# Patient Record
Sex: FEMALE | Race: BLACK OR AFRICAN AMERICAN | Employment: FULL TIME | URBAN - METROPOLITAN AREA
[De-identification: names, ages, dates, MRNs, and addresses within clinical notes are randomized per-mention and may not be internally consistent; named-entity substitution may affect disease eponyms.]

---

## 2017-02-02 ENCOUNTER — OFFICE VISIT (OUTPATIENT)
Dept: GYNECOLOGY | Age: 37
End: 2017-02-02

## 2017-02-02 VITALS
HEART RATE: 73 BPM | SYSTOLIC BLOOD PRESSURE: 115 MMHG | BODY MASS INDEX: 42.34 KG/M2 | HEIGHT: 64 IN | TEMPERATURE: 98.6 F | WEIGHT: 248 LBS | RESPIRATION RATE: 17 BRPM | DIASTOLIC BLOOD PRESSURE: 68 MMHG

## 2017-02-02 DIAGNOSIS — N89.8 VAGINAL IRRITATION: ICD-10-CM

## 2017-02-02 DIAGNOSIS — R87.811 VAGINAL HIGH RISK HPV DNA TEST POSITIVE: Primary | ICD-10-CM

## 2017-02-02 PROCEDURE — 99213 OFFICE O/P EST LOW 20 MIN: CPT | Performed by: OBSTETRICS & GYNECOLOGY

## 2017-02-02 RX ORDER — CLOBETASOL PROPIONATE 0.5 MG/G
OINTMENT TOPICAL NIGHTLY
Qty: 60 G | Refills: 2 | Status: SHIPPED | OUTPATIENT
Start: 2017-02-02 | End: 2017-12-12 | Stop reason: SDUPTHER

## 2017-02-03 ENCOUNTER — TELEPHONE (OUTPATIENT)
Dept: BARIATRICS/WEIGHT MGMT | Age: 37
End: 2017-02-03

## 2017-02-07 LAB
HPV COMMENT: ABNORMAL
HPV TYPE 16: NOT DETECTED
HPV TYPE 18: NOT DETECTED
HPVOH (OTHER TYPES): DETECTED

## 2017-05-16 ENCOUNTER — OFFICE VISIT (OUTPATIENT)
Dept: ORTHOPEDIC SURGERY | Age: 37
End: 2017-05-16

## 2017-05-16 VITALS — HEIGHT: 63 IN | BODY MASS INDEX: 49.61 KG/M2 | HEART RATE: 74 BPM | WEIGHT: 279.98 LBS | RESPIRATION RATE: 17 BRPM

## 2017-05-16 DIAGNOSIS — M25.512 ACUTE PAIN OF LEFT SHOULDER: Primary | ICD-10-CM

## 2017-05-16 PROCEDURE — 99203 OFFICE O/P NEW LOW 30 MIN: CPT | Performed by: ORTHOPAEDIC SURGERY

## 2017-05-26 ENCOUNTER — OFFICE VISIT (OUTPATIENT)
Dept: INTERNAL MEDICINE CLINIC | Age: 37
End: 2017-05-26

## 2017-05-26 VITALS
BODY MASS INDEX: 53.18 KG/M2 | WEIGHT: 289 LBS | OXYGEN SATURATION: 97 % | HEIGHT: 62 IN | DIASTOLIC BLOOD PRESSURE: 79 MMHG | SYSTOLIC BLOOD PRESSURE: 137 MMHG | HEART RATE: 97 BPM

## 2017-05-26 DIAGNOSIS — I10 ESSENTIAL HYPERTENSION: Primary | Chronic | ICD-10-CM

## 2017-05-26 DIAGNOSIS — I50.32 CHRONIC DIASTOLIC CHF (CONGESTIVE HEART FAILURE) (HCC): ICD-10-CM

## 2017-05-26 DIAGNOSIS — S43.402A SPRAIN OF LEFT SHOULDER, UNSPECIFIED SHOULDER SPRAIN TYPE, INITIAL ENCOUNTER: ICD-10-CM

## 2017-05-26 DIAGNOSIS — K21.9 GASTROESOPHAGEAL REFLUX DISEASE, ESOPHAGITIS PRESENCE NOT SPECIFIED: ICD-10-CM

## 2017-05-26 PROCEDURE — 99214 OFFICE O/P EST MOD 30 MIN: CPT | Performed by: INTERNAL MEDICINE

## 2017-05-26 RX ORDER — CHOLECALCIFEROL (VITAMIN D3) 125 MCG
500 CAPSULE ORAL DAILY
Qty: 30 TABLET | Refills: 3 | Status: SHIPPED | OUTPATIENT
Start: 2017-05-26 | End: 2018-09-14

## 2017-05-26 RX ORDER — CALCIUM CARBONATE 200(500)MG
2 TABLET,CHEWABLE ORAL 2 TIMES DAILY WITH MEALS
Qty: 360 TABLET | Refills: 1 | Status: SHIPPED | OUTPATIENT
Start: 2017-05-26 | End: 2018-06-06 | Stop reason: SDUPTHER

## 2017-05-26 RX ORDER — AMLODIPINE BESYLATE 10 MG/1
TABLET ORAL
Qty: 90 TABLET | Refills: 1 | Status: SHIPPED | OUTPATIENT
Start: 2017-05-26 | End: 2018-08-01 | Stop reason: SDUPTHER

## 2017-05-26 RX ORDER — DULOXETIN HYDROCHLORIDE 60 MG/1
CAPSULE, DELAYED RELEASE ORAL
Qty: 30 CAPSULE | Refills: 3 | Status: SHIPPED | OUTPATIENT
Start: 2017-05-26 | End: 2017-12-18 | Stop reason: SDUPTHER

## 2017-05-26 RX ORDER — ACETAMINOPHEN AND CODEINE PHOSPHATE 300; 30 MG/1; MG/1
TABLET ORAL
Qty: 30 TABLET | Refills: 0 | Status: SHIPPED | OUTPATIENT
Start: 2017-05-26 | End: 2017-12-12

## 2017-05-26 RX ORDER — METFORMIN HYDROCHLORIDE 500 MG/1
1000 TABLET, EXTENDED RELEASE ORAL 2 TIMES DAILY WITH MEALS
Qty: 120 TABLET | Refills: 3 | Status: SHIPPED | OUTPATIENT
Start: 2017-05-26 | End: 2018-09-21 | Stop reason: SDUPTHER

## 2017-05-26 RX ORDER — BUMETANIDE 1 MG/1
1 TABLET ORAL 2 TIMES DAILY
Qty: 180 TABLET | Refills: 1 | Status: SHIPPED | OUTPATIENT
Start: 2017-05-26 | End: 2017-12-12 | Stop reason: SDUPTHER

## 2017-05-26 RX ORDER — SPIRONOLACTONE 100 MG/1
100 TABLET, FILM COATED ORAL DAILY
Qty: 90 TABLET | Refills: 1 | Status: SHIPPED | OUTPATIENT
Start: 2017-05-26 | End: 2017-11-27 | Stop reason: SDUPTHER

## 2017-05-26 RX ORDER — FLUTICASONE PROPIONATE 50 MCG
2 SPRAY, SUSPENSION (ML) NASAL DAILY
Qty: 1 BOTTLE | Refills: 5 | Status: SHIPPED | OUTPATIENT
Start: 2017-05-26 | End: 2018-07-31 | Stop reason: SDUPTHER

## 2017-05-26 RX ORDER — CALCIUM CARBONATE 300MG(750)
1 TABLET,CHEWABLE ORAL DAILY
Qty: 90 TABLET | Refills: 3 | Status: SHIPPED | OUTPATIENT
Start: 2017-05-26 | End: 2018-08-01 | Stop reason: SDUPTHER

## 2017-05-26 RX ORDER — LANSOPRAZOLE 30 MG/1
CAPSULE, DELAYED RELEASE ORAL
Qty: 90 CAPSULE | Refills: 1 | Status: SHIPPED | OUTPATIENT
Start: 2017-05-26 | End: 2018-06-06 | Stop reason: SDUPTHER

## 2017-05-26 RX ORDER — AMITRIPTYLINE HYDROCHLORIDE 25 MG/1
25 TABLET, FILM COATED ORAL NIGHTLY
Qty: 90 TABLET | Refills: 1 | Status: SHIPPED | OUTPATIENT
Start: 2017-05-26 | End: 2018-07-31

## 2017-05-26 ASSESSMENT — ENCOUNTER SYMPTOMS
ROS SKIN COMMENTS: + HIRSUTISM
APNEA: 1
WHEEZING: 0
COUGH: 0
GASTROINTESTINAL NEGATIVE: 1
SHORTNESS OF BREATH: 1

## 2017-05-30 ENCOUNTER — OFFICE VISIT (OUTPATIENT)
Dept: ORTHOPEDIC SURGERY | Age: 37
End: 2017-05-30

## 2017-05-30 VITALS
RESPIRATION RATE: 16 BRPM | HEIGHT: 62 IN | BODY MASS INDEX: 53.18 KG/M2 | SYSTOLIC BLOOD PRESSURE: 136 MMHG | WEIGHT: 289 LBS | DIASTOLIC BLOOD PRESSURE: 88 MMHG

## 2017-05-30 DIAGNOSIS — S40.022A CONTUSION SHOULDER/ARM, LEFT, INITIAL ENCOUNTER: Primary | ICD-10-CM

## 2017-05-30 DIAGNOSIS — S40.012A CONTUSION SHOULDER/ARM, LEFT, INITIAL ENCOUNTER: Primary | ICD-10-CM

## 2017-05-30 DIAGNOSIS — E66.01 MORBID OBESITY WITH BMI OF 50.0-59.9, ADULT (HCC): Chronic | ICD-10-CM

## 2017-05-30 PROCEDURE — 99213 OFFICE O/P EST LOW 20 MIN: CPT | Performed by: ORTHOPAEDIC SURGERY

## 2017-06-06 ENCOUNTER — TELEPHONE (OUTPATIENT)
Dept: SLEEP MEDICINE | Age: 37
End: 2017-06-06

## 2017-11-27 ENCOUNTER — OFFICE VISIT (OUTPATIENT)
Dept: ORTHOPEDIC SURGERY | Age: 37
End: 2017-11-27

## 2017-11-27 VITALS
RESPIRATION RATE: 18 BRPM | SYSTOLIC BLOOD PRESSURE: 135 MMHG | WEIGHT: 292 LBS | HEIGHT: 63 IN | HEART RATE: 68 BPM | DIASTOLIC BLOOD PRESSURE: 81 MMHG | BODY MASS INDEX: 51.74 KG/M2

## 2017-11-27 DIAGNOSIS — M75.82 TENDINITIS OF LEFT ROTATOR CUFF: Primary | ICD-10-CM

## 2017-11-27 PROCEDURE — G8417 CALC BMI ABV UP PARAM F/U: HCPCS | Performed by: ORTHOPAEDIC SURGERY

## 2017-11-27 PROCEDURE — 99213 OFFICE O/P EST LOW 20 MIN: CPT | Performed by: ORTHOPAEDIC SURGERY

## 2017-11-27 PROCEDURE — 1036F TOBACCO NON-USER: CPT | Performed by: ORTHOPAEDIC SURGERY

## 2017-11-27 PROCEDURE — G8484 FLU IMMUNIZE NO ADMIN: HCPCS | Performed by: ORTHOPAEDIC SURGERY

## 2017-11-27 PROCEDURE — G8427 DOCREV CUR MEDS BY ELIG CLIN: HCPCS | Performed by: ORTHOPAEDIC SURGERY

## 2017-11-27 RX ORDER — SPIRONOLACTONE 100 MG/1
100 TABLET, FILM COATED ORAL DAILY
Qty: 90 TABLET | Refills: 1 | Status: SHIPPED | OUTPATIENT
Start: 2017-11-27 | End: 2018-06-06 | Stop reason: SDUPTHER

## 2017-11-27 NOTE — PROGRESS NOTES
CHIEF COMPLAINT: Left shoulder pain    DATE OF INJURY: 5/16/17    History:    Zena Parmar is a 42 y.o. morbidly obese right handed Black female here for left shoulder follow up. Initial history:  [facility] for evaluation and treatment of Left shoulder pain. This is evaluated as a personal injury. The pain is described as aching and throbbing. The pain began 11 hours ago. There was an injury. She states that she accidentally rolled out of bed while asleep and landed directly on the left shoulder. She was seen in the ER this morning She was diagnosed with a shoulder sprain and recommended orthopaedic follow up. She thinks they mis-diagnosed her in the ER and wanted follow up today. Pain is rated as a 10/10 . Pain is located global.  The symptoms are worse with all activities. Symptoms improve with nothing. Limited activities include all activities. No stiffness, no weakness reported. The patient has not had PT. The patient has not had an injection. The patient has not tried NSAIDs. 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 Interval History:  She was last seen about 6 months ago. She did not follow up. She was hoping the shoulder would get better on it's own. Rates pain 2-8/10. Pain still located latera. Worse with reaching. Physical Examination:      Vital signs:  /81   Pulse 68   Resp 18   Ht 5' 2.99\" (1.6 m)   Wt 292 lb (132.5 kg)   LMP 05/26/2014 (Exact Date)   BMI 51.74 kg/m²     General:   alert, appears stated age, cooperative and no distress   Left Shoulder   Active ROM:   forward flexion 135, external rotation 80, internal rotation L4. Right shoulder: forward flexion 180, external rotation 80, internal rotation L4.       Passive ROM:  forward flexion 180   Joint Tenderness:   lateral acroimal   Neer:   positive   Ele:   mildly positive   Strength:   5/5 Supraspinatus, External rotation, Internal rotation    Right shoulder: 5/5 Supraspinatus, External rotation, Internal rotation    Drop-arm test:   negative   Belly-press test:   negative   Bear-hug test:   negative   Speed's test:   negative   Bicipital groove tenderness:  positive   Duffy's test:   not tested   Cross-body adduction test:   negative   AC joint tenderness:   positive     There are no skin lesions, cellulitis, or extreme edema in the upper extremities. Sensation is grossly intact to light touch bilaterally upper extremity. The patient has warm and well-perfused Bilateral upper extremities with brisk capillary refill. Assessment:      Left shoulder rotator cuff tendonitis      Plan:      Ice / heat prn. NSAIDs prn. PT referral.    Offered injection. She deferred. Follow up in 2 months. Call if no improvement with PT after 4-6 weeks and I will order MRI.

## 2017-12-12 ENCOUNTER — OFFICE VISIT (OUTPATIENT)
Dept: CARDIOLOGY CLINIC | Age: 37
End: 2017-12-12

## 2017-12-12 VITALS
DIASTOLIC BLOOD PRESSURE: 74 MMHG | OXYGEN SATURATION: 97 % | HEART RATE: 75 BPM | SYSTOLIC BLOOD PRESSURE: 118 MMHG | BODY MASS INDEX: 50.54 KG/M2 | HEIGHT: 63 IN | WEIGHT: 285.25 LBS

## 2017-12-12 DIAGNOSIS — R06.02 SHORTNESS OF BREATH: ICD-10-CM

## 2017-12-12 DIAGNOSIS — R07.9 CHEST PAIN IN ADULT: Primary | ICD-10-CM

## 2017-12-12 PROCEDURE — G8417 CALC BMI ABV UP PARAM F/U: HCPCS | Performed by: INTERNAL MEDICINE

## 2017-12-12 PROCEDURE — 1036F TOBACCO NON-USER: CPT | Performed by: INTERNAL MEDICINE

## 2017-12-12 PROCEDURE — G8427 DOCREV CUR MEDS BY ELIG CLIN: HCPCS | Performed by: INTERNAL MEDICINE

## 2017-12-12 PROCEDURE — 93000 ELECTROCARDIOGRAM COMPLETE: CPT | Performed by: INTERNAL MEDICINE

## 2017-12-12 PROCEDURE — G8484 FLU IMMUNIZE NO ADMIN: HCPCS | Performed by: INTERNAL MEDICINE

## 2017-12-12 PROCEDURE — 99215 OFFICE O/P EST HI 40 MIN: CPT | Performed by: INTERNAL MEDICINE

## 2017-12-12 NOTE — PATIENT INSTRUCTIONS
adult-strength or 2 to 4 low-dose aspirin. Wait for an ambulance. Do not try to drive yourself. ?Call your doctor today if:  ? · You have any trouble breathing. ? · Your chest pain gets worse. ? · You are dizzy or lightheaded, or you feel like you may faint. ? · You are not getting better as expected. ? · You are having new or different chest pain. Where can you learn more? Go to https://Sefas Innovation.Advaxis. org and sign in to your SiphonLabs account. Enter A120 in the LogicSource box to learn more about \"Chest Pain: Care Instructions. \"     If you do not have an account, please click on the \"Sign Up Now\" link. Current as of: March 20, 2017  Content Version: 11.4  © 6400-9986 YogiPlay. Care instructions adapted under license by [facility] ([facility]). If you have questions about a medical condition or this instruction, always ask your healthcare professional. [place] [address] any warranty or liability for your use of this information. Patient Education        Angina: Care Instructions  Your Care Instructions    You have a problem called angina. Angina happens when there is not enough blood flow to your heart muscle. Angina is a sign of coronary artery disease (CAD). CAD occurs when blood vessels that supply the heart become narrowed. Having CAD increases your risk of a heart attack. Chest pain or pressure is the most common symptom of angina. But some people have other symptoms, like:  · Pain, pressure, or a strange feeling in the back, neck, jaw, or upper belly, or in one or both shoulders or arms. · Shortness of breath. · Nausea or vomiting. · Lightheadedness or sudden weakness. · Fast or irregular heartbeat. Women are somewhat more likely than men to have angina symptoms like shortness of breath, nausea, and back or jaw pain. Angina can be dangerous. That's why it is important to pay attention to your symptoms.  Know what is typical for you, learn call if:  ? · You passed out (lost consciousness). ? · You have symptoms of a heart attack. These may include:  ¨ Chest pain or pressure, or a strange feeling in the chest.  ¨ Sweating. ¨ Shortness of breath. ¨ Nausea or vomiting. ¨ Pain, pressure, or a strange feeling in the back, neck, jaw, or upper belly or in one or both shoulders or arms. ¨ Lightheadedness or sudden weakness. ¨ A fast or irregular heartbeat. After you call 911, the  may tell you to chew 1 adult-strength or 2 to 4 low-dose aspirin. Wait for an ambulance. Do not try to drive yourself. ? · You have angina symptoms that do not go away with rest or are not getting better within 5 minutes after you take a dose of nitroglycerin. ?Call your doctor now or seek immediate medical care if:  ? · You are having angina symptoms more often than usual, or they are different or worse than usual.   ? · You feel dizzy or lightheaded, or you feel like you may faint. ? Watch closely for changes in your health, and be sure to contact your doctor if you have any problems. Where can you learn more? Go to https://Amplience.Top100.cn. org and sign in to your Unutility Electric account. Enter H129 in the Stitcher box to learn more about \"Angina: Care Instructions. \"     If you do not have an account, please click on the \"Sign Up Now\" link. Current as of: September 21, 2016  Content Version: 11.4  © 4550-2409 Healthwise, Populr. Care instructions adapted under license by [facility] ([facility]). If you have questions about a medical condition or this instruction, always ask your healthcare professional. [place] [address] any warranty or liability for your use of this information.

## 2017-12-12 NOTE — PROGRESS NOTES
(129.4 kg)   LMP 05/26/2014 (Exact Date)   SpO2 97%   BMI 50.53 kg/m²    HEENT:  Face: Atraumatic, Conjunctiva: Pink; non icteric,  Mucous Memb:  Moist, No thyromegaly or Lymphadenopathy  Respiratory:  Resp Assessment: normal, Resp Auscultation: clear  Cardiovascular: Auscultation: nl S1 & S2, Palpation:  Nl PMI; No heaves or thrills, JVP:  normal  Abdomen: Soft, non-tender, Normal bowel sounds,  No organomegaly  Extremities: No Cyanosis or Clubbing  Neurological: Oriented to time, place, and person, Non-anxious  Psychiatric: Normal mood and affect  Skin: Warm and dry,  No rash seen     Outpatient Prescriptions Marked as Taking for the 12/12/17 encounter (Office Visit) with Mira Jose MD   Medication Sig Dispense Refill    spironolactone (ALDACTONE) 100 MG tablet Take 1 tablet by mouth daily 90 tablet 1    DULoxetine (CYMBALTA) 60 MG extended release capsule TAKE 1 CAPSULE BY MOUTH EVERY DAY 30 capsule 3    lansoprazole (PREVACID) 30 MG delayed release capsule TAKE ONE CAPSULE BY MOUTH DAILY 90 capsule 1    Magnesium 400 MG TABS Take 1 tablet by mouth daily 90 tablet 3    amLODIPine (NORVASC) 10 MG tablet TAKE 1 TABLET BY MOUTH EVERY DAY 90 tablet 1    amitriptyline (ELAVIL) 25 MG tablet Take 1 tablet by mouth nightly 90 tablet 1    Psyllium (METAMUCIL MULTIHEALTH FIBER) 63 % POWD Take 7.5 g by mouth 2 times daily Dissolve in a large glass of liquid 1 Bottle 3    metFORMIN (GLUCOPHAGE-XR) 500 MG extended release tablet Take 2 tablets by mouth 2 times daily (with meals) 120 tablet 3    Cholecalciferol 2000 UNITS TABS Take 1 tablet by mouth daily Take 1 tablet by mouth daily.  90 tablet 1    calcium carbonate (ANTACID) 500 MG chewable tablet Take 2 tablets by mouth 2 times daily (with meals) 360 tablet 1    vitamin B-12 (CYANOCOBALAMIN) 500 MCG tablet Take 1 tablet by mouth daily 30 tablet 3    fluticasone (FLONASE) 50 MCG/ACT nasal spray 2 sprays by Nasal route daily 1 Bottle 5    loratadine (CLARITIN) 10 MG tablet Take 1 tablet by mouth daily 90 tablet 1    bumetanide (BUMEX) 1 MG tablet Take 1 tablet by mouth daily 90 tablet 3    nystatin (MYCOSTATIN) 018050 UNIT/GM powder Apply 2 times daily. 1 Bottle 2    clobetasol (TEMOVATE) 0.05 % ointment Apply topically nightly 1 Tube 3    Lactobacillus (ACIDOPHILUS PROBIOTIC) TABS Take 1 tablet by mouth 2 times daily 60 tablet 5    EPIPEN 2-CLARY 0.3 MG/0.3ML SOAJ injection   2       Labs:   Lab Results   Component Value Date    HDL 39 2016    LDLCALC 177 2016    TRIG 129 2016     EK17: reviewed    ECHO:2016  Technically limited study due to body habitus.     Normal left ventricle size, wall thickness and systolic function with an EF   of 55%.     Reversal of E/A inflow velocities across the mitral valve suggesting   impaired left ventricular relaxation.     Trivial tricuspid regurgitation with RVSP estimated at 19 mmHg. Stress Nuclear: 12 ([facility])  FINDINGS:  Resting ECG showed sinus rhythm.  The patient underwent   Lexiscan infusion  under standard protocol.  There were no ST segment changes diagnostic of   ischemia.  There  were no significant atrioventricular arrhythmias identified.  Heart rate   during the  Lexiscan infusion was 71 beats per minute.  Blood pressure was 182/92. IMAGING FINDINGS:  Stress Images:   Attenuation corrected images demonstrate a perfusion defect along the anterior wall and apex.  This is not present on the uncorrected images however. Rest images:   Normal perfusion with homogeneous uptake throughout. LVEF:  63% .       (Normal is 50% or greater). LV wall motion:   Normal  LVEDV:   152 mL        (Normal is up to 100ml for women and 150ml for men). Transient dilatation ratio:  1.6.         (Normal is up to 1.3 for women and 1.2 for men).     Corornary angiogram  & Intervention: 2012 ([facility]gr[facility])  CORONARY ANGIOGRAPHY FINDINGS    DOMINANCE:  Right dominant        LEFT MAIN:

## 2017-12-18 RX ORDER — DULOXETIN HYDROCHLORIDE 60 MG/1
CAPSULE, DELAYED RELEASE ORAL
Qty: 30 CAPSULE | Refills: 0 | Status: SHIPPED | OUTPATIENT
Start: 2017-12-18 | End: 2018-03-01 | Stop reason: SDUPTHER

## 2018-01-09 DIAGNOSIS — N89.8 VAGINAL IRRITATION: ICD-10-CM

## 2018-01-09 RX ORDER — CLOBETASOL PROPIONATE 0.5 MG/G
OINTMENT TOPICAL
Qty: 60 G | Refills: 0 | Status: SHIPPED | OUTPATIENT
Start: 2018-01-09 | End: 2018-06-06 | Stop reason: SDUPTHER

## 2018-01-16 ENCOUNTER — TELEPHONE (OUTPATIENT)
Dept: GYNECOLOGY | Age: 38
End: 2018-01-16

## 2018-01-17 ENCOUNTER — OFFICE VISIT (OUTPATIENT)
Dept: ORTHOPEDIC SURGERY | Age: 38
End: 2018-01-17

## 2018-01-17 VITALS — BODY MASS INDEX: 50.5 KG/M2 | HEIGHT: 63 IN | WEIGHT: 285 LBS

## 2018-01-17 DIAGNOSIS — M79.671 RIGHT FOOT PAIN: Primary | ICD-10-CM

## 2018-01-17 PROCEDURE — 1036F TOBACCO NON-USER: CPT | Performed by: ORTHOPAEDIC SURGERY

## 2018-01-17 PROCEDURE — 99214 OFFICE O/P EST MOD 30 MIN: CPT | Performed by: ORTHOPAEDIC SURGERY

## 2018-01-17 PROCEDURE — G8484 FLU IMMUNIZE NO ADMIN: HCPCS | Performed by: ORTHOPAEDIC SURGERY

## 2018-01-17 PROCEDURE — G8427 DOCREV CUR MEDS BY ELIG CLIN: HCPCS | Performed by: ORTHOPAEDIC SURGERY

## 2018-01-17 PROCEDURE — G8417 CALC BMI ABV UP PARAM F/U: HCPCS | Performed by: ORTHOPAEDIC SURGERY

## 2018-01-17 PROCEDURE — L3040 FT ARCH SUPRT PREMOLD LONGIT: HCPCS | Performed by: ORTHOPAEDIC SURGERY

## 2018-01-17 RX ORDER — PREDNISONE 10 MG/1
10 TABLET ORAL DAILY
Qty: 30 TABLET | Refills: 0 | Status: SHIPPED | OUTPATIENT
Start: 2018-01-17 | End: 2018-06-06 | Stop reason: ALTCHOICE

## 2018-01-17 NOTE — PATIENT INSTRUCTIONS
knees straight, slowly lift your heels above the step so that you are standing on your toes. Then slowly lower your heels below the step and toward the floor. 3. Return to the starting position, with your feet even with the step. 4. Repeat 8 to 12 times. Follow-up care is a key part of your treatment and safety. Be sure to make and go to all appointments, and call your doctor if you are having problems. It's also a good idea to know your test results and keep a list of the medicines you take. Where can you learn more? Go to https://AntVoice.Moodlerooms. org and sign in to your Peopleclick Authoria account. Enter H119 in the Yodio box to learn more about \"Arch Pain: Exercises. \"     If you do not have an account, please click on the \"Sign Up Now\" link. Current as of: March 21, 2017  Content Version: 11.5  © 2043-3533 Healthwise, Incorporated. Care instructions adapted under license by [facility] ([facility]). If you have questions about a medical condition or this instruction, always ask your healthcare professional. [place] [address] any warranty or liability for your use of this information.

## 2018-01-17 NOTE — PROGRESS NOTES
Chief Complaint    No chief complaint on file. History of Present Illness:  Indiana Cao is a 42 y.o. female. She is here for evaluation of her right foot. She's had lateral border right foot pain that is been going on now for roughly 6 weeks. She had no injury to her foot which onset of the pain. She does work in a [employer] and has to stand throughout the entire day and thinks that this is what onset of her pain. It is painful when she is standing on it is also painful at rest.  She denies radicular pain. Denies numbness or tingling. Medical History:  Patient's medications, allergies, past medical, surgical, social and family histories were reviewed and updated as appropriate. Review of Systems:  Pertinent items are noted in HPI  Review of systems reviewed from Patient History Form dated on 1/17/18 and available in the patient's chart under the Media tab. Vital Signs:  Ht 5' 3\" (1.6 m)   Wt 285 lb (129.3 kg)   LMP 05/26/2014 (Exact Date)   BMI 50.49 kg/m²     General Exam:   Constitutional: Patient is adequately groomed with no evidence of malnutrition  DTRs: Deep tendon reflexes are intact  Mental Status: The patient is oriented to time, place and person. The patient's mood and affect are appropriate. Foot Examination:    Inspection:  She is an obese female with a BMI 50. No swelling or erythema noted about the right foot. She does have an overall flat foot alignment    Palpation:  There is tenderness to palpation over the plantar aspect of the 5th metatarsal.  She does have callus in this area on the plantar aspect of the foot. There is no break in the skin    Range of Motion:  Dorsiflexion of her right ankle is to neutral    Strength:  She does have good strength with resisted plantarflexion and dorsiflexion    Special Tests:  Negative Felix test.  Negative Homans sign    Skin: There are no rashes, ulcerations or lesions.     Gait: She is walking with an antalgic gait    Reflex 2+ and symmetric    Additional Comments:       Additional Examinations:         Left Lower Extremity: Examination of the left lower extremity does not show any tenderness, deformity or injury. Range of motion is unremarkable. There is no gross instability. There are no rashes, ulcerations or lesions. Strength and tone are normal.    Radiology:     X-rays from the emergency department:  FINDINGS:   There is no evidence of acute fracture.  There is normal alignment of the   tarsometatarsal joints.  No acute joint abnormality.  No focal osseous   lesion.  There is moderate dorsal soft tissue swelling. Assessment :  Right foot pain with overloading of the lateral border of the foot    Impression:  Encounter Diagnosis   Name Primary?  Right foot pain Yes       Office Procedures:  No orders of the defined types were placed in this encounter. Treatment Plan: We are going to get her into a pair power step orthotics today. I'm going to place her onto prednisone. Also did give her some stretching exercises to help with her ankle flexibility. I will see her back in 4-6 weeks to make sure she is getting improvement.

## 2018-01-24 ENCOUNTER — HOSPITAL ENCOUNTER (OUTPATIENT)
Dept: PHYSICAL THERAPY | Age: 38
Discharge: HOME | End: 2018-01-31
Admitting: ORTHOPAEDIC SURGERY

## 2018-01-24 ASSESSMENT — PAIN SCALES - GENERAL: PAINLEVEL_OUTOF10: 4

## 2018-01-24 ASSESSMENT — PAIN DESCRIPTION - LOCATION: LOCATION: SHOULDER

## 2018-01-24 ASSESSMENT — PAIN DESCRIPTION - FREQUENCY: FREQUENCY: CONTINUOUS

## 2018-01-24 ASSESSMENT — PAIN DESCRIPTION - ORIENTATION: ORIENTATION: LEFT

## 2018-01-24 ASSESSMENT — PAIN DESCRIPTION - PAIN TYPE: TYPE: CHRONIC PAIN

## 2018-01-24 ASSESSMENT — PAIN DESCRIPTION - DESCRIPTORS: DESCRIPTORS: BURNING;CONSTANT

## 2018-01-24 NOTE — PROGRESS NOTES
Treatment Recommendations: Strengthening, ROM, Functional Mobility Training, Neuromuscular Re-education, Manual Therapy - Soft Tissue Mobilization, Home Exercise Program, Manual Therapy - Joint Manipulation    G-Code  PT G-Codes  Functional Assessment Tool Used: quick dash  Score: 23  Functional Limitation: Carrying, moving and handling objects  Carrying, Moving and Handling Objects Current Status (): At least 20 percent but less than 40 percent impaired, limited or restricted  Carrying, Moving and Handling Objects Goal Status (): 0 percent impaired, limited or restricted    OutComes Score  QuickDASH Total Score: 23       QuickDASH Disability/Symptom Score : 27.27 %      Goals  Long term goals  Time Frame for Long term goals : 6 weeks  Long term goal 1: Pt will have full ROM in L shoulder for return to PLOF  Long term goal 2: Pt will have full strength for ADL's   Long term goal 3: Pt will report that she is able to sleep her normal amount without waking wlith pain in her shoulder.    Patient Goals   Patient goals : full movement and no pain       Therapy Time   Individual Concurrent Group Co-treatment   Time In 1140         Time Out 1221         Minutes 41         Timed Code Treatment Minutes: 32 Minutes       Cherylene Cam, PT

## 2018-02-01 ENCOUNTER — HOSPITAL ENCOUNTER (OUTPATIENT)
Dept: OTHER | Age: 38
Discharge: HOME | End: 2018-02-28
Attending: ORTHOPAEDIC SURGERY | Admitting: ORTHOPAEDIC SURGERY

## 2018-02-04 RX ORDER — LORATADINE 10 MG/1
10 TABLET ORAL DAILY
Qty: 90 TABLET | Refills: 0 | Status: SHIPPED | OUTPATIENT
Start: 2018-02-04 | End: 2018-07-31 | Stop reason: SDUPTHER

## 2018-02-05 ENCOUNTER — HOSPITAL ENCOUNTER (OUTPATIENT)
Dept: PHYSICAL THERAPY | Age: 38
Discharge: HOME | End: 2018-02-06
Admitting: ORTHOPAEDIC SURGERY

## 2018-02-05 NOTE — FLOWSHEET NOTE
Physical Therapy Daily Treatment Note  Date:  2018    Patient Name:  Lissette Wilson    :  1980  MRN: [id number]  Restrictions/Precautions:  LATEX ALLERGY  Medical/Treatment Diagnosis Information:   · Diagnosis: tendonitis L RC  · Treatment Diagnosis: decreased ROM and strength in L shoulder    Tracking Information:  Physician Information Referring Practitioner: Dr Tj Justice of Care Sent Date: 18 Signed Received:    Visit Count / Total Visits      Insurance Approved Visits  /  Approved Dates:     Insurance Information PT Insurance Information: [facility]    Progress Note/G-codes   []  Yes  [x]  No Next Due:      Pain level: 2/10     Subjective: Pain was worse after last visit, had pain into left neck and down arm more. Return to MD   Objective:   Observation:   Test measurements:    None this date    Exercises:  Exercise/Equipment Resistance/Repetitions Other comments   UBE 2'/2' frd/back SCI fit   pullies  L flex 15x     /FM   Mid row: 1 x 15  20#  High row 1 x 15  20#  LPD 1 x 15   20#     ER L: 1x10 5#           Increase wt next visit   Wall/standing Wall:   LT wall slide 10x with intermittent TCs/VCs to recruit LT    Standing: L AAROM flex with LT recuirtment: 5x 2/  Pt improved recruitment of LT    : reviewed cues needed   Mat  Serratus punches: 10x  scap circles: 10x ea cw/ccw     S/l: L ER 15x S/l L shoulder AB 10x with t   Some burning sensation in L axilla.                                                      Other Therapeutic Activities:   review HEP with min cues- issued orange and green LATEX FREE tband    Home Exercise Program:   Provided h/o for HEP wall slides,  SL ER and ABD, supine scap punches and circles     : scap squeeze x 10, sh flex/abd with bar x 10, LPD/ High/mid row x 10 with green band pt issued green band    Manual Treatments:      Modalities: ,  ice bag to go    Timed Code Treatment Minutes:   30    Total Treatment Minutes: 30    Treatment/Activity Tolerance:  [x] Patient tolerated treatment well [] Patient limited by fatigue  [] Patient limited by pain  [] Patient limited by other medical complications  [] Other:     Prognosis: [x] Good [] Fair  [] Poor    Patient Requires Follow-up: [x] Yes  [] No    Plan:   [x] Continue per plan of care [] Alter current plan (see comments)  [] Plan of care initiated [] Hold pending MD visit [] Discharge  Plan for Next Session:  Strengthening, stretching, endurance training    Electronically signed by:  JUNE MarchT

## 2018-03-01 ENCOUNTER — HOSPITAL ENCOUNTER (OUTPATIENT)
Dept: OTHER | Age: 38
Discharge: HOME | End: 2018-03-31
Attending: ORTHOPAEDIC SURGERY | Admitting: ORTHOPAEDIC SURGERY

## 2018-03-01 RX ORDER — DULOXETIN HYDROCHLORIDE 60 MG/1
CAPSULE, DELAYED RELEASE ORAL
Qty: 30 CAPSULE | Refills: 3 | Status: SHIPPED | OUTPATIENT
Start: 2018-03-01 | End: 2018-08-03 | Stop reason: SDUPTHER

## 2018-03-12 ENCOUNTER — OFFICE VISIT (OUTPATIENT)
Dept: ORTHOPEDIC SURGERY | Age: 38
End: 2018-03-12

## 2018-03-12 VITALS
WEIGHT: 285.06 LBS | BODY MASS INDEX: 50.51 KG/M2 | DIASTOLIC BLOOD PRESSURE: 78 MMHG | HEART RATE: 79 BPM | RESPIRATION RATE: 17 BRPM | SYSTOLIC BLOOD PRESSURE: 121 MMHG | HEIGHT: 63 IN

## 2018-03-12 DIAGNOSIS — E66.01 MORBID OBESITY WITH BMI OF 50.0-59.9, ADULT (HCC): Chronic | ICD-10-CM

## 2018-03-12 DIAGNOSIS — M75.82 TENDINITIS OF LEFT ROTATOR CUFF: Primary | ICD-10-CM

## 2018-03-12 PROCEDURE — G8417 CALC BMI ABV UP PARAM F/U: HCPCS | Performed by: ORTHOPAEDIC SURGERY

## 2018-03-12 PROCEDURE — G8484 FLU IMMUNIZE NO ADMIN: HCPCS | Performed by: ORTHOPAEDIC SURGERY

## 2018-03-12 PROCEDURE — G8427 DOCREV CUR MEDS BY ELIG CLIN: HCPCS | Performed by: ORTHOPAEDIC SURGERY

## 2018-03-12 PROCEDURE — 99213 OFFICE O/P EST LOW 20 MIN: CPT | Performed by: ORTHOPAEDIC SURGERY

## 2018-03-12 PROCEDURE — 1036F TOBACCO NON-USER: CPT | Performed by: ORTHOPAEDIC SURGERY

## 2018-04-09 RX ORDER — LANSOPRAZOLE 30 MG/1
CAPSULE, DELAYED RELEASE ORAL
Qty: 90 CAPSULE | Refills: 0 | OUTPATIENT
Start: 2018-04-09

## 2018-06-06 ENCOUNTER — OFFICE VISIT (OUTPATIENT)
Dept: INTERNAL MEDICINE CLINIC | Age: 38
End: 2018-06-06

## 2018-06-06 VITALS
RESPIRATION RATE: 20 BRPM | DIASTOLIC BLOOD PRESSURE: 78 MMHG | SYSTOLIC BLOOD PRESSURE: 136 MMHG | BODY MASS INDEX: 51.91 KG/M2 | HEIGHT: 63 IN | HEART RATE: 76 BPM | WEIGHT: 293 LBS

## 2018-06-06 DIAGNOSIS — E66.01 MORBID OBESITY WITH BMI OF 50.0-59.9, ADULT (HCC): Chronic | ICD-10-CM

## 2018-06-06 DIAGNOSIS — R73.03 PRE-DIABETES: ICD-10-CM

## 2018-06-06 DIAGNOSIS — G56.03 BILATERAL CARPAL TUNNEL SYNDROME: ICD-10-CM

## 2018-06-06 DIAGNOSIS — I10 ESSENTIAL HYPERTENSION: Primary | Chronic | ICD-10-CM

## 2018-06-06 DIAGNOSIS — I50.32 CHRONIC DIASTOLIC CHF (CONGESTIVE HEART FAILURE) (HCC): ICD-10-CM

## 2018-06-06 DIAGNOSIS — M76.61 ACHILLES TENDINITIS OF RIGHT LOWER EXTREMITY: Primary | ICD-10-CM

## 2018-06-06 DIAGNOSIS — Z72.89 OTHER PROBLEMS RELATED TO LIFESTYLE: ICD-10-CM

## 2018-06-06 PROCEDURE — G8417 CALC BMI ABV UP PARAM F/U: HCPCS | Performed by: INTERNAL MEDICINE

## 2018-06-06 PROCEDURE — 90732 PPSV23 VACC 2 YRS+ SUBQ/IM: CPT | Performed by: INTERNAL MEDICINE

## 2018-06-06 PROCEDURE — G8427 DOCREV CUR MEDS BY ELIG CLIN: HCPCS | Performed by: INTERNAL MEDICINE

## 2018-06-06 PROCEDURE — 90471 IMMUNIZATION ADMIN: CPT | Performed by: INTERNAL MEDICINE

## 2018-06-06 PROCEDURE — 1036F TOBACCO NON-USER: CPT | Performed by: INTERNAL MEDICINE

## 2018-06-06 PROCEDURE — 99214 OFFICE O/P EST MOD 30 MIN: CPT | Performed by: INTERNAL MEDICINE

## 2018-06-06 RX ORDER — BUMETANIDE 1 MG/1
TABLET ORAL
Qty: 180 TABLET | Refills: 3 | Status: SHIPPED | OUTPATIENT
Start: 2018-06-06 | End: 2020-01-31

## 2018-06-06 RX ORDER — POLYETHYLENE GLYCOL 3350 17 G/17G
17 POWDER ORAL DAILY
Qty: 1 BOTTLE | Refills: 5 | Status: SHIPPED | OUTPATIENT
Start: 2018-06-06 | End: 2020-01-31 | Stop reason: SDUPTHER

## 2018-06-06 RX ORDER — SPIRONOLACTONE 100 MG/1
TABLET, FILM COATED ORAL
Qty: 270 TABLET | Refills: 3 | Status: SHIPPED | OUTPATIENT
Start: 2018-06-06 | End: 2018-10-15 | Stop reason: SDUPTHER

## 2018-06-06 RX ORDER — CALCIUM CARBONATE 200(500)MG
1 TABLET,CHEWABLE ORAL
Qty: 200 TABLET | Refills: 5 | Status: SHIPPED | OUTPATIENT
Start: 2018-06-06 | End: 2020-01-31

## 2018-06-06 RX ORDER — LANSOPRAZOLE 30 MG/1
CAPSULE, DELAYED RELEASE ORAL
Qty: 90 CAPSULE | Refills: 1 | Status: SHIPPED | OUTPATIENT
Start: 2018-06-06 | End: 2018-09-21 | Stop reason: SDUPTHER

## 2018-06-06 ASSESSMENT — PATIENT HEALTH QUESTIONNAIRE - PHQ9
1. LITTLE INTEREST OR PLEASURE IN DOING THINGS: 0
SUM OF ALL RESPONSES TO PHQ9 QUESTIONS 1 & 2: 0
2. FEELING DOWN, DEPRESSED OR HOPELESS: 0
SUM OF ALL RESPONSES TO PHQ QUESTIONS 1-9: 0

## 2018-06-07 ASSESSMENT — ENCOUNTER SYMPTOMS
SHORTNESS OF BREATH: 0
DIARRHEA: 0
VOMITING: 0
COUGH: 0
APNEA: 1
CONSTIPATION: 1
WHEEZING: 0
ROS SKIN COMMENTS: + HIRSUTISM

## 2018-06-11 ENCOUNTER — OFFICE VISIT (OUTPATIENT)
Dept: GYNECOLOGY | Age: 38
End: 2018-06-11

## 2018-06-11 ENCOUNTER — TELEPHONE (OUTPATIENT)
Dept: BARIATRICS/WEIGHT MGMT | Age: 38
End: 2018-06-11

## 2018-06-11 VITALS
HEART RATE: 80 BPM | SYSTOLIC BLOOD PRESSURE: 122 MMHG | DIASTOLIC BLOOD PRESSURE: 73 MMHG | WEIGHT: 293 LBS | BODY MASS INDEX: 51.9 KG/M2

## 2018-06-11 DIAGNOSIS — Z12.4 SCREENING FOR CERVICAL CANCER: ICD-10-CM

## 2018-06-11 DIAGNOSIS — Z01.419 WOMEN'S ANNUAL ROUTINE GYNECOLOGICAL EXAMINATION: Primary | ICD-10-CM

## 2018-06-11 PROCEDURE — 99395 PREV VISIT EST AGE 18-39: CPT | Performed by: NURSE PRACTITIONER

## 2018-06-14 LAB
HPV COMMENT: NORMAL
HPV TYPE 16: NOT DETECTED
HPV TYPE 18: NOT DETECTED
HPVOH (OTHER TYPES): NOT DETECTED

## 2018-07-31 ENCOUNTER — OFFICE VISIT (OUTPATIENT)
Dept: INTERNAL MEDICINE CLINIC | Age: 38
End: 2018-07-31

## 2018-07-31 VITALS — OXYGEN SATURATION: 97 % | HEART RATE: 85 BPM | SYSTOLIC BLOOD PRESSURE: 128 MMHG | DIASTOLIC BLOOD PRESSURE: 82 MMHG

## 2018-07-31 DIAGNOSIS — R40.0 DAYTIME SOMNOLENCE: ICD-10-CM

## 2018-07-31 DIAGNOSIS — J30.1 CHRONIC SEASONAL ALLERGIC RHINITIS DUE TO POLLEN: ICD-10-CM

## 2018-07-31 DIAGNOSIS — R51.9 SINUS HEADACHE: Primary | ICD-10-CM

## 2018-07-31 DIAGNOSIS — G47.33 OSA (OBSTRUCTIVE SLEEP APNEA): ICD-10-CM

## 2018-07-31 PROCEDURE — 1036F TOBACCO NON-USER: CPT | Performed by: INTERNAL MEDICINE

## 2018-07-31 PROCEDURE — 99213 OFFICE O/P EST LOW 20 MIN: CPT | Performed by: INTERNAL MEDICINE

## 2018-07-31 PROCEDURE — G8417 CALC BMI ABV UP PARAM F/U: HCPCS | Performed by: INTERNAL MEDICINE

## 2018-07-31 PROCEDURE — G8427 DOCREV CUR MEDS BY ELIG CLIN: HCPCS | Performed by: INTERNAL MEDICINE

## 2018-07-31 RX ORDER — ACETAMINOPHEN, ASPIRIN AND CAFFEINE 250; 250; 65 MG/1; MG/1; MG/1
1 TABLET, FILM COATED ORAL 2 TIMES DAILY PRN
Qty: 40 TABLET | Refills: 0 | Status: SHIPPED | OUTPATIENT
Start: 2018-07-31 | End: 2018-09-21 | Stop reason: SDUPTHER

## 2018-07-31 RX ORDER — LORATADINE 10 MG/1
10 TABLET ORAL 2 TIMES DAILY PRN
Qty: 90 TABLET | Refills: 0 | Status: SHIPPED | OUTPATIENT
Start: 2018-07-31 | End: 2018-09-14 | Stop reason: ALTCHOICE

## 2018-07-31 RX ORDER — PREDNISONE 20 MG/1
20 TABLET ORAL DAILY
Qty: 5 TABLET | Refills: 0 | Status: SHIPPED | OUTPATIENT
Start: 2018-07-31 | End: 2018-08-05

## 2018-07-31 RX ORDER — FLUTICASONE PROPIONATE 50 MCG
2 SPRAY, SUSPENSION (ML) NASAL DAILY
Qty: 1 BOTTLE | Refills: 5 | Status: SHIPPED | OUTPATIENT
Start: 2018-07-31 | End: 2020-01-31

## 2018-07-31 ASSESSMENT — PATIENT HEALTH QUESTIONNAIRE - PHQ9
SUM OF ALL RESPONSES TO PHQ9 QUESTIONS 1 & 2: 0
SUM OF ALL RESPONSES TO PHQ QUESTIONS 1-9: 0
2. FEELING DOWN, DEPRESSED OR HOPELESS: 0
1. LITTLE INTEREST OR PLEASURE IN DOING THINGS: 0

## 2018-07-31 NOTE — PROGRESS NOTES
Progress Note:    Mehrdad Genao    7/31/2018    Chief Complaint   Patient presents with    Headache     Temple headaches, pain behind ears    Nausea    Fatigue     Mr(s)Sylvie Genao has had 60 days of nausea, headache and fatigue  Progression: same, not resolving, undulating severity  Quality: feeling soreness of neck, also headache is \"pounding\"  Radiation: points to right ear and left ear also, traces downward to side of necks,  Severity: moderate, it's limiting some days  Timing: gradual onset since she   Moderation: took ibuprofen, tylenol, advil, no relief. She does have fibromyalgia. Sleeping is ok, denies fogginess feeling. PHQ-2 was zero. She is still on magnesium but she stopped her allergy medicine because it was not filled. She just feels tired all the time, waking headaches, not sure if she is snoring. Daytime fatigue, has a hx of SHYLA but not using her machine. /82   Pulse 85   LMP 05/26/2014 (Exact Date)   SpO2 97%   There is no height or weight on file to calculate BMI. Gen: Patient appears well groomed, well nourished appearing  HEAD: Atraumatic, normocephalic,   Eyes: PERRLA, EOMI   Neck: supple, no thyroid nodule appreciated, no JVD  Chest: Clear to auscultation THERESA, unlabored breathing, normal expansion  Heart: Regular rate, regular rhythm, no murmur, no rub  Abdomen: Non-tender, non-distended, bowel sounds present x3  Extremities: no edema, distal pulses intact  Patient was alert and oriented to person, place and time  Cranial compression seems to make the headache worse  Neck circumference ~19 inches  Mallampati Class III/IV    Kerry was seen today for headache, nausea and fatigue.     Diagnoses and all orders for this visit:    Sinus headache  Treat allergic rhinitis first, resume claritin and also demonstrated use of flonase, refilled flonase, refer for pulmonology eval for SHYLA again, prn excedrin migraine    Chronic seasonal allergic rhinitis due to pollen  As above    SHYLA (obstructive sleep apnea)  -     Quirino Hill MD    Daytime somnolence  -     Coreen Barger MD    Other orders  -     loratadine (CLARITIN) 10 MG tablet; Take 1 tablet by mouth 2 times daily as needed (allergies)  -     predniSONE (DELTASONE) 20 MG tablet; Take 1 tablet by mouth daily for 5 days  -     fluticasone (FLONASE) 50 MCG/ACT nasal spray; 2 sprays by Nasal route daily  -     aspirin-acetaminophen-caffeine (EXCEDRIN MIGRAINE) 250-250-65 MG per tablet; Take 1 tablet by mouth 2 times daily as needed for Headaches    Current Outpatient Prescriptions on File Prior to Visit   Medication Sig Dispense Refill    bumetanide (BUMEX) 1 MG tablet Take ONE plus HALF tablet TWICE daily 180 tablet 3    spironolactone (ALDACTONE) 100 MG tablet Take ONE plus HALF tablet daily 270 tablet 3    polyethylene glycol (MIRALAX) POWD powder Take 17 g by mouth daily 1 Bottle 5    lansoprazole (PREVACID) 30 MG delayed release capsule TAKE ONE CAPSULE BY MOUTH DAILY 90 capsule 1    DULoxetine (CYMBALTA) 60 MG extended release capsule TAKE 1 CAPSULE BY MOUTH EVERY DAY 30 capsule 3    amLODIPine (NORVASC) 10 MG tablet TAKE 1 TABLET BY MOUTH EVERY DAY 90 tablet 1    metFORMIN (GLUCOPHAGE-XR) 500 MG extended release tablet Take 2 tablets by mouth 2 times daily (with meals) 120 tablet 3    Cholecalciferol 2000 UNITS TABS Take 1 tablet by mouth daily Take 1 tablet by mouth daily.  90 tablet 1    vitamin B-12 (CYANOCOBALAMIN) 500 MCG tablet Take 1 tablet by mouth daily 30 tablet 3    EPIPEN 2-CLARY 0.3 MG/0.3ML SOAJ injection   2    calcium carbonate (ANTACID) 500 MG chewable tablet Take 1 tablet by mouth Daily with supper 200 tablet 5    Magnesium 400 MG TABS Take 1 tablet by mouth daily 90 tablet 3    Psyllium (METAMUCIL MULTIHEALTH FIBER) 63 % POWD Take 7.5 g by mouth 2 times daily Dissolve in a large glass of liquid 1 Bottle 3     No current facility-administered medications on file prior to visit.         Past Medical History:   Diagnosis Date    Acid reflux     Allergic rhinitis     Anemia     Back pain     Depression     Dermatomyositis ([address] 2008    Eczema     Fibromyalgia     GERD (gastroesophageal reflux disease)     Headache(784.0)     History of blood transfusion 2009    also two in 2014    HTN (hypertension)     Hx of blood clots     Irritable bowel syndrome     Morbid obesity with BMI of 50.0-59.9, adult (HCC)     Osteoarthritis     PCOS (polycystic ovarian syndrome)     Prediabetes     Pulmonary embolism (HCC)     Vaginal high risk HPV DNA test positive 05/2016       Past Surgical History:   Procedure Laterality Date    EYE SURGERY      crossed eyes    HYSTERECTOMY  2014    full-ovaries gone    MUSCLE BIOPSY      left thigh    OVARIAN CYST REMOVAL  1998       Social History   Substance Use Topics    Smoking status: Never Smoker    Smokeless tobacco: Never Used    Alcohol use 0.0 oz/week      Comment: occasionally       Family History   Problem Relation Age of Onset    High Blood Pressure Mother     Other Father         gout    Cancer Father         prostate    Breast Cancer Maternal Aunt 47    Asthma Maternal Cousin     Rheum Arthritis Neg Hx     Osteoarthritis Neg Hx     Diabetes Neg Hx     Heart Failure Neg Hx     High Cholesterol Neg Hx     Hypertension Neg Hx     Migraines Neg Hx     Ovarian Cancer Neg Hx     Rashes/Skin Problems Neg Hx     Seizures Neg Hx     Stroke Neg Hx     Thyroid Disease Neg Hx

## 2018-07-31 NOTE — PATIENT INSTRUCTIONS
body.  ¨ Swelling of the throat, mouth, lips, or tongue. ¨ Trouble breathing. ¨ Passing out (losing consciousness). Or you may feel very lightheaded or suddenly feel weak, confused, or restless.     · You have been given an epinephrine shot, even if you feel better.    Call your doctor now or seek immediate medical care if:    · You have symptoms of an allergic reaction, such as:  ¨ A rash or hives (raised, red areas on the skin). ¨ Itching. ¨ Swelling. ¨ Belly pain, nausea, or vomiting.    Watch closely for changes in your health, and be sure to contact your doctor if:    · You do not get better as expected. Where can you learn more? Go to [email or web address]. org and sign in to your [employer] account. Enter S774 in the [employer] box to learn more about \"Allergies: Care Instructions. \"     If you do not have an account, please click on the \"Sign Up Now\" link. Current as of: October 6, 2017  Content Version: 11.6  © 2189-7537 [employer]. Care instructions adapted under license by [facility] ([facility]). If you have questions about a medical condition or this instruction, always ask your healthcare professional. [place] [address] any warranty or liability for your use of this information. Patient Education        Learning About CPAP for Sleep Apnea  What is CPAP? CPAP is a small machine that you use at home every night while you sleep. It increases air pressure in your throat to keep your airway open. When you have sleep apnea, this can help you sleep better so you feel much better. CPAP stands for \"continuous positive airway pressure. \"  The CPAP machine will have one of the following:  · A mask that covers your nose and mouth  · Prongs that fit into your nose  · A mask that covers your nose only, the most common type. This type is called NCPAP. The N stands for \"nasal.\"  Why is it done? CPAP is usually the best treatment for obstructive sleep apnea.

## 2018-08-02 ENCOUNTER — TELEPHONE (OUTPATIENT)
Dept: INTERNAL MEDICINE CLINIC | Age: 38
End: 2018-08-02

## 2018-08-02 RX ORDER — AMLODIPINE BESYLATE 10 MG/1
TABLET ORAL
Qty: 90 TABLET | Refills: 0 | Status: SHIPPED | OUTPATIENT
Start: 2018-08-02 | End: 2018-09-21 | Stop reason: SDUPTHER

## 2018-08-02 RX ORDER — CHOLECALCIFEROL (VITAMIN D3) 125 MCG
CAPSULE ORAL
Qty: 90 TABLET | Refills: 0 | Status: SHIPPED | OUTPATIENT
Start: 2018-08-02 | End: 2018-09-21 | Stop reason: SDUPTHER

## 2018-08-02 RX ORDER — CYANOCOBALAMIN (VITAMIN B-12) 500 MCG
500 TABLET ORAL DAILY
Qty: 30 TABLET | Refills: 0 | Status: SHIPPED | OUTPATIENT
Start: 2018-08-02 | End: 2018-09-21 | Stop reason: SDUPTHER

## 2018-08-03 RX ORDER — DULOXETIN HYDROCHLORIDE 60 MG/1
CAPSULE, DELAYED RELEASE ORAL
Qty: 30 CAPSULE | Refills: 1 | Status: SHIPPED | OUTPATIENT
Start: 2018-08-03 | End: 2018-09-21 | Stop reason: SDUPTHER

## 2018-08-03 RX ORDER — LEVOCETIRIZINE DIHYDROCHLORIDE 5 MG/1
TABLET, FILM COATED ORAL
Qty: 30 TABLET | Refills: 1 | Status: SHIPPED | OUTPATIENT
Start: 2018-08-03 | End: 2018-09-21 | Stop reason: SDUPTHER

## 2018-09-14 ENCOUNTER — OFFICE VISIT (OUTPATIENT)
Dept: INTERNAL MEDICINE CLINIC | Age: 38
End: 2018-09-14

## 2018-09-14 VITALS
WEIGHT: 293 LBS | SYSTOLIC BLOOD PRESSURE: 136 MMHG | BODY MASS INDEX: 51.91 KG/M2 | DIASTOLIC BLOOD PRESSURE: 80 MMHG | TEMPERATURE: 98.4 F | HEART RATE: 76 BPM | HEIGHT: 63 IN

## 2018-09-14 DIAGNOSIS — G47.33 OBSTRUCTIVE SLEEP APNEA SYNDROME: Chronic | ICD-10-CM

## 2018-09-14 DIAGNOSIS — E78.5 DYSLIPIDEMIA (HIGH LDL; LOW HDL): ICD-10-CM

## 2018-09-14 DIAGNOSIS — Z12.39 SCREENING FOR BREAST CANCER: ICD-10-CM

## 2018-09-14 DIAGNOSIS — I10 ESSENTIAL HYPERTENSION: Chronic | ICD-10-CM

## 2018-09-14 DIAGNOSIS — R79.89 ABNORMAL TSH: ICD-10-CM

## 2018-09-14 DIAGNOSIS — E66.01 MORBID OBESITY WITH BMI OF 50.0-59.9, ADULT (HCC): Primary | Chronic | ICD-10-CM

## 2018-09-14 DIAGNOSIS — E04.9 ENLARGED THYROID GLAND: ICD-10-CM

## 2018-09-14 DIAGNOSIS — R73.03 PREDIABETES: ICD-10-CM

## 2018-09-14 DIAGNOSIS — E28.2 PCOS (POLYCYSTIC OVARIAN SYNDROME): Chronic | ICD-10-CM

## 2018-09-14 PROCEDURE — 90686 IIV4 VACC NO PRSV 0.5 ML IM: CPT | Performed by: INTERNAL MEDICINE

## 2018-09-14 PROCEDURE — 90471 IMMUNIZATION ADMIN: CPT | Performed by: INTERNAL MEDICINE

## 2018-09-14 PROCEDURE — 1036F TOBACCO NON-USER: CPT | Performed by: INTERNAL MEDICINE

## 2018-09-14 PROCEDURE — 99214 OFFICE O/P EST MOD 30 MIN: CPT | Performed by: INTERNAL MEDICINE

## 2018-09-14 PROCEDURE — G8417 CALC BMI ABV UP PARAM F/U: HCPCS | Performed by: INTERNAL MEDICINE

## 2018-09-14 PROCEDURE — G8428 CUR MEDS NOT DOCUMENT: HCPCS | Performed by: INTERNAL MEDICINE

## 2018-09-14 NOTE — PROGRESS NOTES
Vaccine Information Sheet, \"Influenza - Inactivated\"  given to Wagner Carlisle, or parent/legal guardian of  Wagner Carlisle and verbalized understanding. Patient responses:    Have you ever had a reaction to a flu vaccine? No  Are you able to eat eggs without adverse effects? Yes  Do you have any current illness? No  Have you ever had [place] Syndrome? No    Flu vaccine given per order. Please see immunization tab.
Yes Pippa Ponce MD   EPIPEN 2-CLARY 0.3 MG/0.3ML SOAJ injection  2/28/15  Yes Historical Provider, MD       Past Medical History:   Diagnosis Date    Acid reflux     Allergic rhinitis     Anemia     Back pain     Depression     Dermatomyositis ([address] 2008    Eczema     Fibromyalgia     GERD (gastroesophageal reflux disease)     Headache(784.0)     History of blood transfusion 2009    also two in 2014    HTN (hypertension)     Hx of blood clots     Irritable bowel syndrome     Morbid obesity with BMI of 50.0-59.9, adult (HCC)     Osteoarthritis     PCOS (polycystic ovarian syndrome)     Prediabetes     Pulmonary embolism (HCC)     Vaginal high risk HPV DNA test positive 05/2016       Past Surgical History:   Procedure Laterality Date    EYE SURGERY      crossed eyes    HYSTERECTOMY  2014    full-ovaries gone    MUSCLE BIOPSY      left thigh    OVARIAN CYST REMOVAL  1998       Social History   Substance Use Topics    Smoking status: Never Smoker    Smokeless tobacco: Never Used    Alcohol use 0.0 oz/week      Comment: occasionally       Family History   Problem Relation Age of Onset    High Blood Pressure Mother     Other Father         gout    Cancer Father         prostate    Breast Cancer Maternal Aunt 47    Asthma Maternal Cousin     Rheum Arthritis Neg Hx     Osteoarthritis Neg Hx     Diabetes Neg Hx     Heart Failure Neg Hx     High Cholesterol Neg Hx     Hypertension Neg Hx     Migraines Neg Hx     Ovarian Cancer Neg Hx     Rashes/Skin Problems Neg Hx     Seizures Neg Hx     Stroke Neg Hx     Thyroid Disease Neg Hx

## 2018-09-18 DIAGNOSIS — R73.03 PREDIABETES: ICD-10-CM

## 2018-09-18 DIAGNOSIS — E78.5 DYSLIPIDEMIA (HIGH LDL; LOW HDL): ICD-10-CM

## 2018-09-18 DIAGNOSIS — R79.89 ABNORMAL TSH: ICD-10-CM

## 2018-09-18 DIAGNOSIS — E04.9 ENLARGED THYROID GLAND: ICD-10-CM

## 2018-09-18 DIAGNOSIS — I10 ESSENTIAL HYPERTENSION: Chronic | ICD-10-CM

## 2018-09-18 LAB
A/G RATIO: 1.3 (ref 1.1–2.2)
ALBUMIN SERPL-MCNC: 4.5 G/DL (ref 3.4–5)
ALP BLD-CCNC: 88 U/L (ref 40–129)
ALT SERPL-CCNC: 13 U/L (ref 10–40)
ANION GAP SERPL CALCULATED.3IONS-SCNC: 12 MMOL/L (ref 3–16)
AST SERPL-CCNC: 15 U/L (ref 15–37)
BILIRUB SERPL-MCNC: 0.3 MG/DL (ref 0–1)
BUN BLDV-MCNC: 9 MG/DL (ref 7–20)
CALCIUM SERPL-MCNC: 9.5 MG/DL (ref 8.3–10.6)
CHLORIDE BLD-SCNC: 103 MMOL/L (ref 99–110)
CHOLESTEROL, FASTING: 285 MG/DL (ref 0–199)
CO2: 25 MMOL/L (ref 21–32)
CREAT SERPL-MCNC: <0.5 MG/DL (ref 0.6–1.1)
GFR AFRICAN AMERICAN: >60
GFR NON-AFRICAN AMERICAN: >60
GLOBULIN: 3.4 G/DL
GLUCOSE BLD-MCNC: 93 MG/DL (ref 70–99)
HDLC SERPL-MCNC: 44 MG/DL (ref 40–60)
LDL CHOLESTEROL CALCULATED: 215 MG/DL
POTASSIUM SERPL-SCNC: 4.8 MMOL/L (ref 3.5–5.1)
SODIUM BLD-SCNC: 140 MMOL/L (ref 136–145)
T4 TOTAL: 7.3 UG/DL (ref 4.5–10.9)
THYROID PEROXIDASE (TPO) ABS: 12 IU/ML
TOTAL PROTEIN: 7.9 G/DL (ref 6.4–8.2)
TRIGLYCERIDE, FASTING: 130 MG/DL (ref 0–150)
TSH REFLEX: 0.59 UIU/ML (ref 0.27–4.2)
VLDLC SERPL CALC-MCNC: 26 MG/DL

## 2018-09-21 ENCOUNTER — TELEPHONE (OUTPATIENT)
Dept: INTERNAL MEDICINE CLINIC | Age: 38
End: 2018-09-21

## 2018-09-21 ENCOUNTER — PROCEDURE VISIT (OUTPATIENT)
Dept: NEUROLOGY | Age: 38
End: 2018-09-21

## 2018-09-21 DIAGNOSIS — R20.0 BILATERAL ARM NUMBNESS AND TINGLING WHILE SLEEPING: Primary | ICD-10-CM

## 2018-09-21 DIAGNOSIS — R20.2 BILATERAL ARM NUMBNESS AND TINGLING WHILE SLEEPING: Primary | ICD-10-CM

## 2018-09-21 DIAGNOSIS — G56.01 RIGHT CARPAL TUNNEL SYNDROME: ICD-10-CM

## 2018-09-21 PROCEDURE — 95886 MUSC TEST DONE W/N TEST COMP: CPT | Performed by: PSYCHIATRY & NEUROLOGY

## 2018-09-21 PROCEDURE — 95911 NRV CNDJ TEST 9-10 STUDIES: CPT | Performed by: PSYCHIATRY & NEUROLOGY

## 2018-09-21 RX ORDER — LANSOPRAZOLE 30 MG/1
30 CAPSULE, DELAYED RELEASE ORAL PRN
Qty: 30 CAPSULE | Refills: 1 | Status: SHIPPED | OUTPATIENT
Start: 2018-09-21 | End: 2018-11-22 | Stop reason: SDUPTHER

## 2018-09-21 RX ORDER — METFORMIN HYDROCHLORIDE 500 MG/1
1000 TABLET, EXTENDED RELEASE ORAL 2 TIMES DAILY WITH MEALS
Qty: 120 TABLET | Refills: 5 | Status: SHIPPED | OUTPATIENT
Start: 2018-09-21 | End: 2020-01-31

## 2018-09-21 RX ORDER — AMLODIPINE BESYLATE 10 MG/1
TABLET ORAL
Qty: 30 TABLET | Refills: 5 | Status: SHIPPED | OUTPATIENT
Start: 2018-09-21 | End: 2020-01-31 | Stop reason: SDUPTHER

## 2018-09-21 RX ORDER — CYANOCOBALAMIN (VITAMIN B-12) 500 MCG
500 TABLET ORAL DAILY
Qty: 30 TABLET | Refills: 5 | Status: SHIPPED | OUTPATIENT
Start: 2018-09-21 | End: 2020-02-28

## 2018-09-21 RX ORDER — DULOXETIN HYDROCHLORIDE 60 MG/1
CAPSULE, DELAYED RELEASE ORAL
Qty: 30 CAPSULE | Refills: 5 | Status: SHIPPED | OUTPATIENT
Start: 2018-09-21 | End: 2020-01-31 | Stop reason: SDUPTHER

## 2018-09-21 RX ORDER — CHOLECALCIFEROL (VITAMIN D3) 125 MCG
CAPSULE ORAL
Qty: 30 TABLET | Refills: 5 | Status: SHIPPED | OUTPATIENT
Start: 2018-09-21 | End: 2020-01-31

## 2018-09-21 RX ORDER — ACETAMINOPHEN, ASPIRIN AND CAFFEINE 250; 250; 65 MG/1; MG/1; MG/1
1 TABLET, FILM COATED ORAL PRN
Qty: 30 TABLET | Refills: 0 | Status: SHIPPED | OUTPATIENT
Start: 2018-09-21 | End: 2018-10-15 | Stop reason: SDUPTHER

## 2018-09-21 RX ORDER — LEVOCETIRIZINE DIHYDROCHLORIDE 5 MG/1
TABLET, FILM COATED ORAL
Qty: 30 TABLET | Refills: 5 | Status: SHIPPED | OUTPATIENT
Start: 2018-09-21 | End: 2020-02-28 | Stop reason: SDUPTHER

## 2018-09-21 NOTE — TELEPHONE ENCOUNTER
Patient states that @ her appointment a few days ago, she wasn't sure which medications she needed refilled. She is calling for the following refills:    1.  levocetirizine (XYZAL) 5 MG tablet #30 w/ refills  2.  metFORMIN (GLUCOPHAGE-XR) 500 MG extended release tablet #120  3.  aspirin-acetaminophen-caffeine (EXCEDRIN MIGRAINE) 250-250-65 MG per tablet #40  4. MAGNESIUM-OXIDE 400 (241.3 Mg) MG TABS tablet #30  5. Cyanocobalamin (B-12) 500 MCG TABS #30  6. amLODIPine (NORVASC) 10 MG tablet #30  7.  lansoprazole (PREVACID) 30 MG delayed release capsule #30  8. DULoxetine (CYMBALTA) 60 MG extended release capsule #30  9. Cholecalciferol (VITAMIN D3) 2000 units TABS #30    [facility], [address] [phone] [facility] [phone]  Patient made aware to allow 24 to 48 business hours for prescription refills. Patient can be reached @ phone # provided should there be any questions.

## 2018-09-24 ENCOUNTER — HOSPITAL ENCOUNTER (OUTPATIENT)
Dept: ULTRASOUND IMAGING | Age: 38
Discharge: HOME | End: 2018-09-24
Payer: COMMERCIAL

## 2018-09-24 DIAGNOSIS — E04.9 ENLARGED THYROID GLAND: ICD-10-CM

## 2018-09-24 PROCEDURE — 76536 US EXAM OF HEAD AND NECK: CPT

## 2018-09-27 ENCOUNTER — TELEPHONE (OUTPATIENT)
Dept: INTERNAL MEDICINE CLINIC | Age: 38
End: 2018-09-27

## 2018-10-10 ENCOUNTER — TELEPHONE (OUTPATIENT)
Dept: INTERNAL MEDICINE CLINIC | Age: 38
End: 2018-10-10

## 2018-10-10 DIAGNOSIS — G56.01 CARPAL TUNNEL SYNDROME OF RIGHT WRIST: Primary | ICD-10-CM

## 2018-10-11 ENCOUNTER — TELEPHONE (OUTPATIENT)
Dept: INTERNAL MEDICINE CLINIC | Age: 38
End: 2018-10-11

## 2018-10-15 RX ORDER — SPIRONOLACTONE 100 MG/1
TABLET, FILM COATED ORAL
Qty: 270 TABLET | Refills: 1 | Status: SHIPPED | OUTPATIENT
Start: 2018-10-15 | End: 2020-02-28

## 2018-10-15 RX ORDER — DIAPER,BRIEF,ADULT, DISPOSABLE
EACH MISCELLANEOUS
Qty: 30 TABLET | Refills: 0 | Status: SHIPPED | OUTPATIENT
Start: 2018-10-15 | End: 2018-10-19 | Stop reason: SDUPTHER

## 2018-10-19 RX ORDER — DIAPER,BRIEF,ADULT, DISPOSABLE
EACH MISCELLANEOUS
Qty: 30 TABLET | Refills: 0 | Status: SHIPPED | OUTPATIENT
Start: 2018-10-19 | End: 2020-01-31

## 2018-11-19 RX ORDER — LORATADINE 10 MG/1
10 TABLET ORAL 2 TIMES DAILY PRN
Qty: 90 TABLET | Refills: 0 | OUTPATIENT
Start: 2018-11-19

## 2018-11-27 RX ORDER — LANSOPRAZOLE 30 MG/1
CAPSULE, DELAYED RELEASE ORAL
Qty: 30 CAPSULE | Refills: 0 | Status: SHIPPED | OUTPATIENT
Start: 2018-11-27 | End: 2020-01-31 | Stop reason: SDUPTHER

## 2018-12-10 ENCOUNTER — TELEPHONE (OUTPATIENT)
Dept: INTERNAL MEDICINE CLINIC | Age: 38
End: 2018-12-10

## 2019-02-04 ENCOUNTER — TELEPHONE (OUTPATIENT)
Dept: INTERNAL MEDICINE CLINIC | Age: 39
End: 2019-02-04

## 2019-02-05 ENCOUNTER — HOSPITAL ENCOUNTER (EMERGENCY)
Age: 39
Discharge: HOME | End: 2019-02-05
Payer: MEDICAID

## 2019-02-05 ENCOUNTER — APPOINTMENT (OUTPATIENT)
Dept: CT IMAGING | Age: 39
End: 2019-02-05
Payer: MEDICAID

## 2019-02-05 VITALS
DIASTOLIC BLOOD PRESSURE: 57 MMHG | RESPIRATION RATE: 18 BRPM | HEART RATE: 66 BPM | OXYGEN SATURATION: 95 % | TEMPERATURE: 98.2 F | SYSTOLIC BLOOD PRESSURE: 105 MMHG

## 2019-02-05 DIAGNOSIS — R10.84 GENERALIZED ABDOMINAL PAIN: Primary | ICD-10-CM

## 2019-02-05 LAB
A/G RATIO: 1 (ref 1.1–2.2)
ALBUMIN SERPL-MCNC: 4.1 G/DL (ref 3.4–5)
ALP BLD-CCNC: 94 U/L (ref 40–129)
ALT SERPL-CCNC: 24 U/L (ref 10–40)
ANION GAP SERPL CALCULATED.3IONS-SCNC: 12 MMOL/L (ref 3–16)
AST SERPL-CCNC: 21 U/L (ref 15–37)
BACTERIA WET PREP: NORMAL
BASOPHILS ABSOLUTE: 0 K/UL (ref 0–0.2)
BASOPHILS RELATIVE PERCENT: 0.4 %
BILIRUB SERPL-MCNC: <0.2 MG/DL (ref 0–1)
BILIRUBIN URINE: NEGATIVE
BLOOD, URINE: NEGATIVE
BUN BLDV-MCNC: 12 MG/DL (ref 7–20)
CALCIUM SERPL-MCNC: 9.4 MG/DL (ref 8.3–10.6)
CHLORIDE BLD-SCNC: 104 MMOL/L (ref 99–110)
CLARITY: CLEAR
CLUE CELLS: NORMAL
CO2: 25 MMOL/L (ref 21–32)
COLOR: YELLOW
CREAT SERPL-MCNC: 0.6 MG/DL (ref 0.6–1.1)
EOSINOPHILS ABSOLUTE: 0.4 K/UL (ref 0–0.6)
EOSINOPHILS RELATIVE PERCENT: 4.7 %
EPITHELIAL CELLS WET PREP: NORMAL
GFR AFRICAN AMERICAN: >60
GFR NON-AFRICAN AMERICAN: >60
GLOBULIN: 4.1 G/DL
GLUCOSE BLD-MCNC: 92 MG/DL (ref 70–99)
GLUCOSE URINE: NEGATIVE MG/DL
HCG QUALITATIVE: NEGATIVE
HCT VFR BLD CALC: 38.6 % (ref 36–48)
HEMOGLOBIN: 12.6 G/DL (ref 12–16)
KETONES, URINE: NEGATIVE MG/DL
LACTIC ACID, SEPSIS: 1.2 MMOL/L (ref 0.4–1.9)
LEUKOCYTE ESTERASE, URINE: NEGATIVE
LYMPHOCYTES ABSOLUTE: 2.9 K/UL (ref 1–5.1)
LYMPHOCYTES RELATIVE PERCENT: 36.7 %
MCH RBC QN AUTO: 27.9 PG (ref 26–34)
MCHC RBC AUTO-ENTMCNC: 32.5 G/DL (ref 31–36)
MCV RBC AUTO: 85.8 FL (ref 80–100)
MICROSCOPIC EXAMINATION: NORMAL
MONOCYTES ABSOLUTE: 0.7 K/UL (ref 0–1.3)
MONOCYTES RELATIVE PERCENT: 8.6 %
NEUTROPHILS ABSOLUTE: 4 K/UL (ref 1.7–7.7)
NEUTROPHILS RELATIVE PERCENT: 49.6 %
NITRITE, URINE: NEGATIVE
PDW BLD-RTO: 14.4 % (ref 12.4–15.4)
PH UA: 7.5
PLATELET # BLD: 262 K/UL (ref 135–450)
PMV BLD AUTO: 8.6 FL (ref 5–10.5)
POTASSIUM SERPL-SCNC: 4.7 MMOL/L (ref 3.5–5.1)
PROTEIN UA: NEGATIVE MG/DL
RBC # BLD: 4.5 M/UL (ref 4–5.2)
RBC WET PREP: NORMAL
SODIUM BLD-SCNC: 141 MMOL/L (ref 136–145)
SOURCE WET PREP: NORMAL
SPECIFIC GRAVITY UA: 1.02
TOTAL PROTEIN: 8.2 G/DL (ref 6.4–8.2)
TRICHOMONAS PREP: NORMAL
URINE REFLEX TO CULTURE: NORMAL
URINE TYPE: NORMAL
UROBILINOGEN, URINE: 1 E.U./DL
WBC # BLD: 8 K/UL (ref 4–11)
WBC WET PREP: NORMAL
YEAST WET PREP: NORMAL

## 2019-02-05 PROCEDURE — 83605 ASSAY OF LACTIC ACID: CPT

## 2019-02-05 PROCEDURE — 81003 URINALYSIS AUTO W/O SCOPE: CPT

## 2019-02-05 PROCEDURE — 80053 COMPREHEN METABOLIC PANEL: CPT

## 2019-02-05 PROCEDURE — 6360000002 HC RX W HCPCS: Performed by: NURSE PRACTITIONER

## 2019-02-05 PROCEDURE — 85025 COMPLETE CBC W/AUTO DIFF WBC: CPT

## 2019-02-05 PROCEDURE — 74177 CT ABD & PELVIS W/CONTRAST: CPT

## 2019-02-05 PROCEDURE — 87491 CHLMYD TRACH DNA AMP PROBE: CPT

## 2019-02-05 PROCEDURE — 87591 N.GONORRHOEAE DNA AMP PROB: CPT

## 2019-02-05 PROCEDURE — 6360000004 HC RX CONTRAST MEDICATION: Performed by: NURSE PRACTITIONER

## 2019-02-05 PROCEDURE — 99284 EMERGENCY DEPT VISIT MOD MDM: CPT

## 2019-02-05 PROCEDURE — 96374 THER/PROPH/DIAG INJ IV PUSH: CPT

## 2019-02-05 PROCEDURE — 87210 SMEAR WET MOUNT SALINE/INK: CPT

## 2019-02-05 PROCEDURE — 96375 TX/PRO/DX INJ NEW DRUG ADDON: CPT

## 2019-02-05 PROCEDURE — 2580000003 HC RX 258: Performed by: NURSE PRACTITIONER

## 2019-02-05 PROCEDURE — 96361 HYDRATE IV INFUSION ADD-ON: CPT

## 2019-02-05 PROCEDURE — 84703 CHORIONIC GONADOTROPIN ASSAY: CPT

## 2019-02-05 RX ORDER — KETOROLAC TROMETHAMINE 30 MG/ML
30 INJECTION, SOLUTION INTRAMUSCULAR; INTRAVENOUS ONCE
Status: COMPLETED | OUTPATIENT
Start: 2019-02-05 | End: 2019-02-05

## 2019-02-05 RX ORDER — DICYCLOMINE HYDROCHLORIDE 10 MG/1
10 CAPSULE ORAL EVERY 6 HOURS PRN
Qty: 20 CAPSULE | Refills: 0 | Status: SHIPPED | OUTPATIENT
Start: 2019-02-05 | End: 2020-01-31

## 2019-02-05 RX ORDER — ONDANSETRON 2 MG/ML
4 INJECTION INTRAMUSCULAR; INTRAVENOUS EVERY 6 HOURS PRN
Status: DISCONTINUED | OUTPATIENT
Start: 2019-02-05 | End: 2019-02-05 | Stop reason: HOSPADM

## 2019-02-05 RX ORDER — 0.9 % SODIUM CHLORIDE 0.9 %
1000 INTRAVENOUS SOLUTION INTRAVENOUS ONCE
Status: COMPLETED | OUTPATIENT
Start: 2019-02-05 | End: 2019-02-05

## 2019-02-05 RX ADMIN — IOPAMIDOL 75 ML: 755 INJECTION, SOLUTION INTRAVENOUS at 16:20

## 2019-02-05 RX ADMIN — ONDANSETRON 4 MG: 2 INJECTION INTRAMUSCULAR; INTRAVENOUS at 14:47

## 2019-02-05 RX ADMIN — KETOROLAC TROMETHAMINE 30 MG: 30 INJECTION, SOLUTION INTRAMUSCULAR at 14:47

## 2019-02-05 RX ADMIN — SODIUM CHLORIDE 1000 ML: 9 INJECTION, SOLUTION INTRAVENOUS at 14:47

## 2019-02-05 ASSESSMENT — ENCOUNTER SYMPTOMS
NAUSEA: 1
DIARRHEA: 0
VOMITING: 0
SHORTNESS OF BREATH: 0
CHEST TIGHTNESS: 0
ABDOMINAL PAIN: 1

## 2019-02-05 ASSESSMENT — PAIN DESCRIPTION - PAIN TYPE: TYPE: ACUTE PAIN

## 2019-02-05 ASSESSMENT — PAIN SCALES - GENERAL
PAINLEVEL_OUTOF10: 4
PAINLEVEL_OUTOF10: 4

## 2019-02-07 ENCOUNTER — TELEPHONE (OUTPATIENT)
Dept: GYNECOLOGY | Age: 39
End: 2019-02-07

## 2019-02-07 LAB
C TRACH DNA GENITAL QL NAA+PROBE: NEGATIVE
N. GONORRHOEAE DNA: NEGATIVE

## 2019-02-11 ENCOUNTER — TELEPHONE (OUTPATIENT)
Dept: GYNECOLOGY | Age: 39
End: 2019-02-11

## 2020-01-31 ENCOUNTER — OFFICE VISIT (OUTPATIENT)
Dept: INTERNAL MEDICINE CLINIC | Age: 40
End: 2020-01-31
Payer: COMMERCIAL

## 2020-01-31 VITALS
OXYGEN SATURATION: 98 % | HEART RATE: 65 BPM | DIASTOLIC BLOOD PRESSURE: 93 MMHG | WEIGHT: 293 LBS | HEIGHT: 64 IN | SYSTOLIC BLOOD PRESSURE: 145 MMHG | BODY MASS INDEX: 50.02 KG/M2 | TEMPERATURE: 98.1 F

## 2020-01-31 PROBLEM — M75.82 TENDINITIS OF LEFT ROTATOR CUFF: Status: RESOLVED | Noted: 2018-03-12 | Resolved: 2020-01-31

## 2020-01-31 LAB — HBA1C MFR BLD: 5.8 %

## 2020-01-31 PROCEDURE — G8427 DOCREV CUR MEDS BY ELIG CLIN: HCPCS | Performed by: NURSE PRACTITIONER

## 2020-01-31 PROCEDURE — G8482 FLU IMMUNIZE ORDER/ADMIN: HCPCS | Performed by: NURSE PRACTITIONER

## 2020-01-31 PROCEDURE — 99214 OFFICE O/P EST MOD 30 MIN: CPT | Performed by: NURSE PRACTITIONER

## 2020-01-31 PROCEDURE — 1036F TOBACCO NON-USER: CPT | Performed by: NURSE PRACTITIONER

## 2020-01-31 PROCEDURE — G8417 CALC BMI ABV UP PARAM F/U: HCPCS | Performed by: NURSE PRACTITIONER

## 2020-01-31 PROCEDURE — 83036 HEMOGLOBIN GLYCOSYLATED A1C: CPT | Performed by: NURSE PRACTITIONER

## 2020-01-31 RX ORDER — CHLORTHALIDONE 25 MG/1
25 TABLET ORAL DAILY
Qty: 30 TABLET | Refills: 3 | Status: SHIPPED | OUTPATIENT
Start: 2020-01-31 | End: 2020-02-28 | Stop reason: SDUPTHER

## 2020-01-31 RX ORDER — LANSOPRAZOLE 30 MG/1
CAPSULE, DELAYED RELEASE ORAL
Qty: 30 CAPSULE | Refills: 0 | Status: SHIPPED | OUTPATIENT
Start: 2020-01-31 | End: 2020-02-26

## 2020-01-31 RX ORDER — POLYETHYLENE GLYCOL 3350 17 G/17G
17 POWDER ORAL DAILY
Qty: 1 BOTTLE | Refills: 5 | Status: SHIPPED | OUTPATIENT
Start: 2020-01-31 | End: 2020-02-28 | Stop reason: SDUPTHER

## 2020-01-31 RX ORDER — AMLODIPINE BESYLATE 10 MG/1
TABLET ORAL
Qty: 30 TABLET | Refills: 5 | Status: SHIPPED | OUTPATIENT
Start: 2020-01-31 | End: 2020-02-28 | Stop reason: SDUPTHER

## 2020-01-31 RX ORDER — DULOXETIN HYDROCHLORIDE 60 MG/1
60 CAPSULE, DELAYED RELEASE ORAL DAILY
Qty: 30 CAPSULE | Refills: 3 | Status: SHIPPED | OUTPATIENT
Start: 2020-01-31 | End: 2020-02-28 | Stop reason: SDUPTHER

## 2020-01-31 SDOH — ECONOMIC STABILITY: TRANSPORTATION INSECURITY
IN THE PAST 12 MONTHS, HAS THE LACK OF TRANSPORTATION KEPT YOU FROM MEDICAL APPOINTMENTS OR FROM GETTING MEDICATIONS?: NO

## 2020-01-31 SDOH — ECONOMIC STABILITY: INCOME INSECURITY: HOW HARD IS IT FOR YOU TO PAY FOR THE VERY BASICS LIKE FOOD, HOUSING, MEDICAL CARE, AND HEATING?: PATIENT DECLINED

## 2020-01-31 SDOH — SOCIAL STABILITY: SOCIAL INSECURITY: WITHIN THE LAST YEAR, HAVE YOU BEEN HUMILIATED OR EMOTIONALLY ABUSED IN OTHER WAYS BY YOUR PARTNER OR EX-PARTNER?: NO

## 2020-01-31 SDOH — SOCIAL STABILITY: SOCIAL INSECURITY: WITHIN THE LAST YEAR, HAVE YOU BEEN AFRAID OF YOUR PARTNER OR EX-PARTNER?: NO

## 2020-01-31 SDOH — ECONOMIC STABILITY: TRANSPORTATION INSECURITY
IN THE PAST 12 MONTHS, HAS LACK OF TRANSPORTATION KEPT YOU FROM MEETINGS, WORK, OR FROM GETTING THINGS NEEDED FOR DAILY LIVING?: NO

## 2020-01-31 SDOH — HEALTH STABILITY: MENTAL HEALTH: HOW OFTEN DO YOU HAVE A DRINK CONTAINING ALCOHOL?: MONTHLY OR LESS

## 2020-01-31 SDOH — SOCIAL STABILITY: SOCIAL INSECURITY
WITHIN THE LAST YEAR, HAVE TO BEEN RAPED OR FORCED TO HAVE ANY KIND OF SEXUAL ACTIVITY BY YOUR PARTNER OR EX-PARTNER?: NO

## 2020-01-31 SDOH — ECONOMIC STABILITY: FOOD INSECURITY: WITHIN THE PAST 12 MONTHS, YOU WORRIED THAT YOUR FOOD WOULD RUN OUT BEFORE YOU GOT MONEY TO BUY MORE.: NEVER TRUE

## 2020-01-31 SDOH — ECONOMIC STABILITY: FOOD INSECURITY: WITHIN THE PAST 12 MONTHS, THE FOOD YOU BOUGHT JUST DIDN'T LAST AND YOU DIDN'T HAVE MONEY TO GET MORE.: NEVER TRUE

## 2020-01-31 SDOH — SOCIAL STABILITY: SOCIAL INSECURITY
WITHIN THE LAST YEAR, HAVE YOU BEEN KICKED, HIT, SLAPPED, OR OTHERWISE PHYSICALLY HURT BY YOUR PARTNER OR EX-PARTNER?: NO

## 2020-01-31 ASSESSMENT — PATIENT HEALTH QUESTIONNAIRE - PHQ9
SUM OF ALL RESPONSES TO PHQ QUESTIONS 1-9: 0
1. LITTLE INTEREST OR PLEASURE IN DOING THINGS: 0
2. FEELING DOWN, DEPRESSED OR HOPELESS: 0
SUM OF ALL RESPONSES TO PHQ9 QUESTIONS 1 & 2: 0
SUM OF ALL RESPONSES TO PHQ QUESTIONS 1-9: 0

## 2020-01-31 ASSESSMENT — ENCOUNTER SYMPTOMS
DIARRHEA: 0
CHEST TIGHTNESS: 0
CONSTIPATION: 0

## 2020-01-31 NOTE — PROGRESS NOTES
[address]   [address]  [address] [facility] [place] [address]  Dept: [phone]       2020    Best Brown (:  1980) najma 37 y.o. female, here for evaluation of the following medical concerns: This is a new patient being seen today for obesity, hypertension, and prediabetes. She reports not taking medication for several months. HPI   Hypertension  She reports a history of hypertension, reports being out of medication for several months. Denies significant life style changes, requesting a refill on medication. Pulmonary Embolism  She reports a history of PE, had IVC filter placed. Reports clots developed in legs and lungs. Stated has not had IVC filter removed, would like a referral to vascular. She reports intermittent epigastric discomfort, possibly related to IVC filter. Recent CT below. ICT ABDOMEN PELVIS W CONTRAST 2020  FINDINGS:  LOWER CHEST:  Unremarkable  LIVER:  Diffuse fatty infiltration  GALLBLADDER/BILE DUCTS:  Unremarkable.  No opaque gallstones  PANCREAS:  Unremarkable.  No mass or duct dilation  SPLEEN:  Unremarkable  ADRENALS:  Unremarkable    KIDNEYS/URETERS:  Unremarkable.  No hydronephrosis, stone, or suspicious mass   GI TRACT:  Unremarkable.  No obstruction or wall thickening  VESSELS:  IVC filter again noted with extraluminal extension of the limbs, stable.  The anterior limb may project sufficiently into the posterior wall of the crossing duodenum. LYMPH NODES: Unremarkable.  No enlarged lymph nodes  ABD WALL:  Unremarkable  PELVIS:  Unremarkable  BONES:  Unremarkable  OTHER:  None     Polycystic Ovarian syndrome (PCOS)  Reports a history of PCOS, treated with Spironolactone, for facial hair growth. Reports discontinuing due to loss of eyebrows but no improvement of unwanted facial hair. A1C 5.8% today    Obesity  She states she is interested in the [employer].   General weight loss/lifestyle modification strategies discussed (elicit support from others; identify saboteurs; non-food rewards, etc). Goals:   -Eat 4-5 times daily  -Avoid high fat and high sugar foods  -Include protein with all meals and snacks  -Avoid carbonation and caffeine  -Avoid calorie containing beverages  -Increase physical activity as tolerated  Overweight/Obesity related to lack of exercise, sedentary lifestyle, unhealthy eating habits, and unsuccessful diet attempts as evidenced by BMI. Congestive Heart Failure  Per chart review was started on Bumex and Aldactone for diastolic heart failure. Stated she is no longer taking medications. Had echo in 2018, seen by Cardiology. Will refer to Cardiology for recommendations.     Lab Results   Component Value Date    CHOL 242 (H) 06/02/2016    CHOL 300 (H) 01/30/2015    CHOL 199 05/29/2014     Lab Results   Component Value Date    TRIG 129 06/02/2016    TRIG 245 (H) 01/30/2015    TRIG 204 (H) 05/29/2014     Lab Results   Component Value Date    HDL 44 09/18/2018    HDL 39 (L) 06/02/2016    HDL 41 01/30/2015     Lab Results   Component Value Date    LDLCALC 215 (H) 09/18/2018    LDLCALC 177 (H) 06/02/2016    LDLCALC 210 (H) 01/30/2015     Lab Results   Component Value Date    LABVLDL 26 09/18/2018    LABVLDL 26 06/02/2016    LABVLDL 49 01/30/2015     Lab Results   Component Value Date    WBC 8.0 02/05/2019    HGB 12.6 02/05/2019    HCT 38.6 02/05/2019    MCV 85.8 02/05/2019     02/05/2019     Lab Results   Component Value Date     02/05/2019    K 4.7 02/05/2019     02/05/2019    CO2 25 02/05/2019    BUN 12 02/05/2019    CREATININE 0.6 02/05/2019    GLUCOSE 92 02/05/2019    CALCIUM 9.4 02/05/2019    PROT 8.2 02/05/2019    LABALBU 4.1 02/05/2019    BILITOT <0.2 02/05/2019    ALKPHOS 94 02/05/2019    AST 21 02/05/2019    ALT 24 02/05/2019    LABGLOM >60 02/05/2019    GFRAA >60 02/05/2019    AGRATIO 1.0 (L) 02/05/2019    GLOB 4.1 02/05/2019       Review of Systems   Constitutional: Negative for activity change and fever. HENT: Negative for congestion. Eyes: Negative for visual disturbance. Respiratory: Negative for chest tightness. Cardiovascular: Negative for chest pain, palpitations and leg swelling. Gastrointestinal: Positive for abdominal pain (epigastric, intermittent). Negative for constipation and diarrhea. Endocrine: Negative for polyuria. Genitourinary: Negative for dysuria. Musculoskeletal: Negative for arthralgias and myalgias. Skin: Negative for rash. Neurological: Negative for dizziness, light-headedness and headaches. Psychiatric/Behavioral: Negative for agitation, decreased concentration and sleep disturbance. The patient is not nervous/anxious. Prior to Visit Medications    Medication Sig Taking?  Authorizing Provider   lansoprazole (PREVACID) 30 MG delayed release capsule TAKE 1 CAPSULE BY MOUTH DAILY AS NEEDED FOR REFLUX Yes ZELDA Tate CNP   amLODIPine (NORVASC) 10 MG tablet TAKE 1 TABLET BY MOUTH EVERY DAY Yes ZELDA Tate CNP   polyethylene glycol (MIRALAX) POWD powder Take 17 g by mouth daily Yes ZELDA Tate CNP   DULoxetine (CYMBALTA) 60 MG extended release capsule Take 1 capsule by mouth daily TAKE 1 CAPSULE BY MOUTH EVERY DAY Yes ZELDA Tate CNP   chlorthalidone (HYGROTON) 25 MG tablet Take 1 tablet by mouth daily Yes ZELDA Tate CNP   spironolactone (ALDACTONE) 100 MG tablet Take ONE plus HALF tablet daily  Patient not taking: Reported on 1/31/2020  Conda Dense, DO   Cyanocobalamin (B-12) 500 MCG TABS Take 500 mcg by mouth daily  Conda Dense, DO   magnesium oxide (MAGNESIUM-OXIDE) 400 (241.3 Mg) MG TABS tablet TAKE 1 TABLET BY MOUTH EVERY DAY  Patient not taking: Reported on 1/31/2020  Conda Dense, DO   levocetirizine (XYZAL) 5 MG tablet Take one tablet by mouth daily  Patient not taking: Reported on 1/31/2020  Conda Dense, DO       Allergies   Allergen Reactions    Latex Itching and Rash  Corn-Containing Products Itching, Shortness Of Breath and Swelling    Other Anaphylaxis     enviromental  allegies    Corn, wheat, soy bean, peanuts and walnuts    Peanut-Containing Drug Products Shortness Of Breath    Soy Allergy Shortness Of Breath and Swelling    Tree Nut  [Macadamia Nut Oil] Shortness Of Breath and Swelling    Wheat Bran Hives, Itching, Shortness Of Breath and Swelling    Tramadol Other (See Comments)     Made her feel really bad, and head dizzy    Imitrex [Sumatriptan] Other (See Comments)     Chest pain       Past Medical History:   Diagnosis Date    Acid reflux     Allergic rhinitis     Anemia     Back pain     Depression     [facility]atomy[facility] ([facility] 2008    Eczema     Fibromyalgia     GERD (gastroesophageal reflux disease)     Headache(784.0)     History of blood transfusion 2009    also two in 2014    HTN (hypertension)     Hx of blood clots     Irritable bowel syndrome     Morbid obesity with BMI of 50.0-59.9, adult (HCC)     Osteoarthritis     PCOS (polycystic ovarian syndrome)     Prediabetes     Pulmonary embolism (HCC)     Vaginal high risk HPV DNA test positive 05/2016       Past Surgical History:   Procedure Laterality Date    EYE SURGERY      crossed eyes    HYSTERECTOMY  2014    full-ovaries gone    MUSCLE BIOPSY      left thigh    OVARIAN CYST REMOVAL  1998       Social History     Socioeconomic History    Marital status: Single     Spouse name: Not on file    Number of children: 3    Years of education: Not on file    Highest education level: Not on file   Occupational History    Occupation: unemployed   Social Needs    Financial resource strain: Patient refused    Food insecurity:     Worry: Never true     Inability: Never true    Transportation needs:     Medical: No     Non-medical: No   Tobacco Use    Smoking status: Never Smoker    Smokeless tobacco: Never Used   Substance and Sexual Activity    Alcohol use:  Yes     Alcohol/week: HENT:      Head: Normocephalic. Right Ear: Hearing and external ear normal.      Left Ear: Hearing and external ear normal.      Nose: Nose normal.   Eyes:      General: Lids are normal.   Cardiovascular:      Rate and Rhythm: Normal rate and regular rhythm. Heart sounds: Normal heart sounds, S1 normal and S2 normal.   Pulmonary:      Effort: Pulmonary effort is normal.      Breath sounds: Normal breath sounds. Musculoskeletal: Normal range of motion. Lymphadenopathy:      Head:      Right side of head: No submental or submandibular adenopathy. Left side of head: No submental or submandibular adenopathy. Skin:     General: Skin is warm and dry. Findings: No rash. Neurological:      Mental Status: She is alert and oriented to person, place, and time. GCS: GCS eye subscore is 4. GCS verbal subscore is 5. GCS motor subscore is 6. Psychiatric:         Attention and Perception: Attention normal.         Mood and Affect: Mood normal.         Speech: Speech normal.         Behavior: Behavior normal. Behavior is cooperative. ASSESSMENT/PLAN:  1. SHYLA (obstructive sleep apnea)    - Sheila Wiggins MD, Sleep Medicine, [facility]    2. Presence of IVC filter    - Sheila Vazquez MD, Vascular Surgery, [facility]    3. PCOS (polycystic ovarian syndrome)    - POCT glycosylated hemoglobin (Hb A1C)    4. Essential hypertension  -Take medications as prescribed. 5. Morbid obesity with BMI of 50.0-59.9, adult ([address]  -The patient is advised to begin progressive daily aerobic exercise program, attempt to lose weight, reduce exposure to stress, improve dietary compliance and continue current medications. 6. Pulmonary embolism, unspecified chronicity, unspecified pulmonary embolism type, unspecified whether acute cor pulmonale present ([address]  -Resolved    7.  Chronic diastolic CHF (congestive heart failure) ([address]  -Will review chart and refer to Cardiology  -Continue current medications. 8. Epigastric pain  -GERD vs IVF filter placement  - lansoprazole (PREVACID) 30 MG delayed release capsule; TAKE 1 CAPSULE BY MOUTH DAILY AS NEEDED FOR REFLUX  Dispense: 30 capsule;  Refill: 0  - Sheila Alvarado MD, Vascular Surgery, [facility]      Return in about 4 weeks (around 2/28/2020) for HTN.      --Sharyle Needles, APRN - CNP on 2/4/2020 at 9:29 AM

## 2020-01-31 NOTE — PATIENT INSTRUCTIONS
BP goal: less than 130/80  Eat foods high in Potassium  Call dietitian  Make appointment with Vascular Surgeon  Patient Education        Learning About High-Potassium Foods  What foods are high in potassium? The foods you eat contain nutrients, such as vitamins and minerals. Potassium is a nutrient. Your body needs the right amount to stay healthy and work as it should. You can use the list below to help you make choices about which foods to eat. Foods are high in potassium if they have more than 200 mg per serving. Fruits  · Apricots, 2 raw  · Avocado, ½ fruit  · Banana, 1 medium  · [place], 1 fruit  · Nectarine, 1 fruit  · Orange, 1 fruit  · Prunes, 5 fruits  · Raisins, ¼ cup  Vegetables  · Artichoke, 1 medium  · Beets, ½ cup  · Broccoli, ½ cup  · Kale (raw), 1 cup  · Potato with skin, 1 medium  · Spinach, ½ cup  · Sweet potato, 1 medium  · Tomato sauce, ½ cup  · Zucchini, ½ cup  Dairy and dairy alternatives  · Milk, 1 cup  · Soy milk, 1 cup  · Yogurt, 6 oz  Meats and other protein foods  · Beans (lima, navy, white), ½ cup  · Beef, ground, 3 oz  · Chicken, 3 oz  · Fish (halibut, tuna, cod, snapper), 3 oz  · Nuts (almonds, hazelnuts, [place], cashew, pistachios), 1 oz  · Peanut butter, 2 Tbsp  · Peanuts, 1 oz  · [place] 3 oz  Seasonings  · Salt substitutes  Work with your doctor to find out how much of this nutrient you need. Depending on your health, you may need more or less of it in your diet. Where can you learn more? Go to [email or web address] and sign in to your [employer] account. Enter P450 in the [employer] box to learn more about \"Learning About High-Potassium Foods. \"     If you do not have an account, please click on the \"Sign Up Now\" link. Current as of: August 21, 2019  Content Version: 12.3  © 5520-1084 [employer]wise, Inc[employer]. Care instructions adapted under license by [facility] ([facility]).  If you have questions about a medical condition or this instruction, always ask

## 2020-02-04 ASSESSMENT — ENCOUNTER SYMPTOMS: ABDOMINAL PAIN: 1

## 2020-02-05 ENCOUNTER — TELEPHONE (OUTPATIENT)
Dept: PULMONOLOGY | Age: 40
End: 2020-02-05

## 2020-02-05 NOTE — TELEPHONE ENCOUNTER
FYI patient called back today and said that she tried to get the records from [address] [place] [address] from 15 yrs ago and is not able to get the records.

## 2020-02-06 ENCOUNTER — OFFICE VISIT (OUTPATIENT)
Dept: SLEEP MEDICINE | Age: 40
End: 2020-02-06
Payer: COMMERCIAL

## 2020-02-06 VITALS
RESPIRATION RATE: 16 BRPM | WEIGHT: 293 LBS | SYSTOLIC BLOOD PRESSURE: 144 MMHG | HEIGHT: 64 IN | OXYGEN SATURATION: 97 % | BODY MASS INDEX: 50.02 KG/M2 | DIASTOLIC BLOOD PRESSURE: 86 MMHG | HEART RATE: 73 BPM

## 2020-02-06 PROCEDURE — G8427 DOCREV CUR MEDS BY ELIG CLIN: HCPCS | Performed by: PSYCHIATRY & NEUROLOGY

## 2020-02-06 PROCEDURE — 1036F TOBACCO NON-USER: CPT | Performed by: PSYCHIATRY & NEUROLOGY

## 2020-02-06 PROCEDURE — G8482 FLU IMMUNIZE ORDER/ADMIN: HCPCS | Performed by: PSYCHIATRY & NEUROLOGY

## 2020-02-06 PROCEDURE — 99204 OFFICE O/P NEW MOD 45 MIN: CPT | Performed by: PSYCHIATRY & NEUROLOGY

## 2020-02-06 PROCEDURE — G8417 CALC BMI ABV UP PARAM F/U: HCPCS | Performed by: PSYCHIATRY & NEUROLOGY

## 2020-02-06 ASSESSMENT — SLEEP AND FATIGUE QUESTIONNAIRES
HOW LIKELY ARE YOU TO NOD OFF OR FALL ASLEEP WHEN YOU ARE A PASSENGER IN A CAR FOR AN HOUR WITHOUT A BREAK: 0
HOW LIKELY ARE YOU TO NOD OFF OR FALL ASLEEP WHILE SITTING AND TALKING TO SOMEONE: 0
HOW LIKELY ARE YOU TO NOD OFF OR FALL ASLEEP WHILE SITTING AND READING: 0
ESS TOTAL SCORE: 6
HOW LIKELY ARE YOU TO NOD OFF OR FALL ASLEEP WHILE WATCHING TV: 3
HOW LIKELY ARE YOU TO NOD OFF OR FALL ASLEEP IN A CAR, WHILE STOPPED FOR A FEW MINUTES IN TRAFFIC: 0
HOW LIKELY ARE YOU TO NOD OFF OR FALL ASLEEP WHILE SITTING INACTIVE IN A PUBLIC PLACE: 0
HOW LIKELY ARE YOU TO NOD OFF OR FALL ASLEEP WHILE SITTING QUIETLY AFTER LUNCH WITHOUT ALCOHOL: 0
NECK CIRCUMFERENCE (INCHES): 17.5
HOW LIKELY ARE YOU TO NOD OFF OR FALL ASLEEP WHILE LYING DOWN TO REST IN THE AFTERNOON WHEN CIRCUMSTANCES PERMIT: 3

## 2020-02-06 ASSESSMENT — ENCOUNTER SYMPTOMS
EYES NEGATIVE: 1
SHORTNESS OF BREATH: 1
SORE THROAT: 1
ALLERGIC/IMMUNOLOGIC NEGATIVE: 1
ABDOMINAL PAIN: 1
COUGH: 1

## 2020-02-06 NOTE — PATIENT INSTRUCTIONS
Patient Education        Heart Failure and Sleep Apnea: Care Instructions  Your Care Instructions    Sleep apnea is fairly common in people with heart failure. Sleep apnea means you stop breathing for 10 seconds or longer during sleep. It may cause you to snore loudly and not sleep well, so you wake up feeling tired. Getting treatment for sleep apnea can help you sleep and feel better. It may also help keep your heart failure from getting worse. Follow-up care is a key part of your treatment and safety. Be sure to make and go to all appointments, and call your doctor if you are having problems. It's also a good idea to know your test results and keep a list of the medicines you take. How can you care for yourself at home? · Lose weight, if needed. It may reduce the number of times you stop breathing or have slowed breathing. · Go to bed at the same time every night. · Sleep on your side. It may stop mild apnea. If you tend to roll onto your back, sew a pocket in the back of your pajama top. Put a tennis ball into the pocket, and stitch the pocket shut. This will help keep you from sleeping on your back. · Avoid alcohol and medicines such as sleeping pills and sedatives before bed. · Do not smoke. Smoking can make heart failure and sleep apnea worse. If you need help quitting, talk to your doctor about stop-smoking programs and medicines. These can increase your chances of quitting for good. · Prop up the head of your bed 4 to 6 inches by putting bricks under the legs of the bed. · Try a continuous positive airway pressure (CPAP) breathing machine if your doctor recommends it. The machine keeps your airway from closing when you sleep. It may take time for you to get used to CPAP. · If your nose feels dry or bleeds when you use one of these machines, talk with your doctor about increasing moisture in the air. A humidifier may help.   · If your nose is runny or stuffy from using a breathing machine, talk

## 2020-02-06 NOTE — PROGRESS NOTES
MD ADRIANNA Lincoln Board Certified in Sleep Medicine  Certified [facility] Sleep Medicine  Board Certified in Neurology [address]  [address] [address] [facility]  [address]  [address] [facility] [facility]  [address], [address]           [address]  [address] [facility] [id number] [phone]    Subjective:     Patient ID: Mercy Shone is a 37 y.o. female. Chief Complaint   Patient presents with    New Patient     referred by dr. Vangie Olszewski. has bipap does not use, sleep study 15 years ago       HPI:        Mercy Shone is a 37 y.o. female referred by [facility] for a sleep evaluation. She complains of snoring, snorting, choking, tossing and turning, excessive daytime sleepiness, feels sleepy during the day, take naps during the day but she denies periods of not breathing, knees buckling with laughing, completely or partially paralyzed while falling asleep or waking up, noisy environment, uncomfortable room temperature, uncomfortable bedding. Symptoms began several years ago, gradually worsening since that time. The patient's family confirmed the snoring without stopped breathing at night  SLEEP SCHEDULE: Goes to bed around 10 PM in theweekdays and 10 PM in the weekends. It usually takes the patient few minutes to fall asleep. The patient gets up 7 per night to go to the bathroom. The Patient finally gets up at 6-6:45 AM during the weekdays and 8 AM in the weekends. patient wakes up with dry mouth. The patient has restless sleep with frequent arousals in addition to the Patient has significant daytime sleepiness. The Patient scored Total score: 6 on [place] Sleepiness Scale ( more than 10 is indicative of daytime sleepiness)and 40 in fatigue scale ( more than 36 is indicativeof daytime fatigue).  The patient takes 2 naps a week for 120-180 minutes and (PREVACID) 30 MG delayed release capsule TAKE 1 CAPSULE BY MOUTH DAILY AS NEEDED FOR REFLUX 1/31/20  Yes Mallika Claudia, APRN - CNP   amLODIPine (NORVASC) 10 MG tablet TAKE 1 TABLET BY MOUTH EVERY DAY 1/31/20  Yes Mallika Claudia, APRN - CNP   polyethylene glycol [facility]) POWD powder Take 17 g by mouth daily 1/31/20  Yes Mallika Claudia, APRN - CNP   DULoxetine (CYMBALTA) 60 MG extended release capsule Take 1 capsule by mouth daily TAKE 1 CAPSULE BY MOUTH EVERY DAY 1/31/20  Yes Mallika Claudia, APRN - CNP   chlorthalidone (HYGROTON) 25 MG tablet Take 1 tablet by mouth daily 1/31/20  Yes Mallika Claudia, APRN - CNP   spironolactone (ALDACTONE) 100 MG tablet Take ONE plus HALF tablet daily  Patient not taking: Reported on 1/31/2020 10/15/18   Sigrid Geller, DO   Cyanocobalamin (B-12) 500 MCG TABS Take 500 mcg by mouth daily 9/21/18 9/21/19  Sigrid Geller DO   magnesium oxide (MAGNESIUM-OXIDE) 400 (241.3 Mg) MG TABS tablet TAKE 1 TABLET BY MOUTH EVERY DAY  Patient not taking: Reported on 2/6/2020 9/21/18   Sigrid Geller DO   levocetirizine (XYZAL) 5 MG tablet Take one tablet by mouth daily  Patient not taking: Reported on 2/6/2020 9/21/18   Sigrid Geller DO       Allergies as of 02/06/2020 - Review Complete 02/06/2020   Allergen Reaction Noted    Latex Itching and Rash 07/10/2013    Corn-containing products Itching, Shortness Of Breath, and Swelling 06/21/2016    Other Anaphylaxis 05/21/2014    Peanut-containing drug products Shortness Of Breath 07/09/2015    Soy allergy Shortness Of Breath and Swelling 06/21/2016    Tree nut  [macadamia nut oil] Shortness Of Breath and Swelling 06/21/2016    Wheat bran Hives, Itching, Shortness Of Breath, and Swelling 05/08/2015    Tramadol Other (See Comments) 07/07/2015    Imitrex [sumatriptan] Other (See Comments) 07/10/2013       Patient Active Problem List   Diagnosis    Essential hypertension    Migraine    GERD (gastroesophageal reflux disease)    Morbid obesity with BMI of 50.0-59.9, adult ([facility]    History of hysterectomy    Obstructive sleep apnea syndrome    Pulmonary embolism (HCC)    Prediabetes    PCOS (polycystic ovarian syndrome)    Primary osteoarthritis involving multiple joints    Chronic pain    Hx of pulmonary embolus    SHYLA (obstructive sleep apnea)    Chronic diastolic CHF (congestive heart failure) (HCC)       Past Medical History:   Diagnosis Date    Acid reflux     Allergic rhinitis     Anemia     Back pain     Depression     Dermatomyositis ([facility] 2008    Eczema     Fibromyalgia     GERD (gastroesophageal reflux disease)     Headache(784.0)     History of blood transfusion 2009    also two in 2014    HTN (hypertension)     Hx of blood clots     Irritable bowel syndrome     Morbid obesity with BMI of 50.0-59.9, adult (HCC)     Osteoarthritis     PCOS (polycystic ovarian syndrome)     Prediabetes     Pulmonary embolism (HCC)     Vaginal high risk HPV DNA test positive 05/2016       Past Surgical History:   Procedure Laterality Date    EYE SURGERY      crossed eyes    HYSTERECTOMY  2014    full-ovaries gone    MUSCLE BIOPSY      left thigh    OVARIAN CYST REMOVAL  1998       Family History   Problem Relation Age of Onset    High Blood Pressure Mother     Other Father         gout    Cancer Father         prostate    Breast Cancer Maternal Aunt 47    Asthma Maternal Cousin     Rheum Arthritis Neg Hx     Osteoarthritis Neg Hx     Diabetes Neg Hx     Heart Failure Neg Hx     High Cholesterol Neg Hx     Hypertension Neg Hx     Migraines Neg Hx     Ovarian Cancer Neg Hx     Rashes/Skin Problems Neg Hx     Seizures Neg Hx     Stroke Neg Hx     Thyroid Disease Neg Hx        Review of Systems   Constitutional: Positive for diaphoresis. HENT: Positive for congestion and sore throat. Eyes: Negative. Respiratory: Positive for cough and shortness of breath. Cardiovascular: Positive for leg swelling. Gastrointestinal: Positive for abdominal pain. Endocrine: Positive for cold intolerance and heat intolerance. Genitourinary: Positive for frequency. Musculoskeletal: Positive for arthralgias and myalgias. Skin: Negative. Allergic/Immunologic: Negative. Neurological: Positive for headaches. Psychiatric/Behavioral: Positive for agitation. Objective:     Vitals:  Weight BMI Neck circumference    Wt Readings from Last 3 Encounters:   02/06/20 (!) 320 lb (145.2 kg)   01/31/20 (!) 319 lb 6.4 oz (144.9 kg)   09/14/18 297 lb (134.7 kg)    Body mass index is 55.62 kg/m². Neck circumference: 17.5     BP HR SaO2   BP Readings from Last 3 Encounters:   02/06/20 (!) 144/86   01/31/20 (!) 145/93   02/05/19 (!) 105/57    Pulse Readings from Last 3 Encounters:   02/06/20 73   01/31/20 65   02/05/19 66    SpO2 Readings from Last 3 Encounters:   02/06/20 97%   01/31/20 98%   02/05/19 95%        The mandibular molar Class :   [x]1 []2 []3      Mallampati I Airway Classification:   []1 []2 []3 [x]4        Physical Exam  Vitals signs and nursing note reviewed. Constitutional:       Appearance: Normal appearance. HENT:      Head: Atraumatic. Nose: Nose normal.      Mouth/Throat:      Comments: Mallampati class 3, no retrognathia or hypognathia , normal airflow in bilateral nostrils, no septum deviation , crowded oropharynx with low soft palate, high arched hard palate,no tonsils enlargement. Eyes:      Extraocular Movements: Extraocular movements intact. Neck:      Musculoskeletal: Normal range of motion and neck supple. Cardiovascular:      Rate and Rhythm: Normal rate and regular rhythm. Heart sounds: Normal heart sounds. Pulmonary:      Effort: Pulmonary effort is normal.      Breath sounds: Normal breath sounds. Musculoskeletal: Normal range of motion. General: No swelling. Skin:     General: Skin is warm. Neurological:      General: No focal deficit present.    Psychiatric: Mood and Affect: Mood normal.         Assessment:   Obstructive Sleep Apnea/Hypopnea Syndrome     Diagnosis Orders   1. Obstructive sleep apnea  Baseline Diagnostic Sleep Study    Sleep Study with PAP Titration   2. H/O CHF  Baseline Diagnostic Sleep Study    Sleep Study with PAP Titration   3. Essential hypertension  Baseline Diagnostic Sleep Study    Sleep Study with PAP Titration   4. Morbid obesity with BMI of 50.0-59.9, adult [facility])  Baseline Diagnostic Sleep Study     Plan:     Patient was counseled about the pathophysiology of obstructive sleep apnea syndrome and the methods for evaluating its presence and severity. Patient was counseled to avoid driving and other potentially hazardous circumstances if the patient is experiencing excessive sleepiness. Treatment considerations include the use of nasal CPAP, oral dental appliance or a surgical intervention, which should be based on otolarygologic findings, In the meantime, the patient should be cautioned to avoid the use of alcohol or other depressant medications because of potential for increasing the duration and severity of apnea and cautioned regarding driving or operating and dangerous equipment if the patient is experiencing daytime sleepiness. .  Most likely treating the SHYLA will have position impact on HTN and CHF control. We discussed the proportionality between weight and AHI. With 10% weight change, the AHI has a 27% proportionate change. With 20% weight change, the AHI has a 45-50% proportionate change. Orders Placed This Encounter   Procedures    Baseline Diagnostic Sleep Study    Sleep Study with PAP Titration       Return in about 3 months (around 5/6/2020) for to review the PSG and CPAP usage, Reveiwing CPAP usage and compliance report and tro.     Graciela Briseno MD  Medical Director - [facility]

## 2020-02-09 ENCOUNTER — HOSPITAL ENCOUNTER (OUTPATIENT)
Dept: SLEEP CENTER | Age: 40
Discharge: HOME | End: 2020-02-09
Payer: COMMERCIAL

## 2020-02-09 PROCEDURE — 95811 POLYSOM 6/>YRS CPAP 4/> PARM: CPT | Performed by: PSYCHIATRY & NEUROLOGY

## 2020-02-09 PROCEDURE — 95811 POLYSOM 6/>YRS CPAP 4/> PARM: CPT

## 2020-02-13 ENCOUNTER — TELEPHONE (OUTPATIENT)
Dept: SLEEP MEDICINE | Age: 40
End: 2020-02-13

## 2020-02-28 ENCOUNTER — OFFICE VISIT (OUTPATIENT)
Dept: INTERNAL MEDICINE CLINIC | Age: 40
End: 2020-02-28
Payer: COMMERCIAL

## 2020-02-28 VITALS
BODY MASS INDEX: 54.06 KG/M2 | DIASTOLIC BLOOD PRESSURE: 70 MMHG | HEART RATE: 75 BPM | WEIGHT: 293 LBS | SYSTOLIC BLOOD PRESSURE: 132 MMHG | OXYGEN SATURATION: 97 % | TEMPERATURE: 98.7 F

## 2020-02-28 PROCEDURE — G8417 CALC BMI ABV UP PARAM F/U: HCPCS | Performed by: NURSE PRACTITIONER

## 2020-02-28 PROCEDURE — G8427 DOCREV CUR MEDS BY ELIG CLIN: HCPCS | Performed by: NURSE PRACTITIONER

## 2020-02-28 PROCEDURE — 1036F TOBACCO NON-USER: CPT | Performed by: NURSE PRACTITIONER

## 2020-02-28 PROCEDURE — G8482 FLU IMMUNIZE ORDER/ADMIN: HCPCS | Performed by: NURSE PRACTITIONER

## 2020-02-28 PROCEDURE — 99214 OFFICE O/P EST MOD 30 MIN: CPT | Performed by: NURSE PRACTITIONER

## 2020-02-28 RX ORDER — LANSOPRAZOLE 30 MG/1
CAPSULE, DELAYED RELEASE ORAL
Qty: 30 CAPSULE | Refills: 3 | Status: SHIPPED | OUTPATIENT
Start: 2020-02-28 | End: 2020-08-11

## 2020-02-28 RX ORDER — CHLORTHALIDONE 25 MG/1
12.5 TABLET ORAL DAILY
Qty: 30 TABLET | Refills: 3 | Status: SHIPPED | OUTPATIENT
Start: 2020-02-28 | End: 2020-11-19 | Stop reason: SDUPTHER

## 2020-02-28 RX ORDER — DULOXETIN HYDROCHLORIDE 60 MG/1
60 CAPSULE, DELAYED RELEASE ORAL DAILY
Qty: 30 CAPSULE | Refills: 3 | Status: SHIPPED | OUTPATIENT
Start: 2020-02-28 | End: 2020-08-11

## 2020-02-28 RX ORDER — POLYETHYLENE GLYCOL 3350 17 G/17G
17 POWDER ORAL DAILY
Qty: 1 BOTTLE | Refills: 5 | Status: SHIPPED | OUTPATIENT
Start: 2020-02-28 | End: 2020-11-19 | Stop reason: SDUPTHER

## 2020-02-28 RX ORDER — AMLODIPINE BESYLATE 10 MG/1
TABLET ORAL
Qty: 30 TABLET | Refills: 5 | Status: SHIPPED | OUTPATIENT
Start: 2020-02-28 | End: 2020-11-19 | Stop reason: SDUPTHER

## 2020-02-28 RX ORDER — LEVOCETIRIZINE DIHYDROCHLORIDE 5 MG/1
TABLET, FILM COATED ORAL
Qty: 30 TABLET | Refills: 5 | Status: SHIPPED | OUTPATIENT
Start: 2020-02-28 | End: 2020-11-19 | Stop reason: SDUPTHER

## 2020-02-28 ASSESSMENT — PATIENT HEALTH QUESTIONNAIRE - PHQ9
SUM OF ALL RESPONSES TO PHQ QUESTIONS 1-9: 1
SUM OF ALL RESPONSES TO PHQ9 QUESTIONS 1 & 2: 1
SUM OF ALL RESPONSES TO PHQ QUESTIONS 1-9: 1
1. LITTLE INTEREST OR PLEASURE IN DOING THINGS: 0
DEPRESSION UNABLE TO ASSESS: FUNCTIONAL CAPACITY MOTIVATION LIMITS ACCURACY
2. FEELING DOWN, DEPRESSED OR HOPELESS: 1

## 2020-02-28 ASSESSMENT — ENCOUNTER SYMPTOMS
ABDOMINAL PAIN: 0
CHEST TIGHTNESS: 0
DIARRHEA: 0
CONSTIPATION: 0
SHORTNESS OF BREATH: 0

## 2020-02-28 NOTE — PATIENT INSTRUCTIONS
Patient Education        Learning About High-Potassium Foods  What foods are high in potassium? The foods you eat contain nutrients, such as vitamins and minerals. Potassium is a nutrient. Your body needs the right amount to stay healthy and work as it should. You can use the list below to help you make choices about which foods to eat. Foods are high in potassium if they have more than 200 mg per serving. Fruits  · Apricots, 2 raw  · Avocado, ½ fruit  · Banana, 1 medium  · [place], 1 fruit  · Nectarine, 1 fruit  · Orange, 1 fruit  · Prunes, 5 fruits  · Raisins, ¼ cup  Vegetables  · Artichoke, 1 medium  · Beets, ½ cup  · Broccoli, ½ cup  · Kale (raw), 1 cup  · Potato with skin, 1 medium  · Spinach, ½ cup  · Sweet potato, 1 medium  · Tomato sauce, ½ cup  · Zucchini, ½ cup  Dairy and dairy alternatives  · Milk, 1 cup  · Soy milk, 1 cup  · Yogurt, 6 oz  Meats and other protein foods  · Beans (lima, navy, white), ½ cup  · Beef, ground, 3 oz  · Chicken, 3 oz  · Fish (halibut, tuna, cod, snapper), 3 oz  · Nuts (almonds, hazelnuts, [place], cashew, pistachios), 1 oz  · Peanut butter, 2 Tbsp  · Peanuts, 1 oz  · [place] 3 oz  Seasonings  · Salt substitutes  Work with your doctor to find out how much of this nutrient you need. Depending on your health, you may need more or less of it in your diet. Where can you learn more? Go to [email or web address] Dynamicspepiceweb.[email or web address] [email or web address]. org and sign in to your [employer] account. Enter P450 in the [employer] box to learn more about \"Learning About High-Potassium Foods. \"     If you do not have an account, please click on the \"Sign Up Now\" link. Current as of: August 21, 2019  Content Version: 12.3  © 4600-3567 Healthwise, Incorporated. Care instructions adapted under license by [facility] ([facility]).  If you have questions about a medical condition or this instruction, always ask your healthcare professional. [address] any warranty or liability for your use of this

## 2020-02-28 NOTE — PROGRESS NOTES
[address] [facility]  [address] [address]  Dept: [phone]       2020     Funmilayo Pa (:  )ET a 37 y.o. female, here for evaluation of the following medical concerns:    HPI     Hypertension  She reports a history of hypertension, reports being out of medication for several months. Denies significant life style changes, requesting a refill on medication. She reports severe cramps @ every 3 days. Reports skipping doses due to cramping. Pulmonary Embolism  She reports a history of PE, had IVC filter placed. Reports clots developed in legs and lungs. Stated has not had IVC filter removed, would like a referral to vascular. She reports intermittent epigastric discomfort, possibly related to IVC filter. Recent CT below. IVC filter removed on      Polycystic Ovarian syndrome (PCOS)  Reports a history of PCOS, treated with Spironolactone, for facial hair growth. Reports discontinuing due to loss of eyebrows but no improvement of unwanted facial hair.     Obesity  She states she is interested in the [employer]. Has not called [employer] at this time. Allergic to corn, wheat, soy, peanuts and walnuts. Has decreased snacks and chips. General weight loss/lifestyle modification strategies discussed (elicit support from others; identify saboteurs; non-food rewards, etc). Goals:   -Eat 4-5 times daily  -Avoid high fat and high sugar foods  -Include protein with all meals and snacks  -Avoid carbonation and caffeine  -Avoid calorie containing beverages  -Increase physical activity as tolerated  Overweight/Obesity related to lack of exercise, sedentary lifestyle, unhealthy eating habits, and unsuccessful diet attempts as evidenced by BMI.      SHYLA  Reports using nasal pillow device for sleep apnea for 1 week. States symptoms have improved, sleeping 2-4 hours without awakening. Decrease fatigue during the day and no naps during the day.    Lab is not nervous/anxious. Prior to Visit Medications    Medication Sig Taking? Authorizing Provider   lansoprazole (PREVACID) 30 MG delayed release capsule TAKE 1 CAPSULE BY MOUTH EVERY DAY AS NEEDED Yes Paz Clark, ZELDA - CNP   levocetirizine (XYZAL) 5 MG tablet Take one tablet by mouth daily Yes ZELDA Fernandez - CNP   amLODIPine (NORVASC) 10 MG tablet TAKE 1 TABLET BY MOUTH EVERY DAY Yes ZELDA Fernandez - CNP   DULoxetine (CYMBALTA) 60 MG extended release capsule Take 1 capsule by mouth daily TAKE 1 CAPSULE BY MOUTH EVERY DAY Yes ZELDA Fernandez - CNP   polyethylene glycol (MIRALAX) POWD powder Take 17 g by mouth daily Yes ZELDA Fernandez - CNP   chlorthalidone (HYGROTON) 25 MG tablet Take 0.5 tablets by mouth daily Yes Paz Clark, ZELDA - DEENA        Social History     Tobacco Use    Smoking status: Never Smoker    Smokeless tobacco: Never Used   Substance Use Topics    Alcohol use: Yes     Alcohol/week: 0.0 standard drinks     Frequency: Monthly or less     Comment: occasionally        Vitals:    02/28/20 0859   BP: 132/70   Site: Left Upper Arm   Position: Sitting   Cuff Size: Large Adult   Pulse: 75   Temp: 98.7 °F (37.1 °C)   TempSrc: Oral   SpO2: 97%   Weight: (!) 311 lb (141.1 kg)     Estimated body mass index is 54.06 kg/m² as calculated from the following:    Height as of 2/6/20: 5' 3.6\" (1.615 m). Weight as of this encounter: 311 lb (141.1 kg). Physical Exam  Vitals signs reviewed. Constitutional:       Appearance: She is well-developed. She is morbidly obese. HENT:      Head: Normocephalic. Right Ear: Hearing and external ear normal.      Left Ear: Hearing and external ear normal.      Nose: Nose normal.   Eyes:      General: Lids are normal.   Cardiovascular:      Rate and Rhythm: Normal rate and regular rhythm.       Heart sounds: Normal heart sounds, S1 normal and S2 normal.   Pulmonary:      Effort: Pulmonary effort is normal.

## 2020-03-11 ENCOUNTER — HOSPITAL ENCOUNTER (OUTPATIENT)
Dept: WOMENS IMAGING | Age: 40
Discharge: HOME | End: 2020-03-11
Payer: COMMERCIAL

## 2020-03-11 PROCEDURE — 77067 SCR MAMMO BI INCL CAD: CPT

## 2020-03-23 ENCOUNTER — VIRTUAL VISIT (OUTPATIENT)
Dept: SLEEP MEDICINE | Age: 40
End: 2020-03-23
Payer: COMMERCIAL

## 2020-03-23 PROCEDURE — 99213 OFFICE O/P EST LOW 20 MIN: CPT | Performed by: PSYCHIATRY & NEUROLOGY

## 2020-03-23 ASSESSMENT — ENCOUNTER SYMPTOMS
ALLERGIC/IMMUNOLOGIC NEGATIVE: 1
APNEA: 0
CHOKING: 0
EYES NEGATIVE: 1
GASTROINTESTINAL NEGATIVE: 1

## 2020-03-23 NOTE — PROGRESS NOTES
MD ADRIANNA Lopes Board Certified in Sleep Medicine  Certified in [address] [place] [address] Certified in Neurology Izzy Skinnerdenis [facility] [address] [place] [address],  [address]  [phone]   [address] [place] [address], [address]                      [address]  [address] [facility] [id number] [phone]    Subjective:     Patient ID: Kiran Benitez is a 37 y.o. female. No chief complaint on file. HPI:  This is a phone telehealth visit at patient home, no physical exam performed Patient agreed for this visit. Patient consented this visit  The location of the provider and the patient at the time of the visit, A statement such as \"the Patient, was located (home) The provider was located ([facility] medical office);    total time 11 minutes   Kiran Benitez is a 37 y.o. female was seen today as a follow for obstructive sleep apnea. The patient underwent split comprehensive polysomnogram on 02/09/2020, the overnight registration revealed severe obstructive sleep apnea with apnea hypopnea index of 56.8 with lowest O2 saturation of 44%, patient spent about 42 minutes below 90%. Subsequently, the patient underwent successful PAP titration on the second half of the night, the lowest O2 saturation while on PAP was 94%. Patient is using the PAP machine about 100% of the time, more than 4 hours a nightabout  93 %, in total average of 6:34 hours a night in last 30 days. Currently on PAP at 15 cm, the AHI is only 1.0 events per hour atthis pressure. Patient improved regarding daytime sleepiness. Patient has no problem with PAP pressure or mask.     Chin strap, with P10  DOT/CDL - N/A        Previous Report(s)Reviewed: historical medical records         Social History     Socioeconomic History    Marital status: Single     Spouse name: Not on file    Number of children: 3    Years of education: Not on file    Highest education level: Not on file   Occupational History    Occupation: unemployed   Social Needs    Financial resource strain: Patient refused    Food insecurity     Worry: Never true     Inability: Never true    Transportation needs     Medical: No     Non-medical: No   Tobacco Use    Smoking status: Never Smoker    Smokeless tobacco: Never Used   Substance and Sexual Activity    Alcohol use: Yes     Alcohol/week: 0.0 standard drinks     Frequency: Monthly or less     Comment: occasionally    Drug use: No    Sexual activity: Not Currently     Partners: Male   Lifestyle    Physical activity     Days per week: Not on file     Minutes per session: Not on file    Stress: Not on file   Relationships    Social connections     Talks on phone: Not on file     Gets together: Not on file     Attends Orthodox service: Not on file     Active member of club or organization: Not on file     Attends meetings of clubs or organizations: Not on file     Relationship status: Not on file    Intimate partner violence     Fear of current or ex partner: No     Emotionally abused: No     Physically abused: No     Forced sexual activity: No   Other Topics Concern    Not on file   Social History Narrative    Lives with 2 children       Prior to Admission medications    Medication Sig Start Date End Date Taking?  Authorizing Provider   lansoprazole (PREVACID) 30 MG delayed release capsule TAKE 1 CAPSULE BY MOUTH EVERY DAY AS NEEDED 2/28/20   ZELDA Garza CNP   levocetirizine (XYZAL) 5 MG tablet Take one tablet by mouth daily 2/28/20   ZELDA Garza CNP   amLODIPine (NORVASC) 10 MG tablet TAKE 1 TABLET BY MOUTH EVERY DAY 2/28/20   ZELDA Garza CNP   DULoxetine (CYMBALTA) 60 MG extended release capsule Take 1 capsule by mouth daily TAKE 1 CAPSULE BY MOUTH EVERY DAY 2/28/20   ZELDA Garza CNP   polyethylene glycol [facility] POWD powder Take 17 g by mouth daily 2/28/20   ZELDA Conway CNP   chlorthalidone (HYGROTON) 25 MG tablet Take 0.5 tablets by mouth daily 2/28/20   ZELDA Conway CNP       Allergies as of 03/23/2020 - Review Complete 02/28/2020   Allergen Reaction Noted    Latex Itching and Rash 07/10/2013    Corn-containing products Itching, Shortness Of Breath, and Swelling 06/21/2016    Other Anaphylaxis 05/21/2014    Peanut-containing drug products Shortness Of Breath 07/09/2015    Soy allergy Shortness Of Breath and Swelling 06/21/2016    Tree nut  [macadamia nut oil] Shortness Of Breath and Swelling 06/21/2016    Wheat bran Hives, Itching, Shortness Of Breath, and Swelling 05/08/2015    Tramadol Other (See Comments) 07/07/2015    Imitrex [sumatriptan] Other (See Comments) 07/10/2013       Patient Active Problem List   Diagnosis    Essential hypertension    Migraine    GERD (gastroesophageal reflux disease)    Morbid obesity with BMI of 50.0-59.9, adult ([facility]    History of hysterectomy    Obstructive sleep apnea syndrome    Pulmonary embolism (HCC)    Prediabetes    PCOS (polycystic ovarian syndrome)    Primary osteoarthritis involving multiple joints    Chronic pain    Hx of pulmonary embolus    Obstructive sleep apnea    Chronic diastolic CHF (congestive heart failure) ([facility]       Past Medical History:   Diagnosis Date    Acid reflux     Allergic rhinitis     Anemia     Back pain     Depression     Dermatomyositis ([facility] 2008    Eczema     Fibromyalgia     GERD (gastroesophageal reflux disease)     Headache(784.0)     History of blood transfusion 2009    also two in 2014    HTN (hypertension)     Hx of blood clots     Irritable bowel syndrome     Morbid obesity with BMI of 50.0-59.9, adult (HCC)     Osteoarthritis     PCOS (polycystic ovarian syndrome)     Prediabetes     Pulmonary embolism (HCC)     Vaginal high risk HPV DNA test positive 05/2016       Past

## 2020-03-27 ENCOUNTER — TELEPHONE (OUTPATIENT)
Dept: INTERNAL MEDICINE CLINIC | Age: 40
End: 2020-03-27

## 2020-03-31 RX ORDER — FEXOFENADINE HCL AND PSEUDOEPHEDRINE HCI 180; 240 MG/1; MG/1
1 TABLET, EXTENDED RELEASE ORAL DAILY
Qty: 30 TABLET | Refills: 0 | Status: SHIPPED | OUTPATIENT
Start: 2020-03-31 | End: 2020-11-19 | Stop reason: ALTCHOICE

## 2020-04-08 ENCOUNTER — TELEPHONE (OUTPATIENT)
Dept: INTERNAL MEDICINE CLINIC | Age: 40
End: 2020-04-08

## 2020-04-10 ENCOUNTER — APPOINTMENT (OUTPATIENT)
Dept: CT IMAGING | Age: 40
End: 2020-04-10
Payer: COMMERCIAL

## 2020-04-10 ENCOUNTER — APPOINTMENT (OUTPATIENT)
Dept: GENERAL RADIOLOGY | Age: 40
End: 2020-04-10
Payer: COMMERCIAL

## 2020-04-10 ENCOUNTER — HOSPITAL ENCOUNTER (EMERGENCY)
Age: 40
Discharge: HOME | End: 2020-04-10
Payer: COMMERCIAL

## 2020-04-10 VITALS
RESPIRATION RATE: 18 BRPM | SYSTOLIC BLOOD PRESSURE: 126 MMHG | DIASTOLIC BLOOD PRESSURE: 66 MMHG | OXYGEN SATURATION: 97 % | HEART RATE: 76 BPM | TEMPERATURE: 98.4 F

## 2020-04-10 LAB — S PYO AG THROAT QL: NEGATIVE

## 2020-04-10 PROCEDURE — 70360 X-RAY EXAM OF NECK: CPT

## 2020-04-10 PROCEDURE — 99284 EMERGENCY DEPT VISIT MOD MDM: CPT

## 2020-04-10 PROCEDURE — 70490 CT SOFT TISSUE NECK W/O DYE: CPT

## 2020-04-10 PROCEDURE — 6370000000 HC RX 637 (ALT 250 FOR IP): Performed by: NURSE PRACTITIONER

## 2020-04-10 PROCEDURE — 87081 CULTURE SCREEN ONLY: CPT

## 2020-04-10 PROCEDURE — 87880 STREP A ASSAY W/OPTIC: CPT

## 2020-04-10 RX ORDER — PREDNISONE 20 MG/1
60 TABLET ORAL ONCE
Status: COMPLETED | OUTPATIENT
Start: 2020-04-10 | End: 2020-04-10

## 2020-04-10 RX ORDER — FAMOTIDINE 20 MG/1
20 TABLET, FILM COATED ORAL 2 TIMES DAILY
Qty: 60 TABLET | Refills: 0 | Status: SHIPPED | OUTPATIENT
Start: 2020-04-10 | End: 2020-11-19 | Stop reason: ALTCHOICE

## 2020-04-10 RX ORDER — PREDNISONE 10 MG/1
60 TABLET ORAL DAILY
Qty: 30 TABLET | Refills: 0 | Status: SHIPPED | OUTPATIENT
Start: 2020-04-10 | End: 2020-04-15

## 2020-04-10 RX ORDER — DIPHENHYDRAMINE HCL 25 MG
50 TABLET ORAL ONCE
Status: COMPLETED | OUTPATIENT
Start: 2020-04-10 | End: 2020-04-10

## 2020-04-10 RX ORDER — DIPHENHYDRAMINE HCL 25 MG
25-50 CAPSULE ORAL EVERY 6 HOURS PRN
Qty: 25 CAPSULE | Refills: 0 | Status: SHIPPED | OUTPATIENT
Start: 2020-04-10 | End: 2020-04-20

## 2020-04-10 RX ORDER — FAMOTIDINE 20 MG/1
40 TABLET, FILM COATED ORAL ONCE
Status: COMPLETED | OUTPATIENT
Start: 2020-04-10 | End: 2020-04-10

## 2020-04-10 RX ADMIN — PREDNISONE 60 MG: 20 TABLET ORAL at 13:47

## 2020-04-10 RX ADMIN — FAMOTIDINE 40 MG: 20 TABLET, FILM COATED ORAL at 13:47

## 2020-04-10 RX ADMIN — DIPHENHYDRAMINE HCL 50 MG: 25 TABLET ORAL at 13:47

## 2020-04-10 NOTE — ED PROVIDER NOTES
[address] [facility]  [address] [address]  Dept: [phone]  Loc: [address] ENCOUNTER        This patient was not seen or evaluated by the attending physician. I evaluated this patient, the attending physician was available for consultation. CHIEF COMPLAINT    Chief Complaint   Patient presents with    Allergic Reaction     Patient c/o throat irritation with the sensation of throat swelling and tongue swelling for past two days. Also reports neck itching. No distress noted. HPI    Wilber Eller is a 37 y.o. female who presents with complaints of possible allergic reaction. This is been ongoing for over 48 hours without any over-the-counter medications taken by the patient. She states that she has multiple food allergies, but some new food and drink products at the grocery store. She states that she feels as if this is the culprit. She denies any diffuse uticaria, shortness of breath or cough. Denies any fevers or chills. States that she feels as if her throat is scratchy and her mouth and neck are itching. Onset was over 48 hours ago. The duration has been constant since the onset. Patient states that she came to the ED for further evaluation and treatment. REVIEW OF SYSTEMS    Cardiac:  No syncope  Respiratory: no shortness of breath  ENT: + mild tongue and throat swelling   GI: No Vomiting  General: No Fever  Skin: see HPI  All other systems reviewed and are negative.     PAST MEDICAL & SURGICAL HISTORY    Past Medical History:   Diagnosis Date    Acid reflux     Allergic rhinitis     Anemia     Back pain     Depression     Dermatomyositis ([facility] [address] 2008    Eczema     Fibromyalgia     GERD (gastroesophageal reflux disease)     Headache(784.0)     History of blood transfusion 2009    also two in 2014    HTN (hypertension)     Hx of blood clots     Irritable bowel syndrome     Morbid obesity with BMI of 50.0-59.9, adult ([facility] [address]     Osteoarthritis     PCOS (polycystic ovarian syndrome)     Prediabetes     Pulmonary embolism (HCC)     Vaginal high risk HPV DNA test positive 05/2016     Past Surgical History:   Procedure Laterality Date    EYE SURGERY      crossed eyes    HYSTERECTOMY  2014    full-ovaries gone    MUSCLE BIOPSY      left thigh    OVARIAN CYST REMOVAL  1998       CURRENT MEDICATIONS  (may include discharge medications prescribed in the ED)  Current Outpatient Rx   Medication Sig Dispense Refill    predniSONE (DELTASONE) 10 MG tablet Take 6 tablets by mouth daily for 5 doses 30 tablet 0    diphenhydrAMINE (BENADRYL) 25 MG capsule Take 1-2 capsules by mouth every 6 hours as needed for Itching or Allergies 25 capsule 0    famotidine (PEPCID) 20 MG tablet Take 1 tablet by mouth 2 times daily 60 tablet 0    fexofenadine-pseudoephedrine (ALLEGRA-D 24HR) 180-240 MG per extended release tablet Take 1 tablet by mouth daily 30 tablet 0    lansoprazole (PREVACID) 30 MG delayed release capsule TAKE 1 CAPSULE BY MOUTH EVERY DAY AS NEEDED 30 capsule 3    levocetirizine (XYZAL) 5 MG tablet Take one tablet by mouth daily 30 tablet 5    amLODIPine (NORVASC) 10 MG tablet TAKE 1 TABLET BY MOUTH EVERY DAY 30 tablet 5    DULoxetine (CYMBALTA) 60 MG extended release capsule Take 1 capsule by mouth daily TAKE 1 CAPSULE BY MOUTH EVERY DAY 30 capsule 3    polyethylene glycol (MIRALAX) POWD powder Take 17 g by mouth daily 1 Bottle 5    chlorthalidone (HYGROTON) 25 MG tablet Take 0.5 tablets by mouth daily 30 tablet 3       ALLERGIES    Allergies   Allergen Reactions    Latex Itching and Rash    Corn-Containing Products Itching, Shortness Of Breath and Swelling    Other Anaphylaxis     enviromental  allegies    Corn, wheat, soy bean, peanuts and walnuts    Peanut-Containing Drug Products Shortness Of Breath    Soy Allergy Shortness Of Breath and Swelling    Tree Nut  Sunday Schaffer

## 2020-04-11 ENCOUNTER — CARE COORDINATION (OUTPATIENT)
Dept: CARE COORDINATION | Age: 40
End: 2020-04-11

## 2020-04-12 LAB — S PYO THROAT QL CULT: NORMAL

## 2020-04-13 ENCOUNTER — CARE COORDINATION (OUTPATIENT)
Dept: CARE COORDINATION | Age: 40
End: 2020-04-13

## 2020-05-27 PROBLEM — T78.40XA ALLERGIC REACTION: Status: ACTIVE | Noted: 2020-05-27

## 2020-05-27 PROBLEM — T78.1XXA ALLERGIC REACTION TO FOOD: Status: ACTIVE | Noted: 2020-05-27

## 2020-05-28 ENCOUNTER — TELEPHONE (OUTPATIENT)
Dept: ORTHOPEDIC SURGERY | Age: 40
End: 2020-05-28

## 2020-05-29 NOTE — TELEPHONE ENCOUNTER
Received APPROVAL for Levocetirizine Dihydrochloride 5MG tablets from 04/28/2020 to 05/28/2021. Approval letter attached. Please advise patient. Thank you.

## 2020-06-02 ENCOUNTER — TELEPHONE (OUTPATIENT)
Dept: INTERNAL MEDICINE CLINIC | Age: 40
End: 2020-06-02

## 2020-08-11 RX ORDER — LANSOPRAZOLE 30 MG/1
CAPSULE, DELAYED RELEASE ORAL
Qty: 30 CAPSULE | Refills: 0 | Status: SHIPPED | OUTPATIENT
Start: 2020-08-11 | End: 2020-09-11

## 2020-08-11 RX ORDER — DULOXETIN HYDROCHLORIDE 60 MG/1
CAPSULE, DELAYED RELEASE ORAL
Qty: 30 CAPSULE | Refills: 0 | Status: SHIPPED | OUTPATIENT
Start: 2020-08-11 | End: 2020-09-11

## 2020-09-11 RX ORDER — LANSOPRAZOLE 30 MG/1
CAPSULE, DELAYED RELEASE ORAL
Qty: 30 CAPSULE | Refills: 0 | Status: SHIPPED | OUTPATIENT
Start: 2020-09-11 | End: 2020-11-19 | Stop reason: SDUPTHER

## 2020-09-11 RX ORDER — DULOXETIN HYDROCHLORIDE 60 MG/1
CAPSULE, DELAYED RELEASE ORAL
Qty: 30 CAPSULE | Refills: 0 | Status: SHIPPED | OUTPATIENT
Start: 2020-09-11 | End: 2020-11-19 | Stop reason: SDUPTHER

## 2020-11-11 RX ORDER — DULOXETIN HYDROCHLORIDE 60 MG/1
CAPSULE, DELAYED RELEASE ORAL
Qty: 30 CAPSULE | Refills: 0 | OUTPATIENT
Start: 2020-11-11

## 2020-11-11 RX ORDER — AMLODIPINE BESYLATE 10 MG/1
TABLET ORAL
Qty: 30 TABLET | Refills: 0 | OUTPATIENT
Start: 2020-11-11

## 2020-11-11 RX ORDER — LANSOPRAZOLE 30 MG/1
CAPSULE, DELAYED RELEASE ORAL
Qty: 30 CAPSULE | Refills: 0 | OUTPATIENT
Start: 2020-11-11

## 2020-11-19 ENCOUNTER — OFFICE VISIT (OUTPATIENT)
Dept: INTERNAL MEDICINE CLINIC | Age: 40
End: 2020-11-19
Payer: COMMERCIAL

## 2020-11-19 ENCOUNTER — TELEPHONE (OUTPATIENT)
Dept: INTERNAL MEDICINE CLINIC | Age: 40
End: 2020-11-19

## 2020-11-19 VITALS
BODY MASS INDEX: 50.62 KG/M2 | HEART RATE: 78 BPM | WEIGHT: 291.2 LBS | OXYGEN SATURATION: 98 % | DIASTOLIC BLOOD PRESSURE: 78 MMHG | TEMPERATURE: 97.4 F | SYSTOLIC BLOOD PRESSURE: 124 MMHG

## 2020-11-19 PROCEDURE — G8484 FLU IMMUNIZE NO ADMIN: HCPCS | Performed by: NURSE PRACTITIONER

## 2020-11-19 PROCEDURE — 99214 OFFICE O/P EST MOD 30 MIN: CPT | Performed by: NURSE PRACTITIONER

## 2020-11-19 PROCEDURE — 1036F TOBACCO NON-USER: CPT | Performed by: NURSE PRACTITIONER

## 2020-11-19 PROCEDURE — G8427 DOCREV CUR MEDS BY ELIG CLIN: HCPCS | Performed by: NURSE PRACTITIONER

## 2020-11-19 PROCEDURE — G8417 CALC BMI ABV UP PARAM F/U: HCPCS | Performed by: NURSE PRACTITIONER

## 2020-11-19 RX ORDER — LEVOCETIRIZINE DIHYDROCHLORIDE 5 MG/1
TABLET, FILM COATED ORAL
Qty: 90 TABLET | Refills: 1 | Status: SHIPPED | OUTPATIENT
Start: 2020-11-19 | End: 2020-12-26 | Stop reason: SDUPTHER

## 2020-11-19 RX ORDER — EPINEPHRINE 0.3 MG/.3ML
INJECTION SUBCUTANEOUS
COMMUNITY
Start: 2020-04-27 | End: 2021-03-11 | Stop reason: SDUPTHER

## 2020-11-19 RX ORDER — POLYETHYLENE GLYCOL 3350 17 G/17G
17 POWDER ORAL DAILY
Qty: 3 BOTTLE | Refills: 1 | Status: SHIPPED | OUTPATIENT
Start: 2020-11-19 | End: 2020-12-26 | Stop reason: SDUPTHER

## 2020-11-19 RX ORDER — FLUTICASONE PROPIONATE 50 MCG
SPRAY, SUSPENSION (ML) NASAL
COMMUNITY
Start: 2020-11-11 | End: 2020-11-19 | Stop reason: SDUPTHER

## 2020-11-19 RX ORDER — FLUTICASONE PROPIONATE 50 MCG
2 SPRAY, SUSPENSION (ML) NASAL DAILY
Qty: 3 BOTTLE | Refills: 1 | Status: SHIPPED | OUTPATIENT
Start: 2020-11-19 | End: 2021-03-11 | Stop reason: SDUPTHER

## 2020-11-19 RX ORDER — DULOXETIN HYDROCHLORIDE 60 MG/1
CAPSULE, DELAYED RELEASE ORAL
Qty: 90 CAPSULE | Refills: 1 | Status: SHIPPED | OUTPATIENT
Start: 2020-11-19 | End: 2021-03-11 | Stop reason: SDUPTHER

## 2020-11-19 RX ORDER — AMLODIPINE BESYLATE 10 MG/1
TABLET ORAL
Qty: 90 TABLET | Refills: 1 | Status: SHIPPED | OUTPATIENT
Start: 2020-11-19 | End: 2021-03-11 | Stop reason: SDUPTHER

## 2020-11-19 RX ORDER — LANSOPRAZOLE 30 MG/1
30 CAPSULE, DELAYED RELEASE ORAL DAILY
Qty: 90 CAPSULE | Refills: 1 | Status: SHIPPED | OUTPATIENT
Start: 2020-11-19 | End: 2021-03-11 | Stop reason: SDUPTHER

## 2020-11-19 RX ORDER — CHLORTHALIDONE 25 MG/1
12.5 TABLET ORAL DAILY
Qty: 90 TABLET | Refills: 1 | Status: SHIPPED
Start: 2020-11-19 | End: 2021-03-11 | Stop reason: SINTOL

## 2020-11-19 ASSESSMENT — ENCOUNTER SYMPTOMS
VOMITING: 0
ABDOMINAL PAIN: 0
ANAL BLEEDING: 0
SHORTNESS OF BREATH: 0
RECTAL PAIN: 0
DIARRHEA: 0
BLOOD IN STOOL: 1
ABDOMINAL DISTENTION: 0
CHEST TIGHTNESS: 0
NAUSEA: 1
CONSTIPATION: 0

## 2020-11-19 NOTE — PROGRESS NOTES
[address]   [address]  [address] [facility] [place] [address]  Dept: [phone]       2020     Cassidy Dawson (:  )VG a 41 y.o. female, here for evaluation of hypertension and bilateral wrist pain. Rash   This is a chronic problem. The current episode started more than 1 year ago. The affected locations include the left hand and right hand. The rash is characterized by dryness and itchiness. She was exposed to nothing. Pertinent negatives include no congestion, diarrhea, fever, shortness of breath or vomiting. Past treatments include topical steroids Sharmin Albert). The treatment provided moderate relief. Her past medical history is significant for eczema. Reports steroid creams are ineffective, states she was given a sample of Eucrisa, which is effective. States her insurance does not cover, requesting samples. Birth [facility]  She reports the birth merritt on her left forearm has changed in size. States the birth merritt has always been black with hair. Most recently the edges became swollen and the entire merritt became raised. Reports pain and itching at site. She is requesting a Dermatology referral for evaluation. Bowel Incontinence  She reports after having a bowel movement she experiences stool leakage. States the leakage occurs also with urinating. Admits this started over 1 year ago. Reports hemorrhoids, occasional blood on tissue. Denies abdominal pain, nausea, vomiting, hard stools, or straining. Bilateral Carpal Tunnel  She reports having a diagnosis of bilateral carpal tunnel since the age of 23 or 20. States diagnosed per EMG in 2018. She reports persistent numbness of thumbs, dropping objects, bilateral wrist pain. Right wrist pain worse than left.  Admits to fingers \"becoming stuck,\" feeling a knot of inner right wrist.     Lab Results   Component Value Date    CHOL 242 (H) 2016    CHOL 300 (H) 2015    CHOL 199 2014     Lab Results   Component Value Date    TRIG 129 06/02/2016    TRIG 245 (H) 01/30/2015    TRIG 204 (H) 05/29/2014     Lab Results   Component Value Date    HDL 44 09/18/2018    HDL 39 (L) 06/02/2016    HDL 41 01/30/2015     Lab Results   Component Value Date    LDLCALC 215 (H) 09/18/2018    LDLCALC 177 (H) 06/02/2016    LDLCALC 210 (H) 01/30/2015     Lab Results   Component Value Date    LABVLDL 26 09/18/2018    LABVLDL 26 06/02/2016    LABVLDL 49 01/30/2015     Lab Results   Component Value Date    WBC 8.0 02/05/2019    HGB 12.6 02/05/2019    HCT 38.6 02/05/2019    MCV 85.8 02/05/2019     02/05/2019     Lab Results   Component Value Date     02/05/2019    K 4.7 02/05/2019     02/05/2019    CO2 25 02/05/2019    BUN 12 02/05/2019    CREATININE 0.6 02/05/2019    GLUCOSE 92 02/05/2019    CALCIUM 9.4 02/05/2019    PROT 8.2 02/05/2019    LABALBU 4.1 02/05/2019    BILITOT <0.2 02/05/2019    ALKPHOS 94 02/05/2019    AST 21 02/05/2019    ALT 24 02/05/2019    LABGLOM >60 02/05/2019    GFRAA >60 02/05/2019    AGRATIO 1.0 (L) 02/05/2019    GLOB 4.1 02/05/2019       Review of Systems   Constitutional: Negative for activity change and fever. HENT: Negative for congestion. Eyes: Negative for visual disturbance. Respiratory: Negative for chest tightness and shortness of breath. Cardiovascular: Negative for chest pain, palpitations and leg swelling. Gastrointestinal: Positive for blood in stool and nausea. Negative for abdominal distention, abdominal pain, anal bleeding, constipation, diarrhea, rectal pain and vomiting. Incontinent of stool   Endocrine: Negative for polyuria. Genitourinary: Negative for dysuria and frequency. Musculoskeletal: Positive for myalgias (bilateral wrist pain). Negative for arthralgias. Skin: Positive for rash (atop both hands). Neurological: Negative for dizziness, light-headedness and headaches.    Psychiatric/Behavioral: Negative for agitation, decreased concentration and sleep disturbance. The patient is not nervous/anxious. Prior to Visit Medications    Medication Sig Taking? Authorizing Provider   EPINEPHrine (EPIPEN) 0.3 MG/0.3ML SOAJ injection Inject 1 pen into the thigh for emergency treatment of allergic reactions, may repeat for severe persistent reactions. Yes Historical Provider, MD   DULoxetine (CYMBALTA) 60 MG extended release capsule TAKE ONE CAPSULE BY MOUTH DAILY Yes Sindhu SnuffZELDA hui CNP   lansoprazole (PREVACID) 30 MG delayed release capsule Take 1 capsule by mouth daily Yes Sindhu SnuffZELDA hui CNP   levocetirizine (XYZAL) 5 MG tablet Take one tablet by mouth daily Yes Sindhu SnZELDA chen CNP   amLODIPine (NORVASC) 10 MG tablet TAKE 1 TABLET BY MOUTH EVERY DAY Yes Sindhu SnZELDA chen CNP   polyethylene glycol (MIRALAX) 17 GM/SCOOP POWD powder Take 17 g by mouth daily Yes Sindhu SnZELDA chen CNP   chlorthalidone (HYGROTON) 25 MG tablet Take 0.5 tablets by mouth daily Yes Sindhu SnZELDA chen CNP   fluticasone (FLONASE) 50 MCG/ACT nasal spray 2 sprays by Nasal route daily Yes ZELDA Hicks CNP        Social History     Tobacco Use    Smoking status: Never Smoker    Smokeless tobacco: Never Used   Substance Use Topics    Alcohol use: Yes     Alcohol/week: 0.0 standard drinks     Frequency: Monthly or less     Comment: occasionally        Vitals:    11/19/20 1523   BP: 124/78   Pulse: 78   Temp: 97.4 °F (36.3 °C)   TempSrc: Temporal   SpO2: 98%   Weight: 291 lb 3.2 oz (132.1 kg)     Estimated body mass index is 50.62 kg/m² as calculated from the following:    Height as of 2/6/20: 5' 3.6\" (1.615 m). Weight as of this encounter: 291 lb 3.2 oz (132.1 kg). Physical Exam  Vitals signs reviewed. Constitutional:       Appearance: She is well-developed. HENT:      Head: Normocephalic.       Right Ear: Hearing and external ear normal.      Left Ear: Hearing and external ear normal.      Nose: Nose normal.   Eyes:      General: Lids are normal.   Cardiovascular:      Rate and Rhythm: Normal rate and regular rhythm. Heart sounds: Normal heart sounds, S1 normal and S2 normal.   Pulmonary:      Effort: Pulmonary effort is normal.      Breath sounds: Normal breath sounds. Musculoskeletal: Normal range of motion. Skin:     General: Skin is warm and dry. Findings: Rash present. Rash is macular, papular and urticarial.             Comments: Fine raised bumps on both hands, skin slightly discolored. Neurological:      Mental Status: She is alert and oriented to person, place, and time. GCS: GCS eye subscore is 4. GCS verbal subscore is 5. GCS motor subscore is 6. Psychiatric:         Speech: Speech normal.         Behavior: Behavior normal. Behavior is cooperative. ASSESSMENT/PLAN:  1. Epigastric pain  -Continue current medications. - lansoprazole (PREVACID) 30 MG delayed release capsule; Take 1 capsule by mouth daily  Dispense: 90 capsule; Refill: 1    2. Essential hypertension  -Continue current medications. - amLODIPine (NORVASC) 10 MG tablet; TAKE 1 TABLET BY MOUTH EVERY DAY  Dispense: 90 tablet; Refill: 1  - chlorthalidone (HYGROTON) 25 MG tablet; Take 0.5 tablets by mouth daily  Dispense: 90 tablet; Refill: 1    3. Prediabetes    - Comprehensive Metabolic Panel; Future  - Hemoglobin A1C; Future    4. Screening for deficiency anemia    - CBC Auto Differential; Future    5. Screening for lipid disorders    - Lipid Panel; Future    6. Screening for thyroid disorder    - TSH with Reflex; Future    7. Blood in stool  -Hemorrhoids vs IBS  - AFL - Alicia Moon MD, Gastroenterology, [facility]    8. Bilateral wrist pain    - Marco A Alexander MD, Hand Surgery (Hand, Wrist, Upper Extremity), [facility]    9. Birth merritt  -contact insurance company for Dermatologist    10.  Incontinence of Stool  -polyp vs overuse of medication  Decrease the use of Miralax      No follow-ups on file. Reviewed patient's pertinent medical history, relevant laboratory results, imaging studies, and health maintenance. Medications have been reviewed and discussed with the patient, refills otherwise up-to-date. Discussed the importance of adhering to current medication regimen. Advised:  (1) continue to work on eating a healthy balanced diet; (2) stay active by exercising within your personal limits.  Patient was advised to keep future appointments with their respective specialty care team(s). Questions and concerns addressed, care plan reviewed and patient is agreeable with the care plan following 308 ZELDA Koenig CNP on 11/24/2020 at 10:26 AM

## 2020-11-19 NOTE — PATIENT INSTRUCTIONS
Call Insurance company for Dermatology referral  Make appointment with Gastroenterology and Hand Specialist

## 2020-11-19 NOTE — TELEPHONE ENCOUNTER
Elena Richardson, this pt was dismissed in 2016 from [place]. She is now a pt of M. Julianna Kanner and needs an a 3 month follow up but it is blocked from the dismissal.       Please clear this so pt can be called to rescheduled. Thank you.     PT:  [phone]

## 2020-11-24 PROBLEM — Q82.5 BIRTH MARK: Status: ACTIVE | Noted: 2020-11-24

## 2020-12-04 ENCOUNTER — OFFICE VISIT (OUTPATIENT)
Dept: ORTHOPEDIC SURGERY | Age: 40
End: 2020-12-04
Payer: COMMERCIAL

## 2020-12-04 VITALS — TEMPERATURE: 96.8 F | WEIGHT: 291 LBS | HEIGHT: 64 IN | BODY MASS INDEX: 49.68 KG/M2

## 2020-12-04 PROBLEM — R20.0 BILATERAL HAND NUMBNESS: Status: ACTIVE | Noted: 2020-12-04

## 2020-12-04 PROCEDURE — 1036F TOBACCO NON-USER: CPT | Performed by: PHYSICIAN ASSISTANT

## 2020-12-04 PROCEDURE — G8417 CALC BMI ABV UP PARAM F/U: HCPCS | Performed by: PHYSICIAN ASSISTANT

## 2020-12-04 PROCEDURE — 99203 OFFICE O/P NEW LOW 30 MIN: CPT | Performed by: PHYSICIAN ASSISTANT

## 2020-12-04 PROCEDURE — G8427 DOCREV CUR MEDS BY ELIG CLIN: HCPCS | Performed by: PHYSICIAN ASSISTANT

## 2020-12-04 PROCEDURE — G8484 FLU IMMUNIZE NO ADMIN: HCPCS | Performed by: PHYSICIAN ASSISTANT

## 2020-12-04 NOTE — PROGRESS NOTES
Ms. Rodo Marcos is a 41 y.o. right handed woman  who is seen today in Hand Surgical Consultation at the request of ZELDA Dukes CNP. She presents today regarding Bilateral symptoms which have been present for approximately 20 years. A history of antecedent trauma or injury is Absent. She reports symptoms to include moderate numbness & tingling in the Whole Hand. Hand symptoms do Daily awaken her from sleep. She reports mild pain located in the palmar both wrists and both forearms. Symptoms are worsening over time. Previous treatment has included conservative measures and splinting of the both wrists. She does not claim relation of her symptoms to her required work activities. She has undergone electrodiagnostic testing, however she states that her symptoms have worsened and progressed to her whole hand vs just her thumb, index finger and middle finger since she had the test done. I have today reviewed with Rodo Marcos the clinically relevant, past medical history, medications, allergies,  family history, social history, and Review Of Systems & I have documented any details relevant to today's presenting complaints in my history above. Ms. Arti Llamas self-reported past medical history, medications, allergies,  family history, social history, and Review Of Systems have been scanned into the chart under the \"Media\" tab. Physical Exam:  Ms. Arti Llamas most recent vitals:  Vitals  Temp: 96.8 °F (36 °C)  Temp Source: Infrared  Height: 5' 3.6\" (161.5 cm)  Weight: 291 lb (132 kg)    She is well nourished, oriented to person, place & time. She demonstrates appropriate mood and affect as well as normal gait and station.     Skin: Normal in appearance, Normal Color and Free of Lesions Bilaterally   Digital range of motion is without significant limitation bilaterally  Wrist range of motion is without significant limitation bilaterally  There is no evidence of gross joint limitations. I have outlined for Ms. Paulie Phillips the benefits and consequences of the various treatment modalities, including the fact that surgical treatment is the only modality which is reasonably expected to provide long lasting or permanent resolution of her symptoms. Based upon our current discussion and a reasonable understating of the options available to her, Ms. Paulie Phillips has selected to proceed with surgical Carpal Tunnel Release. I have discussed the details of the surgical procedure, the pre, pj and postoperative concerns and the appropriate expectations after surgery with Ms. Paulie Phillips today. She was given the opportunity to ask questions, voiced an understanding of the procedure, and she did wish to proceed with Staged Bilateral Carpal Tunnel Release PENDING EMG RESULTS. I had an extensive discussion with Ms. Paulie Phillips (and any family members present with her today) regarding the natural history, etiology, and long term consequences of this problem. We have mutually selected a surgical treatment plan  and, in my opinion, surgical intervention is indicated at this time. I have discussed with her the potential complications, limitations, expectations, alternatives, and risks of Carpal Tunnel Release. She has had full opportunity to ask her questions. I have answered them all to her satisfaction. I feel that Ms. Paulie Phillips (and any family members present with her today) do understand our discussion today and she has provided informed consent for Staged Bilateral Carpal Tunnel Release. I have had a thorough discussion with Ms. Paulie Phillips regarding the treatment options available for her initially presenting cubital tunnel syndrome, which is causing her significant  limitations. I have outlined for Ms. Paulie Phillips the benefits and consequences of the various treatment modalities, including the fact that surgical treatment is the only modality which is reasonably expected to provide long lasting or permanent resolution of her symptoms. Based upon our current discussion and a reasonable understating of the options available to her, Ms. Jason Best has selected to proceed with surgical Ulnar Nerve decompression at the Cubital Tunnel. I have discussed the details of the surgical procedure, the pre, pj and postoperative concerns and the appropriate expectations after surgery with Ms. Jason Best today. She was given the opportunity to ask questions, voiced an understanding of the procedure, and she did wish to proceed with Staged Bilateral Ulnar Nerve decompression at the Cubital Tunnel. I had an extensive discussion with Ms. Jason Best (and any family members present with her today) regarding the natural history, etiology, and long term consequences of this problem. We have mutually selected a surgical treatment plan  and, in my opinion, surgical intervention is indicated at this time. I have discussed with her the potential complications, limitations, expectations, alternatives, and risks of Ulnar Nerve decompression at the Cubital Tunnel. She has had full opportunity to ask her questions. I have answered them all to her satisfaction. I feel that Ms. Jason Best (and any family members present with her today) do understand our discussion today and she has provided informed consent for Staged Bilateral Ulnar Nerve decompression at the Cubital Tunnel. I have also discussed with Ms. Jason Best the other treatment options available to her for this condition. We have today selected to proceed with Surgical treatment. She has voiced and  understanding that there are other less aggressive treatment options which are available in this situation, albeit possibly less efficacious or durable, and she is comfortable with the plan that she has chosen. We discussed that based on her EMG from 2018 there results only show right carpal tunnel.  Ms. Alma Krishna states that her symptoms have worsened and progressed to all fingers of both hands since having the test done. She wishes to proceed with another EMG and base her surgery on the findings of that EMG. She understands that if her EMG only shows carpal tunnel syndrome, that there is no reason for her to have Ulnar Nerve decompression at the Cubital Tunnel and that her symptoms could be coming from somewhere else such as her neck. Ms. Lenora Joseph has been given a full verbal list of instructions and precautions related to her present condition. I have asked her to followup with me in the office at the prescribed time. She is also specifically requested to call or return to the office sooner if her symptoms change or worsen prior to the next scheduled appointment.

## 2020-12-04 NOTE — LETTER
[address] [facility] SURGERY  Surgery Scheduling Form:      DEMOGRAPHICS:                                                                                                              .    Patient Name:  Kay Torres  Patient :  1980   Patient SS#:  [id number]    Patient Phone:  [phone] (home)  Alt. Patient Phone:    Patient Address:  [address] [address]    PCP:  ZELDA Beatty CNP  Insurance:  Payor: [facility] / Plan: Jackie Hash / Product Type: *No Product type* /   Insurance ID Number:    DIAGNOSIS & PROCEDURE:                                                                                            .    Diagnosis:   bilateral Cubital Tunnel Syndrome / Ulnar Nerve Entrapment @ Elbow (354.2) &  bilateral Carpal Tunnel Syndrome  Operation:  right  Ulnar Nerve Decompression  (at Elbow) &  right Carpal Tunnel Release followed 3 weeks later by  left  Ulnar Nerve Decompression  (at Elbow) &  left Carpal Tunnel Release  [CPT: 89862 + 04425]  Location:  [facility]  Surgeon:  Freda Oliveira    SCHEDULING INFORMATION:                                                                                         .    Surgeon's Scheduling Instruction:  elective    RN Post-op Appt:  [x] Yes   [] No  Preferred Thursday:   [] Yes   [x] No    Requested Date:    OR Time:   Patient Arrival Time:    OR Time Required:  20  Minutes  Anesthesia:  General  Equipment:  None  Mini C-Arm:  No   Standard C-Arm:  No  Status:  outpatient  PAT Required:  Yes  Comments: Do not schedule until we discuss EMG results with patient. Will have patient call to schedule                     Stephanie Dixon MD  20 11:12 AM    BILLING INFORMATION:                                                                                                    .    Procedure:       CPT Code Modifier  right  Ulnar Nerve Decompression  (at Elbow) &  right Carpal Tunnel Release followed 3 weeks later by  left  Ulnar Nerve Decompression  (at Elbow) &  left Carpal Tunnel Release    .                                        Pre Operative Physician Prophylaxis Orders - SCIP Protocols      Pre-Operative Antibiotic Order:    Allergies   Allergen Reactions    Latex Itching and Rash    Corn-Containing Products Itching, Shortness Of Breath and Swelling    Other Anaphylaxis     enviromental  allegies    Corn, wheat, soy bean, peanuts and walnuts    Peanut-Containing Drug Products Shortness Of Breath    Soy Allergy Shortness Of Breath and Swelling    Tree Nut  [Macadamia Nut Oil] Shortness Of Breath and Swelling    Wheat Bran Hives, Itching, Shortness Of Breath and Swelling    Tramadol Other (See Comments)     Made her feel really bad, and head dizzy    Imitrex [Sumatriptan] Other (See Comments)     Chest pain          [x]  ----  No Antibiotic Ordered       []  ----  Give the following Antibiotic within 1 hour prior to start time:         Ancef 1 gram IV if patient is less than 200 pounds    or       Ancef 2 grams IV if patient is greater than 200 pounds    or      Vancomycin 1 gram IV (over 1 hour) if patient is allergic to           PENICILLINS or CEFALOSPORINS            Procedure: right  Ulnar Nerve Decompression  (at Elbow) &  right Carpal Tunnel Release followed 3 weeks later by  left  Ulnar Nerve Decompression  (at Elbow) &  left Carpal Tunnel Release     Patient: Susie Lee  :    1980    Physician Signature:     Date: 20  Time: 11:12 AM

## 2020-12-04 NOTE — PATIENT INSTRUCTIONS
Pre-Operative Instructions    1. The night before your surgery, unless otherwise instructed, do not eat any food, drink any liquids, chew gum or mints after midnight. Abstain from alcohol for 24 hours prior to surgery. 2. You will be contacted by the Hospital the working day prior to your procedure to confirm your arrival time. 3. Patients under 25years of age must have a parent or legal guardian present to sign their consent and discharge paperwork. 4. On the day of surgery,  you will be seen pre-operatively by an anesthesiologist.     5. If you are having hand surgery, it is recommended that nail polish and acrylic nails be removed prior to surgery if possible. 6. Please bring cases for glasses, contact lenses, hearing aids or dentures. They will likely be removed prior to surgery. 7. Wear casual, loose-fitting and comfortable clothing. Consider that you may have a large dressing to fit under your clothing after surgery. 9. Please do not bring valuables such as jewelry or large sums of cash to the hospital. Remove all body piercings before coming to the hospital. Sara Phipps may not  wear any rings on the hand if you are having surgery on that hand, wrist or elbow. 10. Do not smoke or chew tobacco before your surgery. [address] [place] [address] and surgery facilities are smoke-free environments. Smoking is not permitted anywhere on campus. 11. Be sure to follow any additional instructions from your physician. If the above conditions are not met, your surgery may be cancelled and rescheduled for another day. Should you develop any change in your health such as fever, cough, sore throat, cold, flu, or infection, or if you have any questions regarding your Pre-admission or surgery, please contact [address] - Surgery Scheduling at [phone], Monday through Friday, 9 a.m. to 5 p.m.

## 2020-12-22 ENCOUNTER — TELEPHONE (OUTPATIENT)
Dept: PRIMARY CARE CLINIC | Age: 40
End: 2020-12-22

## 2020-12-22 NOTE — TELEPHONE ENCOUNTER
Pt is needing Levocetirizine 5 mg called in. Also pt was prescribed Mirilax as a qty#3. She is a 90 day supply on both. pls send to [facility]: [phone]  Thank you!   Pt damaris: [phone]

## 2020-12-23 RX ORDER — POLYETHYLENE GLYCOL 3350 17 G/17G
17 POWDER ORAL DAILY
Qty: 3 BOTTLE | Refills: 1 | Status: CANCELLED | OUTPATIENT
Start: 2020-12-23

## 2020-12-23 RX ORDER — LEVOCETIRIZINE DIHYDROCHLORIDE 5 MG/1
TABLET, FILM COATED ORAL
Qty: 90 TABLET | Refills: 1 | Status: CANCELLED | OUTPATIENT
Start: 2020-12-23

## 2020-12-24 ENCOUNTER — HOSPITAL ENCOUNTER (OUTPATIENT)
Dept: NEUROLOGY | Age: 40
Discharge: HOME | End: 2020-12-24
Payer: COMMERCIAL

## 2020-12-24 PROCEDURE — 95886 MUSC TEST DONE W/N TEST COMP: CPT

## 2020-12-24 PROCEDURE — 95910 NRV CNDJ TEST 7-8 STUDIES: CPT

## 2020-12-24 NOTE — PROCEDURES
Test Date:  2020    Patient: aRe Pope : 1980 Physician: Carlitos Shetty DO   Sex: Female ID#:  Ref Phys: Pedro Pablo Fuller PA-C     Patient Complaints:  Patient is a 41year-old female who presents with numbness and tingling right more severe than right onset 20 yrs ago. Patient History / Exam:  PMH: no diabetes, or thyroid disease, + fibromyalgia, No c spine surgery or arm surgery PE: reflexes absent, + thumb opposition weakness    NCV & EMG Findings:  Evaluation of the left median (APB) motor and the right median (APB) motor nerves showed prolonged distal onset latency (L4.4, R4.8 ms) and reduced amplitude (L4.2, R1.72 mV). The left ulnar (ADM) motor and the left ulnar sensory nerves showed reduced amplitude (L2.9, L13 µV). The left median sensory and the right median sensory nerves showed prolonged distal peak latency (L4.4, R4.3 ms) and decreased conduction velocity (L32, R33 m/s). All remaining nerves (as indicated in the following tables) were within normal limits. All examined muscles (as indicated in the following table) showed no evidence of electrical instability. Impression: Study is consistent with bilateral carpal tunnel syndrome, moderate severity. No evidence of an acute radiculopathy or other entrapment neuropathy. Thank you.         Carlitos Shetty DO        Nerve Conduction Studies  Motor Nerve Results      Latency Amplitude F-Lat Segment Distance CV Comment   Site (ms) Norm (mV) Norm (ms)  (cm) (m/s) Norm    Left Median (APB) Motor   Wrist 4.4  < 4.2 4.2  > 5.0         Elbow 8.1 - 8.5 -  Elbow-Wrist 21 57  > 50    Right Median (APB) Motor   Wrist 4.8  < 4.2 1.72  > 5.0         Elbow 8.5 - 2.3 -  Elbow-Wrist 22 59  > 50    Left Ulnar (ADM) Motor   Wrist 3.4  < 4.2 2.9  > 3.0         Bel Elbow 6.7 - 3.0 -  Bel Elbow-Wrist 23 70  > 50    Abv Elbow 8.3 - 2.8 -  Abv Elbow-Bel Elbow 8 50  > 48    Right Ulnar (ADM) Motor   Wrist 2.7  < 4.2 3.9  > 3.0         Bel Elbow 6.3 - 1.69 -  Bel Elbow-Wrist 22 61  > 50    Abv Elbow 7.6 - 5.1 -  Abv Elbow-Bel Elbow 7 54  > 48      Sensory Nerve Results      Latency (Peak) Amplitude (P-P) Segment Distance CV Comment   Site (ms) Norm (µV) Norm  (cm) (m/s) Norm    Left Median Sensory   Wrist-Dig II 4.4  < 3.6 74  > 10 Wrist-Dig II 14 32  > 39    Right Median Sensory   Wrist-Dig II 4.3  < 3.6 27  > 10 Wrist-Dig II 14 33  > 39    Left Ulnar Sensory   Wrist-Dig V 3.0  < 3.7 13  > 15 Wrist-Dig V 14 47  > 38    Right Ulnar Sensory   Wrist-Dig V 2.8  < 3.7 17  > 15 Wrist-Dig V 14 50  > 38        Electromyography     Side Muscle Nerve Root Ins Act Fibs Psw Amp Dur Poly Recrt Int Alois Leola Comment   Right Deltoid Axillary C5-C6 Nml Nml Nml Nml Nml 0 Nml Nml    Right Biceps Musculocut C5-C6 Nml Nml Nml Nml Nml 0 Nml Nml    Right Triceps Radial C6-C8 Nml Nml Nml Nml Nml 0 Nml Nml    Right Brachiorad Radial C5-C6 Nml Nml Nml Nml Nml 0 Nml Nml    Right Pronator Teres Median C6-C7 Nml Nml Nml Nml Nml 0 Nml Nml    Right EIP Post Interosseous,  R... C7-C8 Nml Nml Nml Nml Nml 0 Nml Nml    Right APB Median C8-T1 Nml Nml Nml Nml Nml 0 Nml Nml    Right FDI Ulnar C8-T1 Nml Nml Nml Nml Nml 0 Nml Nml    Right Cervical Paraspinal (Uppe. .. Rami C1-C3 Nml Nml Nml         Right Cervical Paraspinal (Mid) Rami C4-C6 Nml Nml Nml         Right Cervical Paraspinal ([place]. .. Rami C7-C8 Nml Nml Nml         Left Deltoid Axillary C5-C6 Nml Nml Nml Nml Nml 0 Nml Nml    Left Biceps Musculocut C5-C6 Nml Nml Nml Nml Nml 0 Nml Nml    Left Triceps Radial C6-C8 Nml Nml Nml Nml Nml 0 Nml Nml    Left Brachiorad Radial C5-C6 Nml Nml Nml Nml Nml 0 Nml Nml    Left Pronator Teres Median C6-C7 Nml Nml Nml Nml Nml 0 Nml Nml    Left EIP Post Interosseous,  R... C7-C8 Nml Nml Nml Nml Nml 0 Nml Nml    Left APB Median C8-T1 Nml Nml Nml Nml Nml 0 Nml Nml    Left FDI Ulnar C8-T1 Nml Nml Nml Nml Nml 0 Nml Nml    Left Cervical Paraspinal (Uppe. ..  Rami C1-C3 Nml Nml Nml         Left Cervical Paraspinal (Mid) Rami C4-C6 Nml Nml Nml         Left Cervical Paraspinal ([place]. ..  Rami C7-C8 Nml Nml Nml               Electronically signed by Vale Brannon DO on 12/24/2020 at 9:51 AM]

## 2020-12-26 RX ORDER — POLYETHYLENE GLYCOL 3350 17 G/17G
17 POWDER ORAL DAILY
Qty: 3 BOTTLE | Refills: 1 | Status: SHIPPED | OUTPATIENT
Start: 2020-12-26 | End: 2022-05-19

## 2020-12-26 RX ORDER — LEVOCETIRIZINE DIHYDROCHLORIDE 5 MG/1
TABLET, FILM COATED ORAL
Qty: 90 TABLET | Refills: 1 | Status: SHIPPED | OUTPATIENT
Start: 2020-12-26 | End: 2021-07-08

## 2021-01-07 ENCOUNTER — OFFICE VISIT (OUTPATIENT)
Dept: PRIMARY CARE CLINIC | Age: 41
End: 2021-01-07
Payer: COMMERCIAL

## 2021-01-07 DIAGNOSIS — Z01.812 PRE-PROCEDURAL LABORATORY EXAMINATIONS: Primary | ICD-10-CM

## 2021-01-07 PROCEDURE — G8428 CUR MEDS NOT DOCUMENT: HCPCS | Performed by: NURSE PRACTITIONER

## 2021-01-07 PROCEDURE — G8417 CALC BMI ABV UP PARAM F/U: HCPCS | Performed by: NURSE PRACTITIONER

## 2021-01-07 PROCEDURE — 99211 OFF/OP EST MAY X REQ PHY/QHP: CPT | Performed by: NURSE PRACTITIONER

## 2021-01-07 NOTE — PROGRESS NOTES
Patient presented to [facility] drive up clinic for preop testing. Patient was swabbed and given information advising them to remain isolated until procedure date.

## 2021-01-08 LAB — SARS-COV-2: NOT DETECTED

## 2021-01-12 ENCOUNTER — ANESTHESIA EVENT (OUTPATIENT)
Dept: ENDOSCOPY | Age: 41
End: 2021-01-12
Payer: COMMERCIAL

## 2021-01-12 RX ORDER — FAMOTIDINE 40 MG/1
40 TABLET, FILM COATED ORAL DAILY
COMMUNITY
End: 2021-06-10 | Stop reason: ALTCHOICE

## 2021-01-13 ENCOUNTER — ANESTHESIA (OUTPATIENT)
Dept: ENDOSCOPY | Age: 41
End: 2021-01-13
Payer: COMMERCIAL

## 2021-01-13 ENCOUNTER — HOSPITAL ENCOUNTER (OUTPATIENT)
Age: 41
Setting detail: OUTPATIENT SURGERY
Discharge: HOME | End: 2021-01-13
Attending: INTERNAL MEDICINE | Admitting: INTERNAL MEDICINE
Payer: COMMERCIAL

## 2021-01-13 VITALS
OXYGEN SATURATION: 98 % | BODY MASS INDEX: 51.91 KG/M2 | HEIGHT: 63 IN | DIASTOLIC BLOOD PRESSURE: 72 MMHG | WEIGHT: 293 LBS | HEART RATE: 70 BPM | RESPIRATION RATE: 12 BRPM | TEMPERATURE: 97.6 F | SYSTOLIC BLOOD PRESSURE: 132 MMHG

## 2021-01-13 VITALS — OXYGEN SATURATION: 97 % | DIASTOLIC BLOOD PRESSURE: 79 MMHG | SYSTOLIC BLOOD PRESSURE: 131 MMHG

## 2021-01-13 PROCEDURE — 2500000003 HC RX 250 WO HCPCS: Performed by: NURSE ANESTHETIST, CERTIFIED REGISTERED

## 2021-01-13 PROCEDURE — 7100000001 HC PACU RECOVERY - ADDTL 15 MIN: Performed by: INTERNAL MEDICINE

## 2021-01-13 PROCEDURE — 3700000001 HC ADD 15 MINUTES (ANESTHESIA): Performed by: INTERNAL MEDICINE

## 2021-01-13 PROCEDURE — 7100000010 HC PHASE II RECOVERY - FIRST 15 MIN: Performed by: INTERNAL MEDICINE

## 2021-01-13 PROCEDURE — 7100000000 HC PACU RECOVERY - FIRST 15 MIN: Performed by: INTERNAL MEDICINE

## 2021-01-13 PROCEDURE — 2580000003 HC RX 258: Performed by: ANESTHESIOLOGY

## 2021-01-13 PROCEDURE — 3609027000 HC COLONOSCOPY: Performed by: INTERNAL MEDICINE

## 2021-01-13 PROCEDURE — 3700000000 HC ANESTHESIA ATTENDED CARE: Performed by: INTERNAL MEDICINE

## 2021-01-13 PROCEDURE — 7100000011 HC PHASE II RECOVERY - ADDTL 15 MIN: Performed by: INTERNAL MEDICINE

## 2021-01-13 PROCEDURE — 2709999900 HC NON-CHARGEABLE SUPPLY: Performed by: INTERNAL MEDICINE

## 2021-01-13 PROCEDURE — 6360000002 HC RX W HCPCS: Performed by: NURSE ANESTHETIST, CERTIFIED REGISTERED

## 2021-01-13 RX ORDER — LIDOCAINE HYDROCHLORIDE 20 MG/ML
INJECTION, SOLUTION EPIDURAL; INFILTRATION; INTRACAUDAL; PERINEURAL PRN
Status: DISCONTINUED | OUTPATIENT
Start: 2021-01-13 | End: 2021-01-13 | Stop reason: SDUPTHER

## 2021-01-13 RX ORDER — PROPOFOL 10 MG/ML
INJECTION, EMULSION INTRAVENOUS CONTINUOUS PRN
Status: DISCONTINUED | OUTPATIENT
Start: 2021-01-13 | End: 2021-01-13 | Stop reason: SDUPTHER

## 2021-01-13 RX ORDER — SODIUM CHLORIDE 0.9 % (FLUSH) 0.9 %
10 SYRINGE (ML) INJECTION EVERY 12 HOURS SCHEDULED
Status: DISCONTINUED | OUTPATIENT
Start: 2021-01-13 | End: 2021-01-13 | Stop reason: HOSPADM

## 2021-01-13 RX ORDER — SODIUM CHLORIDE 9 MG/ML
INJECTION, SOLUTION INTRAVENOUS CONTINUOUS
Status: DISCONTINUED | OUTPATIENT
Start: 2021-01-13 | End: 2021-01-13 | Stop reason: HOSPADM

## 2021-01-13 RX ORDER — SODIUM CHLORIDE 0.9 % (FLUSH) 0.9 %
10 SYRINGE (ML) INJECTION PRN
Status: DISCONTINUED | OUTPATIENT
Start: 2021-01-13 | End: 2021-01-13 | Stop reason: HOSPADM

## 2021-01-13 RX ORDER — ONDANSETRON 2 MG/ML
4 INJECTION INTRAMUSCULAR; INTRAVENOUS
Status: DISCONTINUED | OUTPATIENT
Start: 2021-01-13 | End: 2021-01-13 | Stop reason: HOSPADM

## 2021-01-13 RX ADMIN — SODIUM CHLORIDE: 9 INJECTION, SOLUTION INTRAVENOUS at 10:11

## 2021-01-13 RX ADMIN — LIDOCAINE HYDROCHLORIDE 100 MG: 20 INJECTION, SOLUTION EPIDURAL; INFILTRATION; INTRACAUDAL; PERINEURAL at 11:29

## 2021-01-13 RX ADMIN — PROPOFOL 160 MCG/KG/MIN: 10 INJECTION, EMULSION INTRAVENOUS at 11:29

## 2021-01-13 ASSESSMENT — PAIN DESCRIPTION - LOCATION: LOCATION: ABDOMEN

## 2021-01-13 ASSESSMENT — PULMONARY FUNCTION TESTS
PIF_VALUE: 0

## 2021-01-13 ASSESSMENT — PAIN DESCRIPTION - DESCRIPTORS: DESCRIPTORS: CRAMPING

## 2021-01-13 ASSESSMENT — PAIN DESCRIPTION - FREQUENCY: FREQUENCY: CONTINUOUS

## 2021-01-13 ASSESSMENT — PAIN - FUNCTIONAL ASSESSMENT: PAIN_FUNCTIONAL_ASSESSMENT: ACTIVITIES ARE NOT PREVENTED

## 2021-01-13 ASSESSMENT — PAIN SCALES - GENERAL: PAINLEVEL_OUTOF10: 0

## 2021-01-13 ASSESSMENT — PAIN DESCRIPTION - ONSET: ONSET: GRADUAL

## 2021-01-13 NOTE — ANESTHESIA POSTPROCEDURE EVALUATION
Department of Anesthesiology  Postprocedure Note    Patient: Jack Chatman  MRN: [id number]  YOB: 1980  Date of evaluation: 1/13/2021  Time:  1:42 PM     Procedure Summary     Date: 01/13/21 Room / Location: [address] [place] [address]    Anesthesia Start: 1125 Anesthesia Stop: 1152    Procedure: COLONOSCOPY (N/A ) Diagnosis: (RECTAL PAIN, FECAL INCONTINENCE)    Surgeons: Yodit Huerta MD Responsible Provider: Jhoan Arshad MD    Anesthesia Type: MAC ASA Status: 3          Anesthesia Type: MAC    Yessenia Phase I: Yessenia Score: 10    Yessenia Phase II: Yessenia Score: 10    Last vitals: Reviewed and per EMR flowsheets.        Anesthesia Post Evaluation    Patient location during evaluation: bedside  Patient participation: complete - patient participated  Level of consciousness: awake  Pain score: 1  Airway patency: patent  Nausea & Vomiting: no nausea and no vomiting  Complications: no  Cardiovascular status: blood pressure returned to baseline  Respiratory status: acceptable  Hydration status: euvolemic

## 2021-01-13 NOTE — ANESTHESIA PRE PROCEDURE
[facility] Department of Anesthesiology  Pre-Anesthesia Evaluation/Consultation       Name:  Maverick Roman  : 1980  Age:  41 y.o.                                            MRN:  [id number]  Date: 2021           Surgeon: Surgeon(s):  Leora Molina MD    Procedure: Procedure(s):  COLONOSCOPY     Allergies   Allergen Reactions    Latex Itching and Rash    Corn-Containing Products Shortness Of Breath, Itching and Swelling    Imitrex [Sumatriptan] Other (See Comments)     Chest pain    Other Anaphylaxis     enviromental  allegies    Corn, wheat, soy bean, peanuts and walnuts    Peanut-Containing Drug Products Shortness Of Breath    Soy Allergy Shortness Of Breath and Swelling    Tree Nut [Macadamia Nut Oil] Shortness Of Breath and Swelling    Wheat Bran Hives, Shortness Of Breath, Itching and Swelling    Mixed Grasses Itching and Other (See Comments)     Molds--over 51 different environmental allergies  Sinus issues/redden eyes    Tramadol Other (See Comments)     Made her feel really bad, and head dizzy and chest pain     Patient Active Problem List   Diagnosis    Essential hypertension    Migraine    GERD (gastroesophageal reflux disease)    Morbid obesity with BMI of 50.0-59.9, adult (HCC)    History of hysterectomy    Obstructive sleep apnea syndrome    Pulmonary embolism (HCC)    Prediabetes    PCOS (polycystic ovarian syndrome)    Primary osteoarthritis involving multiple joints    Chronic pain    Hx of pulmonary embolus    Obstructive sleep apnea    Chronic diastolic CHF (congestive heart failure) (HCC)    Allergic reaction to food    Birth merritt    Bilateral hand numbness     Past Medical History:   Diagnosis Date    Allergic rhinitis     Anemia     Anxiety and depression     Dermatomyositis ([address]     Eczema     Fibromyalgia     Fibromyalgia     GERD (gastroesophageal reflux disease)     Headache(784.0)     History of blood transfusion  also two in 2014     HTN (hypertension)     Hx of blood clots 2014    PE and DVT    Irritable bowel syndrome     Morbid obesity with BMI of 50.0-59.9, adult (HCC)     Osteoarthritis     PCOS (polycystic ovarian syndrome)     Prediabetes     Sleep apnea     Vaginal high risk HPV DNA test positive 05/2016    Wears glasses      Past Surgical History:   Procedure Laterality Date    EYE SURGERY      crossed eyes    HYSTERECTOMY  2014    full-ovaries gone    MUSCLE BIOPSY      left thigh    OVARIAN CYST REMOVAL  1998    VENA CAVA FILTER PLACEMENT      inserted in 2014 and removed in 2020     Social History     Tobacco Use    Smoking status: Never Smoker    Smokeless tobacco: Never Used   Substance Use Topics    Alcohol use: Yes     Alcohol/week: 0.0 standard drinks     Frequency: Monthly or less     Comment: occasionally    Drug use: Never     Medications  No current facility-administered medications on file prior to encounter. Current Outpatient Medications on File Prior to Encounter   Medication Sig Dispense Refill    famotidine (PEPCID) 40 MG tablet Take 40 mg by mouth daily      DULoxetine (CYMBALTA) 60 MG extended release capsule TAKE ONE CAPSULE BY MOUTH DAILY 90 capsule 1    lansoprazole (PREVACID) 30 MG delayed release capsule Take 1 capsule by mouth daily 90 capsule 1    amLODIPine (NORVASC) 10 MG tablet TAKE 1 TABLET BY MOUTH EVERY DAY 90 tablet 1    chlorthalidone (HYGROTON) 25 MG tablet Take 0.5 tablets by mouth daily 90 tablet 1    fluticasone (FLONASE) 50 MCG/ACT nasal spray 2 sprays by Nasal route daily 3 Bottle 1    EPINEPHrine (EPIPEN) 0.3 MG/0.3ML SOAJ injection Inject 1 pen into the thigh for emergency treatment of allergic reactions, may repeat for severe persistent reactions.        Current Facility-Administered Medications   Medication Dose Route Frequency Provider Last Rate Last Admin  0.9 % sodium chloride infusion   Intravenous Continuous Sarah Stoddard MD        sodium chloride flush 0.9 % injection 10 mL  10 mL Intravenous 2 times per day Sarah Stoddard MD        sodium chloride flush 0.9 % injection 10 mL  10 mL Intravenous PRN Sarah Stoddard MD         Vital Signs (Current)   Vitals:    21 0934 21   BP:  (!) 140/81   Pulse:  75   Resp:  20   Temp:  97.5 °F (36.4 °C)   TempSrc:  Temporal   SpO2:  97%   Weight: (!) 306 lb (138.8 kg) (!) 307 lb 10.4 oz (139.5 kg)   Height: 5' 3\" (1.6 m) 5' 3\" (1.6 m)                                          BP Readings from Last 3 Encounters:   21 (!) 140/81   20 124/78   04/10/20 126/66     Vital Signs Statistics (for past 48 hrs)     Temp  Av.5 °F (36.4 °C)  Min: 97.5 °F (36.4 °C)   Min taken time: 21  Max: 97.5 °F (36.4 °C)   Max taken time: 21  Pulse  Av  Min: 76   Min taken time: 21  Max: 76   Max taken time: 21  Resp  Av  Min: 21   Min taken time: 21  Max: 20   Max taken time: 21  BP  Min: 140/81   Min taken time: 21  Max: 140/81   Max taken time: 21  SpO2  Av %  Min: 97 %   Min taken time: 21  Max: 97 %   Max taken time: 21  BP Readings from Last 3 Encounters:   21 (!) 140/81   20 124/78   04/10/20 126/66       BMI  Body mass index is 54.5 kg/m². Estimated body mass index is 54.5 kg/m² as calculated from the following:    Height as of this encounter: 5' 3\" (1.6 m). Weight as of this encounter: 307 lb 10.4 oz (139.5 kg).     CBC   Lab Results   Component Value Date    WBC 8.0 2019    RBC 4.50 2019    HGB 12.6 2019    HCT 38.6 2019    MCV 85.8 2019    RDW 14.4 2019     2019     CMP    Lab Results   Component Value Date     2019    K 4.7 2019     2019    CO2 25 2019 BUN 12 02/05/2019    CREATININE 0.6 02/05/2019    GFRAA >60 02/05/2019    AGRATIO 1.0 02/05/2019    LABGLOM >60 02/05/2019    GLUCOSE 92 02/05/2019    PROT 8.2 02/05/2019    CALCIUM 9.4 02/05/2019    BILITOT <0.2 02/05/2019    ALKPHOS 94 02/05/2019    AST 21 02/05/2019    ALT 24 02/05/2019     BMP    Lab Results   Component Value Date     02/05/2019    K 4.7 02/05/2019     02/05/2019    CO2 25 02/05/2019    BUN 12 02/05/2019    CREATININE 0.6 02/05/2019    CALCIUM 9.4 02/05/2019    GFRAA >60 02/05/2019    LABGLOM >60 02/05/2019    GLUCOSE 92 02/05/2019     POCGlucose  No results for input(s): GLUCOSE in the last 72 hours. Coags    Lab Results   Component Value Date    PROTIME 63.6 06/29/2015    INR 5.39 06/29/2015    APTT 68.3 41/83/2773     HCG (If Applicable) No results found for: PREGTESTUR, PREGSERUM, HCG, HCGQUANT   ABGs No results found for: PHART, PO2ART, TJF4YTD, XAC1NWU, BEART, C1EKQBFZ   Type & Screen (If Applicable)  No results found for: LABABO, LABRH                         BMI: Wt Readings from Last 3 Encounters:       NPO Status:                          Anesthesia Evaluation  Patient summary reviewed no history of anesthetic complications:   Airway: Mallampati: III        Dental:          Pulmonary:   (+) sleep apnea:                             Cardiovascular:    (+) hypertension:, CHF: diastolic,     (-) valvular problems/murmurs and past MI                Neuro/Psych:   (+) neuromuscular disease:, headaches:, psychiatric history:            GI/Hepatic/Renal:   (+) GERD:, bowel prep, morbid obesity     (-) no renal disease       Endo/Other:    (+) : arthritis: OA., .                  ROS comment: PCOS Abdominal:   (+) obese,         Vascular:   + DVT, PE. Anesthesia Plan      MAC     ASA 3       Induction: intravenous. Anesthetic plan and risks discussed with patient. Plan discussed with CRNA. Attending anesthesiologist reviewed and agrees with Pre Eval content              This pre-anesthesia assessment may be used as a history and physical.    DOS STAFF ADDENDUM:    Pt seen and examined, chart reviewed (including anesthesia, drug and allergy history). No interval changes to history and physical examination. Anesthetic plan, risks, benefits, alternatives, and personnel involved discussed with patient. Patient verbalized an understanding and agrees to proceed.       Gunner Nguyễn MD  January 13, 2021  10:02 AM

## 2021-01-13 NOTE — PROCEDURES
[facility]  Endoscopy Note    Patient: Saurav Hamilton  : 1980  Acct#: [de-identified]    Procedure: Colonoscopy     Date:  2021    Surgeon:  Regina Kilpatrick MD    Referring Physician:  Louisa Parham    Previous Colonoscopy: Yes  Date: unknown  Greater than 3 years? Yes    Preoperative Diagnosis:  surveillance    Postoperative Diagnosis:  Normal colon    Anesthesia:  See anesthesia note    Indications: This is a 41y.o. year old female who presents today with previous adenomatous polyp. Procedure: An informed consent was obtained from the patient after explanation of indications, benefits, possible risks and complications of the procedure. The patient was then taken to the endoscopy suite, placed in the left lateral decubitus position, and the above IV anesthesia was administered. A digital rectal examination was performed and revealed negative without mass, lesions or tenderness. The Olympus CFQ-180-AL video colonoscope was placed in the patient's rectum under digital direction and advanced to the cecum. The cecum was identified by characteristic anatomy and ballottment. The ileocecal valve was identified. The preparation was excellent. The scope was then withdrawn back through the cecum, ascending, transverse, descending and sigmoid colons. Carefull circumferential examination of the mucosa in these areas demonstrated normal colonic mucosa throughout. The scope was then withdrawn into the rectum and retroflexed. The retroflexed view of the anal verge and rectum demonstrates internal hemorrhoids. The scope was straightened, the colon was decompressed and the scope was withdrawn from the patient. The patient tolerated the procedure well and was taken to the PACU in good condition. Estimated Blood Loss:  none    Impression:  Normal colon    Recommendations:  Repeat colonoscopy in 10 years.     Regina Kilpatrick MD   [facility]  2021

## 2021-01-13 NOTE — PROGRESS NOTES
C-Difficile admission screening and protocol:     * Admitted with diarrhea? YES____    NO__X___     *Prior history of C-Diff. In last 3 months? YES____   NO___X__     *Antibiotic use in the past 6-8 weeks? NO__X____YES______                 If yes which  ANTIBIOTIC AND REASON______     *Prior hospitalization or nursing home in the last month?  YES____   NO__X__
Patient resting comfortably. Resp easy unlabored on room air O2. VSS. IV patent. Abdominal discomfort easing per patient. Awaiting phase II bed.
Patient stable to transfer to ACU for phase II.
Phone message left to call PAT dept at [phone]  for history review and surgery instructions on 1/11/21 @ [phone]
Preoperative Screening for Elective Surgery/Invasive Procedures While COVID-19 present in the community     Have you tested positive or have been told to self-isolate for COVID-19 like symptoms within the past 28 days? Done on 1/7/21   Do you currently have any of the following symptoms? NO  o Fever >100.0 F or 99.9 F in immunocompromised patients? o New onset cough, shortness of breath or difficulty breathing?  o New onset sore throat, myalgia (muscle aches and pains), headache, loss of taste/smell or diarrhea?  Have you had a potential exposure to COVID-19 within the past 14 days by: NO  o Close contact with a confirmed case? o Close contact with a healthcare worker,  or essential infrastructure worker (grocery store, [employer], gas station, public utilities or transportation)? o Do you reside in a congregate setting such as; skilled nursing facility, adult home, correctional facility, homeless shelter or other institutional setting?  o Have you had recent travel to a known COVID-19 hotspot? Indicate if the patient has a positive screen by answering yes to one or more of the above questions. Patients who test positive or screen positive prior to surgery or on the day of surgery should be evaluated in conjunction with the surgeon/proceduralist/anesthesiologist to determine the urgency of the procedure.
toes      For your safety, please do not wear any jewelry or body piercing's on the day of surgery. All jewelry must be removed. If you have dentures, they will be removed before going to operating room. For your convenience, we will provide you with a container. If you wear contact lenses or glasses, they will be removed, please bring a case for them. If you have a living will and a durable power of  for healthcare, please bring in a copy. As part of our patient safety program to minimize surgical site infections, we ask you to do the following:    · Please notify your surgeon if you develop any illness between         now and the  day of your surgery. · This includes a cough, cold, fever, sore throat, nausea,         or vomiting, and diarrhea, etc.  ·  Please notify your surgeon if you experience dizziness, shortness         of breath or blurred vision between now and the time of your surgery. Do not shave your operative site 96 hours prior to surgery. For face and neck surgery, men may use an electric razor 48 hours   prior to surgery. You may shower the night before surgery or the morning of   your surgery with an antibacterial soap. You will need to bring a photo ID and insurance card    [facility] has an onsite pharmacy, would you like to utilize our pharmacy     If you will be staying overnight and use a C-pap machine, please bring   your C-pap to hospital     Our goal is to provide you with excellent care, therefore, visitors will be limited to two(2) in the room at a time so that we may focus on providing this care for you. Please contact pre-admission testing if you have any further questions. [facility] phone number:  [address] PAT fax number:  [phone]  Please note these are generalized instructions for all surgical cases, you may be provided with more specific instructions according to your surgery.

## 2021-01-13 NOTE — H&P
[facility]   Pre-operative History and Physical    Patient: Shirley Jani  : 1980  Acct#: [de-identified]    History Obtained From: electronic medical record    HISTORY OF PRESENT ILLNESS  Procedure:Colonoscopy  Indications:rectal bleeding and history of colon polyps  Past Medical History:        Diagnosis Date    Allergic rhinitis     Anemia     Anxiety and depression     Dermatomyositis ([address]     Eczema     Fibromyalgia     Fibromyalgia     GERD (gastroesophageal reflux disease)     Headache(784.0)     History of blood transfusion 2009    also two in      HTN (hypertension)     Hx of blood clots     PE and DVT    Irritable bowel syndrome     Morbid obesity with BMI of 50.0-59.9, adult (HCC)     Osteoarthritis     PCOS (polycystic ovarian syndrome)     Prediabetes     Sleep apnea     Vaginal high risk HPV DNA test positive 2016    Wears glasses      Past Surgical History:        Procedure Laterality Date    EYE SURGERY      crossed eyes    HYSTERECTOMY      full-ovaries gone    MUSCLE BIOPSY      left thigh    OVARIAN CYST REMOVAL      VENA CAVA FILTER PLACEMENT      inserted in  and removed in      Medications prior to admission:   Prior to Admission medications    Medication Sig Start Date End Date Taking?  Authorizing Provider   famotidine (PEPCID) 40 MG tablet Take 40 mg by mouth daily   Yes Historical Provider, MD   polyethylene glycol (MIRALAX) 17 GM/SCOOP POWD powder Take 17 g by mouth daily 20  Yes ZELDA Conrad CNP   levocetirizine (XYZAL) 5 MG tablet Take one tablet by mouth daily 20  Yes ZELDA Conrad CNP   DULoxetine (CYMBALTA) 60 MG extended release capsule TAKE ONE CAPSULE BY MOUTH DAILY 20  Yes ZELDA Conrad CNP   lansoprazole (PREVACID) 30 MG delayed release capsule Take 1 capsule by mouth daily 20  Yes ZELDA Conrad CNP   amLODIPine (NORVASC) 10 MG tablet TAKE 1 TABLET BY MOUTH EVERY DAY 11/19/20  Yes ZELDA Conrad CNP   chlorthalidone (HYGROTON) 25 MG tablet Take 0.5 tablets by mouth daily 11/19/20  Yes ZELDA Conrad CNP   fluticasone [facility]) 50 MCG/ACT nasal spray 2 sprays by Nasal route daily 11/19/20  Yes ZELDA Conrad CNP   EPINEPHrine (EPIPEN) 0.3 MG/0.3ML SOAJ injection Inject 1 pen into the thigh for emergency treatment of allergic reactions, may repeat for severe persistent reactions. 4/27/20   Historical Provider, MD     Allergies:   Latex, Corn-containing products, Imitrex [sumatriptan], Other, Peanut-containing drug products, Soy allergy, Tree nut [macadamia nut oil], Wheat bran, Mixed grasses, and Tramadol    Social History     Socioeconomic History    Marital status: Single     Spouse name: Not on file    Number of children: 3    Years of education: Not on file    Highest education level: Not on file   Occupational History    Occupation: unemployed   Social Needs    Financial resource strain: Patient refused    Food insecurity     Worry: Never true     Inability: Never true    Transportation needs     Medical: No     Non-medical: No   Tobacco Use    Smoking status: Never Smoker    Smokeless tobacco: Never Used   Substance and Sexual Activity    Alcohol use:  Yes     Alcohol/week: 0.0 standard drinks     Frequency: Monthly or less     Comment: occasionally    Drug use: Never    Sexual activity: Not Currently     Partners: Male   Lifestyle    Physical activity     Days per week: Not on file     Minutes per session: Not on file    Stress: Not on file   Relationships    Social connections     Talks on phone: Not on file     Gets together: Not on file     Attends Denominational service: Not on file     Active member of club or organization: Not on file     Attends meetings of clubs or organizations: Not on file     Relationship status: Not on file    Intimate partner violence     Fear of current or ex partner: No Emotionally abused: No     Physically abused: No     Forced sexual activity: No   Other Topics Concern    Not on file   Social History Narrative    Lives with 2 children     Family History   Problem Relation Age of Onset    High Blood Pressure Mother     Other Father         gout    Cancer Father         prostate    Breast Cancer Maternal Aunt 47    Asthma Maternal Cousin     Rheum Arthritis Neg Hx     Osteoarthritis Neg Hx     Diabetes Neg Hx     Heart Failure Neg Hx     High Cholesterol Neg Hx     Hypertension Neg Hx     Migraines Neg Hx     Ovarian Cancer Neg Hx     Rashes/Skin Problems Neg Hx     Seizures Neg Hx     Stroke Neg Hx     Thyroid Disease Neg Hx          PHYSICAL EXAM:      BP (!) 140/81   Pulse 75   Temp 97.5 °F (36.4 °C) (Temporal)   Resp 20   Ht 5' 3\" (1.6 m)   Wt (!) 307 lb 10.4 oz (139.5 kg)   LMP 05/26/2014   SpO2 97%   BMI 54.50 kg/m²  I        Heart:normal    Lungs: normal    Abdomen: normal      ASA Grade:  See anesthesia note      ASSESSMENT AND PLAN:    1. Procedure options, risks and benefits reviewed with patient and expresses understanding.

## 2021-01-15 ENCOUNTER — HOSPITAL ENCOUNTER (OUTPATIENT)
Dept: PHYSICAL THERAPY | Age: 41
Setting detail: THERAPIES SERIES
Discharge: HOME | End: 2021-01-15
Payer: COMMERCIAL

## 2021-01-15 PROCEDURE — 97535 SELF CARE MNGMENT TRAINING: CPT

## 2021-01-15 PROCEDURE — 97161 PT EVAL LOW COMPLEX 20 MIN: CPT

## 2021-01-15 NOTE — FLOWSHEET NOTE
[facility]  Phone: [phone]   Fax: [phone]    Physical Therapy Daily LYMPHEDEMA Treatment Note    Date:  1/15/2021    Patient Name:  Yenny Morris    :  1980  MRN: [id number]  Restrictions/Precautions:  LATEX ALLERGY   Medical/Treatment Diagnosis Information:  Diagnosis: I89.0 Lymphedema  Treatment Diagnosis: Bilateral LE lymphedema  Insurance/Certification information:  PT Insurance Information: [facility]  Physician Information:  Referring Practitioner: Dr. Velazco Parent of care signed (Y/N): []  Yes [x]  No     Date of Patient follow up with Physician:     Functional scale[de-identified]  LLIS  raw score = 29/72 ; dysfunction = 40%    Progress Report: [x]  Yes  []  No     Date Range for reporting period:  Beginning 1/15/2021  Ending     Progress report due (10 Rx/or 30 days whichever is less): visit #10 or  (date)     Recertification due (POC duration/ or 90 days whichever is less): 5/15/2021  (date)     Visit # Insurance Allowable Auth required?  Date Range    30 PT/OT/speech combined  []  Yes  [x]  No         Latex Allergy:  [x]NO      []YES  Preferred Language for Healthcare:   [x]English       []other:      Pain level:  2-3/10 - B feet     SUBJECTIVE:  See eval    OBJECTIVE: See eval      RESTRICTIONS/PRECAUTIONS: LATEX ALLERGY, IVC filter - removed      Exercises/Interventions:     Therapeutic Exercises (29400) Resistance / level Sets/sec Reps Notes          Pt educated on the following exercises to stimulate lymphatic flow:                                     Therapeutic Activities (16270)       Compression: trial tensoshape size, applied to  NO TENSOSHAPE SECONDARY TO LATEX ALLERGY       Multilayer compression bandaging to BLE:  Stockinette  Artiflex  8 cm low stretch compression bandage  10 cm low stretch compression bandage  10 cm low stretch compression bandage       Educated pt to purchase; handout provided with resources       Pt educated on compression as follows:   how to appropriately apply and wear compression   how to maintain appropriate gradient compression  do not sleep with compression garment/  signs and symptoms of constriction   when to remove compression Educated pt to wear tight leggings for compression to BLE; use loaner pump one hour daily                                   Home Management                            Neuromuscular Re-ed (51639)                            Manual Intervention (52855)       See MLD flowchart below Start next visit                                            Manual Lymph Drainage (MLD):  MLD to BLE, clearing along alternate pathway of ipsilateral trunk to ipsilateral axillary lymph nodes    Clear Nodes 10x each   Neck    [address]    Axilla    Abdomen    Groin    Popliteal x2    Clear Alternate Pathway 10x each   Re-clear alternate pathway   x5 each position    Location        Fluid Mobilization 10x each   Re-clear alternate pathway   x5 each position    Shoulder bracing    Location        Protein Resorption 10x each   Location        Clear Foot/Ankle or Hand 10x each   Achilles    Bilateral malleolus    Fan the cards    Clear dorsum    Clear through web space    Clear toes/fingers    Fluid mobilization    Re-clear all positions  X5 each            OTHER:     Pt education:   Pt educated on pathology and anatomy of etiology of lymphedema, condition precautions, indications for long term prognosis. Pt was educated on diagnosis; prognosis; PT POC including MLD, compression (role of multilayer bandaging,  compression garments), lymphedema management/prevention of flare ups, role of exercise, HEP, lymphedema pump; expectations for rehab. All pt questions were answered.       HEP instruction:  1/15 - Wear tight leggings for compression, moisturize BLEs, use loaner pump 1 hour daily      Therapeutic Exercise and NMR EXR  [] (03351) Provided verbal/tactile cueing for activities related to strengthening, flexibility, endurance, ROM for improvements in  [] LE / Lumbar: LE, proximal hip, and core control with self care, mobility, lifting, ambulation. [] UE / Cervical: cervical, postural, scapular, scapulothoracic and UE control with self care, reaching, carrying, lifting, house/yardwork, driving, computer work.  [] (80392) Provided verbal/tactile cueing for activities related to improving balance, coordination, kinesthetic sense, posture, motor skill, proprioception to assist with   [] LE / lumbar: LE, proximal hip, and core control in self care, mobility, lifting, ambulation and eccentric single leg control. [] UE / cervical: cervical, scapular, scapulothoracic and UE control with self care, reaching, carrying, lifting, house/yardwork, driving, computer work.   [] (43934) Therapist is in constant attendance of 2 or more patients providing skilled therapy interventions, but not providing any significant amount of measurable one-on-one time to either patient, for improvements in  [] LE / lumbar: LE, proximal hip, and core control in self care, mobility, lifting, ambulation and eccentric single leg control. [] UE / cervical: cervical, scapular, scapulothoracic and UE control with self care, reaching, carrying, lifting, house/yardwork, driving, computer work.      NMR and Therapeutic Activities:    [] (84860 or 98791) Provided verbal/tactile cueing for activities related to improving balance, coordination, kinesthetic sense, posture, motor skill, proprioception and motor activation to allow for proper function of   [] LE: / Lumbar core, proximal hip and LE with self care and ADLs  [] UE / Cervical: cervical, postural, scapular, scapulothoracic and UE control with self care, carrying, lifting, driving, computer work.   [] (76867) Gait Re-education- Provided training and instruction to the patient for proper LE, core and proximal hip recruitment and positioning and eccentric body weight control with ambulation re-education including up and down stairs     Home Management Training / Self Care:  [x] (93652) Provided self-care/home management training related to activities of daily living and compensatory training, and/or use of adaptive equipment for improvement with: ADLs and compensatory training, meal preparation, safety procedures and instruction in use of adaptive equipment, including bathing, grooming, dressing, personal hygiene, basic household cleaning and chores. Home Exercise Program:    [x] (52951) Reviewed/Progressed HEP activities related to strengthening, flexibility, endurance, ROM of   [] LE / Lumbar: core, proximal hip and LE for functional self-care, mobility, lifting and ambulation/stair navigation   [] UE / Cervical: cervical, postural, scapular, scapulothoracic and UE control with self care, reaching, carrying, lifting, house/yardwork, driving, computer work  [] (93378)Reviewed/Progressed HEP activities related to improving balance, coordination, kinesthetic sense, posture, motor skill, proprioception of   [] LE: core, proximal hip and LE for self care, mobility, lifting, and ambulation/stair navigation    [] UE / Cervical: cervical, postural,  scapular, scapulothoracic and UE control with self care, reaching, carrying, lifting, house/yardwork, driving, computer work    Manual Treatments:  PROM / STM / Oscillations-Mobs:  G-I, II, III, IV (PA's, Inf., Post.)  [] (57499) Provided manual therapy to mobilize LE, proximal hip and/or LS spine soft tissue/joints for the purpose of modulating pain, promoting relaxation,  increasing ROM, reducing/eliminating soft tissue swelling/inflammation/restriction, improving soft tissue extensibility and allowing for proper ROM for normal function with   [] LE / lumbar: self care, mobility, lifting and ambulation. [] UE / Cervical: self care, reaching, carrying, lifting, house/yardwork, driving, computer work.      Modalities:  [] (65560) Vasopneumatic compression: Utilized vasopneumatic compression to decrease edema / swelling for the purpose of improving mobility and quad tone / recruitment which will allow for increased overall function including but not limited to self-care, transfers, ambulation, and ascending / descending stairs. Modalities:      Charges:  Timed Code Treatment Minutes: 45   Total Treatment Minutes: 45     [x] EVAL - LOW (39814)   [] EVAL - MOD (76763)  [] EVAL - HIGH (18441)  [] RE-EVAL (54367)  [] XI(15903) x       [] Ionto  [] NMR (86001) x       [] Vaso  [] Manual (64312) x       [] Ultrasound  [] TA x        [] Mech Traction (12115)  [] Aquatic Therapy x     [] ES (un) (54148):   [x] Home Management Training x 2  [] ES(attended) (04046)   [] Dry Needling 1-2 muscles (41738):  [] Dry Needling 3+ muscles (703249  [] Group:      [] Other:     GOALS:  Patient stated goal: decreasing swelling in legs, ankles, feet, and toes; wearing shoes she likes; reducing pain   []? Progressing: []? Met: []? Not Met: []? Adjusted     Therapist goals for Patient:   Short Term Goals: To be achieved in: 2 weeks  1. Independent in HEP and progression per patient tolerance, in order to prevent return of swelling   []? Progressing: []? Met: []? Not Met: []? Adjusted  2. Patient will have a decrease in swelling/pain to facilitate improvement in movement, function, and ADLs as indicated by improvement with LLIS. []? Progressing: []? Met: []? Not Met: []? Adjusted     Long Term Goals: To be achieved in: 6 weeks  1. Disability index score of 10% or less on the LLIS to assist with reaching prior level of function. []? Progressing: []? Met: []? Not Met: []? Adjusted  2. Patient will demonstrate increased AROM/strength of BLE to [facility] so that pt can resume ADLs and IADLs without increase in symptoms. []? Progressing: []? Met: []? Not Met: []? Adjusted  3.  Decrease swelling of R LE by 6 cm so that pt can return to functional activities including standing for 30 min without increased symptoms or restriction. []? Progressing: []? Met: []? Not Met: []? Adjusted    Overall Progression Towards Functional goals/ Treatment Progress Update:  [] Patient is progressing as expected towards functional goals listed. [] Progression is slowed due to complexities/Impairments listed. [] Progression has been slowed due to co-morbidities. [x] Plan just implemented, too soon to assess goals progression <30days   [] Goals require adjustment due to lack of progress  [] Patient is not progressing as expected and requires additional follow up with physician  [] Other    Persisting Functional Limitations/Impairments:  []Sleeping []Sitting               [x]Standing []Transfers        [x]Walking []Kneeling               []Stairs [x]Squatting / bending   []ADLs []Reaching  []Lifting  []Housework  []Driving [x]Job related tasks  []Sports/Recreation []Other:        ASSESSMENT:  See eval  Treatment/Activity Tolerance:  [] Patient able to complete tx [] Patient limited by fatigue  [] Patient limited by pain  [] Patient limited by other medical complications  [] Other:     Prognosis: [] Good [] Fair  [] Poor    Patient Requires Follow-up: [x] Yes  [] No    Plan for next treatment session:   MLD, compression, HEP, pt education, lymph pump as appropriate, exercise to stimulate lymphatic flow    PLAN: See eval. PT 2x / week for 6 weeks. [] Continue per plan of care [] Alter current plan (see comments)  [x] Plan of care initiated [] Hold pending MD visit [] Discharge    Electronically signed by: Oriana Bowers PT, DPT [id number]  Therapist was present, directed the patient's care, made skilled judgement, and was responsible for assessment and treatment of the patient. Connie Daugherty, SPT    Note: If patient does not return for scheduled/ recommended follow up visits, this note will serve as a discharge from care along with most recent update on progress.

## 2021-01-15 NOTE — PLAN OF CARE
[address] [address], [address] [place] [address] Drive  Phone: [phone]   Fax: [phone]                                                       Physical Therapy Certification    Dear Referring Practitioner: Dr. Radha Paula,    We had the pleasure of evaluating the following patient for physical therapy services at [address] [place] [address]. A summary of our findings can be found in the initial assessment below. This includes our plan of care. If you have any questions or concerns regarding these findings, please do not hesitate to contact me at the office phone number checked above. Thank you for the referral.       Physician Signature:_______________________________Date:__________________  By signing above (or electronic signature), therapists plan is approved by physician      Patient: Saurav Monteiro   : 1980   MRN: [id number]  Referring Physician: Referring Practitioner: Dr. aRdha Paula      Evaluation Date: 1/15/2021      Medical Diagnosis Information:  Diagnosis: I89.0 Lymphedema   Treatment Diagnosis: Bilateral LE lymphedema                                         Insurance information: PT Insurance Information: [facility]     Preferred Language for Healthcare:   [x]English       []Other:    C-SSRS Triggered by Intake questionnaire (Past 2 wk assessment ):   [x] No, Questionnaire did not trigger screening.   [] Yes, Patient intake triggered C-SSRS Screening     [] Completed, no further action required. [] Completed, PCP notified via Epic    SUBJECTIVE: Pt reports noticing swelling in ankles in B ankles when she was 18/23 y/o. It increased to feet and up legs as she gained weight and had kids. She was diagnosed with lymphedema in 2016 by her vascular doctor. R LE worse the L, up to thigh on R. Now has pain in B feet.  Prior treatment at [address] [place] [address] Ave about 4 years ago; did bandaging and bought compression but compression stockings cut into the back of her knees. Unable to wear custom orthotics secondary to tightness in shoes. She has a loner lymphedema pump but has not used for a long time. ONSET: Summer 1998    Current Level of Function: Independent; difficulty finding shoes that fit  Prior Level of Function: Prior to this injury / incident, pt was independent with ADLs and IADLs    Living Status: Lives with 16 and 5 y/o children   Occupation/School:  unemployed - previously worked in [employer] as a  until work injury 10/30/2020; reports she is on her feet for 8 hrs/day at work     PAIN:  Pain Scale: 10/10 at worst; 2-3/10 currently at rest   Easing factors: rest   Provocative factors:  Weightbearing     Functional Outcome: LLIS:  taken at initial eval - Raw score: 29/72; % disability: 40%    Precautions/ Contra-indications: LATEX ALLERGY, IVC filter - removed 2020  Latex Allergy:  []NO      [x]YES   Relevant Medical History:  [x] Patient history, allergies, meds reviewed. Medical chart reviewed. See intake form. Review Of Systems (ROS):  [x]Performed Review of systems (Integumentary, CardioPulmonary, Neurological) by intake and observation. Intake form has been scanned into medical record. Patient has been instructed to contact their primary care physician regarding ROS issues if not already being addressed at this time.       Co-morbidities/Complexities (which will affect course of rehabilitation):   []None           Arthritic conditions   []Rheumatoid arthritis (M05.9)  [x]Osteoarthritis (M19.91)   Cardiovascular conditions   [x]Hypertension (I10)  []Hyperlipidemia (E78.5)  []Angina pectoris (I20)  []Atherosclerosis (I70)   Musculoskeletal conditions   []Disc pathology   []Congenital spine pathologies   []Prior surgical intervention  []Osteoporosis (M81.8)  []Osteopenia (M85.8)   Endocrine conditions   []Hypothyroid (E03.9)  []Hyperthyroid Gastrointestinal conditions   []Constipation (V91.80)   Metabolic conditions   []Morbid obesity (E66.01)  []Diabetes type 1(E10.65) or 2 (E11.65)   []Neuropathy (G60.9)     Pulmonary conditions   []Asthma (J45)  []Coughing   []COPD (J44.9)   Psychological Disorders  [x]Anxiety (F41.9)  [x]Depression (F32.9)   []Other:   []Other:              OBJECTIVE:    Pitting   [x] none [] slightly [] moderate [] severe [] brawny (does not indent)   Color    [x] dusky [] mottled [] red streaks [] other:  Skin Texture   [] rough  [x] dry   [] moist  [] normal  [x] hyperkaratosis [] hyperplasia  [] hyperpigmentation [] Elephantiasis  [] papillomas  [] Skin breakdown with lymphorrhea (weeping)  Skin Temperature   [x] normal [] cool  [] uneven [] warm [] hot  Edema Rebound*   [x] quick [] slow [x] fibrotic tissue - posterior L calf   *pressure applied x10 seconds     Signs of Constriction:  Condition of Nailbeds: abnormal growth pattern of toenails   [] discolored [] red  [] white [] swollen     Skin Breakdown (indicate size, location and number) [] Yes [x] No  Comments:  Fistulas (an abnormal passageway) [] Yes  [x] No  Comments:  Tinea (fungus) [] Yes [x] No  Comments:  Papilloma (benign tumor arising from an epithelial layer)  [] Yes [x] No  Comments:   Fibrotic areas [x] Yes [] No  Comments: posterior L calf   Lymphorrhea (flow of lymph from a cut or ruptured lymph vessels): [] Yes [x] No  Comments:  Warts (a local growth of the outer layer of skin) [] Yes [x] No  Comments:  Ulcers  [] Yes [x] No  Comments:    SCARS: L thigh - mm biopsy     STEMMER SIGN: [x] positive - B  [] negative    Stage of Lymphedema   [] Latency stage/Lymphangiopathy (Stage 0 / Prestage / Subclinical stage):   · No swelling  · Reduced transport capacity (TC)  · \"Normal\" tissue consistency  [] Stage 1 (reversible stage):  · Edema is soft (pitting)  · No secondary tissue changes  · Elevation reduces swelling  [x] Stage 2 (spontaneously irreversible stage)  · Lyphostatic fibrosis  · Hardening of the tissue (no pitting)  · Stemmer sign positive   · Frequent infections    [] Stage 3 (lymphostatic elephantiasis)  · Extreme increase in volume and tissue texture with typical skin changes (papillomas, deep skinfolds, etc.)  · Stemmer sign positive    GIRTH MEASUREMENT  (Tape on skin along anterior tibialis)    Lower Extremity Right (cm) Left  (cm)   Date 1/15 1/15        cm  Widest at hip 140.0    cm at waist (at umbilicus) 400.5    Inferior medial maleoli 30.5 32.1   Metatarsal heads 29.1 28.8   10 m above inf aspect Medial [id number] 33.4 33.2   20 Cm above inf aspect  of Medial malleolus    43.2 42.0   30 Cm above  inf aspec of Medial malleolus  50.2 48.8   35 Cm above  inf aspec of Medial malleolus 46.9 46.7   45 Cm above  inf aspec of Medial malleolus  57.8 55.5   50 Cm above  inf aspec of Medial malleolus 63.7 61.8   55 Cm above  inf aspec of Medial malleolus 69.8 67.8   60 Cm above  inf aspec of Medial malleolus 74.3 75.7             Proximal phalanx Gr toe 10.3 10.2             Total Girth 509.2 502.6   R LE 6.6 cm greater than L LE    Classification for Lymphedema  [] Mild: < 3 cm differential between affected limb and unaffected limb  [] Moderate:  3 - 5 cm differential between affected limb and unaffected limb   [x] Severe:  5+ cm differential between affected limb and unaffected limb           Barriers to/and or personal factors that will affect rehab potential:              []Age  []Sex    []Smoker              []Motivation/Lack of Motivation                        [x]Co-Morbidities              []Cognitive Function, education/learning barriers              []Environmental, home barriers              []profession/work barriers  [x]past PT/medical experience  []other:    Falls Risk Assessment (30 days): none  [x] Falls Risk assessed and no intervention required.   [] Falls Risk assessed and Patient requires intervention due to being higher risk   TUG score (>12s at risk):     [] Falls education provided, including         ASSESSMENT: Pt presents with B LE lymphedema, R>L, with fibrosis on her posterior L calf. 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 She demonstrates weakness in BLEs and has severe pain in B feet with WB. Pt will benefit from MLD, compression, LE strengthening, and a home management plan to reduce edema and pain and return to PLOF without limitations.         Functional Impairments:   [x] Noted increased girth of R LE (6.6 cm)  [x] Noted decreased health of skin at B shins and fibrotic tissue posterior L calf   []Decreased functional strength of   []Reduced balance/proprioceptive control    []other:  reduced functional ROM of    []other:  reduce functional strength of    []other: myofascial changes and pain at    [] Postural impairments:   []other:      Functional Activity Limitations (from functional questionnaire and intake)  [x]Reduced ability to use affected limb for ADLs/IADLs due to swelling causing symptoms of heaviness, skin tightness, pain  []Reduced ability to perform lifting, reaching, carrying tasks  [x]Reduced ability to wear his/her normal clothes/shoes due to swelling    []Reduced ability to tolerate prolonged functional positions  []Reduced ability or difficulty with changes of positions or transfers between positions  []Reduced ability to maintain good posture and demonstrate good body mechanics with sitting, bending, and lifting   []Reduced ability to sleep   [] Reduced ability or tolerance with driving and/or computer work   [x]Reduced ability to squat   []Reduced ability to forward bend   [x]Reduced ability to ambulate prolonged functional periods/distances/surfaces   [x]Reduced ability to ascend/descend stairs    []Reduced ability to tolerate any impact through UE or spine   []other:        Participation Restrictions   [x]Reduced participation in self care activities   [x]Reduced participation in home management activities   [x]Reduced participation in work activities   []Reduced participation in social activities. []Reduced participation in sport/recreational activities. Prognosis/Rehab Potential:      []Excellent   [x]Good    []Fair   []Poor    Tolerance of evaluation/treatment:    []Excellent   [x]Good    []Fair   []Poor     Physical Therapy Evaluation Complexity Justification  [x] A history of present problem with:  [] no personal factors and/or comorbidities that impact the plan of care;  []1-2 personal factors and/or comorbidities that impact the plan of care  [x]3 personal factors and/or comorbidities that impact the plan of care  [x] An examination of body systems using standardized tests and measures addressing any of the following: body structures and functions (impairments), activity limitations, and/or participation restrictions;:  [x] a total of 1-2 or more elements   [] a total of 3 or more elements   [] a total of 4 or more elements   [x] A clinical presentation with:  [x] stable and/or uncomplicated characteristics   [] evolving clinical presentation with changing characteristics  [] unstable and unpredictable characteristics;   [x] Clinical decision making of [x] low, [] moderate, [] high complexity using standardized patient assessment instrument and/or measurable assessment of functional outcome.     [x] EVAL (LOW) 42928 (typically 15 minutes face-to-face)  [] EVAL (MOD) 76865 (typically 30 minutes face-to-face)  [] EVAL (HIGH) 54480 (typically 45 minutes face-to-face)  [] RE-EVAL     PLAN:  manual lymph drainage to BLE; compression, HEP, education on lymphedema management, ROM/strength exercises to restore PLOF    Frequency/Duration:  2 days per week for 6 Weeks:  Interventions:  [x]  Compression to include multilayer compression bandaging and/or compression garments as appropriate  [x]  Manual therapy as indicated for BLE to include: manual lymph drainage, STM, ROM as appropriate. [x]  Modalities as needed that may include: lymphedema pump, thermal agents, as indicated  [x]  Patient education on lymphedema management, compression, activity modification, progression of HEP. [x]  Therapeutic exercise including: strength/ROM/flexibility  [x]  NMR activation and proprioception  including postural re-education         AQUATIC EXERCISE      HEP instruction: Written HEP instructions provided and reviewed:    GOALS:  Patient stated goal: decreasing swelling in legs, ankles, feet, and toes; wearing shoes she likes; reducing pain   [] Progressing: [] Met: [] Not Met: [] Adjusted    Therapist goals for Patient:   Short Term Goals: To be achieved in: 2 weeks  1. Independent in HEP and progression per patient tolerance, in order to prevent return of swelling   [] Progressing: [] Met: [] Not Met: [] Adjusted  2. Patient will have a decrease in swelling/pain to facilitate improvement in movement, function, and ADLs as indicated by improvement with LLIS. [] Progressing: [] Met: [] Not Met: [] Adjusted    Long Term Goals: To be achieved in: 6 weeks  1. Disability index score of 10% or less on the LLIS to assist with reaching prior level of function. [] Progressing: [] Met: [] Not Met: [] Adjusted  2. Patient will demonstrate increased AROM/strength of BLE to [facility] so that pt can resume ADLs and IADLs without increase in symptoms. [] Progressing: [] Met: [] Not Met: [] Adjusted  3. Decrease swelling of R LE by 6 cm so that pt can return to functional activities including standing for 30 min without increased symptoms or restriction. [] Progressing: [] Met: [] Not Met: [] Adjusted        Electronically signed by:  Nadya Munoz, PT  [id number]    Therapist was present, directed the patient's care, made skilled judgement, and was responsible for assessment and treatment of the patient.        Dany Suggs, SPT

## 2021-01-19 ENCOUNTER — HOSPITAL ENCOUNTER (OUTPATIENT)
Dept: PHYSICAL THERAPY | Age: 41
Setting detail: THERAPIES SERIES
Discharge: HOME | End: 2021-01-19
Payer: COMMERCIAL

## 2021-01-19 PROCEDURE — 97535 SELF CARE MNGMENT TRAINING: CPT

## 2021-01-19 PROCEDURE — 97140 MANUAL THERAPY 1/> REGIONS: CPT

## 2021-01-19 NOTE — FLOWSHEET NOTE
[address] [facility] Physical Therapy  Phone: [phone]   Fax: [phone]    Physical Therapy Daily LYMPHEDEMA Treatment Note    Date:  2021    Patient Name:  Jack Chatman    :  1980  MRN: [id number]  Restrictions/Precautions:  LATEX ALLERGY   Medical/Treatment Diagnosis Information:  Diagnosis: I89.0 Lymphedema  Treatment Diagnosis: Bilateral LE lymphedema  Insurance/Certification information:  PT Insurance Information: [facility]  Physician Information:  Referring Practitioner: Dr. Anjel Stark [facility] signed (Y/N): []  Yes [x]  No     Date of Patient follow up with Physician:     Functional scale[de-identified]  LLIS  raw score = 29/72 ; dysfunction = 40%    Progress Report: [x]  Yes  []  No     Date Range for reporting period:  Beginning 1/15/2021  Ending     Progress report due (10 Rx/or 30 days whichever is less): visit #10 or 636 (date)     Recertification due (POC duration/ or 90 days whichever is less): 5/15/2021  (date)     Visit # Insurance Allowable Auth required? Date Range    30 PT/OT/speech combined  []  Yes  [x]  No         Latex Allergy:  [x]NO      []YES  Preferred Language for Healthcare:   [x]English       []other:      Pain level: 0/10 - B feet     SUBJECTIVE:   Subject stated not having any pain,  ust occasional sharp, stinging type of pain in calves. Not sure why. Has been researching types of compression,  Had picture of foot/toe compression garment. And wanted to know if would be appropriate. OBJECTIVE: advised depending on price may be useful for toes/forefoot along with compression bandaging.         RESTRICTIONS/PRECAUTIONS: LATEX ALLERGY, IVC filter - removed      Exercises/Interventions:     Therapeutic Exercises (73139) Resistance / level Sets/sec Reps Notes                                               Therapeutic Activities (84342)       Compression: trial tensoshape size, applied to  NO TENSOSHAPE SECONDARY TO LATEX ALLERGY       Multilayer compression bandaging to BLE:  Stockinette  Artiflex  8 cm low stretch compression bandage  10 cm low stretch compression bandage  10 cm low stretch compression bandage       Educated pt to purchase; handout provided with resources       Pt educated on compression as follows:   how to appropriately apply and wear compression   how to maintain appropriate gradient compression  do not sleep with compression garment/  signs and symptoms of constriction   when to remove compression Educated pt to wear tight leggings for compression to BLE; use loaner pump one hour daily                                   Home Management  NV educate pt on swipe method for LE and B hands. Discussion regarding compression and use of items she discovered. X 10 min                    Neuromuscular Re-ed (66919)                            Manual Intervention ([id number])       See MLD flowchart below 34 min                                           Manual Lymph Drainage (MLD):  MLD to BLE, clearing along alternate pathway of ipsilateral trunk to ipsilateral axillary lymph nodes  119  L LE MLD today and R alternate pathway during pump on R LE     Clear Nodes 10x each   Neck    [address]    Axilla    Abdomen    Groin    Popliteal x2    Clear Alternate Pathway 10x each   Re-clear alternate pathway   x5 each position    Location        Fluid Mobilization 10x each   Re-clear alternate pathway   x5 each position ipsil lateral trunk to ipsil Axilla   Shoulder bracing    Location        Protein Resorption 10x each   Location Posterior L calf       Clear Foot/Ankle or Hand 10x each   Achilles    Bilateral malleolus    Fan the cards    Clear dorsum    Clear through web space    Clear toes/fingers    Fluid mobilization    Re-clear all positions  X5 each            OTHER:  1/19 eviewed pt's options for compression I.e, toe and forefoot compression,,  Several types of LE compression garments.     Pt education:   Pt educated on pathology and anatomy of etiology of lymphedema, condition precautions, indications for long term prognosis. Pt was educated on diagnosis; prognosis; PT POC including MLD, compression (role of multilayer bandaging,  compression garments), lymphedema management/prevention of flare ups, role of exercise, HEP, lymphedema pump; expectations for rehab. All pt questions were answered. HEP instruction:  1/15 - Wear tight leggings for compression, moisturize BLEs, use loaner pump 1 hour daily      Therapeutic Exercise and NMR EXR  [] (87006) Provided verbal/tactile cueing for activities related to strengthening, flexibility, endurance, ROM for improvements in  [] LE / Lumbar: LE, proximal hip, and core control with self care, mobility, lifting, ambulation. [] UE / Cervical: cervical, postural, scapular, scapulothoracic and UE control with self care, reaching, carrying, lifting, house/yardwork, driving, computer work.  [] (50759) Provided verbal/tactile cueing for activities related to improving balance, coordination, kinesthetic sense, posture, motor skill, proprioception to assist with   [] LE / lumbar: LE, proximal hip, and core control in self care, mobility, lifting, ambulation and eccentric single leg control. [] UE / cervical: cervical, scapular, scapulothoracic and UE control with self care, reaching, carrying, lifting, house/yardwork, driving, computer work.   [] (81975) Therapist is in constant attendance of 2 or more patients providing skilled therapy interventions, but not providing any significant amount of measurable one-on-one time to either patient, for improvements in  [] LE / lumbar: LE, proximal hip, and core control in self care, mobility, lifting, ambulation and eccentric single leg control. [] UE / cervical: cervical, scapular, scapulothoracic and UE control with self care, reaching, carrying, lifting, house/yardwork, driving, computer work.      NMR and Therapeutic Activities:    [] (40628 or 04660) Provided verbal/tactile cueing for activities related to improving balance, coordination, kinesthetic sense, posture, motor skill, proprioception and motor activation to allow for proper function of   [] LE: / Lumbar core, proximal hip and LE with self care and ADLs  [] UE / Cervical: cervical, postural, scapular, scapulothoracic and UE control with self care, carrying, lifting, driving, computer work.   [] (47327) Gait Re-education- Provided training and instruction to the patient for proper LE, core and proximal hip recruitment and positioning and eccentric body weight control with ambulation re-education including up and down stairs     Home Management Training / Self Care:  [x] (70869) Provided self-care/home management training related to activities of daily living and compensatory training, and/or use of adaptive equipment for improvement with: ADLs and compensatory training, meal preparation, safety procedures and instruction in use of adaptive equipment, including bathing, grooming, dressing, personal hygiene, basic household cleaning and chores.      Home Exercise Program:    [] (43236) Reviewed/Progressed HEP activities related to strengthening, flexibility, endurance, ROM of   [] LE / Lumbar: core, proximal hip and LE for functional self-care, mobility, lifting and ambulation/stair navigation   [] UE / Cervical: cervical, postural, scapular, scapulothoracic and UE control with self care, reaching, carrying, lifting, house/yardwork, driving, computer work  [] (42783)Reviewed/Progressed HEP activities related to improving balance, coordination, kinesthetic sense, posture, motor skill, proprioception of   [] LE: core, proximal hip and LE for self care, mobility, lifting, and ambulation/stair navigation    [] UE / Cervical: cervical, postural,  scapular, scapulothoracic and UE control with self care, reaching, carrying, lifting, house/yardwork, driving, computer work    Manual Treatments:  PROM / STM / Oscillations-Mobs:  G-I, II, III, IV (PA's, Inf., Post.)  [] (22680) Provided manual therapy to mobilize LE, proximal hip and/or LS spine soft tissue/joints for the purpose of modulating pain, promoting relaxation,  increasing ROM, reducing/eliminating soft tissue swelling/inflammation/restriction, improving soft tissue extensibility and allowing for proper ROM for normal function with   [] LE / lumbar: self care, mobility, lifting and ambulation. [] UE / Cervical: self care, reaching, carrying, lifting, house/yardwork, driving, computer work. Modalities:  20 mion to R LE  Light compression'    [x] (70833) Vasopneumatic compression: Utilized vasopneumatic compression to decrease edema / swelling for the purpose of improving mobility and quad tone / recruitment which will allow for increased overall function including but not limited to self-care, transfers, ambulation, and ascending / descending stairs. Modalities:      Charges:  Timed Code Treatment Minutes: 45   Total Treatment Minutes: 45     [] EVAL - LOW (99769)   [] EVAL - MOD (20522)  [] EVAL - HIGH (18211)  [] RE-EVAL (44001)  [] KP(38377) x       [] Ionto  [] NMR (74773) x       [] Vaso  [x] Manual (92611) x    2   [] Ultrasound  [] TA x        [] Mech Traction (67995)  [] Aquatic Therapy x     [] ES (un) (93830):   [x] Home Management Training x 0  [] ES(attended) (17269)   [] Dry Needling 1-2 muscles (56190):  [] Dry Needling 3+ muscles (306316  [] Group:      [] Other:     GOALS:  Patient stated goal: decreasing swelling in legs, ankles, feet, and toes; wearing shoes she likes; reducing pain   []? Progressing: []? Met: []? Not Met: []? Adjusted     Therapist goals for Patient:   Short Term Goals: To be achieved in: 2 weeks  1. Independent in HEP and progression per patient tolerance, in order to prevent return of swelling   []? Progressing: []? Met: []? Not Met: []? Adjusted  2.  Patient will have a

## 2021-01-22 ENCOUNTER — HOSPITAL ENCOUNTER (OUTPATIENT)
Dept: PHYSICAL THERAPY | Age: 41
Setting detail: THERAPIES SERIES
Discharge: HOME | End: 2021-01-22
Payer: COMMERCIAL

## 2021-01-22 PROCEDURE — 97535 SELF CARE MNGMENT TRAINING: CPT

## 2021-01-22 NOTE — FLOWSHEET NOTE
[facility] Therapy  Phone: [phone]   Fax: [phone]    Physical Therapy Daily LYMPHEDEMA Treatment Note    Date:  2021    Patient Name:  Margaret Washington    :  1980  MRN: [id number]  Restrictions/Precautions:  LATEX ALLERGY   Medical/Treatment Diagnosis Information:  Diagnosis: I89.0 Lymphedema  Treatment Diagnosis: Bilateral LE lymphedema  Insurance/Certification information:  PT Insurance Information: [facility]  Physician Information:  Referring Practitioner: Dr. Floyd Esteban of care signed (Y/N): []  Yes [x]  No     Date of Patient follow up with Physician:     Functional scale[de-identified]  LLIS  raw score = 29/72 ; dysfunction = 40%    Progress Report: [x]  Yes  []  No     Date Range for reporting period:  Beginning 1/15/2021  Ending     Progress report due (10 Rx/or 30 days whichever is less): visit #10 or  (date)     Recertification due (POC duration/ or 90 days whichever is less): 5/15/2021  (date)     Visit # Insurance Allowable Auth required? Date Range   3/12 30 PT/OT/speech combined  []  Yes  [x]  No         Latex Allergy:  [x]NO      []YES  Preferred Language for Healthcare:   [x]English       []other:      Pain level: 2-3/10 - B feet, calves, and knees     SUBJECTIVE:   Pt states she is having pain in BLEs this date. Pt has not ordered compression bandages this date, but she found a website where she can get both legs for $60. She is interested in purchasing 20-30 mmHg compression garments     OBJECTIVE: advised depending on price may be useful for toes/forefoot along with compression bandaging.         RESTRICTIONS/PRECAUTIONS: LATEX ALLERGY, IVC filter - removed      Exercises/Interventions:     Therapeutic Exercises (57286) Resistance / level Sets/sec Reps Notes                                               Therapeutic Activities (10509)       Home Management/ADL  Total - 43 min       Compression: trial tensoshape size, applied to  NO TENSOSHAPE SECONDARY TO LATEX ALLERGY       Multilayer compression bandaging to BLE:  Stockinette  Artiflex  8 cm low stretch compression bandage  10 cm low stretch compression bandage  10 cm low stretch compression bandage       Educated pt to purchase; handout provided with resources    1/22 - Pt stated bandages should be in by next Friday    Pt educated on compression as follows:   how to appropriately apply and wear compression   how to maintain appropriate gradient compression  do not sleep with compression garment/  signs and symptoms of constriction   when to remove compression   1/22 - reviewed need for B compression stockings. Researched brands online and measured pt to ensure she purchases the appropriate size. recommended 20-30 mmHg compression. Instructed pt to not wear compression socks while sleeping. Educated pt on self MLD swipe method for B LE. Provided handouts and answered all pt questions. Pt instructed to complete swipe method 2x a day. Discussion regarding compression and use of items she discovered.                       Neuromuscular Re-ed (64154)                            Manual Intervention (15915)       See MLD flowchart below                                            Manual Lymph Drainage (MLD):  MLD to BLE, clearing along alternate pathway of ipsilateral trunk to ipsilateral axillary lymph nodes  119  L LE MLD today and R alternate pathway during pump on R LE     Clear Nodes 10x each   Neck    [address]    Axilla    Abdomen    Groin    Popliteal x2    Clear Alternate Pathway 10x each   Re-clear alternate pathway   x5 each position    Location        Fluid Mobilization 10x each   Re-clear alternate pathway   x5 each position ipsil lateral trunk to ipsil Axilla   Shoulder bracing    Location        Protein Resorption 10x each   Location Posterior L calf       Clear Foot/Ankle or Hand 10x each   Achilles    Bilateral malleolus    Fan the cards    Clear dorsum    Clear through web space    Clear toes/fingers    Fluid mobilization    Re-clear all positions  X5 each            OTHER:    1/22 - educated pt on compression socks, purchasing bandages, and self MLD swipe method 2x/day. Pt educated on importance of keeping skin moisturized and staying hydrated as she increases her activity. 1/19 eviewed pt's options for compression I.e, toe and forefoot compression,,  Several types of LE compression garments. Pt education:   Pt educated on pathology and anatomy of etiology of lymphedema, condition precautions, indications for long term prognosis. Pt was educated on diagnosis; prognosis; PT POC including MLD, compression (role of multilayer bandaging,  compression garments), lymphedema management/prevention of flare ups, role of exercise, HEP, lymphedema pump; expectations for rehab. All pt questions were answered. HEP instruction:  1/22 - purchase compression socks and bandages, self MLD swipe method for BLEs   1/15 - Wear tight leggings for compression, moisturize BLEs, use loaner pump 1 hour daily      Therapeutic Exercise and NMR EXR  [] (78456) Provided verbal/tactile cueing for activities related to strengthening, flexibility, endurance, ROM for improvements in  [] LE / Lumbar: LE, proximal hip, and core control with self care, mobility, lifting, ambulation. [] UE / Cervical: cervical, postural, scapular, scapulothoracic and UE control with self care, reaching, carrying, lifting, house/yardwork, driving, computer work.  [] (42435) Provided verbal/tactile cueing for activities related to improving balance, coordination, kinesthetic sense, posture, motor skill, proprioception to assist with   [] LE / lumbar: LE, proximal hip, and core control in self care, mobility, lifting, ambulation and eccentric single leg control.    [] UE / cervical: cervical, scapular, scapulothoracic and UE control with self care, reaching, carrying, lifting, house/yardwork, driving, computer work.   [] (24309) Therapist is in constant attendance of 2 or more patients providing skilled therapy interventions, but not providing any significant amount of measurable one-on-one time to either patient, for improvements in  [] LE / lumbar: LE, proximal hip, and core control in self care, mobility, lifting, ambulation and eccentric single leg control. [] UE / cervical: cervical, scapular, scapulothoracic and UE control with self care, reaching, carrying, lifting, house/yardwork, driving, computer work. NMR and Therapeutic Activities:    [] (70952 or 30890) Provided verbal/tactile cueing for activities related to improving balance, coordination, kinesthetic sense, posture, motor skill, proprioception and motor activation to allow for proper function of   [] LE: / Lumbar core, proximal hip and LE with self care and ADLs  [] UE / Cervical: cervical, postural, scapular, scapulothoracic and UE control with self care, carrying, lifting, driving, computer work.   [] (10713) Gait Re-education- Provided training and instruction to the patient for proper LE, core and proximal hip recruitment and positioning and eccentric body weight control with ambulation re-education including up and down stairs     Home Management Training / Self Care:  [x] (34485) Provided self-care/home management training related to activities of daily living and compensatory training, and/or use of adaptive equipment for improvement with: ADLs and compensatory training, meal preparation, safety procedures and instruction in use of adaptive equipment, including bathing, grooming, dressing, personal hygiene, basic household cleaning and chores.      Home Exercise Program:    [] (36938) Reviewed/Progressed HEP activities related to strengthening, flexibility, endurance, ROM of   [] LE / Lumbar: core, proximal hip and LE for functional self-care, mobility, lifting and ambulation/stair navigation [] UE / Cervical: cervical, postural, scapular, scapulothoracic and UE control with self care, reaching, carrying, lifting, house/yardwork, driving, computer work  [] (80468)Reviewed/Progressed HEP activities related to improving balance, coordination, kinesthetic sense, posture, motor skill, proprioception of   [] LE: core, proximal hip and LE for self care, mobility, lifting, and ambulation/stair navigation    [] UE / Cervical: cervical, postural,  scapular, scapulothoracic and UE control with self care, reaching, carrying, lifting, house/yardwork, driving, computer work    Manual Treatments:  PROM / STM / Oscillations-Mobs:  G-I, II, III, IV (PA's, Inf., Post.)  [] (09950) Provided manual therapy to mobilize LE, proximal hip and/or LS spine soft tissue/joints for the purpose of modulating pain, promoting relaxation,  increasing ROM, reducing/eliminating soft tissue swelling/inflammation/restriction, improving soft tissue extensibility and allowing for proper ROM for normal function with   [] LE / lumbar: self care, mobility, lifting and ambulation. [] UE / Cervical: self care, reaching, carrying, lifting, house/yardwork, driving, computer work. Modalities:   1/22 - no vasopump   20 mion to R LE  Light compression'    [] (16216) Vasopneumatic compression: Utilized vasopneumatic compression to decrease edema / swelling for the purpose of improving mobility and quad tone / recruitment which will allow for increased overall function including but not limited to self-care, transfers, ambulation, and ascending / descending stairs.        Modalities:      Charges:  Timed Code Treatment Minutes: 43   Total Treatment Minutes: 43     [] EVAL - LOW (74030)   [] EVAL - MOD (35921)  [] EVAL - HIGH (70748)  [] RE-EVAL (78896)  [] XQ(13549) x       [] Ionto  [] NMR (56845) x       [] Vaso  [] Manual (85518) x      [] Ultrasound  [] TA x        [] Mech Traction (42312)  [] Aquatic Therapy x     [] ES (un) (63784):   [x] Home Management Training x 3  [] ES(attended) (12926)   [] Dry Needling 1-2 muscles (77924):  [] Dry Needling 3+ muscles (548427  [] Group:      [] Other:     GOALS:  Patient stated goal: decreasing swelling in legs, ankles, feet, and toes; wearing shoes she likes; reducing pain   []? Progressing: []? Met: []? Not Met: []? Adjusted     Therapist goals for Patient:   Short Term Goals: To be achieved in: 2 weeks  1. Independent in HEP and progression per patient tolerance, in order to prevent return of swelling   []? Progressing: []? Met: []? Not Met: []? Adjusted  2. Patient will have a decrease in swelling/pain to facilitate improvement in movement, function, and ADLs as indicated by improvement with LLIS. []? Progressing: []? Met: []? Not Met: []? Adjusted     Long Term Goals: To be achieved in: 6 weeks  1. Disability index score of 10% or less on the LLIS to assist with reaching prior level of function. []? Progressing: []? Met: []? Not Met: []? Adjusted  2. Patient will demonstrate increased AROM/strength of BLE to [facility] so that pt can resume ADLs and IADLs without increase in symptoms. []? Progressing: []? Met: []? Not Met: []? Adjusted  3. Decrease swelling of R LE by 6 cm so that pt can return to functional activities including standing for 30 min without increased symptoms or restriction. []? Progressing: []? Met: []? Not Met: []? Adjusted    Overall Progression Towards Functional goals/ Treatment Progress Update:  [] Patient is progressing as expected towards functional goals listed. [] Progression is slowed due to complexities/Impairments listed. [] Progression has been slowed due to co-morbidities.   [x] Plan just implemented, too soon to assess goals progression <30days   [] Goals require adjustment due to lack of progress  [] Patient is not progressing as expected and requires additional follow up with physician  [] Other    Persisting Functional Limitations/Impairments:  []Sleeping []Sitting               [x]Standing []Transfers        [x]Walking []Kneeling               []Stairs [x]Squatting / bending   []ADLs []Reaching  []Lifting  []Housework  []Driving [x]Job related tasks  []Sports/Recreation []Other:        ASSESSMENT:  Pt was educated on self MLD swipe method for BLEs and purchasing compression socks for BLEs (20-30 mmHg). Pt is increasing her physical activity, including walking, biking, lifting weights, and abdominal exercises and was educated on wearing compression with exercise and increasing hydration. Pt will continue to benefit from MLD, therapeutic exercises, compression, and home management to decrease pain and edema and return to PLOF without limitations. Treatment/Activity Tolerance:  [x] Patient able to complete tx [] Patient limited by fatigue  [] Patient limited by pain  [] Patient limited by other medical complications  [] Other:     Prognosis: [x] Good [] Fair  [] Poor    Patient Requires Follow-up: [x] Yes  [] No    Plan for next treatment session:   MLD, compression, HEP, pt education, lymph pump as appropriate, exercise to stimulate lymphatic flow    PLAN: See eval. PT 2x / week for 6 weeks. [x] Continue per plan of care [] Alter current plan (see comments)  [] Plan of care initiated [] Hold pending MD visit [] Discharge    Electronically signed by: Tanika Hu PT, DPT [id number]  Therapist was present, directed the patient's care, made skilled judgement, and was responsible for assessment and treatment of the patient. Dejah Wiley, SPT      Note: If patient does not return for scheduled/ recommended follow up visits, this note will serve as a discharge from care along with most recent update on progress.

## 2021-01-25 ENCOUNTER — HOSPITAL ENCOUNTER (OUTPATIENT)
Dept: PHYSICAL THERAPY | Age: 41
Setting detail: THERAPIES SERIES
Discharge: HOME | End: 2021-01-25
Payer: COMMERCIAL

## 2021-01-25 PROCEDURE — 97140 MANUAL THERAPY 1/> REGIONS: CPT

## 2021-01-25 PROCEDURE — 97535 SELF CARE MNGMENT TRAINING: CPT

## 2021-01-25 NOTE — FLOWSHEET NOTE
[facility]  Phone: [phone]   Fax: [phone]    Physical Therapy Daily LYMPHEDEMA Treatment Note    Date:  2021    Patient Name:  Kenyatta Cool    :  1980  MRN: [id number]  Restrictions/Precautions:  LATEX ALLERGY   Medical/Treatment Diagnosis Information:  Diagnosis: I89.0 Lymphedema  Treatment Diagnosis: Bilateral LE lymphedema  Insurance/Certification information:  PT Insurance Information: [facility]  Physician Information:  Referring Practitioner: Dr. Deya Marin of care signed (Y/N): []  Yes [x]  No     Date of Patient follow up with Physician:     Functional scale[de-identified]  LLIS  raw score =  ; dysfunction = 40%    Progress Report: [x]  Yes  []  No     Date Range for reporting period:  Beginning 1/15/2021  Ending     Progress report due (10 Rx/or 30 days whichever is less): visit #10 or  (date)     Recertification due (POC duration/ or 90 days whichever is less): 5/15/2021  (date)     Visit # Insurance Allowable Auth required? Date Range    30 PT/OT/speech combined  []  Yes  [x]  No         Latex Allergy:  [x]NO      []YES  Preferred Language for Healthcare:   [x]English       []other:      Pain level: 10 - B feet, calves, and knees     SUBJECTIVE:   Pt states the self MLD is going well. She came with picture options for compression socks and gloves. Pt stated she will purchase soon and bring into clinic. She is going to the gym 2-3x/wk and would like to increase to 5x/wk. Relevant PMH:  - ovarian cyst removal, pt noticed swelling in ankles and feet after surgery.  - partial hysterectomy. Pt then got an infection and needed a total hysterectomy. Pt states her edema and tightness was very severe after the second surgery. OBJECTIVE: advised depending on price may be useful for toes/forefoot along with compression bandaging.         RESTRICTIONS/PRECAUTIONS: LATEX ALLERGY, IVC filter - removed 2020.    Exercises/Interventions:     Therapeutic Exercises (68595) Resistance / level Sets/sec Reps Notes                                               Therapeutic Activities (14027)       Home Management/ADL         Compression: trial tensoshape size, applied to  NO TENSOSHAPE SECONDARY TO LATEX ALLERGY       Multilayer compression bandaging to BLE:  Stockinette  Artiflex  8 cm low stretch compression bandage  10 cm low stretch compression bandage  10 cm low stretch compression bandage       Educated pt to purchase; handout provided with resources    1/22 - Pt stated bandages should be in by next Friday    Pt educated on compression as follows:   how to appropriately apply and wear compression   how to maintain appropriate gradient compression  do not sleep with compression garment/  signs and symptoms of constriction   when to remove compression   1/22 - reviewed need for B compression stockings. Researched brands online and measured pt to ensure she purchases the appropriate size. recommended 20-30 mmHg compression. Instructed pt to not wear compression socks while sleeping. Educated pt on self MLD swipe method for B LE. Provided handouts and answered all pt questions. Pt instructed to complete swipe method 2x a day. Discussion regarding compression and use of items she discovered. Pt instructed to purchase 20-30 mmHg compression stockings and gloves and low stretch bandages. Pt to bring in once they arrive.   X 10 min                    Neuromuscular Re-ed (13390)                            Manual Intervention ([id number])       See MLD flowchart below 40 min                                            Manual Lymph Drainage (MLD):  MLD to BLE, clearing along alternate pathway of ipsilateral trunk to ipsilateral axillary lymph nodes  119  L LE MLD today and R alternate pathway during pump on R LE     Clear Nodes 10x each   Neck    [address]    Axilla    Abdomen    Groin Popliteal x2    Clear Alternate Pathway 10x each   Re-clear alternate pathway   x5 each position    Location        Fluid Mobilization 10x each   Re-clear alternate pathway   x5 each position ipsil lateral trunk to ipsil Axilla   Shoulder bracing    Location        Protein Resorption 10x each   Location Posterior L calf       Clear Foot/Ankle or Hand 10x each   Achilles    Bilateral malleolus    Fan the cards    Clear dorsum    Clear through web space    Clear toes/fingers    Fluid mobilization    Re-clear all positions  X5 each            OTHER:    1/25 - educated on compression socks, gloves, and bandages. Educated on importance of skin care and moisturizing.   1/22 - educated pt on compression socks, purchasing bandages, and self MLD swipe method 2x/day. Pt educated on importance of keeping skin moisturized and staying hydrated as she increases her activity. 1/19 eviewed pt's options for compression I.e, toe and forefoot compression,,  Several types of LE compression garments. Pt education:   Pt educated on pathology and anatomy of etiology of lymphedema, condition precautions, indications for long term prognosis. Pt was educated on diagnosis; prognosis; PT POC including MLD, compression (role of multilayer bandaging,  compression garments), lymphedema management/prevention of flare ups, role of exercise, HEP, lymphedema pump; expectations for rehab. All pt questions were answered. HEP instruction:  1/22 - purchase compression socks and bandages, self MLD swipe method for BLEs   1/15 - Wear tight leggings for compression, moisturize BLEs, use loaner pump 1 hour daily      Therapeutic Exercise and NMR EXR  [] (99195) Provided verbal/tactile cueing for activities related to strengthening, flexibility, endurance, ROM for improvements in  [] LE / Lumbar: LE, proximal hip, and core control with self care, mobility, lifting, ambulation.   [] UE / Cervical: cervical, postural, scapular, scapulothoracic and UE control with self care, reaching, carrying, lifting, house/yardwork, driving, computer work.  [] (35688) Provided verbal/tactile cueing for activities related to improving balance, coordination, kinesthetic sense, posture, motor skill, proprioception to assist with   [] LE / lumbar: LE, proximal hip, and core control in self care, mobility, lifting, ambulation and eccentric single leg control. [] UE / cervical: cervical, scapular, scapulothoracic and UE control with self care, reaching, carrying, lifting, house/yardwork, driving, computer work.   [] (17785) Therapist is in constant attendance of 2 or more patients providing skilled therapy interventions, but not providing any significant amount of measurable one-on-one time to either patient, for improvements in  [] LE / lumbar: LE, proximal hip, and core control in self care, mobility, lifting, ambulation and eccentric single leg control. [] UE / cervical: cervical, scapular, scapulothoracic and UE control with self care, reaching, carrying, lifting, house/yardwork, driving, computer work.      NMR and Therapeutic Activities:    [] (29667 or 78063) Provided verbal/tactile cueing for activities related to improving balance, coordination, kinesthetic sense, posture, motor skill, proprioception and motor activation to allow for proper function of   [] LE: / Lumbar core, proximal hip and LE with self care and ADLs  [] UE / Cervical: cervical, postural, scapular, scapulothoracic and UE control with self care, carrying, lifting, driving, computer work.   [] (32481) Gait Re-education- Provided training and instruction to the patient for proper LE, core and proximal hip recruitment and positioning and eccentric body weight control with ambulation re-education including up and down stairs     Home Management Training / Self Care:  [x] (26174) Provided self-care/home management training related to activities of daily living and compensatory training, and/or use of and quad tone / recruitment which will allow for increased overall function including but not limited to self-care, transfers, ambulation, and ascending / descending stairs. Modalities:      Charges:  Timed Code Treatment Minutes: 60   Total Treatment Minutes: 60     [] EVAL - LOW (72422)   [] EVAL - MOD (66248)  [] EVAL - HIGH (22532)  [] RE-EVAL (36295)  [] HV(27411) x       [] Ionto  [] NMR (94917) x       [] Vaso  [x] Manual (29671) x 3     [] Ultrasound  [] TA x        [] Mech Traction (28821)  [] Aquatic Therapy x     [] ES (un) (90396):   [x] Home Management Training x 1  [] ES(attended) (00275)   [] Dry Needling 1-2 muscles (21906):  [] Dry Needling 3+ muscles (526035  [] Group:      [] Other:     GOALS:  Patient stated goal: decreasing swelling in legs, ankles, feet, and toes; wearing shoes she likes; reducing pain   []? Progressing: []? Met: []? Not Met: []? Adjusted     Therapist goals for Patient:   Short Term Goals: To be achieved in: 2 weeks  1. Independent in HEP and progression per patient tolerance, in order to prevent return of swelling   []? Progressing: []? Met: []? Not Met: []? Adjusted  2. Patient will have a decrease in swelling/pain to facilitate improvement in movement, function, and ADLs as indicated by improvement with LLIS. []? Progressing: []? Met: []? Not Met: []? Adjusted     Long Term Goals: To be achieved in: 6 weeks  1. Disability index score of 10% or less on the LLIS to assist with reaching prior level of function. []? Progressing: []? Met: []? Not Met: []? Adjusted  2. Patient will demonstrate increased AROM/strength of BLE to [facility] so that pt can resume ADLs and IADLs without increase in symptoms. []? Progressing: []? Met: []? Not Met: []? Adjusted  3. Decrease swelling of R LE by 6 cm so that pt can return to functional activities including standing for 30 min without increased symptoms or restriction. []? Progressing: []? Met: []? Not Met: []?  Adjusted    Overall Progression Towards Functional goals/ Treatment Progress Update:  [] Patient is progressing as expected towards functional goals listed. [] Progression is slowed due to complexities/Impairments listed. [] Progression has been slowed due to co-morbidities. [x] Plan just implemented, too soon to assess goals progression <30days   [] Goals require adjustment due to lack of progress  [] Patient is not progressing as expected and requires additional follow up with physician  [] Other    Persisting Functional Limitations/Impairments:  []Sleeping []Sitting               [x]Standing []Transfers        [x]Walking []Kneeling               []Stairs [x]Squatting / bending   []ADLs []Reaching  []Lifting  []Housework  []Driving [x]Job related tasks  []Sports/Recreation []Other:        ASSESSMENT:  Pt was educated on continuing self swipe method twice a day and to purchase compression socks, gloves, and bandages. Pt presented with decreased edema in BLEs at end of session after MLD and vasopump. Pt will continue to benefit from MLD, vasopump, and home program with addition of skin care, LE exercises, and compression to decrease edema and fibrosis and return to PLOF without limitations. Treatment/Activity Tolerance:  [x] Patient able to complete tx [] Patient limited by fatigue  [] Patient limited by pain  [] Patient limited by other medical complications  [] Other:     Prognosis: [x] Good [] Fair  [] Poor    Patient Requires Follow-up: [x] Yes  [] No    Plan for next treatment session:   MLD, compression, HEP, pt education, lymph pump as appropriate, exercise to stimulate lymphatic flow    PLAN: See eval. PT 2x / week for 6 weeks.    [x] Continue per plan of care [] Alter current plan (see comments)  [] Plan of care initiated [] Hold pending MD visit [] Discharge    Electronically signed by: Scarlett Acosta PT, DPT [id number]  Therapist was present, directed the patient's care, made skilled judgement, and was responsible for assessment and treatment of the patient. Gabrielle Stacy, SPT      Note: If patient does not return for scheduled/ recommended follow up visits, this note will serve as a discharge from care along with most recent update on progress.

## 2021-01-28 ENCOUNTER — OFFICE VISIT (OUTPATIENT)
Dept: PRIMARY CARE CLINIC | Age: 41
End: 2021-01-28
Payer: COMMERCIAL

## 2021-01-28 DIAGNOSIS — Z20.822 SUSPECTED COVID-19 VIRUS INFECTION: Primary | ICD-10-CM

## 2021-01-28 PROCEDURE — G8417 CALC BMI ABV UP PARAM F/U: HCPCS | Performed by: NURSE PRACTITIONER

## 2021-01-28 PROCEDURE — 99211 OFF/OP EST MAY X REQ PHY/QHP: CPT | Performed by: NURSE PRACTITIONER

## 2021-01-28 PROCEDURE — G8428 CUR MEDS NOT DOCUMENT: HCPCS | Performed by: NURSE PRACTITIONER

## 2021-01-28 NOTE — PROGRESS NOTES
Renetta Mc received a viral test for COVID-19. They were educated on isolation and quarantine as appropriate. For any symptoms, they were directed to seek care from their PCP, given contact information to establish with a doctor, directed to an urgent care or the emergency room.

## 2021-01-29 ENCOUNTER — HOSPITAL ENCOUNTER (OUTPATIENT)
Dept: PHYSICAL THERAPY | Age: 41
Setting detail: THERAPIES SERIES
Discharge: HOME | End: 2021-01-29
Payer: COMMERCIAL

## 2021-01-29 LAB — SARS-COV-2, NAA: NOT DETECTED

## 2021-01-29 NOTE — PROGRESS NOTES
[address]    Physical Therapy  Cancellation/No-show Note  Patient Name:  Oliva Rodriguez  :  1980   Date:  2021    Cancelled visits to date: 1  No-shows to date: 0    For today's appointment patient:  [x]  Cancelled -    []  Rescheduled appointment  []  No-show     Reason given by patient:  [x]  Patient ill   []  Conflicting appointment  []  No transportation    []  Conflict with work  []  No reason given  [x]  Other:     Comments:  Suspected COVID; pending test     Phone call information:   []  Phone call made today to patient at _ time at number provided:      []  Patient answered, conversation as follows:    []  Patient did not answer, message left as follows:  []  Phone call not made today  [x]  Phone call not needed - pt contacted us to cancel and provided reason for cancellation. Electronically signed by:  Caity Maxwell, PT   Therapist was present, directed the patient's care, made skilled judgement, and was responsible for assessment and treatment of the patient.        Rigo Moody, SPT

## 2021-02-01 ENCOUNTER — HOSPITAL ENCOUNTER (OUTPATIENT)
Dept: PHYSICAL THERAPY | Age: 41
Setting detail: THERAPIES SERIES
Discharge: HOME | End: 2021-02-01
Payer: COMMERCIAL

## 2021-02-01 NOTE — PROGRESS NOTES
[address] [facility]    Physical Therapy  Cancellation/No-show Note  Patient Name:  Margarte Washington  :  1980   Date:  2021    Cancelled visits to date: 2  No-shows to date: 0    For today's appointment patient:  [x]  Cancelled - ,   []  Rescheduled appointment  []  No-show     Reason given by patient:  [x]  Patient ill   []  Conflicting appointment  []  No transportation    []  Conflict with work  []  No reason given  [x]  Other:     Comments:  : Suspected COVID; pending test; : COVID test negative but pt is still not feeling well     Phone call information:   []  Phone call made today to patient at _ time at number provided:      []  Patient answered, conversation as follows:    []  Patient did not answer, message left as follows:  []  Phone call not made today  [x]  Phone call not needed - pt contacted us to cancel and provided reason for cancellation. Electronically signed by:  Gilbert Rain, PT   Therapist was present, directed the patient's care, made skilled judgement, and was responsible for assessment and treatment of the patient.        Lydia Mancilla, SPT

## 2021-02-08 ENCOUNTER — OFFICE VISIT (OUTPATIENT)
Dept: GYNECOLOGY | Age: 41
End: 2021-02-08
Payer: COMMERCIAL

## 2021-02-08 VITALS
HEART RATE: 77 BPM | SYSTOLIC BLOOD PRESSURE: 146 MMHG | WEIGHT: 293 LBS | RESPIRATION RATE: 17 BRPM | TEMPERATURE: 97.2 F | DIASTOLIC BLOOD PRESSURE: 88 MMHG | HEIGHT: 63 IN | BODY MASS INDEX: 51.91 KG/M2

## 2021-02-08 DIAGNOSIS — R10.2 VAGINAL PAIN: ICD-10-CM

## 2021-02-08 DIAGNOSIS — N81.10 VAGINAL WALL PROLAPSE: ICD-10-CM

## 2021-02-08 DIAGNOSIS — Z01.419 WELL WOMAN EXAM WITH ROUTINE GYNECOLOGICAL EXAM: Primary | ICD-10-CM

## 2021-02-08 PROCEDURE — G8484 FLU IMMUNIZE NO ADMIN: HCPCS | Performed by: OBSTETRICS & GYNECOLOGY

## 2021-02-08 PROCEDURE — 99396 PREV VISIT EST AGE 40-64: CPT | Performed by: OBSTETRICS & GYNECOLOGY

## 2021-02-08 RX ORDER — UREA 40 %
40 CREAM (GRAM) TOPICAL DAILY
COMMUNITY
Start: 2021-01-22

## 2021-02-08 ASSESSMENT — ENCOUNTER SYMPTOMS
EYES NEGATIVE: 1
RESPIRATORY NEGATIVE: 1
GASTROINTESTINAL NEGATIVE: 1

## 2021-02-09 NOTE — PROGRESS NOTES
Subjective:      Patient ID: Madeline Kearney is a 41 y.o. female. Patient is here for annual. Patient with pain in vagina. Gynecologic Exam        Review of Systems   Constitutional: Negative. HENT: Negative. Eyes: Negative. Respiratory: Negative. Cardiovascular: Negative. Gastrointestinal: Negative. Genitourinary: Positive for vaginal pain. Musculoskeletal: Negative. Skin: Negative. Neurological: Negative. Psychiatric/Behavioral: Negative.       Date of Birth 1980  Past Medical History:   Diagnosis Date    Allergic rhinitis     Anemia     Anxiety and depression     Dermatomyositis ([address]     Eczema     Fibromyalgia     Fibromyalgia     GERD (gastroesophageal reflux disease)     Headache(784.0)     History of blood transfusion 2009    also two in      HTN (hypertension)     Hx of blood clots     PE and DVT    Irritable bowel syndrome     Morbid obesity with BMI of 50.0-59.9, adult (HCC)     Osteoarthritis     PCOS (polycystic ovarian syndrome)     Prediabetes     Sleep apnea     Vaginal high risk HPV DNA test positive 2016    Wears glasses      Past Surgical History:   Procedure Laterality Date    COLONOSCOPY N/A 2021    COLONOS[facility] at  [facility]      crossed eyes    HYSTERECTOMY      full-ovaries gone    MUSCLE BIOPSY      left thigh    OVARIAN CYST REMOVAL  1998    SKIN BIOPSY      birth merritt removal left arm    VENA CAVA FILTER PLACEMENT      inserted in  and removed in      OB History    Para Term  AB Living   3 3 3     3   SAB TAB Ectopic Molar Multiple Live Births             3      # Outcome Date GA Lbr Maco/2nd Weight Sex Delivery Anes PTL Lv   3 Term 12    M Vag-Spont   JUSTIN   2 Term 2003    F Vag-Spont   JUSTIN   1 Term 10/04/00    Reese Fernandez Vag-Spont   JUSTIN     Social History     Socioeconomic History    Marital status: Single Spouse name: Not on file    Number of children: 3    Years of education: Not on file    Highest education level: Not on file   Occupational History    Occupation: unemployed   Social Needs    Financial resource strain: Patient refused    Food insecurity     Worry: Never true     Inability: Never true    Transportation needs     Medical: No     Non-medical: No   Tobacco Use    Smoking status: Never Smoker    Smokeless tobacco: Never Used   Substance and Sexual Activity    Alcohol use:  Yes     Alcohol/week: 0.0 standard drinks     Frequency: Monthly or less     Comment: occasionally    Drug use: Never    Sexual activity: Not Currently     Partners: Male   Lifestyle    Physical activity     Days per week: Not on file     Minutes per session: Not on file    Stress: Not on file   Relationships    Social connections     Talks on phone: Not on file     Gets together: Not on file     Attends Restoration service: Not on file     Active member of club or organization: Not on file     Attends meetings of clubs or organizations: Not on file     Relationship status: Not on file    Intimate partner violence     Fear of current or ex partner: No     Emotionally abused: No     Physically abused: No     Forced sexual activity: No   Other Topics Concern    Not on file   Social History Narrative    Lives with 2 children     Allergies   Allergen Reactions    Latex Itching and Rash    Corn-Containing Products Shortness Of Breath, Itching and Swelling    Imitrex [Sumatriptan] Other (See Comments)     Chest pain    Other Anaphylaxis     enviromental  allegies    Corn, wheat, soy bean, peanuts and walnuts    Peanut-Containing Drug Products Shortness Of Breath    Soy Allergy Shortness Of Breath and Swelling    Tree Nut [Macadamia Nut Oil] Shortness Of Breath and Swelling    Wheat Bran Hives, Shortness Of Breath, Itching and Swelling    Mixed Grasses Itching and Other (See Comments) Molds--over 51 different environmental allergies  Sinus issues/redden eyes    Tramadol Other (See Comments)     Made her feel really bad, and head dizzy and chest pain     Outpatient Medications Marked as Taking for the 2/8/21 encounter (Office Visit) with Claudia Alvarez MD   Medication Sig Dispense Refill    triamcinolone (KENALOG) 0.1 % ointment Apply 0.1 g topically 2 times daily      Urea (CARMOL) 40 % cream Apply 40 oz topically daily      famotidine (PEPCID) 40 MG tablet Take 40 mg by mouth daily      polyethylene glycol (MIRALAX) 17 GM/SCOOP POWD powder Take 17 g by mouth daily 3 Bottle 1    levocetirizine (XYZAL) 5 MG tablet Take one tablet by mouth daily 90 tablet 1    EPINEPHrine (EPIPEN) 0.3 MG/0.3ML SOAJ injection Inject 1 pen into the thigh for emergency treatment of allergic reactions, may repeat for severe persistent reactions.       lansoprazole (PREVACID) 30 MG delayed release capsule Take 1 capsule by mouth daily 90 capsule 1    amLODIPine (NORVASC) 10 MG tablet TAKE 1 TABLET BY MOUTH EVERY DAY 90 tablet 1    chlorthalidone (HYGROTON) 25 MG tablet Take 0.5 tablets by mouth daily 90 tablet 1    fluticasone (FLONASE) 50 MCG/ACT nasal spray 2 sprays by Nasal route daily 3 Bottle 1     Family History   Problem Relation Age of Onset    High Blood Pressure Mother     Other Father         gout    Cancer Father         prostate    Breast Cancer Maternal Aunt 47    Asthma Maternal Cousin     Rheum Arthritis Neg Hx     Osteoarthritis Neg Hx     Diabetes Neg Hx     Heart Failure Neg Hx     High Cholesterol Neg Hx     Hypertension Neg Hx     Migraines Neg Hx     Ovarian Cancer Neg Hx     Rashes/Skin Problems Neg Hx     Seizures Neg Hx     Stroke Neg Hx     Thyroid Disease Neg Hx BP (!) 146/88 (Site: Right Upper Arm, Position: Sitting, Cuff Size: Large Adult)   Pulse 77   Temp 97.2 °F (36.2 °C)   Resp 17   Ht 5' 3\" (1.6 m)   Wt (!) 321 lb (145.6 kg)   LMP 05/26/2014   BMI 56.86 kg/m²       Objective:   Physical Exam  Constitutional:       General: She is not in acute distress. Appearance: Normal appearance. She is well-developed and normal weight. She is not diaphoretic. HENT:      Head: Normocephalic. Nose: Nose normal.      Mouth/Throat:      Mouth: Mucous membranes are moist.      Pharynx: Oropharynx is clear. Eyes:      Pupils: Pupils are equal, round, and reactive to light. Neck:      Musculoskeletal: Normal range of motion and neck supple. No neck rigidity. Thyroid: No thyromegaly. Cardiovascular:      Rate and Rhythm: Normal rate and regular rhythm. Heart sounds: Normal heart sounds. No murmur. No friction rub. No gallop. Pulmonary:      Effort: Pulmonary effort is normal. No respiratory distress. Breath sounds: Normal breath sounds. No stridor. No wheezing, rhonchi or rales. Chest:      Chest wall: No tenderness. Breasts:         Right: Normal. No swelling, bleeding, inverted nipple, mass, nipple discharge, skin change or tenderness. Left: Normal. No swelling, bleeding, inverted nipple, mass, nipple discharge, skin change or tenderness. Abdominal:      General: Abdomen is flat. There is no distension. Palpations: Abdomen is soft. There is no hepatomegaly or mass. Tenderness: There is no abdominal tenderness. There is no guarding or rebound. Hernia: No hernia is present. There is no hernia in the left inguinal area. Genitourinary:     Exam position: Lithotomy position. Pubic Area: No rash. Labia:         Right: No rash, tenderness, lesion or injury. Left: No rash, tenderness, lesion or injury. Urethra: No prolapse, urethral pain, urethral swelling or urethral lesion. Vagina: No signs of injury and foreign body. Tenderness present. No vaginal discharge, erythema, bleeding, lesions or prolapsed vaginal walls. Adnexa: Right adnexa normal and left adnexa normal.        Right: No mass, tenderness or fullness. Left: No mass, tenderness or fullness. Rectum: No anal fissure or external hemorrhoid. Comments: Normal urethral meatus, nl urethra, mild cystocele    Tenderness at top of vagina  Musculoskeletal: Normal range of motion. General: No tenderness. Lymphadenopathy:      Cervical: No cervical adenopathy. Skin:     General: Skin is warm and dry. Neurological:      General: No focal deficit present. Mental Status: She is alert and oriented to person, place, and time. Mental status is at baseline. Deep Tendon Reflexes: Reflexes are normal and symmetric. Psychiatric:         Mood and Affect: Mood normal.         Behavior: Behavior normal.         Thought Content: Thought content normal.         Judgment: Judgment normal.         Assessment:      1. Annual  2. Vaginal pain  3. Vaginal prolapse      Plan:      1. Pap, calcium, exercise, mammogram  2-3 Top of vagina-feel could see urogyn for pain? candidaet for injections.  Could see about cystocele as well        Reji Hager MD

## 2021-02-12 ENCOUNTER — OFFICE VISIT (OUTPATIENT)
Dept: ORTHOPEDIC SURGERY | Age: 41
End: 2021-02-12
Payer: COMMERCIAL

## 2021-02-12 VITALS — WEIGHT: 293 LBS | HEIGHT: 63 IN | TEMPERATURE: 97.4 F | BODY MASS INDEX: 51.91 KG/M2

## 2021-02-12 DIAGNOSIS — R20.0 BILATERAL HAND NUMBNESS: Primary | ICD-10-CM

## 2021-02-12 PROCEDURE — G8417 CALC BMI ABV UP PARAM F/U: HCPCS | Performed by: PHYSICIAN ASSISTANT

## 2021-02-12 PROCEDURE — G8427 DOCREV CUR MEDS BY ELIG CLIN: HCPCS | Performed by: PHYSICIAN ASSISTANT

## 2021-02-12 PROCEDURE — 99213 OFFICE O/P EST LOW 20 MIN: CPT | Performed by: PHYSICIAN ASSISTANT

## 2021-02-12 PROCEDURE — G8484 FLU IMMUNIZE NO ADMIN: HCPCS | Performed by: PHYSICIAN ASSISTANT

## 2021-02-12 PROCEDURE — 1036F TOBACCO NON-USER: CPT | Performed by: PHYSICIAN ASSISTANT

## 2021-02-12 NOTE — LETTER
CONSENT TO OPERATION  AND/OR OTHER PROCEDURE(S)          PATIENT : Gordon Price   YOB: 1980      DATE : 2/12/21          1. I request and consent that Dr. Valiant Schilder. Steve Aguiar,  and/or his associates or assistants perform an operation and/or procedures on the above patient at  [place] [address], to treat the condition(s) which appear indicated by the diagnostic studies already performed. I have been told that in general terms the nature, purpose and reasonable expectations of the operation and/or procedure(s) are:          Right Carpal Tunnel Release         followed 3 weeks later by      Left  Ulnar Nerve Decompression  (at Elbow) &  left Carpal Tunnel Release      2. It has been explained to me by the informing physician that during the course of the operation and/or procedure(s) unforeseen conditions may be revealed that necessitate an extension of the original operation and/or procedure(s) or different operation and/or procedures than those set forth in Paragraph 1. I therefore authorize and request that my physician and/or his associates or assistants perform such operations and/or procedures as are necessary and desirable in the exercise of professional judgment. The authority granted under this Paragraph 2 shall extend to all conditions that require treatment and are known to my physician at the time the operation is commenced. 3. I have been made aware by the informing physician of certain risks and consequences that are associated with the operation and/or procedure(s) described in Paragraph 1, the reasonable alternative methods or treatment, the possible consequences, the possibility that the operation and/or procedure(s) may be unsuccessful and the possibility of complications. I understand the reasonably known risks to be:      ? Bleeding  ? Infection  ? Poor Healing  ?  Possible Damage to Nerve, Vessel, Tendon/Muscle or Bone ? Need for further Treatment/Surgery  ? Stiffness  ? Pain  ? Residual or Recurrent Symptoms  ? Anesthetic and/or Medical Risks  ? We have discussed the specific limitations and risks of hospital and/or office based treatment at this time due to the COVID-19 pandemic                I have been counseled about the risks of kaley Covid-19 in the pj-operative and post-operative periods related to this procedure. I have been made aware that kaley Covid-19 around the time of a surgical procedure may worsen my prognosis for recovering from the virus and lend to a higher morbidity and or mortality risk. With this knowledge, I have requested to proceed with the procedure as scheduled. 4. I have also been informed by the informing physician that there are other risks from both known and unknown causes that are attendant to the performance of any surgical procedure. I am aware that the practice of medicine and surgery is not an exact science, and that no guarantees have been made to me concerning the results of the operation and/or procedure(s). 5. I   CONSENT / REFUSE CONSENT  (strike the phrase that does not apply) to the taking of photographs before, during and/or after the operation or procedure for scientific/educational purposes. 6. I consent to the administration of anesthesia and to the use of such anesthetics as may be deemed advisable by the anesthesiologist who has been engaged by me or my physician. 7. I certify that I have read and understand the above consent to operation and/or other procedure(s); that the explanations therein referred to were made to me by the informing physician in advance of my signing this consent; that all blanks or statements requiring insertion or completion were filled in and inapplicable paragraphs, if any, were stricken before I signed; and that all questions asked by me about the operation and/or procedure(s) which I have consented to have been fully answered in a satisfactory manner.                                 _______________________           2/12/21                              Witness     Signature Of Patient         Date        Carlotta Goltz B. Willis                                                 Informing Physician                                           Signature of Informing Physician                              If patient is unable to sign or is a minor, complete one of the following:    (A)  Patient is a minor   years of age. (B)  Patient is unable to sign because: The undersigned represents that he or she is duly authorized to execute this consent for and on behalf of the above named patient.                Witness               o  Parent  o  Guardian   o  Spouse       o  Other (specify)                                                          Patient Name: Saurav Hamilton  Patient YOB: 1980  Dr. Mekhi Harris' Return To Work Policy Regarding your ability to return to work after surgery or injury, Dr. Gilda Leach will not state that any patient is off of work or cannot work at all. He will place you on restrictions after your surgical procedure or injury. This means that you will be allowed to return to work the day after your office visit or surgery with restrictions. Depending on the details of your particular situation, Dr. Gilda Leach may state that you will have either light use or no use of your hand for a specific number of weeks. It is your obligation to communicate with your employer regarding your restrictions. It is your employer's decision as to whether they will accommodate your restrictions (i.e. allow you to come to work in your restricted capacity) or to not allow you to return to work under your restrictions. Dr. Gilda Leach does not participate in making this decision and cannot influence your employer regarding their decision. If you do not communicate your restrictions to your employer, or if you do not present to work as you are scheduled to, Dr. Gilda Leach will not provide an 'excuse' to explain your absence. A doctors note, or official forms (BWC, FMLA, etc.) will be filled out, upon request, to indicate your date of surgery and your restrictions as stated above. Dr. Gilda Leach' Narcotic Policy  Patients will only be prescribed narcotics after surgical procedures or significant injury. Not all procedures cause pain great enough to require Narcotics and thus, not all patients will receive prescriptions after surgical procedures or injuries. Narcotics are never prescribed for chronic conditions. Narcotics are never prescribed for use longer than one week at a time. Refills are only granted in unusual circumstances and only at Dr. Alanna Caldwell discretion. Patients who are receiving narcotic medication from another physician or who are under pain management contracts will not be given a prescription for narcotics for any reason. I have read the above policies and understand that by agreeing to proceed with treatment by Dr. Nerissa Dean and his team, that I am agreeing to abide by these policies.   Patient Name:  Amira Velez    Patient Signature:  _____________________________    Cristian Byrd Date:   2/12/21

## 2021-02-12 NOTE — LETTER
[address] [facility] - HAND SURGERY  Surgery Scheduling Form:      DEMOGRAPHICS:                                                                                                              .    Patient Name:  Gordon Price  Patient :  1980   Patient SS#:  [id number]    Patient Phone:  [phone] (home)      Patient Address:  [address] [place] [address] [address] [address]    PCP:  ZELDA Mccord CNP  Insurance:   Payor/Plan Subscr  Sex Relation Sub. Ins. ID Effective Group Num   1. [employer] - * CAMILLE SHULTZ 1980 Female Self [id number] [address] [facility] [address]       DIAGNOSIS & PROCEDURE:                                                                                            .  Diagnosis:  LEFT Carpal Tunnel Syndrome ,  left cubital tunnel syndrome     G56.01  Operation: left Carpal Tunnel Release and left ulnar nerve decompression   [id number]  / [id number]   Location:  [facility]  Surgeon:  Berto Spence    SCHEDULING INFORMATION:                                                                                         .    Surgeon's Scheduling Instruction:  elective    RN Post-op Appt:  [x] Yes   [] No  Preferred Thursday:   [] Yes   [x] No    Requested Date:    OR Time:   Patient Arrival Time:      OR Time Required:  20  Minutes  Anesthesia:  General  Equipment:  None                                   COVID:    RAPID  Mini C-Arm:  No   Standard C-Arm:  No  Status:  outpatient  PAT Required:  Yes  Comments:                      Valiant Schilder. Steve Aguiar MD  21 3:30 PM    BILLING INFORMATION:                                                                                                    .    Procedure:       CPT Code Modifier  Left Carpal Tunnel Release         .          Pre Operative Physician Prophylaxis Orders - SCIP Protocols      Pre-Operative Antibiotic Order:    Allergies   Allergen Reactions    Latex Itching and Rash    Corn-Containing Products Shortness Of Breath, Itching and Swelling    Imitrex [Sumatriptan] Other (See Comments)     Chest pain    Other Anaphylaxis     enviromental  allegies    Corn, wheat, soy bean, peanuts and walnuts    Peanut-Containing Drug Products Shortness Of Breath    Soy Allergy Shortness Of Breath and Swelling    Tree Nut [Macadamia Nut Oil] Shortness Of Breath and Swelling    Wheat Bran Hives, Shortness Of Breath, Itching and Swelling    Mixed Grasses Itching and Other (See Comments)     Molds--over 51 different environmental allergies  Sinus issues/redden eyes    Tramadol Other (See Comments)     Made her feel really bad, and head dizzy and chest pain          [x]  ----  No Antibiotic Ordered       []  ----  Give the following Antibiotic within 1 hour prior to start time:         Ancef 1 gram IV if patient is less than 200 pounds    or       Ancef 2 grams IV if patient is greater than 200 pounds    or      Vancomycin 1 gram IV (over 1 hour) if patient is allergic to           PENICILLINS or CEFALOSPORINS        SURG:                                 COVID:   RAPID               H&P  PCP__XX__    Procedure:left  Carpal Tunnel Release      Patient: Jcarlos Villasenor  :    1980    Physician Signature:     Date: 21  Time: 3:30 PM

## 2021-02-12 NOTE — PROGRESS NOTES
Ms. Jennifer Epstein returns today in follow-up of her previously treated  bilateral Carpal Tunnel Syndrome. She was last seen by me in December, 2020 at which time she was treated with electrodiagnostic studies were ordered. She experienced no relief of her initial symptoms. She  has noticed symptom persistence over the last several months. She returns today with worsened symptoms of bilateral numbness in the Median Innervated digits and tingling in the Median Innervated digits, requesting further treatment. I have today reviewed with Jennifer Epstein the clinically relevant, past medical history, medications, allergies,  family history, social history, and Review Of Systems & I have documented any details relevant to today's presenting complaints in my history above. Ms. Britney Ribeiro self-reported past medical history, medications, allergies,  family history, social history, and Review Of Systems have been scanned into the chart under the \"Media\" tab. Physical Exam:  Vitals  Temp: 97.4 °F (36.3 °C)  Temp Source: Infrared  Height: 5' 3\" (160 cm)  Weight: (!) 321 lb (145.6 kg)  Ms. Jennifer Epstein appears well, she is in no apparent distress, she demonstrates appropriate mood & affect. Skin: Normal in appearance, Normal Color and Free of Lesions Bilaterally   Digital range of motion is Full and equal bilaterally bilaterally  Wrist range of motion is Full and equal bilaterally bilaterally  There is no evidence of gross joint instability bilaterally. Sensation is subjectively tingling in the Median Innervated Digits and Ulnar Innervated Digits on the Left, greater than Right and objectively present in the same distribution bilaterally. Vascular examination reveals normal, good capillary refill and good color bilaterally. Swelling is absent in the Volar both wrists. Muscular strength is clinically appropriate bilaterally. Examination for Carpal Tunnel Syndrome shows Carpal Tunnel Compression Test to be Mildly Positive on the right & Mildly Positive on the left. The patient displays mild baseline symptoms to potentially confound the exam.  The thenar musculature is not atrophied & weakened. Examination for Cubital Tunnel Syndrome shows mild tenderness to palpation at the Medial epicondyle. The Ulnar Nerve rests behind the medial epicondyle without subluxation upon elbow flexion. Elbow flexion-compression test is Negative, and there is an active Tinnel's Sign over the Cubital Tunnel. The Ulnar Nerve innervated intrinsic musculature is not atrophied & weakened. Examination for Stenosing Tenosynovitis demonstrates no evidence of tenderness, thickening or nodularity at the A-1 pulleys of the digits bilaterally. There no palpable triggering or any finger or thumb. Review of Electrodiagnostic Testing:  Test performed on: 12/24/2020    NERVE CONDUCTION STUDY:  RIGHT   Median Nerve: Sensory Latency: 4.8  Motor Latency: 4.3  Ulnar Nerve:  Conduction Velocity:  54    LEFT  Median Nerve: Sensory Latency: 4.4  Motor Latency: 4.4  Ulnar Nerve:  Conduction Velocity:  50    EMG:  RIGHT  Normal    LEFT  Normal        Impression:  Ms. Harriett Pabon is showing evidence of persistent Carpal Tunnel Syndrome and cubital tunnel syndrome after previous treatment. She requests additional treatment at this time.     Plan: I have had a thorough discussion with Ms. Zaida Byrne regarding the treatment options available for her unchanged carpal tunnel syndrome, which is causing her significant  limitations. I have outlined for Ms. Zaida Byrne the benefits and consequences of the various treatment modalities, including the fact that surgical treatment is the only modality which is reasonably expected to provide long lasting or permanent resolution of her symptoms. Based upon our current discussion and a reasonable understating of the options available to her, Ms. Zaida Byrne has selected to proceed with surgical Carpal Tunnel Release. I have discussed the details of the surgical procedure, the pre, pj and postoperative concerns and the appropriate expectations after surgery with Ms. Zaida Byrne today. She was given the opportunity to ask questions, voiced an understanding of the procedure, and she did wish to proceed with Staged Bilateral Carpal Tunnel Release. I had an extensive discussion with Ms. Zaida Byrne (and any family members present with her today) regarding the natural history, etiology, and long term consequences of this problem. We have mutually selected a surgical treatment plan  and, in my opinion, surgical intervention is indicated at this time. I have discussed with her the potential complications, limitations, expectations, alternatives, and risks of Carpal Tunnel Release. She has had full opportunity to ask her questions. I have answered them all to her satisfaction. I feel that Ms. Zaida Byrne (and any family members present with her today) do understand our discussion today and she has provided informed consent for Staged Bilateral Carpal Tunnel Release. I have had a thorough discussion with Ms. Gordon Price regarding the treatment options available for her unchanged cubital tunnel syndrome, which is causing her significant  limitations. I have outlined for Ms. Gordon Price the benefits and consequences of the various treatment modalities, including the fact that surgical treatment is the only modality which is reasonably expected to provide long lasting or permanent resolution of her symptoms. Based upon our current discussion and a reasonable understating of the options available to her, Ms. Gordon Price has selected to proceed with surgical Ulnar Nerve decompression at the Cubital Tunnel. I have discussed the details of the surgical procedure, the pre, pj and postoperative concerns and the appropriate expectations after surgery with Ms. Gordon Price today. She was given the opportunity to ask questions, voiced an understanding of the procedure, and she did wish to proceed with Left Ulnar Nerve decompression at the Cubital Tunnel. I had an extensive discussion with Ms. Gordon Price (and any family members present with her today) regarding the natural history, etiology, and long term consequences of this problem. We have mutually selected a surgical treatment plan  and, in my opinion, surgical intervention is indicated at this time. I have discussed with her the potential complications, limitations, expectations, alternatives, and risks of Ulnar Nerve decompression at the Cubital Tunnel. She has had full opportunity to ask her questions. I have answered them all to her satisfaction. I feel that Ms. Gordon Price (and any family members present with her today) do understand our discussion today and she has provided informed consent for Left Ulnar Nerve decompression at the Cubital Tunnel. I have also discussed with Ms. Victoriano Rollins the other treatment options available to her for this condition. We have today selected to proceed with Surgical treatment. She has voiced and  understanding that there are other less aggressive treatment options which are available in this situation, albeit possibly less efficacious or durable, and she is comfortable with the plan that she has chosen. Ms. Victoriano Rollins has been given a full verbal list of instructions and precautions related to her present condition. I have asked her to followup with me in the office at the prescribed time. She is also specifically requested to call or return to the office sooner if her symptoms change or worsen prior to the next scheduled appointment.

## 2021-02-12 NOTE — LETTER
[address] [facility] - HAND SURGERY  Surgery Scheduling Form:      DEMOGRAPHICS:                                                                                                              .    Patient Name:  Sandy Lemus  Patient :  1980   Patient SS#:  [id number]    Patient Phone: [phone]     Patient Address:  [address] [place] [address] [address] [address]    PCP:  ZELDA Haskins CNP  Insurance:   Payor/Plan Subscr  Sex Relation Sub. Ins. ID Effective Group Num   1. [employer] - * CAMILLE SHULTZ 1980 Female Self [id number] [facility] [address]     DIAGNOSIS & PROCEDURE:                                                                                            .  Diagnosis:  Right  Carpal Tunnel Syndrome   G56.01  Operation:  Right Carpal Tunnel Release [id number]     Location:  [facility]  Surgeon:  Dmitry Natarajan    SCHEDULING INFORMATION:                                                                                         .    Surgeon's Scheduling Instruction:  elective    RN Post-op Appt:  [x] Yes   [] No  Preferred Thursday:   [] Yes   [x] No    Requested Date:   C[facility] OR Time:   Patient Arrival Time:      OR Time Required:  15  Minutes                     NEEDS SA  Anesthesia: MAC/ TIVA  Equipment:  None                                   COVID:    4-30  Mini C-Arm:  No   Standard C-Arm:  No  Status:  outpatient  PAT Required:  Yes  Comments:                      Beata Taylor. Tato Dos Santos MD  21 3:30 PM    BILLING INFORMATION:                                                                                                    .    Procedure:       CPT Code Modifier  Right Carpal Tunnel Release         .          Pre Operative Physician Prophylaxis Orders - SCIP Protocols      Pre-Operative Antibiotic Order:    Allergies   Allergen Reactions    Latex Itching and Rash    Corn-Containing Products Shortness Of Breath, Itching and Swelling    Imitrex [Sumatriptan] Other (See Comments)     Chest pain    Other Anaphylaxis     enviromental  allegies    Corn, wheat, soy bean, peanuts and walnuts    Peanut-Containing Drug Products Shortness Of Breath    Soy Allergy Shortness Of Breath and Swelling    Tree Nut [Macadamia Nut Oil] Shortness Of Breath and Swelling    Wheat Bran Hives, Shortness Of Breath, Itching and Swelling    Mixed Grasses Itching and Other (See Comments)     Molds--over 51 different environmental allergies  Sinus issues/redden eyes    Tramadol Other (See Comments)     Made her feel really bad, and head dizzy and chest pain          [x]  ----  No Antibiotic Ordered       []  ----  Give the following Antibiotic within 1 hour prior to start time:         Ancef 1 gram IV if patient is less than 200 pounds    or       Ancef 2 grams IV if patient is greater than 200 pounds    or      Vancomycin 1 gram IV (over 1 hour) if patient is allergic to           PENICILLINS or CEFALOSPORINS        SUR-6                              COVID:   4-30               H&P  PCP__XX__    Procedure:Right Carpal Tunnel Release      Patient: Elidia Gustafson  :    1980    Physician Signature:     Date: 21  Time: 3:30 PM

## 2021-02-12 NOTE — PATIENT INSTRUCTIONS
Pre-Operative Instructions    1. The night before your surgery, unless otherwise instructed, do not eat any food, drink any liquids, chew gum or mints after midnight. Abstain from alcohol for 24 hours prior to surgery. 2. You will be contacted by the Hospital the working day prior to your procedure to confirm your arrival time. 3. Patients under 25years of age must have a parent or legal guardian present to sign their consent and discharge paperwork. 4. On the day of surgery,  you will be seen pre-operatively by an anesthesiologist.     5. If you are having hand surgery, it is recommended that nail polish and acrylic nails be removed prior to surgery if possible. 6. Please bring cases for glasses, contact lenses, hearing aids or dentures. They will likely be removed prior to surgery. 7. Wear casual, loose-fitting and comfortable clothing. Consider that you may have a large dressing to fit under your clothing after surgery. 9. Please do not bring valuables such as jewelry or large sums of cash to the hospital. Remove all body piercings before coming to the hospital. Yaima Bundy may not  wear any rings on the hand if you are having surgery on that hand, wrist or elbow. 10. Do not smoke or chew tobacco before your surgery. [address] [place] [address] and surgery facilities are smoke-free environments. Smoking is not permitted anywhere on campus. 11. Be sure to follow any additional instructions from your physician. If the above conditions are not met, your surgery may be cancelled and rescheduled for another day. Should you develop any change in your health such as fever, cough, sore throat, cold, flu, or infection, or if you have any questions regarding your Pre-admission or surgery, please contact [address] - Surgery Scheduling at [phone], Monday through Friday, 9 a.m. to 5 p.m.

## 2021-03-05 NOTE — PROGRESS NOTES
[address] [facility]  [place] [address]  Dept: [phone]       3/11/2021     Maverick Roman (:  )QV a 41 y.o. female, here for evaluation of the following medical concerns:    HPI  Rash   This is a chronic problem. The current episode started more than 1 year ago. The affected locations include the left hand and right hand. The rash is characterized by dryness and itchiness. She was exposed to nothing. Pertinent negatives include no congestion, diarrhea, fever,   shortness of breath or vomiting. Past treatments include topical steroids [place]). Has been evaluated by Dermatology and has started on 2 new creams for treatment.      Birth [place]  She reports the birth merritt on her left forearm has changed in size. States the birth merritt has always been black with hair. Most recently the edges became swollen and the entire merritt became raised. Reports pain and itching at site. Had birth merritt removed on , no complications     Hypertension  Denies chest pain, shortness of breath, dizziness, or headaches. States she did not take Amlodipine today. Accidentially missed medication. States the chlorthalidone causes cramping and therefore has not been taking regularly. She reports an intermittent left-sided pain,  \" like someone is punching in that area. \" Reports pain last for less than 1 minute. Denies rash, swelling, redness. No other associated symptoms. Reports she was inured while at work, and is requesting a letter noting that she is not being treated for Migraines. She later reported to the MA that she is requesting a letter to excuse her from work due to her history of CHF and the pandemic. She established care in 2020, was seen in February and 2020, did not mention that she was not working related to the pandemic.     She reports going to therapy for treatment of lymphedema but had to stop due to loss of job.  She is planning to resume once financially able. States she could not afford supplies required to treat condition. Lab Results   Component Value Date    CHOL 242 (H) 06/02/2016    CHOL 300 (H) 01/30/2015    CHOL 199 05/29/2014     Lab Results   Component Value Date    TRIG 129 06/02/2016    TRIG 245 (H) 01/30/2015    TRIG 204 (H) 05/29/2014     Lab Results   Component Value Date    HDL 44 09/18/2018    HDL 39 (L) 06/02/2016    HDL 41 01/30/2015     Lab Results   Component Value Date    LDLCALC 215 (H) 09/18/2018    LDLCALC 177 (H) 06/02/2016    LDLCALC 210 (H) 01/30/2015     Lab Results   Component Value Date    LABVLDL 26 09/18/2018    LABVLDL 26 06/02/2016    LABVLDL 49 01/30/2015     Lab Results   Component Value Date    WBC 8.0 02/05/2019    HGB 12.6 02/05/2019    HCT 38.6 02/05/2019    MCV 85.8 02/05/2019     02/05/2019     Lab Results   Component Value Date     02/05/2019    K 4.7 02/05/2019     02/05/2019    CO2 25 02/05/2019    BUN 12 02/05/2019    CREATININE 0.6 02/05/2019    GLUCOSE 92 02/05/2019    CALCIUM 9.4 02/05/2019    PROT 8.2 02/05/2019    LABALBU 4.1 02/05/2019    BILITOT <0.2 02/05/2019    ALKPHOS 94 02/05/2019    AST 21 02/05/2019    ALT 24 02/05/2019    LABGLOM >60 02/05/2019    GFRAA >60 02/05/2019    AGRATIO 1.0 (L) 02/05/2019    GLOB 4.1 02/05/2019       Review of Systems   Constitutional: Negative for activity change and fever. HENT: Negative for congestion. Eyes: Negative for visual disturbance. Respiratory: Negative for chest tightness and shortness of breath. Cardiovascular: Negative for chest pain, palpitations and leg swelling. Gastrointestinal: Negative for abdominal pain, constipation and diarrhea. Endocrine: Negative for polyuria. Genitourinary: Negative for dysuria. Musculoskeletal: Negative for arthralgias and myalgias. Intermittent sharp left rib pain   Skin: Negative for rash.    Neurological: Negative for dizziness, syncope, weakness, light-headedness and headaches. Psychiatric/Behavioral: Negative for agitation, decreased concentration and sleep disturbance. The patient is not nervous/anxious. Prior to Visit Medications    Medication Sig Taking? Authorizing Provider   fluticasone (FLONASE) 50 MCG/ACT nasal spray 2 sprays by Nasal route daily Yes ZELDA Haskins CNP   amLODIPine (NORVASC) 10 MG tablet TAKE 1 TABLET BY MOUTH EVERY DAY Yes ZELDA Haskins CNP   lansoprazole (PREVACID) 30 MG delayed release capsule Take 1 capsule by mouth daily Yes ZELDA Haskins CNP   DULoxetine (CYMBALTA) 60 MG extended release capsule TAKE ONE CAPSULE BY MOUTH DAILY Yes ZELDA Haskins CNP   EPINEPHrine (EPIPEN) 0.3 MG/0.3ML SOAJ injection Inject 1 pen into the thigh for emergency treatment of allergic reactions, may repeat for severe persistent reactions. Yes ZELDA Haskins CNP   valsartan (DIOVAN) 80 MG tablet Take 1 tablet by mouth daily Yes ZELDA Haskins CNP   triamcinolone (KENALOG) 0.1 % ointment Apply 0.1 g topically 2 times daily Yes Historical Provider, MD   Urea (CARMOL) 40 % cream Apply 40 oz topically daily Yes Historical Provider, MD   famotidine (PEPCID) 40 MG tablet Take 40 mg by mouth daily Yes Historical Provider, MD   polyethylene glycol (MIRALAX) 17 GM/SCOOP POWD powder Take 17 g by mouth daily Yes ZELDA Haskins CNP   levocetirizine (XYZAL) 5 MG tablet Take one tablet by mouth daily Yes ZELDA Haskins CNP        Social History     Tobacco Use    Smoking status: Never Smoker    Smokeless tobacco: Never Used   Substance Use Topics    Alcohol use:  Yes     Alcohol/week: 0.0 standard drinks     Frequency: Monthly or less     Comment: occasionally        Vitals:    03/11/21 1431   BP: (!) 150/100   Pulse: 85   Temp: 96.9 °F (36.1 °C)   TempSrc: Temporal   SpO2: 96%   Weight: (!) 324 lb 9.6 oz (147.2 kg)     Estimated body mass index is 57.5 kg/m² as calculated from the following:    Height as of 2/12/21: 5' 3\" (1.6 m). Weight as of this encounter: 324 lb 9.6 oz (147.2 kg). Physical Exam  Vitals signs reviewed. Constitutional:       Appearance: She is well-developed. HENT:      Head: Normocephalic. Right Ear: Hearing and external ear normal.      Left Ear: Hearing and external ear normal.      Nose: Nose normal.   Eyes:      General: Lids are normal.   Cardiovascular:      Rate and Rhythm: Normal rate and regular rhythm. Heart sounds: Normal heart sounds, S1 normal and S2 normal.   Pulmonary:      Effort: Pulmonary effort is normal.      Breath sounds: Normal breath sounds. Musculoskeletal: Normal range of motion. Arms:       Right lower leg: Edema present. Left lower leg: Edema present. Skin:     General: Skin is warm and dry. Findings: No rash. Neurological:      Mental Status: She is alert and oriented to person, place, and time. GCS: GCS eye subscore is 4. GCS verbal subscore is 5. GCS motor subscore is 6. Psychiatric:         Speech: Speech normal.         Behavior: Behavior normal. Behavior is cooperative. ASSESSMENT/PLAN:  1. Essential hypertension  -Uncontrolled  -Stop taking Chlorthalidone  -Start taking Valsartan 80 mg daily  -Continue taking Norvasc 10 mg daily  - amLODIPine (NORVASC) 10 MG tablet; TAKE 1 TABLET BY MOUTH EVERY DAY  Dispense: 90 tablet; Refill: 1  -Make a Nurse Visit appt. For BP check in 1 week    2. Epigastric pain  -Continue taking Prevacid 30 mg daily  - lansoprazole (PREVACID) 30 MG delayed release capsule; Take 1 capsule by mouth daily  Dispense: 90 capsule; Refill: 1    3. Gastroesophageal reflux disease, unspecified whether esophagitis present  -Continue current medications. 4. Screening for thyroid disorder    - TSH with Reflex; Future  - T4, Free; Future    5. Screening for lipid disorders    - Lipid Panel; Future    6.  Screening for deficiency anemia    - CBC Auto

## 2021-03-11 ENCOUNTER — OFFICE VISIT (OUTPATIENT)
Dept: PRIMARY CARE CLINIC | Age: 41
End: 2021-03-11
Payer: COMMERCIAL

## 2021-03-11 VITALS
SYSTOLIC BLOOD PRESSURE: 150 MMHG | HEART RATE: 85 BPM | DIASTOLIC BLOOD PRESSURE: 100 MMHG | TEMPERATURE: 96.9 F | BODY MASS INDEX: 57.5 KG/M2 | OXYGEN SATURATION: 96 % | WEIGHT: 293 LBS

## 2021-03-11 DIAGNOSIS — Z13.220 SCREENING FOR LIPID DISORDERS: ICD-10-CM

## 2021-03-11 DIAGNOSIS — E28.2 PCOS (POLYCYSTIC OVARIAN SYNDROME): Chronic | ICD-10-CM

## 2021-03-11 DIAGNOSIS — I10 ESSENTIAL HYPERTENSION: Primary | Chronic | ICD-10-CM

## 2021-03-11 DIAGNOSIS — R10.13 EPIGASTRIC PAIN: ICD-10-CM

## 2021-03-11 DIAGNOSIS — K21.9 GASTROESOPHAGEAL REFLUX DISEASE, UNSPECIFIED WHETHER ESOPHAGITIS PRESENT: Chronic | ICD-10-CM

## 2021-03-11 DIAGNOSIS — Z13.29 SCREENING FOR THYROID DISORDER: ICD-10-CM

## 2021-03-11 DIAGNOSIS — M79.10 MUSCLE PAIN: ICD-10-CM

## 2021-03-11 DIAGNOSIS — Z13.0 SCREENING FOR DEFICIENCY ANEMIA: ICD-10-CM

## 2021-03-11 PROCEDURE — G8417 CALC BMI ABV UP PARAM F/U: HCPCS | Performed by: NURSE PRACTITIONER

## 2021-03-11 PROCEDURE — 1036F TOBACCO NON-USER: CPT | Performed by: NURSE PRACTITIONER

## 2021-03-11 PROCEDURE — G8484 FLU IMMUNIZE NO ADMIN: HCPCS | Performed by: NURSE PRACTITIONER

## 2021-03-11 PROCEDURE — G8427 DOCREV CUR MEDS BY ELIG CLIN: HCPCS | Performed by: NURSE PRACTITIONER

## 2021-03-11 PROCEDURE — 99214 OFFICE O/P EST MOD 30 MIN: CPT | Performed by: NURSE PRACTITIONER

## 2021-03-11 RX ORDER — DULOXETIN HYDROCHLORIDE 60 MG/1
CAPSULE, DELAYED RELEASE ORAL
Qty: 90 CAPSULE | Refills: 1 | Status: SHIPPED | OUTPATIENT
Start: 2021-03-11 | End: 2022-02-04 | Stop reason: SDUPTHER

## 2021-03-11 RX ORDER — CHLORTHALIDONE 25 MG/1
12.5 TABLET ORAL DAILY
Qty: 90 TABLET | Refills: 1 | Status: CANCELLED | OUTPATIENT
Start: 2021-03-11

## 2021-03-11 RX ORDER — AMLODIPINE BESYLATE 10 MG/1
TABLET ORAL
Qty: 90 TABLET | Refills: 1 | Status: SHIPPED | OUTPATIENT
Start: 2021-03-11 | End: 2022-02-04 | Stop reason: SDUPTHER

## 2021-03-11 RX ORDER — FLUTICASONE PROPIONATE 50 MCG
2 SPRAY, SUSPENSION (ML) NASAL DAILY
Qty: 3 BOTTLE | Refills: 1 | Status: SHIPPED | OUTPATIENT
Start: 2021-03-11 | End: 2022-02-04 | Stop reason: SDUPTHER

## 2021-03-11 RX ORDER — EPINEPHRINE 0.3 MG/.3ML
INJECTION SUBCUTANEOUS
Qty: 1 EACH | Refills: 2 | Status: SHIPPED | OUTPATIENT
Start: 2021-03-11

## 2021-03-11 RX ORDER — VALSARTAN 80 MG/1
80 TABLET ORAL DAILY
Qty: 30 TABLET | Refills: 3 | Status: SHIPPED | OUTPATIENT
Start: 2021-03-11 | End: 2021-08-11

## 2021-03-11 RX ORDER — LANSOPRAZOLE 30 MG/1
30 CAPSULE, DELAYED RELEASE ORAL DAILY
Qty: 90 CAPSULE | Refills: 1 | Status: SHIPPED | OUTPATIENT
Start: 2021-03-11 | End: 2022-02-04 | Stop reason: SDUPTHER

## 2021-03-11 ASSESSMENT — ENCOUNTER SYMPTOMS
CHEST TIGHTNESS: 0
SHORTNESS OF BREATH: 0
DIARRHEA: 0
CONSTIPATION: 0
ABDOMINAL PAIN: 0

## 2021-03-11 ASSESSMENT — PATIENT HEALTH QUESTIONNAIRE - PHQ9
SUM OF ALL RESPONSES TO PHQ9 QUESTIONS 1 & 2: 0
SUM OF ALL RESPONSES TO PHQ QUESTIONS 1-9: 0

## 2021-03-11 NOTE — PATIENT INSTRUCTIONS
Continue taking Amlodipine daily  Stop taking chlorthalidone  Start taking Diovan (valsartan) daily  Make a Nurse Visit appt.  For BP check in 1 week

## 2021-03-12 ENCOUNTER — TELEPHONE (OUTPATIENT)
Dept: PRIMARY CARE CLINIC | Age: 41
End: 2021-03-12

## 2021-03-19 ENCOUNTER — TELEPHONE (OUTPATIENT)
Dept: PRIMARY CARE CLINIC | Age: 41
End: 2021-03-19

## 2021-03-19 NOTE — TELEPHONE ENCOUNTER
----- Message from Hilario Patricio sent at 3/19/2021 10:53 AM EDT -----  Subject: Message to Provider    QUESTIONS  Information for Provider? patient called to reschedule a LAB/MA visit for   a BP check. Please call patient to schedule.   ---------------------------------------------------------------------------  --------------  CALL BACK INFO  What is the best way for the office to contact you? OK to leave message on   voicemail  Preferred Call Back Phone Number? [phone]  ---------------------------------------------------------------------------  --------------  SCRIPT ANSWERS  Relationship to Patient?  Self

## 2021-04-15 ENCOUNTER — TELEPHONE (OUTPATIENT)
Dept: ORTHOPEDIC SURGERY | Age: 41
End: 2021-04-15

## 2021-04-15 NOTE — TELEPHONE ENCOUNTER
CPT: [id number]  BODY PART: right wrist  AUTHORIZATION: approved     Approved # [id number]  5/6/21-8/6/21

## 2021-04-16 ENCOUNTER — TELEPHONE (OUTPATIENT)
Dept: PRIMARY CARE CLINIC | Age: 41
End: 2021-04-16

## 2021-04-16 NOTE — LETTER
[facility]  [id number]  Phone: [phone]  Fax: [phone]    ZELDA Finley CNP        April 16, 2021     Patient: Maeve Streeter   YOB: 1980   Date of Visit: 4/16/2021         To Whom It May Concern:     Anthony Rodas has been a patient at this clinic since January 2020. She has not been treated for migraines during this time by this Provider. If you have any questions or concerns, please don't hesitate to call.         Sincerely,        ZELDA Finley CNP

## 2021-04-16 NOTE — TELEPHONE ENCOUNTER
----- Message from Jeyson Wills sent at 4/15/2021  3:40 PM EDT -----  Subject: Message to Provider    QUESTIONS  Information for Provider? ATTN? Braxton Zendejas pt is saying they   discussed you writing a letter on behalf of the pt to the pt's work   stating why she missed work often last year. pt says she missed work due   to migraines and being high-risk for covid. Call pt with any Q  ---------------------------------------------------------------------------  --------------  CALL BACK INFO  What is the best way for the office to contact you? Do not leave any   message   patient will call back for answer   OK to respond with electronic message via [employer] portal (only for   patients who have registered [employer] account)  Preferred Call Back Phone Number? [phone]  ---------------------------------------------------------------------------  --------------  SCRIPT ANSWERS  Relationship to Patient?  Self
She is requesting 2 letters. She is requesting a letter stating that she has not been treated by her PCP for migraines. She is also requesting a letter stating that she has an underlying condition and that is why she did not work full time in 2020. In reviewing notes, she has not been treated for migraines and will write that letter today. In reviewing previous visits during 2020 patient did not mention that she was not working full time, and was concerned about being high risk of kaley Covid. Advised that would not write a letter regarding her working full time.
No

## 2021-04-18 NOTE — PROGRESS NOTES
[address]   [address]  [address] [facility] [place] [address]  Dept: [phone]       2021     Zena Bermudez (:  [id number]) a 41 y.o. female, here for evaluation of the following medical concerns:    Hypertension  This is a chronic problem. The current episode started more than 1 year ago. Associated symptoms include chest pain. Pertinent negatives include no headaches, malaise/fatigue, palpitations, peripheral edema or shortness of breath. Agents associated with hypertension include NSAIDs. Risk factors for coronary artery disease include family history, obesity and dyslipidemia. Past treatments include angiotensin blockers. The current treatment provides mild improvement. Compliance problems include diet and exercise. There is no history of kidney disease, CVA, left ventricular hypertrophy or PVD. Gastroesophageal Reflux  She complains of chest pain, heartburn, nausea and water brash. She reports no abdominal pain. This is a chronic problem. The current episode started more than 1 year ago. The problem occurs occasionally. The heartburn wakes her from sleep. The heartburn does not limit her activity. The heartburn doesn't change with position. The symptoms are aggravated by certain foods. Risk factors include obesity and NSAIDs. She has tried a PPI and a histamine-2 antagonist for the symptoms. The treatment provided mild relief. Past procedures do not include an abdominal ultrasound or a UGI. Has persistent nausea, Gerd controlled with Prevacid every morning and Famotidine every night. Intermittent abdominal cramping and epigastric discomfort. CT ABDOMEN 2019  No acute abnormality in the abdomen or pelvis. Small rounded focus of increased attenuation in the subcutaneous fat over the   left lower abdomen may relate to site of subcutaneous injection.  Correlate   with inspection. Small fat containing ventral hernia.    IVC filter with several legs penetrating the IVC. Outside examination from 07/07/2016 has been requested. Ailyn Marin addendum will be   issued to this report once the comparison study has been obtained. Mass on anterior neck  Noticed a small lump on left side of neck 2 days ago. Denies problem swallowing or breathing. No injury, rash, redness. Denies hearing or visiual changes. Environmental allergies  Has been evaluated by Allergist in the past, revealing  51 envirommental allergies. Takes medication every night and Flonase in the morning. Reports persisitent sneezing, rhinorrhea, and itchy eyes. Has an ENT at [facility] and will request a recommendation for an Allergist at [facility]. Obesity  General weight loss/lifestyle modification strategies discussed (elicit support from others; identify saboteurs; non-food rewards, etc). -Avoid high fat and high sugar foods  -Include protein with all meals and snacks  -Avoid carbonation and caffeine  -Avoid calorie containing beverages  -Increase physical activity as tolerated  Overweight/Obesity related to lack of exercise, sedentary lifestyle, unhealthy eating habits, and unsuccessful diet attempts as evidenced by BMI.    Wt Readings from Last 3 Encounters:   04/19/21 (!) 328 lb 9.6 oz (149.1 kg)   03/11/21 (!) 324 lb 9.6 oz (147.2 kg)   02/12/21 (!) 321 lb (145.6 kg)       Health Care Maintenance  Covid Vaccine: over due  Lab Results   Component Value Date    CHOL 266 (H) 04/19/2021    CHOL 242 (H) 06/02/2016    CHOL 300 (H) 01/30/2015     Lab Results   Component Value Date    TRIG 134 04/19/2021    TRIG 129 06/02/2016    TRIG 245 (H) 01/30/2015     Lab Results   Component Value Date    HDL 46 04/19/2021    HDL 44 09/18/2018    HDL 39 (L) 06/02/2016     Lab Results   Component Value Date    LDLCALC 193 (H) 04/19/2021    LDLCALC 215 (H) 09/18/2018    LDLCALC 177 (H) 06/02/2016     Lab Results   Component Value Date    LABVLDL 27 04/19/2021    LABVLDL 26 09/18/2018    LABVLDL 26 06/02/2016     Lab Results   Component Value Date    WBC 6.0 04/19/2021    HGB 13.2 04/19/2021    HCT 40.7 04/19/2021    MCV 84.9 04/19/2021     04/19/2021     Lab Results   Component Value Date     04/19/2021    K 4.3 04/19/2021     04/19/2021    CO2 26 04/19/2021    BUN 9 04/19/2021    CREATININE 0.6 04/19/2021    GLUCOSE 106 (H) 04/19/2021    CALCIUM 9.1 04/19/2021    PROT 7.9 04/19/2021    LABALBU 4.5 04/19/2021    BILITOT 0.3 04/19/2021    ALKPHOS 114 04/19/2021    AST 37 04/19/2021    ALT 45 (H) 04/19/2021    LABGLOM >60 04/19/2021    GFRAA >60 04/19/2021    AGRATIO 1.3 04/19/2021    GLOB 3.4 04/19/2021       Review of Systems   Constitutional: Negative for activity change, fever and malaise/fatigue. HENT: Positive for postnasal drip, rhinorrhea and sneezing. Negative for congestion, dental problem, ear discharge, ear pain, hearing loss, sinus pressure, tinnitus, trouble swallowing and voice change. Small lump on left anterior area of neck   Eyes: Positive for itching. Negative for visual disturbance. Respiratory: Negative for chest tightness and shortness of breath. Cardiovascular: Positive for chest pain. Negative for palpitations and leg swelling. Hypertension   Gastrointestinal: Positive for heartburn and nausea. Negative for abdominal pain, blood in stool, constipation, diarrhea, rectal pain and vomiting. Gerd   Endocrine: Negative for polyuria. Genitourinary: Negative for dysuria. Musculoskeletal: Negative for arthralgias and myalgias. Skin: Negative for rash. Allergic/Immunologic:        Has severe allergic symptoms. Neurological: Negative for dizziness, light-headedness and headaches. Psychiatric/Behavioral: Negative for agitation, decreased concentration and sleep disturbance. The patient is not nervous/anxious. Prior to Visit Medications    Medication Sig Taking?  Authorizing Provider   montelukast (SINGULAIR) 10 MG tablet Take 1 tablet by mouth daily Yes ZELDA Jerry CNP   fluticasone (FLONASE) 50 MCG/ACT nasal spray 2 sprays by Nasal route daily Yes ZELDA Jerry CNP   amLODIPine (NORVASC) 10 MG tablet TAKE 1 TABLET BY MOUTH EVERY DAY Yes ZELDA Jerry CNP   lansoprazole (PREVACID) 30 MG delayed release capsule Take 1 capsule by mouth daily Yes ZELDA Jerry CNP   DULoxetine (CYMBALTA) 60 MG extended release capsule TAKE ONE CAPSULE BY MOUTH DAILY Yes ZELDA Jerry CNP   EPINEPHrine (EPIPEN) 0.3 MG/0.3ML SOAJ injection Inject 1 pen into the thigh for emergency treatment of allergic reactions, may repeat for severe persistent reactions. Yes ZELDA Jerry CNP   valsartan (DIOVAN) 80 MG tablet Take 1 tablet by mouth daily Yes ZELDA Jerry CNP   triamcinolone (KENALOG) 0.1 % ointment Apply 0.1 g topically 2 times daily Yes Historical Provider, MD   Urea (CARMOL) 40 % cream Apply 40 oz topically daily Yes Historical Provider, MD   famotidine (PEPCID) 40 MG tablet Take 40 mg by mouth daily Yes Historical Provider, MD   polyethylene glycol (MIRALAX) 17 GM/SCOOP POWD powder Take 17 g by mouth daily Yes ZELDA Jerry CNP   levocetirizine (XYZAL) 5 MG tablet Take one tablet by mouth daily Yes ZELDA Jerry CNP   atorvastatin (LIPITOR) 20 MG tablet Take 1 tablet by mouth daily  ZELDA Jerry CNP   vitamin D (ERGOCALCIFEROL) 1.25 MG (05951 UT) CAPS capsule Take 1 capsule by mouth once a week  ZELDA Jerry CNP        Social History     Tobacco Use    Smoking status: Never Smoker    Smokeless tobacco: Never Used   Substance Use Topics    Alcohol use:  Yes     Alcohol/week: 0.0 standard drinks     Frequency: Monthly or less     Comment: occasionally        Vitals:    04/19/21 1134   BP: (!) 140/94   Pulse: 84   Temp: 96.8 °F (36 °C)   TempSrc: Temporal   SpO2: 96%   Weight: (!) 328 lb 9.6 oz (149.1 kg)     Estimated body mass index is 58.21 kg/m² as calculated from the following:    Height as of 2/12/21: 5' 3\" (1.6 m). Weight as of this encounter: 328 lb 9.6 oz (149.1 kg). Physical Exam  Vitals signs reviewed. Constitutional:       Appearance: She is well-developed. HENT:      Head: Normocephalic. Right Ear: Hearing and external ear normal.      Left Ear: Hearing and external ear normal.      Nose: Nose normal.   Eyes:      General: Lids are normal.   Neck:      Musculoskeletal: Full passive range of motion without pain and normal range of motion. Trachea: Trachea normal.     Cardiovascular:      Rate and Rhythm: Normal rate and regular rhythm. Heart sounds: Normal heart sounds, S1 normal and S2 normal.   Pulmonary:      Effort: Pulmonary effort is normal.      Breath sounds: Normal breath sounds. Abdominal:      General: Bowel sounds are normal.      Palpations: Abdomen is soft. Tenderness: There is generalized abdominal tenderness and tenderness in the epigastric area. There is no right CVA tenderness or left CVA tenderness. Hernia: A hernia is present. Hernia is present in the ventral area. Musculoskeletal: Normal range of motion. Skin:     General: Skin is warm and dry. Findings: No rash. Neurological:      Mental Status: She is alert and oriented to person, place, and time. GCS: GCS eye subscore is 4. GCS verbal subscore is 5. GCS motor subscore is 6. Psychiatric:         Speech: Speech normal.         Behavior: Behavior normal. Behavior is cooperative. ASSESSMENT/PLAN:  1. Ventral hernia without obstruction or gangrene    - [place] - Aditi Fitzpatrick MD, Gastroenterology, [facility]    2. Nausea in adult  -continue taking Prevacid and Pepcid  - [place] - Aditi Fitzpatrick MD, Gastroenterology, [facility]    3. Localized swelling, mass and lump, neck    - CT SOFT TISSUE NECK WO CONTRAST; Future    4.  Morbid obesity with BMI of 50.0-59.9, adult ([address]  -The patient is advised to begin progressive daily aerobic exercise program, follow a low fat, low cholesterol diet, attempt to lose weight, improve dietary compliance and continue current medications. 5. Gastroesophageal reflux disease, unspecified whether esophagitis present  -Continue Prevacid as prescribed  -continue Pepcid as prescribed  - AFL - David Sweet MD, Gastroenterology, [facility]    6. Essential hypertension  -Continue taking Amlodipine 10 mg daily  -Continue taking Avapro 80 mg daily    7. Allergic rhinitis, unspecified seasonality, unspecified trigger  -Continue Xyzal, but start taking in the AM  -Start taking Singulair in the evening  -Continue taking Flonase in the morning  -Obtain an Allergist and referral will be placed  - montelukast (SINGULAIR) 10 MG tablet; Take 1 tablet by mouth daily  Dispense: 30 tablet; Refill: 3      Return in about 3 months (around 7/19/2021) for HTN. Reviewed patient's pertinent medical history, relevant laboratory results, imaging studies, and health maintenance. Medications have been reviewed and discussed with the patient, refills otherwise up-to-date. Discussed the importance of adhering to current medication regimen. Advised:  (1) continue to work on eating a healthy balanced diet; (2) stay active by exercising within your personal limits. Patient was advised to keep future appointments with their respective specialty care team(s). Questions and concerns addressed, care plan reviewed and patient is agreeable with the care plan following today's visit.     --Lamberto Reid, ZELDA - CNP on 4/20/2021 at 8:25 AM

## 2021-04-19 ENCOUNTER — OFFICE VISIT (OUTPATIENT)
Dept: PRIMARY CARE CLINIC | Age: 41
End: 2021-04-19
Payer: COMMERCIAL

## 2021-04-19 VITALS
TEMPERATURE: 96.8 F | WEIGHT: 293 LBS | HEART RATE: 84 BPM | OXYGEN SATURATION: 96 % | SYSTOLIC BLOOD PRESSURE: 140 MMHG | BODY MASS INDEX: 58.21 KG/M2 | DIASTOLIC BLOOD PRESSURE: 94 MMHG

## 2021-04-19 DIAGNOSIS — K21.9 GASTROESOPHAGEAL REFLUX DISEASE, UNSPECIFIED WHETHER ESOPHAGITIS PRESENT: Chronic | ICD-10-CM

## 2021-04-19 DIAGNOSIS — K43.9 VENTRAL HERNIA WITHOUT OBSTRUCTION OR GANGRENE: ICD-10-CM

## 2021-04-19 DIAGNOSIS — R22.1 LOCALIZED SWELLING, MASS AND LUMP, NECK: Primary | ICD-10-CM

## 2021-04-19 DIAGNOSIS — E28.2 PCOS (POLYCYSTIC OVARIAN SYNDROME): Chronic | ICD-10-CM

## 2021-04-19 DIAGNOSIS — M79.10 MUSCLE PAIN: ICD-10-CM

## 2021-04-19 DIAGNOSIS — Z13.220 SCREENING FOR LIPID DISORDERS: ICD-10-CM

## 2021-04-19 DIAGNOSIS — E66.01 MORBID OBESITY WITH BMI OF 50.0-59.9, ADULT (HCC): Chronic | ICD-10-CM

## 2021-04-19 DIAGNOSIS — J30.9 ALLERGIC RHINITIS, UNSPECIFIED SEASONALITY, UNSPECIFIED TRIGGER: ICD-10-CM

## 2021-04-19 DIAGNOSIS — I10 ESSENTIAL HYPERTENSION: Chronic | ICD-10-CM

## 2021-04-19 DIAGNOSIS — Z13.0 SCREENING FOR DEFICIENCY ANEMIA: ICD-10-CM

## 2021-04-19 DIAGNOSIS — Z13.29 SCREENING FOR THYROID DISORDER: ICD-10-CM

## 2021-04-19 DIAGNOSIS — R11.0 NAUSEA IN ADULT: ICD-10-CM

## 2021-04-19 PROCEDURE — 99214 OFFICE O/P EST MOD 30 MIN: CPT | Performed by: NURSE PRACTITIONER

## 2021-04-19 PROCEDURE — G8427 DOCREV CUR MEDS BY ELIG CLIN: HCPCS | Performed by: NURSE PRACTITIONER

## 2021-04-19 PROCEDURE — G8417 CALC BMI ABV UP PARAM F/U: HCPCS | Performed by: NURSE PRACTITIONER

## 2021-04-19 PROCEDURE — 1036F TOBACCO NON-USER: CPT | Performed by: NURSE PRACTITIONER

## 2021-04-19 RX ORDER — MONTELUKAST SODIUM 10 MG/1
10 TABLET ORAL DAILY
Qty: 30 TABLET | Refills: 3 | Status: SHIPPED | OUTPATIENT
Start: 2021-04-19 | End: 2021-10-30

## 2021-04-19 ASSESSMENT — ENCOUNTER SYMPTOMS
WATER BRASH: 1
HEARTBURN: 1

## 2021-04-20 LAB
A/G RATIO: 1.3 (ref 1.1–2.2)
ALBUMIN SERPL-MCNC: 4.5 G/DL (ref 3.4–5)
ALP BLD-CCNC: 114 U/L (ref 40–129)
ALT SERPL-CCNC: 45 U/L (ref 10–40)
ANION GAP SERPL CALCULATED.3IONS-SCNC: 13 MMOL/L (ref 3–16)
AST SERPL-CCNC: 37 U/L (ref 15–37)
BASOPHILS ABSOLUTE: 0 K/UL (ref 0–0.2)
BASOPHILS RELATIVE PERCENT: 0.6 %
BILIRUB SERPL-MCNC: 0.3 MG/DL (ref 0–1)
BUN BLDV-MCNC: 9 MG/DL (ref 7–20)
CALCIUM SERPL-MCNC: 9.1 MG/DL (ref 8.3–10.6)
CHLORIDE BLD-SCNC: 105 MMOL/L (ref 99–110)
CHOLESTEROL, TOTAL: 266 MG/DL (ref 0–199)
CO2: 26 MMOL/L (ref 21–32)
CREAT SERPL-MCNC: 0.6 MG/DL (ref 0.6–1.1)
EOSINOPHILS ABSOLUTE: 0.2 K/UL (ref 0–0.6)
EOSINOPHILS RELATIVE PERCENT: 3.5 %
ESTIMATED AVERAGE GLUCOSE: 137 MG/DL
GFR AFRICAN AMERICAN: >60
GFR NON-AFRICAN AMERICAN: >60
GLOBULIN: 3.4 G/DL
GLUCOSE BLD-MCNC: 106 MG/DL (ref 70–99)
HBA1C MFR BLD: 6.4 %
HCT VFR BLD CALC: 40.7 % (ref 36–48)
HDLC SERPL-MCNC: 46 MG/DL (ref 40–60)
HEMOGLOBIN: 13.2 G/DL (ref 12–16)
LDL CHOLESTEROL CALCULATED: 193 MG/DL
LYMPHOCYTES ABSOLUTE: 2.5 K/UL (ref 1–5.1)
LYMPHOCYTES RELATIVE PERCENT: 42.1 %
MAGNESIUM: 2.2 MG/DL (ref 1.8–2.4)
MCH RBC QN AUTO: 27.5 PG (ref 26–34)
MCHC RBC AUTO-ENTMCNC: 32.4 G/DL (ref 31–36)
MCV RBC AUTO: 84.9 FL (ref 80–100)
MONOCYTES ABSOLUTE: 0.4 K/UL (ref 0–1.3)
MONOCYTES RELATIVE PERCENT: 5.9 %
NEUTROPHILS ABSOLUTE: 2.9 K/UL (ref 1.7–7.7)
NEUTROPHILS RELATIVE PERCENT: 47.9 %
PDW BLD-RTO: 14.8 % (ref 12.4–15.4)
PLATELET # BLD: 230 K/UL (ref 135–450)
PMV BLD AUTO: 8.9 FL (ref 5–10.5)
POTASSIUM SERPL-SCNC: 4.3 MMOL/L (ref 3.5–5.1)
RBC # BLD: 4.8 M/UL (ref 4–5.2)
SODIUM BLD-SCNC: 144 MMOL/L (ref 136–145)
T4 FREE: 1 NG/DL (ref 0.9–1.8)
TOTAL PROTEIN: 7.9 G/DL (ref 6.4–8.2)
TRIGL SERPL-MCNC: 134 MG/DL (ref 0–150)
TSH REFLEX: 0.54 UIU/ML (ref 0.27–4.2)
VITAMIN D 25-HYDROXY: 14.5 NG/ML
VLDLC SERPL CALC-MCNC: 27 MG/DL
WBC # BLD: 6 K/UL (ref 4–11)

## 2021-04-20 RX ORDER — ATORVASTATIN CALCIUM 20 MG/1
20 TABLET, FILM COATED ORAL DAILY
Qty: 30 TABLET | Refills: 3 | Status: SHIPPED | OUTPATIENT
Start: 2021-04-20 | End: 2021-09-13

## 2021-04-20 RX ORDER — ERGOCALCIFEROL 1.25 MG/1
50000 CAPSULE ORAL WEEKLY
Qty: 12 CAPSULE | Refills: 0 | Status: SHIPPED | OUTPATIENT
Start: 2021-04-20 | End: 2021-07-12

## 2021-04-20 ASSESSMENT — ENCOUNTER SYMPTOMS
DIARRHEA: 0
ABDOMINAL PAIN: 0
SHORTNESS OF BREATH: 0
VOICE CHANGE: 0
RECTAL PAIN: 0
NAUSEA: 1
CONSTIPATION: 0
BLOOD IN STOOL: 0
EYE ITCHING: 1
VOMITING: 0
SINUS PRESSURE: 0
RHINORRHEA: 1
CHEST TIGHTNESS: 0
TROUBLE SWALLOWING: 0

## 2021-04-27 ENCOUNTER — NURSE TRIAGE (OUTPATIENT)
Dept: OTHER | Facility: CLINIC | Age: 41
End: 2021-04-27

## 2021-04-27 NOTE — TELEPHONE ENCOUNTER
[facility] [place] [id number] [facility] Brief description of triage: Vaccine questions    Triage indicates for patient to homecare    Care advice provided. Recommended using local department of health website for testing locations and up to date information. Caller verbalizes understanding, denies any other questions or concerns; instructed to call back for any new or worsening symptoms. Reason for Disposition   COVID-19 vaccine, Frequently Asked Questions (FAQs)    Answer Assessment - Initial Assessment Questions  1. MAIN CONCERN OR SYMPTOM:  \"What is your main concern right now? \" \"What question do you have? \" \"What's the main symptom you're worried about? \" (e.g., fever, pain, redness, swelling)      Caller wants to know which vaccine is better between [facility] and [facility]    2. VACCINE: \"What vaccination did you receive? \" \"Is this your first or second shot? \" (e.g., none; [employer], [employer], [address], other)      N/a    3. SYMPTOM ONSET: \"When did the n/a begin? \" (e.g., not relevant; hours, days)       N/a    4. SYMPTOM SEVERITY: \"How bad is it? \"       N/a    5. FEVER: \"Is there a fever? \" If so, ask: \"What is it, how was it measured, and when did it start? \"       N/a    6. PAST REACTIONS: \"Have you reacted to immunizations before? \" If so, ask: \"What happened? \"      N/a    7. OTHER SYMPTOMS: \"Do you have any other symptoms? \"      N/a    Protocols used: CORONAVIRUS (COVID-19) VACCINE QUESTIONS AND REACTIONS-ADULT-[place]

## 2021-05-13 ENCOUNTER — HOSPITAL ENCOUNTER (OUTPATIENT)
Dept: CT IMAGING | Age: 41
Discharge: HOME | End: 2021-05-13
Payer: COMMERCIAL

## 2021-05-13 DIAGNOSIS — R22.1 LOCALIZED SWELLING, MASS AND LUMP, NECK: ICD-10-CM

## 2021-05-13 PROCEDURE — 70490 CT SOFT TISSUE NECK W/O DYE: CPT

## 2021-06-03 ENCOUNTER — HOSPITAL ENCOUNTER (OUTPATIENT)
Dept: PHYSICAL THERAPY | Age: 41
Setting detail: THERAPIES SERIES
Discharge: HOME | End: 2021-06-03
Payer: COMMERCIAL

## 2021-06-10 ENCOUNTER — OFFICE VISIT (OUTPATIENT)
Dept: PRIMARY CARE CLINIC | Age: 41
End: 2021-06-10
Payer: COMMERCIAL

## 2021-06-10 VITALS
BODY MASS INDEX: 58.99 KG/M2 | DIASTOLIC BLOOD PRESSURE: 78 MMHG | WEIGHT: 293 LBS | OXYGEN SATURATION: 96 % | TEMPERATURE: 97.5 F | SYSTOLIC BLOOD PRESSURE: 123 MMHG | HEART RATE: 74 BPM

## 2021-06-10 DIAGNOSIS — E66.01 MORBID OBESITY WITH BMI OF 50.0-59.9, ADULT (HCC): Chronic | ICD-10-CM

## 2021-06-10 DIAGNOSIS — M25.551 BILATERAL HIP PAIN: ICD-10-CM

## 2021-06-10 DIAGNOSIS — I10 ESSENTIAL HYPERTENSION: Chronic | ICD-10-CM

## 2021-06-10 DIAGNOSIS — M25.552 BILATERAL HIP PAIN: ICD-10-CM

## 2021-06-10 DIAGNOSIS — R10.9 ABDOMINAL CRAMPING: Primary | ICD-10-CM

## 2021-06-10 PROCEDURE — G8427 DOCREV CUR MEDS BY ELIG CLIN: HCPCS | Performed by: NURSE PRACTITIONER

## 2021-06-10 PROCEDURE — 1036F TOBACCO NON-USER: CPT | Performed by: NURSE PRACTITIONER

## 2021-06-10 PROCEDURE — G8417 CALC BMI ABV UP PARAM F/U: HCPCS | Performed by: NURSE PRACTITIONER

## 2021-06-10 PROCEDURE — 99214 OFFICE O/P EST MOD 30 MIN: CPT | Performed by: NURSE PRACTITIONER

## 2021-06-10 SDOH — ECONOMIC STABILITY: FOOD INSECURITY: WITHIN THE PAST 12 MONTHS, THE FOOD YOU BOUGHT JUST DIDN'T LAST AND YOU DIDN'T HAVE MONEY TO GET MORE.: NEVER TRUE

## 2021-06-10 SDOH — ECONOMIC STABILITY: FOOD INSECURITY: WITHIN THE PAST 12 MONTHS, YOU WORRIED THAT YOUR FOOD WOULD RUN OUT BEFORE YOU GOT MONEY TO BUY MORE.: NEVER TRUE

## 2021-06-10 ASSESSMENT — ENCOUNTER SYMPTOMS
ABDOMINAL PAIN: 0
EYE ITCHING: 0
SHORTNESS OF BREATH: 0
VOICE CHANGE: 0
BLOOD IN STOOL: 0
SINUS PRESSURE: 0
RHINORRHEA: 0
NAUSEA: 0
CONSTIPATION: 0
VOMITING: 0
DIARRHEA: 0
CHEST TIGHTNESS: 0
TROUBLE SWALLOWING: 0
RECTAL PAIN: 0

## 2021-06-10 ASSESSMENT — SOCIAL DETERMINANTS OF HEALTH (SDOH): HOW HARD IS IT FOR YOU TO PAY FOR THE VERY BASICS LIKE FOOD, HOUSING, MEDICAL CARE, AND HEATING?: NOT HARD AT ALL

## 2021-06-10 NOTE — PATIENT INSTRUCTIONS
Analgesic creams or heat  Naproxen twice daily for 2-3 weeks with food  Patient Education        Hip Bursitis: Exercises  Introduction  Here are some examples of exercises for you to try. The exercises may be suggested for a condition or for rehabilitation. Start each exercise slowly. Ease off the exercises if you start to have pain. You will be told when to start these exercises and which ones will work best for you. How to do the exercises  Hip rotator stretch   1. Lie on your back with both knees bent and your feet flat on the floor. 2. Put the ankle of your affected leg on your opposite thigh near your knee. 3. Use your hand to gently push your knee away from your body until you feel a gentle stretch around your hip. 4. Hold the stretch for 15 to 30 seconds. 5. Repeat 2 to 4 times. 6. Repeat steps 1 through 5, but this time use your hand to gently pull your knee toward your opposite shoulder. Iliotibial band stretch   1. Lean sideways against a wall. If you are not steady on your feet, hold on to a chair or counter. 2. Stand on the leg with the affected hip, with that leg close to the wall. Then cross your other leg in front of it. 3. Let your affected hip drop out to the side of your body and against wall. Then lean away from your affected hip until you feel a stretch. 4. Hold the stretch for 15 to 30 seconds. 5. Repeat 2 to 4 times. Straight-leg raises to the outside   1. Lie on your side, with your affected hip on top. 2. Tighten the front thigh muscles of your top leg to keep your knee straight. 3. Keep your hip and your leg straight in line with the rest of your body, and keep your knee pointing forward. Do not drop your hip back. 4. Lift your top leg straight up toward the ceiling, about 12 inches off the floor. Hold for about 6 seconds, then slowly lower your leg. 5. Repeat 8 to 12 times. Clamshell   1.  Lie on your side, with your affected hip on top and your head propped on a pillow. Keep your feet and knees together and your knees bent. 2. Raise your top knee, but keep your feet together. Do not let your hips roll back. Your legs should open up like a clamshell. 3. Hold for 6 seconds. 4. Slowly lower your knee back down. Rest for 10 seconds. 5. Repeat 8 to 12 times. Follow-up care is a key part of your treatment and safety. Be sure to make and go to all appointments, and call your doctor if you are having problems. It's also a good idea to know your test results and keep a list of the medicines you take. Where can you learn more? Go to https://ID QuantiquepeOpti-Source.[employer]. org and sign in to your [employer] account. Enter Z458 in the [employer] box to learn more about \"Hip Bursitis: Exercises. \"     If you do not have an account, please click on the \"Sign Up Now\" link. Current as of: November 16, 2020               Content Version: 12.8  © 2006-2021 Healthwise, simfy. Care instructions adapted under license by [facility] ([facility] [place]). If you have questions about a medical condition or this instruction, always ask your healthcare professional. [place] [address] any warranty or liability for your use of this information. Patient Education        Trochanteric Bursitis: Exercises  Introduction  Here are some examples of exercises for you to try. The exercises may be suggested for a condition or for rehabilitation. Start each exercise slowly. Ease off the exercises if you start to have pain. You will be told when to start these exercises and which ones will work best for you. How to do the exercises  Hamstring wall stretch   7. Lie on your back in a doorway, with your good leg through the open door. 8. Slide your affected leg up the wall to straighten your knee. You should feel a gentle stretch down the back of your leg. 9. Hold the stretch for at least 1 minute to begin. Then try to lengthen the time you hold the stretch to as long as 6 minutes. 10. Repeat 2 to 4 times. 11. If you do not have a place to do this exercise in a doorway, there is another way to do it:  12. Lie on your back, and bend the knee of your affected leg. 13. Loop a towel under the ball and toes of that foot, and hold the ends of the towel in your hands. 14. Straighten your knee, and slowly pull back on the towel. You should feel a gentle stretch down the back of your leg. 15. Hold the stretch for 15 to 30 seconds. Or even better, hold the stretch for 1 minute if you can. 16. Repeat 2 to 4 times. 6. Do not arch your back. 7. Do not bend either knee. 8. Keep one heel touching the floor and the other heel touching the wall. Do not point your toes. Straight-leg raises to the outside   6. Lie on your side, with your affected leg on top. 7. Tighten the front thigh muscles of your top leg to keep your knee straight. 8. Keep your hip and your leg straight in line with the rest of your body, and keep your knee pointing forward. Do not drop your hip back. 9. Lift your top leg straight up toward the ceiling, about 12 inches off the floor. Hold for about 6 seconds, then slowly lower your leg. 10. Repeat 8 to 12 times. Clamshell   6. Lie on your side, with your affected leg on top and your head propped on a pillow. Keep your feet and knees together and your knees bent. 7. Raise your top knee, but keep your feet together. Do not let your hips roll back. Your legs should open up like a clamshell. 8. Hold for 6 seconds. 9. Slowly lower your knee back down. Rest for 10 seconds. 10. Repeat 8 to 12 times. Standing quadriceps stretch   1. If you are not steady on your feet, hold on to a chair, counter, or wall. You can also lie on your stomach or your side to do this exercise. 2. Bend the knee of the leg you want to stretch, and reach behind you to grab the front of your foot or ankle with the hand on the same side.  For example, if you are stretching your right leg, use your right hand. 3. Keeping your knees next to each other, pull your foot toward your buttock until you feel a gentle stretch across the front of your hip and down the front of your thigh. Your knee should be pointed directly to the ground, and not out to the side. 4. Hold the stretch for 15 to 30 seconds. 5. Repeat 2 to 4 times. Piriformis stretch   1. Lie on your back with your legs straight. 2. Lift your affected leg and bend your knee. With your opposite hand, reach across your body, and then gently pull your knee toward your opposite shoulder. 3. Hold the stretch for 15 to 30 seconds. 4. Repeat 2 to 4 times. Double knee-to-chest   1. Lie on your back with your knees bent and your feet flat on the floor. You can put a small pillow under your head and neck if it is more comfortable. 2. Bring both knees to your chest.  3. Keep your lower back pressed to the floor. Hold for 15 to 30 seconds. 4. Relax, and lower your knees to the starting position. 5. Repeat 2 to 4 times. Follow-up care is a key part of your treatment and safety. Be sure to make and go to all appointments, and call your doctor if you are having problems. It's also a good idea to know your test results and keep a list of the medicines you take. Where can you learn more? Go to https://MattersightpeyudiZoomin.com.Stat. org and sign in to your [employer] account. Enter U837 in the [employer] box to learn more about \"Trochanteric Bursitis: Exercises. \"     If you do not have an account, please click on the \"Sign Up Now\" link. Current as of: November 16, 2020               Content Version: 12.8  © 4746-7881 Healthwise, Incorporated. Care instructions adapted under license by [facility] ([facility] If you have questions about a medical condition or this instruction, always ask your healthcare professional. [place] [address] any warranty or liability for your use of this information.          Patient Education        Hip Flexor your leg back by pulling your foot toward your buttock. You will feel the stretch in the front of your thigh. If this causes stress on your knee, do not do this stretch. 4. Hold the stretch for at least 15 to 30 seconds. 5. Repeat 2 to 4 times. Hip flexor stretch (kneeling)   1. Kneel on your affected leg and bend your good leg out in front of you, with that foot flat on the floor. If you feel discomfort in the front of your knee, place a towel under your knee. 2. Keeping your back straight, slowly push your hips forward until you feel a stretch in the upper thigh of your back leg and hip. 3. Hold the stretch for at least 15 to 30 seconds. 4. Repeat 2 to 4 times. Hip flexor stretch (edge of table)   1. Lie flat on your back on a table or flat bench, with your knees and lower legs hanging off the edge of the table. 2. Grab your good leg at the knee, and pull that knee back toward your chest. Relax your affected leg and let it hang down toward the floor until you feel a stretch in the upper thigh of your affected leg and hip. 3. Hold the stretch for at least 15 to 30 seconds. 4. Repeat 2 to 4 times. Follow-up care is a key part of your treatment and safety. Be sure to make and go to all appointments, and call your doctor if you are having problems. It's also a good idea to know your test results and keep a list of the medicines you take. Where can you learn more? Go to https://SecureMediapeyudiNinja Metrics.[employer] International. org and sign in to your [employer] account. Enter N369 in the [facility] box to learn more about \"Hip Flexor Strain: Rehab Exercises. \"     If you do not have an account, please click on the \"Sign Up Now\" link. Current as of: November 16, 2020               Content Version: 12.8  © 9123-1566 Healthwise, Incorporated. Care instructions adapted under license by [facility] ([facility]).  If you have questions about a medical condition or this instruction, always ask your healthcare professional. [employer], [facility] disclaims any warranty or liability for your use of this information. Patient Education        Gluteal Strain: Rehab Exercises  Introduction  Here are some examples of exercises for you to try. The exercises may be suggested for a condition or for rehabilitation. Start each exercise slowly. Ease off the exercises if you start to have pain. You will be told when to start these exercises and which ones will work best for you. How to do the exercises  Hip rotator stretch   1. Lie on your back with both knees bent and your feet flat on the floor. 2. Put the ankle of your affected leg on your opposite thigh near your knee. 3. Use your hand to gently push the knee of your affected leg away from your body until you feel a gentle stretch around your hip. 4. Hold the stretch for 15 to 30 seconds. 5. Repeat 2 to 4 times. 6. Repeat steps 1 through 5, but this time use your hand to gently pull your knee toward your opposite shoulder. 7. Switch legs and repeat steps 1 through 6, even if only one hip is sore. Seated hip rotator stretch   1. Sit in a sturdy chair. 2. Cross your affected leg over your knee, resting your foot on top of your knee. 3. Keep your back straight, and slowly lean forward until you feel a stretch in your hip. 4. Hold for 15 to 30 seconds. 5. Switch legs and repeat steps 1 through 4 on your other side. 6. Repeat 2 to 4 times. Hamstring stretch (lying down)   1. Lie flat on your back with your legs straight. If you feel discomfort in your back, place a small towel roll under your lower back. 2. Holding the back of your affected leg, lift your leg straight up and toward your body until you feel a stretch at the back of your thigh. 3. Hold the stretch for at least 30 seconds. 4. Repeat 2 to 4 times. 5. Switch legs and repeat steps 1 through 4, even if only one hip is sore. Bridging   1. Lie on your back with both knees bent.  Your knees should be bent about 90 degrees. 2. Then push your feet into the floor, squeeze your buttocks, and lift your hips off the floor until your shoulders, hips, and knees are all in a straight line. 3. Hold for about 6 seconds as you continue to breathe normally, and then slowly lower your hips back down to the floor and rest for up to 10 seconds. 4. Repeat 8 to 12 times. Single-leg bridge   1. Lie on your back, with your arms at your sides. 2. Bend one knee, and keep that foot flat on the floor. The other leg should be straight. 3. Raise the straight leg up so that the knee is level with the bent knee. 4. Tighten your belly muscles by pulling your belly button in toward your spine. Lift your buttocks up and be careful not to let your hips drop down. 5. Hold for about 6 seconds as you continue to breathe normally, and then slowly lower your hips back down to the floor. 6. Switch legs and repeat steps 1 though 5.  7. Repeat 8 to 12 times. Follow-up care is a key part of your treatment and safety. Be sure to make and go to all appointments, and call your doctor if you are having problems. It's also a good idea to know your test results and keep a list of the medicines you take. Where can you learn more? Go to https://UniServity.HackerTarget.com LLC. org and sign in to your F[employer]ality account. Enter D093 in the [facility] box to learn more about \"Gluteal Strain: Rehab Exercises. \"     If you do not have an account, please click on the \"Sign Up Now\" link. Current as of: November 16, 2020               Content Version: 12.8  © 3176-3980 Healthwise, Incorporated. Care instructions adapted under license by [facility] ([facility]). If you have questions about a medical condition or this instruction, always ask your healthcare professional. [address]  any warranty or liability for your use of this information.        Start taking Magnesium supplement  mg  Increase water intake

## 2021-06-10 NOTE — PROGRESS NOTES
[address] [facility] PRIMARY CARE  [address] [id number]  Dept: [phone]    6/10/2021     Gadiel Mantilla (:  [birth year])PS a 41 y.o. female, here for evaluation of hypertension, hip pain and obesity. She reports stepping on glass 2 days ago while wearing flip flop shoes. Denies any bleeding, swelling, redness to area. She is requesting a tetanus vaccine. Advised that her last Td was 2016, which is effective for 10 years. Hip Pain   The incident occurred more than 1 week ago. There was no injury mechanism. The pain is present in the right hip and left hip. The pain is moderate. She reports no foreign bodies present. The symptoms are aggravated by movement and weight bearing. She has tried nothing for the symptoms. The treatment provided mild relief.   some pain with movement and after changing positions for sitting to standing. Has steady gait, ambulates without assistive device. HypertensionThis is a chronic problem. The current episode started more than 1 year ago. Associated symptoms include chest pain. Pertinent negatives include no headaches, malaise/fatigue, palpitations, peripheral edema or shortness of breath. Agents associated with hypertension include NSAIDs. Risk factors for coronary artery disease include family history, obesity and dyslipidemia. Past treatments include angiotensin blockers. The current treatment provides mild improvement. Compliance problems include diet and exercise. There is no history of kidney disease, CVA, left ventricular hypertrophy or PVD. Gastroesophageal Reflux She complains of chest pain, heartburn, nausea and water brash. She reports no abdominal pain. This is a chronic problem. The current episode started more than 1 year ago. The problem occurs occasionally. The heartburn wakes her from sleep. The heartburn does not limit her activity. The heartburn doesn't change with position.  The symptoms are aggravated by certain foods. Risk factors include obesity and NSAIDs. She has tried a PPI and a histamine-2 antagonist for the symptoms. The treatment provided mild relief. Past procedures do not include an abdominal ultrasound or a UGI. Has persistent nausea, Gerd controlled with Prevacid every morning and Famotidine every night. Intermittent abdominal cramping and epigastric discomfort. Reports the cramping is itnermitent, hands, abdomen, legs and feet. CT ABDOMEN 2/5/2019  No acute abnormality in the abdomen or pelvis. Small rounded focus of increased attenuation in the subcutaneous fat over the   left lower abdomen may relate to site of subcutaneous injection.  Correlate   with inspection. Small fat containing ventral hernia. IVC filter with several legs penetrating the IVC. Outside examination from 07/07/2016 has been requested. Ashley Almanzar addendum will be   issued to this report once the comparison study has been obtained. She reported a soft lump on the side of her neck, CT was performed, no abnormalities noted. Denies difficulty swallowing, breathing. 5/13/2021 CT SOFT TISSUE NECK   FINDINGS:   PHARYNX/LARYNX:  The nasopharynx, oropharynx and hypopharynx have a normal   noncontrast appearance.  The epiglottis and aryepiglottic folds are normal.   Although the lack of IV contrast limits evaluation, there is no soft tissue   mass identified. SALIVARY GLANDS/THYROID:  The parotid glands and submandibular glands are   normal in appearance.  The thyroid gland is normal in appearance. LYMPH NODES: Patricia Finders is no pathologically enlarged lymphadenopathy identified. SOFT TISSUES: Patricia Finders is no soft tissue mass evident. BRAIN/ORBITS/SINUSES:  Limited evaluation of the brain and orbits is   unremarkable.  The visualized portions of the paranasal sinuses and mastoid   air cells are clear. LUNG APICES/SUPERIOR MEDIASTINUM:  The lung apices are clear.  There is no   pathologically enlarged lymphadenopathy.    BONES:  No lytic or blastic osseous lesions are identified.      Obesity  Started exercising, has decreased snacks and portion sizes. General weight loss/lifestyle modification strategies discussed (elicit support from others; identify saboteurs; non-food rewards, etc). -Avoid high fat and high sugar foods  -Include protein with all meals and snacks  -Avoid carbonation and caffeine  -Avoid calorie containing beverages  -Increase physical activity as tolerated  Overweight/Obesity related to lack of exercise, sedentary lifestyle, unhealthy eating habits, and unsuccessful diet attempts as evidenced by BMI.    Wt Readings from Last 3 Encounters:   06/11/21 (!) 334 lb 10.5 oz (151.8 kg)   06/10/21 (!) 333 lb (151 kg)   04/19/21 (!) 328 lb 9.6 oz (149.1 kg)       Lab Results   Component Value Date    CHOL 266 (H) 04/19/2021    CHOL 242 (H) 06/02/2016    CHOL 300 (H) 01/30/2015     Lab Results   Component Value Date    TRIG 134 04/19/2021    TRIG 129 06/02/2016    TRIG 245 (H) 01/30/2015     Lab Results   Component Value Date    HDL 46 04/19/2021    HDL 44 09/18/2018    HDL 39 (L) 06/02/2016     Lab Results   Component Value Date    LDLCALC 193 (H) 04/19/2021    LDLCALC 215 (H) 09/18/2018    LDLCALC 177 (H) 06/02/2016     Lab Results   Component Value Date    LABVLDL 27 04/19/2021    LABVLDL 26 09/18/2018    LABVLDL 26 06/02/2016     Lab Results   Component Value Date    WBC 5.2 06/11/2021    HGB 12.5 06/11/2021    HCT 38.4 06/11/2021    MCV 84.7 06/11/2021     06/11/2021     Lab Results   Component Value Date     06/11/2021    K 4.0 06/11/2021     06/11/2021    CO2 23 06/11/2021    BUN 9 06/11/2021    CREATININE 0.6 06/11/2021    GLUCOSE 147 (H) 06/11/2021    CALCIUM 8.6 06/11/2021    PROT 7.6 06/11/2021    LABALBU 3.9 06/11/2021    BILITOT 0.3 06/11/2021    ALKPHOS 109 06/11/2021    AST 45 (H) 06/11/2021    ALT 38 06/11/2021    LABGLOM >60 06/11/2021    GFRAA >60 06/11/2021    AGRATIO 1.1 06/11/2021    GLOB 3.7 06/11/2021       Review of Systems   Constitutional: Negative for activity change and fever. HENT: Negative for congestion, dental problem, ear discharge, ear pain, hearing loss, postnasal drip, rhinorrhea, sinus pressure, sneezing, tinnitus, trouble swallowing and voice change. Small lump on left anterior area of neck   Eyes: Negative for itching and visual disturbance. Respiratory: Negative for chest tightness and shortness of breath. SHYLA - uses CPAP at night   Cardiovascular: Negative for chest pain, palpitations and leg swelling. Hypertension   Gastrointestinal: Negative for abdominal pain, blood in stool, constipation, diarrhea, nausea, rectal pain and vomiting. Gerd   Endocrine: Negative for polyuria. Genitourinary: Negative for dysuria. Musculoskeletal: Negative for arthralgias and myalgias. Bilateral hip pain   Skin: Negative for rash. Allergic/Immunologic:        Has severe allergic symptoms. Neurological: Negative for dizziness, light-headedness and headaches. Psychiatric/Behavioral: Negative for agitation, decreased concentration and sleep disturbance. The patient is not nervous/anxious. Prior to Visit Medications    Medication Sig Taking?  Authorizing Provider   atorvastatin (LIPITOR) 20 MG tablet Take 1 tablet by mouth daily Yes ZELDA Pandya CNP   vitamin D (ERGOCALCIFEROL) 1.25 MG (35237 UT) CAPS capsule Take 1 capsule by mouth once a week Yes ZELDA Pandya CNP   montelukast (SINGULAIR) 10 MG tablet Take 1 tablet by mouth daily Yes ZELDA Pandya CNP   fluticasone (FLONASE) 50 MCG/ACT nasal spray 2 sprays by Nasal route daily Yes ZELDA Pandya CNP   amLODIPine (NORVASC) 10 MG tablet TAKE 1 TABLET BY MOUTH EVERY DAY Yes ZELDA Pandya CNP   lansoprazole (PREVACID) 30 MG delayed release capsule Take 1 capsule by mouth daily Yes ZELDA Pandya CNP   DULoxetine (CYMBALTA) 60 MG extended release capsule TAKE ONE CAPSULE BY MOUTH DAILY Yes Saran Current, APRN - CNP   EPINEPHrine (EPIPEN) 0.3 MG/0.3ML SOAJ injection Inject 1 pen into the thigh for emergency treatment of allergic reactions, may repeat for severe persistent reactions. Yes Saran Current, APRN - CNP   valsartan (DIOVAN) 80 MG tablet Take 1 tablet by mouth daily Yes Saran Current, APRN - CNP   Urea (CARMOL) 40 % cream Apply 40 oz topically daily Yes Historical Provider, MD   polyethylene glycol (MIRALAX) 17 GM/SCOOP POWD powder Take 17 g by mouth daily Yes Saran Current, APRN - CNP   levocetirizine (XYZAL) 5 MG tablet Take one tablet by mouth daily Yes Saran Current, APRN - CNP   ibuprofen (IBU) 600 MG tablet Take 1 tablet by mouth every 6 hours as needed for Pain  Hernán Holm PA-C   erythromycin [facility]) 5 MG/GM ophthalmic ointment Apply to the lower eyelid margin 4 times daily for 5 days. Hernán Holm PA-C        Social History     Tobacco Use    Smoking status: Never Smoker    Smokeless tobacco: Never Used   Substance Use Topics    Alcohol use: Yes     Alcohol/week: 0.0 standard drinks     Comment: occasionally        Vitals:    06/10/21 1047   BP: 123/78   Pulse: 74   Temp: 97.5 °F (36.4 °C)   TempSrc: Temporal   SpO2: 96%   Weight: (!) 333 lb (151 kg)     Estimated body mass index is 58.99 kg/m² as calculated from the following:    Height as of 2/12/21: 5' 3\" (1.6 m). Weight as of this encounter: 333 lb (151 kg). Physical Exam  Vitals reviewed. Constitutional:       Appearance: She is well-developed. HENT:      Head: Normocephalic. Right Ear: Hearing and external ear normal.      Left Ear: Hearing and external ear normal.      Nose: Nose normal.   Eyes:      General: Lids are normal.   Cardiovascular:      Rate and Rhythm: Normal rate and regular rhythm.       Heart sounds: Normal heart sounds, S1 normal and S2 normal.   Pulmonary:      Effort: Pulmonary effort is normal. Breath sounds: Normal breath sounds. Musculoskeletal:         General: Normal range of motion. Right hip: Bony tenderness present. No deformity or crepitus. Normal range of motion. Normal strength. Left hip: Bony tenderness present. No deformity, lacerations, tenderness or crepitus. Normal range of motion. Normal strength. Legs:    Skin:     General: Skin is warm and dry. Findings: No rash. Neurological:      Mental Status: She is alert and oriented to person, place, and time. GCS: GCS eye subscore is 4. GCS verbal subscore is 5. GCS motor subscore is 6. Psychiatric:         Speech: Speech normal.         Behavior: Behavior normal. Behavior is cooperative. ASSESSMENT/PLAN  1. Abdominal cramping- no N/V/D, h/ohysterectomy, no dysuria, blood in stool,   -GERD vs IBS  -Continue Prevacid as prescribed  -Increase water intake  -Take an OTc magnesium supplement    2. Bilateral hip pain- pt is morbidly obese, tenderness with palpation, no swelling, erythema at sites  -Trochanteric Bursitis vs Hip Flexor strain   -Take Ibuprofen with food as needed  -Apply analgesic cream  -will consider referral to Orthopedics  -given exercises to perform    3. Morbid obesity with BMI of 50.0-59.9, adult ([facility] [address]  -The patient is advised to begin progressive daily aerobic exercise program, follow a low fat, low cholesterol diet, attempt to lose weight, reduce exposure to stress, improve dietary compliance and continue current medications. 4. Essential hypertension  -continue valsartan 80 mg daily      Return in about 6 weeks (around 7/22/2021). Reviewed patient's pertinent medical history, relevant laboratory results, imaging studies, and health maintenance. Medications have been reviewed and discussed with the patient, refills otherwise up-to-date. Discussed the importance of adhering to current medication regimen.  Advised:  (1) continue to work on eating a healthy balanced diet; (2) stay active by exercising within your personal limits. Patient was advised to keep future appointments with their respective specialty care team(s). Questions and concerns addressed, care plan reviewed and patient is agreeable with the care plan following today's visit.     --ZELDA Pandya - CNP on 6/15/2021 at 7:23 AM

## 2021-06-11 ENCOUNTER — HOSPITAL ENCOUNTER (EMERGENCY)
Age: 41
Discharge: HOME | End: 2021-06-11
Payer: COMMERCIAL

## 2021-06-11 ENCOUNTER — APPOINTMENT (OUTPATIENT)
Dept: CT IMAGING | Age: 41
End: 2021-06-11
Payer: COMMERCIAL

## 2021-06-11 VITALS
RESPIRATION RATE: 16 BRPM | OXYGEN SATURATION: 96 % | BODY MASS INDEX: 51.91 KG/M2 | TEMPERATURE: 98.3 F | HEIGHT: 63 IN | DIASTOLIC BLOOD PRESSURE: 81 MMHG | WEIGHT: 293 LBS | HEART RATE: 87 BPM | SYSTOLIC BLOOD PRESSURE: 135 MMHG

## 2021-06-11 DIAGNOSIS — K76.0 HEPATIC STEATOSIS: ICD-10-CM

## 2021-06-11 DIAGNOSIS — H10.33 ACUTE CONJUNCTIVITIS OF BOTH EYES, UNSPECIFIED ACUTE CONJUNCTIVITIS TYPE: ICD-10-CM

## 2021-06-11 DIAGNOSIS — R10.2 VAGINAL PAIN: Primary | ICD-10-CM

## 2021-06-11 LAB
A/G RATIO: 1.1 (ref 1.1–2.2)
ALBUMIN SERPL-MCNC: 3.9 G/DL (ref 3.4–5)
ALP BLD-CCNC: 109 U/L (ref 40–129)
ALT SERPL-CCNC: 38 U/L (ref 10–40)
ANION GAP SERPL CALCULATED.3IONS-SCNC: 12 MMOL/L (ref 3–16)
AST SERPL-CCNC: 45 U/L (ref 15–37)
BACTERIA WET PREP: NORMAL
BASOPHILS ABSOLUTE: 0 K/UL (ref 0–0.2)
BASOPHILS RELATIVE PERCENT: 0.3 %
BILIRUB SERPL-MCNC: 0.3 MG/DL (ref 0–1)
BILIRUBIN URINE: NEGATIVE
BLOOD, URINE: NEGATIVE
BUN BLDV-MCNC: 9 MG/DL (ref 7–20)
CALCIUM SERPL-MCNC: 8.6 MG/DL (ref 8.3–10.6)
CHLORIDE BLD-SCNC: 102 MMOL/L (ref 99–110)
CLARITY: CLEAR
CLUE CELLS: NORMAL
CO2: 23 MMOL/L (ref 21–32)
COLOR: YELLOW
CREAT SERPL-MCNC: 0.6 MG/DL (ref 0.6–1.1)
EOSINOPHILS ABSOLUTE: 0.2 K/UL (ref 0–0.6)
EOSINOPHILS RELATIVE PERCENT: 4.5 %
EPITHELIAL CELLS WET PREP: NORMAL
GFR AFRICAN AMERICAN: >60
GFR NON-AFRICAN AMERICAN: >60
GLOBULIN: 3.7 G/DL
GLUCOSE BLD-MCNC: 147 MG/DL (ref 70–99)
GLUCOSE URINE: NEGATIVE MG/DL
HCG(URINE) PREGNANCY TEST: NEGATIVE
HCT VFR BLD CALC: 38.4 % (ref 36–48)
HEMOGLOBIN: 12.5 G/DL (ref 12–16)
KETONES, URINE: NEGATIVE MG/DL
LEUKOCYTE ESTERASE, URINE: NEGATIVE
LYMPHOCYTES ABSOLUTE: 2.1 K/UL (ref 1–5.1)
LYMPHOCYTES RELATIVE PERCENT: 40.7 %
MCH RBC QN AUTO: 27.6 PG (ref 26–34)
MCHC RBC AUTO-ENTMCNC: 32.6 G/DL (ref 31–36)
MCV RBC AUTO: 84.7 FL (ref 80–100)
MICROSCOPIC EXAMINATION: NORMAL
MONOCYTES ABSOLUTE: 0.3 K/UL (ref 0–1.3)
MONOCYTES RELATIVE PERCENT: 6.3 %
NEUTROPHILS ABSOLUTE: 2.5 K/UL (ref 1.7–7.7)
NEUTROPHILS RELATIVE PERCENT: 48.2 %
NITRITE, URINE: NEGATIVE
PDW BLD-RTO: 14.7 % (ref 12.4–15.4)
PH UA: 6 (ref 5–8)
PLATELET # BLD: 216 K/UL (ref 135–450)
PMV BLD AUTO: 8.5 FL (ref 5–10.5)
POTASSIUM REFLEX MAGNESIUM: 4 MMOL/L (ref 3.5–5.1)
PROTEIN UA: NEGATIVE MG/DL
RBC # BLD: 4.53 M/UL (ref 4–5.2)
RBC WET PREP: NORMAL
SODIUM BLD-SCNC: 137 MMOL/L (ref 136–145)
SOURCE WET PREP: NORMAL
SPECIFIC GRAVITY UA: 1.02 (ref 1–1.03)
TOTAL PROTEIN: 7.6 G/DL (ref 6.4–8.2)
TRICHOMONAS PREP: NORMAL
URINE REFLEX TO CULTURE: NORMAL
URINE TYPE: NORMAL
UROBILINOGEN, URINE: 1 E.U./DL
WBC # BLD: 5.2 K/UL (ref 4–11)
WBC WET PREP: NORMAL
YEAST WET PREP: NORMAL

## 2021-06-11 PROCEDURE — 99284 EMERGENCY DEPT VISIT MOD MDM: CPT

## 2021-06-11 PROCEDURE — 87491 CHLMYD TRACH DNA AMP PROBE: CPT

## 2021-06-11 PROCEDURE — 80053 COMPREHEN METABOLIC PANEL: CPT

## 2021-06-11 PROCEDURE — 74176 CT ABD & PELVIS W/O CONTRAST: CPT

## 2021-06-11 PROCEDURE — 84703 CHORIONIC GONADOTROPIN ASSAY: CPT

## 2021-06-11 PROCEDURE — 85025 COMPLETE CBC W/AUTO DIFF WBC: CPT

## 2021-06-11 PROCEDURE — 36415 COLL VENOUS BLD VENIPUNCTURE: CPT

## 2021-06-11 PROCEDURE — 81003 URINALYSIS AUTO W/O SCOPE: CPT

## 2021-06-11 PROCEDURE — 87591 N.GONORRHOEAE DNA AMP PROB: CPT

## 2021-06-11 PROCEDURE — 87210 SMEAR WET MOUNT SALINE/INK: CPT

## 2021-06-11 RX ORDER — ERYTHROMYCIN 5 MG/G
OINTMENT OPHTHALMIC
Qty: 1 TUBE | Refills: 0 | Status: SHIPPED | OUTPATIENT
Start: 2021-06-11

## 2021-06-11 RX ORDER — IBUPROFEN 600 MG/1
600 TABLET ORAL EVERY 6 HOURS PRN
Qty: 30 TABLET | Refills: 0 | Status: SHIPPED | OUTPATIENT
Start: 2021-06-11 | End: 2022-04-07

## 2021-06-11 ASSESSMENT — PAIN DESCRIPTION - PAIN TYPE
TYPE: ACUTE PAIN

## 2021-06-11 ASSESSMENT — PAIN DESCRIPTION - LOCATION
LOCATION: VAGINA

## 2021-06-11 ASSESSMENT — PAIN SCALES - GENERAL
PAINLEVEL_OUTOF10: 2
PAINLEVEL_OUTOF10: 1
PAINLEVEL_OUTOF10: 2

## 2021-06-11 ASSESSMENT — ENCOUNTER SYMPTOMS
EYE ITCHING: 1
EYE REDNESS: 1
BACK PAIN: 0
SORE THROAT: 0
NAUSEA: 0
EYE DISCHARGE: 1
VOMITING: 0
ABDOMINAL PAIN: 0
SHORTNESS OF BREATH: 0

## 2021-06-11 ASSESSMENT — PAIN DESCRIPTION - FREQUENCY: FREQUENCY: INTERMITTENT

## 2021-06-11 ASSESSMENT — PAIN DESCRIPTION - DESCRIPTORS
DESCRIPTORS: THROBBING
DESCRIPTORS: THROBBING
DESCRIPTORS: DISCOMFORT

## 2021-06-11 NOTE — ED TRIAGE NOTES
Pt arrived to dept via private vehicle. Pt c/o throbbing vaginal pain with a sudden onset yesterday at 1 pm until 10 pm. Pt reports that the pain came back this morning for a few hours. Pt reports that the pain was worse with sitting. Pt denies any vaginal pain at this time. Pt also reports bilateral eye itching and crustiness x 3 days. Pt awake, alert and oriented x 3. Skin warm and dry/normal color for ethnicity. Resp easy and unlabored. Pt placed in gown and on cardiac monitor. Call light in reach. Will continue to monitor.

## 2021-06-11 NOTE — ED PROVIDER NOTES
[address] [facility]      Pt Name: Leonard Boston  MRN: [id number]  [place] 1980  Date of evaluation: 6/11/2021  Provider: Hernán Holm PA-C    This patient was not seen and evaluated by the attending physician No att. providers found. CHIEF COMPLAINT       Chief Complaint   Patient presents with    Vaginal Pain     pt states yesterday she started with \"internal vaginal pain\" reports constant pain from 1p-10p. pain came back this morning. denies concern for std.  Eye Problem     concerned for pink eye         HISTORYOF PRESENT ILLNESS  (Location/Symptom, Timing/Onset, Context/Setting, Quality, Duration, Modifying Factors, Severity.)   Leonard Boston is a 41 y.o. female who presents to the emergency department complaining of vaginal pain. Pain has been intermittent since yesterday around 1 PM. She describes pain as throbbing deep internally. Nothing makes it better or worse. Yesterday it lasted several hours before it finally resolved around 10. It went away all night but then this morning it came back. She denies any discharge or urinary symptoms. Denies any abdominal pain or back pain. She has not had intercourse for 2 years, she denies concern for STD. She has had a total hysterectomy. She also states for the past 3 to 4 days she has had eye redness, itching and drainage with crusting in the morning and she would like to be treated for pinkeye. Nursing Notes were reviewedand I agree. REVIEW OF SYSTEMS    (2-9 systems for level 4, 10 or more forlevel 5)     Review of Systems   Constitutional: Negative for chills and fever. HENT: Negative for sore throat. Eyes: Positive for discharge, redness and itching. Respiratory: Negative for shortness of breath. Cardiovascular: Negative for chest pain. Gastrointestinal: Negative for abdominal pain, nausea and vomiting. Genitourinary: Positive for vaginal pain.  Negative for difficulty urinating, dysuria, frequency, vaginal bleeding and vaginal discharge. Musculoskeletal: Negative for back pain. Skin: Negative for rash. Neurological: Negative for light-headedness and headaches. Psychiatric/Behavioral: The patient is not nervous/anxious. All other systems reviewed and are negative. Except as noted above the remainder ofthe review of systems was reviewed and negative. PAST MEDICALHISTORY         Diagnosis Date    Allergic rhinitis     Anemia     Anxiety and depression     Dermatomyositis ([address] 2008    Eczema     Fibromyalgia     Fibromyalgia     GERD (gastroesophageal reflux disease)     Headache(784.0)     History of blood transfusion 2009    also two in 2014     HTN (hypertension)     Hx of blood clots 2014    PE and DVT    Irritable bowel syndrome     Morbid obesity with BMI of 50.0-59.9, adult (HCC)     Osteoarthritis     PCOS (polycystic ovarian syndrome)     Prediabetes     Sleep apnea     Vaginal high risk HPV DNA test positive 05/2016    Wears glasses        SURGICAL HISTORY           Procedure Laterality Date    COLONOSCOPY N/A 01/13/2021    COLONOSCOPY performed by Brad Vogel MD at [address]      crossed eyes    HYSTERECTOMY  2014    full-ovaries gone    MUSCLE BIOPSY      left thigh    OVARIAN CYST REMOVAL  1998    SKIN BIOPSY  2021    birth merritt removal left arm    VENA CAVA FILTER PLACEMENT      inserted in 2014 and removed in 2020       CURRENT MEDICATIONS       Previous Medications    AMLODIPINE (NORVASC) 10 MG TABLET    TAKE 1 TABLET BY MOUTH EVERY DAY    ATORVASTATIN (LIPITOR) 20 MG TABLET    Take 1 tablet by mouth daily    DULOXETINE (CYMBALTA) 60 MG EXTENDED RELEASE CAPSULE    TAKE ONE CAPSULE BY MOUTH DAILY    EPINEPHRINE (EPIPEN) 0.3 MG/0.3ML SOAJ INJECTION    Inject 1 pen into the thigh for emergency treatment of allergic reactions, may repeat for severe persistent reactions.     FLUTICASONE (FLONASE) 50 MCG/ACT NASAL SPRAY    2 sprays by Nasal route daily    LANSOPRAZOLE (PREVACID) 30 MG DELAYED RELEASE CAPSULE    Take 1 capsule by mouth daily    LEVOCETIRIZINE (XYZAL) 5 MG TABLET    Take one tablet by mouth daily    MONTELUKAST (SINGULAIR) 10 MG TABLET    Take 1 tablet by mouth daily    POLYETHYLENE GLYCOL (MIRALAX) 17 GM/SCOOP POWD POWDER    Take 17 g by mouth daily    UREA (CARMOL) 40 % CREAM    Apply 40 oz topically daily    VALSARTAN (DIOVAN) 80 MG TABLET    Take 1 tablet by mouth daily    VITAMIN D (ERGOCALCIFEROL) 1.25 MG (74535 UT) CAPS CAPSULE    Take 1 capsule by mouth once a week       ALLERGIES     Latex, Corn-containing products, Imitrex [sumatriptan], Other, Peanut-containing drug products, Soy allergy, Tree nut [macadamia nut oil], Wheat bran, Mixed grasses, and Tramadol    FAMILY HISTORY           Problem Relation Age of Onset    High Blood Pressure Mother     Other Father         gout    Cancer Father         prostate    Breast Cancer Maternal Aunt 47    Asthma Maternal Cousin     Rheum Arthritis Neg Hx     Osteoarthritis Neg Hx     Diabetes Neg Hx     Heart Failure Neg Hx     High Cholesterol Neg Hx     Hypertension Neg Hx     Migraines Neg Hx     Ovarian Cancer Neg Hx     Rashes/Skin Problems Neg Hx     Seizures Neg Hx     Stroke Neg Hx     Thyroid Disease Neg Hx      Family Status   Relation Name Status    Mother  Alive    Father      MAunt      MCousin  (Not Specified)    Neg Hx  (Not Specified)        SOCIAL HISTORY    reports that she has never smoked. She has never used smokeless tobacco. She reports current alcohol use. She reports that she does not use drugs.     PHYSICAL EXAM    (up to 7 for level 4, 8 or more for level 5)     ED Triage Vitals [21 1322]   BP Temp Temp Source Pulse Resp SpO2 Height Weight   139/86 98.3 °F (36.8 °C) Temporal 93 18 97 % 5' 3\" (1.6 m) (!) 334 lb 10.5 oz (151.8 kg)       Physical Exam  Vitals and nursing note reviewed. Constitutional:       General: She is not in acute distress. Appearance: She is well-developed. HENT:      Head: Normocephalic and atraumatic. Eyes:      Comments: Conjunctival injection bilaterally, clear drainage and some crusting   Pulmonary:      Effort: Pulmonary effort is normal. No respiratory distress. Genitourinary:     Vagina: No foreign body. No vaginal discharge, erythema, tenderness, bleeding, lesions or prolapsed vaginal walls. Comments: Cervix is surgically absent  Musculoskeletal:         General: Normal range of motion. Cervical back: Neck supple. Skin:     General: Skin is warm and dry. Neurological:      Mental Status: She is alert and oriented to person, place, and time. Psychiatric:         Behavior: Behavior normal.            DIAGNOSTIC RESULTS     RADIOLOGY:   Non-plain film images such as CT, Ultrasound and MRI are read by the radiologist.Plain radiographic images are visualized and preliminarily interpreted by La Green PA-C with the below findings:        Interpretation per the Radiologist below, if available at the time of this note:    CT ABDOMEN PELVIS WO CONTRAST Additional Contrast? None   Final Result   1. Marked diffuse hepatic steatosis new from 2019.   2. No nephrolithiasis. 3. No bowel obstruction. 4. No acute infective or inflammatory process. LABS:  Labs Reviewed   COMPREHENSIVE METABOLIC PANEL W/ REFLEX TO MG FOR LOW K - Abnormal; Notable for the following components:       Result Value    Glucose 147 (*)     AST 45 (*)     All other components within normal limits    Narrative:     Performed at:  [facility]  [address] [place], [place] [address]   Phone [phone]   WET PREP, GENITAL    Narrative:     Performed at:  [facility]  [address] [place], [place] [address]   Phone [phone]   C. TRACHOMATIS N.GONORRHOEAE DNA   URINE RT

## 2021-06-11 NOTE — ED NOTES
Discharge and education instructions reviewed. Patient verbalized understanding, teach-back successful. Patient denied questions at this time. No acute distress noted. Patient instructed to follow-up as noted - return to emergency department if symptoms worsen. Patient verbalized understanding. Discharged per EDMD with discharge instructions.           Omega Coleman RN  06/11/21 [id number]

## 2021-06-14 LAB
C TRACH DNA GENITAL QL NAA+PROBE: NEGATIVE
N. GONORRHOEAE DNA: NEGATIVE

## 2021-06-15 PROBLEM — M25.552 BILATERAL HIP PAIN: Status: ACTIVE | Noted: 2021-06-15

## 2021-06-15 PROBLEM — R10.9 ABDOMINAL CRAMPING: Status: ACTIVE | Noted: 2021-06-15

## 2021-06-15 PROBLEM — M25.551 BILATERAL HIP PAIN: Status: ACTIVE | Noted: 2021-06-15

## 2021-07-12 RX ORDER — ERGOCALCIFEROL 1.25 MG/1
CAPSULE ORAL
Qty: 12 CAPSULE | Refills: 0 | Status: SHIPPED | OUTPATIENT
Start: 2021-07-12 | End: 2021-10-04

## 2021-07-12 NOTE — TELEPHONE ENCOUNTER
Medication:   Requested Prescriptions     Pending Prescriptions Disp Refills    vitamin D (ERGOCALCIFEROL) 1.25 MG (45711 UT) CAPS capsule [Pharmacy Med Name: VIT D2 (ERGOCAL) 1.25MG(50,000U) CP] 12 capsule 0     Sig: TAKE ONE CAPSULE BY MOUTH ONCE WEEKLY        Last Filled:      Patient Phone Number: [phone] (home) [phone] (work)    Last appt: 6/10/2021   Next appt: 7/20/2021    Last OARRS: No flowsheet data found.

## 2021-07-21 ENCOUNTER — TELEPHONE (OUTPATIENT)
Dept: ORTHOPEDIC SURGERY | Age: 41
End: 2021-07-21

## 2021-10-02 ENCOUNTER — HOSPITAL ENCOUNTER (EMERGENCY)
Age: 41
Discharge: HOME | End: 2021-10-02
Payer: COMMERCIAL

## 2021-10-02 VITALS
DIASTOLIC BLOOD PRESSURE: 82 MMHG | HEART RATE: 82 BPM | RESPIRATION RATE: 16 BRPM | SYSTOLIC BLOOD PRESSURE: 127 MMHG | OXYGEN SATURATION: 96 % | TEMPERATURE: 97.9 F

## 2021-10-02 DIAGNOSIS — H10.9 CONJUNCTIVITIS OF BOTH EYES, UNSPECIFIED CONJUNCTIVITIS TYPE: ICD-10-CM

## 2021-10-02 DIAGNOSIS — L02.31 ABSCESS OF GLUTEAL REGION: Primary | ICD-10-CM

## 2021-10-02 PROCEDURE — 99283 EMERGENCY DEPT VISIT LOW MDM: CPT

## 2021-10-02 PROCEDURE — 2500000003 HC RX 250 WO HCPCS: Performed by: PHYSICIAN ASSISTANT

## 2021-10-02 RX ORDER — TETRAHYDROZOLINE HCL 0.05 %
1 DROPS OPHTHALMIC (EYE) 3 TIMES DAILY
Qty: 10 ML | Refills: 4 | Status: SHIPPED | OUTPATIENT
Start: 2021-10-02 | End: 2022-05-19

## 2021-10-02 RX ORDER — LIDOCAINE HYDROCHLORIDE AND EPINEPHRINE BITARTRATE 20; .01 MG/ML; MG/ML
20 INJECTION, SOLUTION SUBCUTANEOUS ONCE
Status: COMPLETED | OUTPATIENT
Start: 2021-10-02 | End: 2021-10-02

## 2021-10-02 RX ORDER — IBUPROFEN 600 MG/1
600 TABLET ORAL EVERY 6 HOURS PRN
Qty: 30 TABLET | Refills: 0 | Status: SHIPPED | OUTPATIENT
Start: 2021-10-02 | End: 2022-04-07

## 2021-10-02 RX ORDER — METRONIDAZOLE 500 MG/1
500 TABLET ORAL 2 TIMES DAILY
Qty: 14 TABLET | Refills: 0 | Status: SHIPPED | OUTPATIENT
Start: 2021-10-02 | End: 2021-10-09

## 2021-10-02 RX ORDER — CLINDAMYCIN HYDROCHLORIDE 150 MG/1
450 CAPSULE ORAL 3 TIMES DAILY
Qty: 63 CAPSULE | Refills: 0 | Status: SHIPPED | OUTPATIENT
Start: 2021-10-02 | End: 2021-10-09

## 2021-10-02 RX ADMIN — LIDOCAINE HYDROCHLORIDE,EPINEPHRINE BITARTRATE 20 ML: 20; .01 INJECTION, SOLUTION INFILTRATION; PERINEURAL at 13:05

## 2021-10-02 ASSESSMENT — PAIN DESCRIPTION - DESCRIPTORS: DESCRIPTORS: DISCOMFORT

## 2021-10-02 ASSESSMENT — PAIN SCALES - GENERAL
PAINLEVEL_OUTOF10: 3
PAINLEVEL_OUTOF10: 5

## 2021-10-02 ASSESSMENT — ENCOUNTER SYMPTOMS
ROS SKIN COMMENTS: SEE HPI
NAUSEA: 0
VOMITING: 0

## 2021-10-02 ASSESSMENT — PAIN DESCRIPTION - PAIN TYPE: TYPE: ACUTE PAIN

## 2021-10-02 ASSESSMENT — PAIN DESCRIPTION - LOCATION: LOCATION: RECTUM

## 2021-10-02 NOTE — ED PROVIDER NOTES
[address]  [address] [address]  Dept: [phone]  Loc: [phone]  eMERGENCYdEPARTMENT eNCOUnter      Pt Name: Omega Flowers  MRN: [id number]  Tay 1980  Date of evaluation: 10/2/2021  Provider:Julianne Huerta PA-C    CHIEF COMPLAINT       Chief Complaint   Patient presents with    Abscess     pt reports anal abscess x 2 days. hx of one. CRITICAL CARE TIME   Total Critical Care time was 0 minutes, excluding separately reportable procedures. There was a high probability of clinically significant/life threatening deterioration in the patient's condition which required my urgentintervention. HISTORY OF PRESENT ILLNESS  (Location/Symptom, Timing/Onset, Context/Setting, Quality, Duration,Modifying Factors, Severity.)   Omega Flowers is a 35 y.o. female who presents to the emergency department via private vehicle complaining of abscess to left buttocks. Patient had pain, swelling and gradual enlarging lump to right gluteal region over the past 2 days. Pain is a 3/10. Pain worsened with direct pressure. She has any fevers, chills, nausea, vomiting. Additionally, patient complaining of bilateral eye redness, crusting in the mornings and tearing. Patient states symptoms been ongoing for the past week. She is concerned for conjunctivitis. Denies any visual disturbance, pain, swelling around eyes, upper respiratory complaints. Nursing Notes were reviewedand agreed with or any disagreements were addressed in the HPI. REVIEW OF SYSTEMS    (2-9 systems for level 4, 10 or more for level 5)     Review of Systems   Constitutional: Negative for chills and fever. HENT: Negative. Gastrointestinal: Negative for nausea and vomiting. Genitourinary: Negative. Musculoskeletal: Negative for arthralgias and myalgias. Skin:        See HPI   Neurological: Negative.     Psychiatric/Behavioral: Negative for behavioral problems and confusion. Except as noted above the remainder of the review of systems was reviewed and negative.        PAST MEDICAL HISTORY         Diagnosis Date    Allergic rhinitis     Anemia     Anxiety and depression     Dermatomyositis ([address] 2008    Eczema     Fibromyalgia     Fibromyalgia     GERD (gastroesophageal reflux disease)     Headache(784.0)     History of blood transfusion 2009    also two in 2014     HTN (hypertension)     Hx of blood clots 2014    PE and DVT    Irritable bowel syndrome     Morbid obesity with BMI of 50.0-59.9, adult (HCC)     Osteoarthritis     PCOS (polycystic ovarian syndrome)     Prediabetes     Sleep apnea     Vaginal high risk HPV DNA test positive 05/2016    Wears glasses        SURGICAL HISTORY           Procedure Laterality Date    COLONOSCOPY N/A 01/13/2021    COLONOSCOPY performed by Nusrat Logan MD at [address] [place] [address] [address]      crossed eyes    HYSTERECTOMY  2014    full-ovaries gone    MUSCLE BIOPSY      left thigh    OVARIAN CYST REMOVAL  1998    SKIN BIOPSY  2021    birth merritt removal left arm    VENA CAVA FILTER PLACEMENT      inserted in 2014 and removed in 2020       CURRENT MEDICATIONS     [unfilled]    ALLERGIES     Latex, Corn-containing products, Imitrex [sumatriptan], Other, Peanut-containing drug products, Soy allergy, Tree nut [macadamia nut oil], Wheat bran, Mixed grasses, and Tramadol    FAMILY HISTORY           Problem Relation Age of Onset    High Blood Pressure Mother     Other Father         gout    Cancer Father         prostate    Breast Cancer Maternal Aunt 47    Asthma Maternal Cousin     Rheum Arthritis Neg Hx     Osteoarthritis Neg Hx     Diabetes Neg Hx     Heart Failure Neg Hx     High Cholesterol Neg Hx     Hypertension Neg Hx     Migraines Neg Hx     Ovarian Cancer Neg Hx     Rashes/Skin Problems Neg Hx     Seizures Neg Hx     Stroke Neg Hx     Thyroid Disease Neg Hx      Family Status   Relation Name Status    Mother  Alive    Father     Maci Davila      Batsheva  (Not Specified)    Neg Hx  (Not Specified)        SOCIAL HISTORY      reports that she has never smoked. She has never used smokeless tobacco. She reports current alcohol use. She reports that she does not use drugs. PHYSICAL EXAM    (up to 7 for level 4, 8 or more for level 5)     ED Triage Vitals [10/02/21 1117]   Enc Vitals Group      /82      Pulse 82      Resp 16      Temp 97.9 °F (36.6 °C)      Temp Source Temporal      SpO2 96 %      Weight       Height       Head Circumference       Peak Flow       Pain Score       Pain Loc       Pain Edu? Excl. in [address]? Physical Exam  Vitals reviewed. Constitutional:       Appearance: Normal appearance. HENT:      Head: Normocephalic and atraumatic. Eyes:      Comments: Extraocular is intact equal bilaterally, no pain with extraocular motions, conjunctiva/sclera mildly injected, minimal tearing noted, no significant mucoid drainage, no periorbital erythema/edema   Pulmonary:      Effort: Pulmonary effort is normal. No respiratory distress. Genitourinary:      Skin:     General: Skin is warm. Neurological:      General: No focal deficit present. Mental Status: She is alert and oriented to person, place, and time.    Psychiatric:         Mood and Affect: Mood normal.         Behavior: Behavior normal.           DIAGNOSTIC RESULTS     EKG: All EKG's are interpreted by the Emergency Department Physician who either signs or Co-signs this chart in the absence of a cardiologist.    RADIOLOGY:   Non-plain film images such as CT, Ultrasound and MRI are read by the radiologist. Plain radiographic images are visualized and preliminarilyinterpreted by the emergency physician with the below findings:    Interpretation per the Radiologist below,if available at the time of this note:    No orders to display         LABS:  Labs Reviewed - No data to display    All other labs were within normal range or not returned as of this dictation. EMERGENCY DEPARTMENT COURSE and DIFFERENTIAL DIAGNOSIS/MDM:   Vitals:    Vitals:    10/02/21 1117   BP: 127/82   Pulse: 82   Resp: 16   Temp: 97.9 °F (36.6 °C)   TempSrc: Temporal   SpO2: 96%       MDM     Patient presents ED with HPI noted above. Patient with right gluteal abscess. Abscess does not abut anal/rectal region. Abscess I&D does not below. Patient placed on clindamycin Flagyl. She is educated on return precautions. Will need wound check in 2 days. She was given ibuprofen for pain. Patient also with bilateral conjunctivitis. I favor viral conjunctivitis. Given systemic antibiotics no additional Topamax will be prescribed. Patient was prescribed Visine drops. She was educated on return precautions. Patient follow-up with PCP in 2 days for reevaluation as previously stated. She was discharged home in stable condition. The patient tolerated their visit well. I saw the patient independently with physician available for consultation as needed. I have discussed the findings of today's workup with the patient and addressed the patient's questions and concerns. Important warning signs as well as new or worsening symptoms which would necessitate immediate return to the ED were discussed. The plan is to discharge from the ED at this time, and the patient is in stable condition. The patient acknowledged understanding is agreeable with this plan. CONSULTS:  None    PROCEDURES:  Procedures    FINAL IMPRESSION      1. Abscess of gluteal region    2.  Conjunctivitis of both eyes, unspecified conjunctivitis type          DISPOSITION/PLAN   [unfilled]    PATIENT REFERRED TO:  [facility] Emergency Department  [address] [id number]  [phone]  Go to   If symptoms worsen    Vernette Opitz, APRN - CNP  [address] [address]  [phone]    Call in 2 days  For follow up, reevaluation and wound check.       DISCHARGE MEDICATIONS:  New Prescriptions    CLINDAMYCIN (CLEOCIN) 150 MG CAPSULE    Take 3 capsules by mouth 3 times daily for 7 days    IBUPROFEN (ADVIL;MOTRIN) 600 MG TABLET    Take 1 tablet by mouth every 6 hours as needed for Pain    METRONIDAZOLE (FLAGYL) 500 MG TABLET    Take 1 tablet by mouth 2 times daily for 7 days    TETRAHYDROZOLINE (VISINE) 0.05 % OPHTHALMIC SOLUTION    Place 1 drop into both eyes 3 times daily       (Please note that portions of this note were completed with a voice recognition program.  Efforts were made to edit the dictations but occasionally words are mis-transcribed.)    [id number] [facility], PA-C          [address], [place]  10/02/21 7556

## 2021-10-02 NOTE — ED TRIAGE NOTES
Patient reports hx of rectal abscess 21 years ago. Recent issues with constipation started having pain and notice a large knot. Reports no fevers, bleeding or drainage.

## 2021-10-04 RX ORDER — ERGOCALCIFEROL 1.25 MG/1
CAPSULE ORAL
Qty: 12 CAPSULE | Refills: 0 | Status: SHIPPED | OUTPATIENT
Start: 2021-10-04 | End: 2022-02-14 | Stop reason: SDUPTHER

## 2021-10-30 DIAGNOSIS — J30.9 ALLERGIC RHINITIS, UNSPECIFIED SEASONALITY, UNSPECIFIED TRIGGER: ICD-10-CM

## 2021-10-30 RX ORDER — MONTELUKAST SODIUM 10 MG/1
10 TABLET ORAL NIGHTLY
Qty: 30 TABLET | Refills: 3 | Status: SHIPPED
Start: 2021-10-30 | End: 2022-02-04

## 2022-02-01 ENCOUNTER — TELEPHONE (OUTPATIENT)
Dept: PRIMARY CARE CLINIC | Age: 42
End: 2022-02-01

## 2022-02-01 NOTE — TELEPHONE ENCOUNTER
Called pt to inquire if pt would like to come into office today or Wednesday  due to predicted bad weather.  Pt did not answer LVM asked for return call back to office

## 2022-02-03 ENCOUNTER — TELEPHONE (OUTPATIENT)
Dept: PRIMARY CARE CLINIC | Age: 42
End: 2022-02-03

## 2022-02-03 NOTE — PROGRESS NOTES
Prisca Aldrich (:  1980) is a Established patient, here for evaluation of the following:    Assessment & Plan   Below is the assessment and plan developed based on review of pertinent history, physical exam, labs, studies, and medications. 1. Aching headache- hypertension (out of medications for 1 week) vs sinus congestion (h/o allergic rhinitis) cerebral event due to hypertension, c/o memory changes vs Migraine  -     CT HEAD WO CONTRAST; Future  -     [facility]  2. Essential hypertension  -Resume -  valsartan (DIOVAN) 80 MG tablet; TAKE ONE TABLET BY MOUTH DAILY, Disp-90 tablet, R-1Normal  - Resume-   amLODIPine (NORVASC) 10 MG tablet; TAKE 1 TABLET BY MOUTH EVERY DAY, Disp-90 tablet, R-1Normal  -     Comprehensive Metabolic Panel; Future  3. Chronic diastolic CHF (congestive heart failure) (HCC)  -Resume antihypertensive medications  -Attempt to lose weight  4. Memory changes- HTN urgency vs cerebral event due to HTN   -     CT HEAD WO CONTRAST; Future  -     [facility]  5. Screening for deficiency anemia  -     CBC Auto Differential; Future  6. Hyperlipidemia, unspecified hyperlipidemia type  -     atorvastatin (LIPITOR) 20 MG tablet; TAKE ONE TABLET BY MOUTH DAILY, Disp-90 tablet, R-1Normal  -     Lipid Panel; Future  7. Prediabetes  -     Hemoglobin A1C; Future  8. Encounter for screening mammogram for malignant neoplasm of breast  -     MAEVE DIGITAL SCREEN W OR WO CAD BILATERAL; Future  9. Screening for thyroid disorder  -     TSH with Reflex; Future  -     T4, Free; Future  10. Allergic rhinitis, unspecified seasonality, unspecified trigger  -     montelukast (SINGULAIR) 10 MG tablet; Take 1 tablet by mouth nightly, Disp-90 tablet, R-3Normal  -     fluticasone (FLONASE) 50 MCG/ACT nasal spray; 2 sprays by Nasal route daily, Disp-1 each, R-3Normal  11.  Gastroesophageal reflux disease without esophagitis  - lansoprazole (PREVACID) 30 MG delayed release capsule; Take 1 capsule by mouth daily, Disp-90 capsule, R-1Normal    No follow-ups on file. Daniel Gruber is a 35 y.o. female evaluated via telephone on 2/4/2022. Consent:  She and/or health care decision maker is aware that that she may receive a bill for this telephone service, which includes applicable co-pays, depending on her insurance coverage, and has provided verbal consent to proceed. Documentation:  I communicated with the patient and/or health care decision maker about headaches. Details of this discussion including any medical advice provided:       I affirm this is a Patient Initiated Episode with a Patient who has not had a related appointment within my department in the past 7 days or scheduled within the next 24 hours. Patient identification was verified at the start of the visit: Yes    Total Time: minutes: 25 minutes    Daniel Gruber was evaluated through a synchronous (real-time) audio encounter. The patient was located at home in a state where the provider was licensed to provide care. Note: not billable if this call serves to triage the patient into an appointment for the relevant concern    Healthcare Maintenance:  Covid vaccine: 2 of 3  Influenza vaccine: obtain at local pharmacy  Screen breast cancer: past due  Screen cervical cancer: hysterectomy  Subjective   Headache   Associated symptoms include dizziness. Pertinent negatives include no abdominal pain, ear pain, fever, nausea or vomiting. Right side of head, small knot, near ear, has moved down. The knot comes and goes. Denies pain, redness, or rash. Any type of stretching, moving causes headache. Tylenol is somewhat effective. Headache has been occurring for several months, has been out of antihypertensive medication for 1 month. Denies visual changes, hearing deficits. During headache feels right side of head is swelling and dizziness.  Reports difficulty with memory. Denies trauma. This headache is different than previous migraines.     HypertensionThis is a chronic problem. The current episode started more than 1 year ago. Associated symptoms include chest pain. Pertinent negatives include no headaches, malaise/fatigue, palpitations, peripheral edema or shortness of breath. Agents associated with hypertension include NSAIDs. Risk factors for coronary artery disease include family history, obesity and dyslipidemia. Past treatments include angiotensin blockers. The current treatment provides mild improvement. Compliance problems include diet and exercise. Dee Hathaway is no history of kidney disease, CVA, left ventricular hypertrophy or PVD.      Gastroesophageal Reflux She complains of chest pain, heartburn, nausea and water brash. She reports no abdominal pain. This is a chronic problem. The current episode started more than 1 year ago. The problem occurs occasionally. The heartburn wakes her from sleep. The heartburn does not limit her activity. The heartburn doesn't change with position. The symptoms are aggravated by certain foods. Risk factors include obesity and NSAIDs. She has tried a PPI and a histamine-2 antagonist for the symptoms. The treatment provided mild relief. Past procedures do not include an abdominal ultrasound or a UGI. Has persistent nausea, Gerd controlled with Prevacid every morning and Famotidine every night. Intermittent abdominal cramping and epigastric discomfort. Reports the cramping is itnermitent, hands, abdomen, legs and feet. Review of Systems   Constitutional: Negative for activity change and fever. HENT: Negative for congestion, ear discharge, ear pain, nosebleeds and postnasal drip. Eyes: Negative for visual disturbance. Respiratory: Negative for chest tightness and shortness of breath. Cardiovascular: Negative for chest pain, palpitations and leg swelling.    Gastrointestinal: Negative for abdominal pain, blood in stool, constipation, diarrhea, nausea and vomiting. Endocrine: Negative for polyuria. Genitourinary: Negative for dysuria. Musculoskeletal: Negative for arthralgias and myalgias. Skin: Negative for rash. Neurological: Positive for dizziness and headaches. Negative for light-headedness. Psychiatric/Behavioral: Negative for agitation, decreased concentration and sleep disturbance. The patient is not nervous/anxious. Objective    Patient Active Problem List   Diagnosis    Essential hypertension    Migraine    GERD (gastroesophageal reflux disease)    Morbid obesity with BMI of 50.0-59.9, adult ([facility]    History of hysterectomy    Obstructive sleep apnea syndrome    Pulmonary embolism (HCC)    Prediabetes    PCOS (polycystic ovarian syndrome)    Primary osteoarthritis involving multiple joints    Chronic pain    Hx of pulmonary embolus    Obstructive sleep apnea    Chronic diastolic CHF (congestive heart failure) (HCC)    Allergic reaction to food    Birth merritt    Bilateral hand numbness    Bilateral hip pain    Abdominal cramping     Past Surgical History:   Procedure Laterality Date    COLONOSCOPY N/A 01/13/2021    COLONOSCOPY performed by Destiney Burns MD at [address]      crossed eyes    HYSTERECTOMY  2014    full-ovaries gone    MUSCLE BIOPSY      left thigh    OVARIAN CYST REMOVAL  1998    SKIN BIOPSY  2021    birth merritt removal left arm    VENA CAVA FILTER PLACEMENT      inserted in 2014 and removed in 2020     Social History     Tobacco Use    Smoking status: Never Smoker    Smokeless tobacco: Never Used   Vaping Use    Vaping Use: Never used   Substance Use Topics    Alcohol use: Yes     Alcohol/week: 0.0 standard drinks     Comment: occasionally    Drug use: Never     Patient-Reported Vitals  Patient-Reported Weight: 313lb  Patient-Reported Height: 5 3     Physical exam limited due to telephone visit.     Physical Exam  HENT:      Right Ear: Hearing normal.      Left Ear: Hearing normal.   Pulmonary:      Effort: Pulmonary effort is normal.   Neurological:      General: No focal deficit present. Mental Status: She is alert and oriented to person, place, and time. Psychiatric:         Attention and Perception: Attention normal.         Speech: Speech normal.         Behavior: Behavior is cooperative. On this date 2/4/2022 I have spent 25 minutes reviewing previous notes, test results and face to face (virtual) with the patient discussing the diagnosis and importance of compliance with the treatment plan as well as documenting on the day of the visitSylvie Razo was evaluated through a synchronous (real-time) audio-video encounter. The patient (or guardian if applicable) is aware that this is a billable service, which includes applicable co-pays. This Virtual Visit was conducted with patient's (and/or legal guardian's) consent. The visit was conducted pursuant to the emergency declaration under the [facility], [address] [place] [address] authority and the writewith and [employer] General Act. Patient identification was verified, and a caregiver was present when appropriate. The patient was located at home in a state where the provider was licensed to provide care.        --Lynne Alvarado, ZELDA - CNP

## 2022-02-04 ENCOUNTER — VIRTUAL VISIT (OUTPATIENT)
Dept: PRIMARY CARE CLINIC | Age: 42
End: 2022-02-04
Payer: COMMERCIAL

## 2022-02-04 DIAGNOSIS — Z12.31 ENCOUNTER FOR SCREENING MAMMOGRAM FOR MALIGNANT NEOPLASM OF BREAST: ICD-10-CM

## 2022-02-04 DIAGNOSIS — I50.32 CHRONIC DIASTOLIC CHF (CONGESTIVE HEART FAILURE) (HCC): ICD-10-CM

## 2022-02-04 DIAGNOSIS — J30.9 ALLERGIC RHINITIS, UNSPECIFIED SEASONALITY, UNSPECIFIED TRIGGER: ICD-10-CM

## 2022-02-04 DIAGNOSIS — Z13.29 SCREENING FOR THYROID DISORDER: ICD-10-CM

## 2022-02-04 DIAGNOSIS — Z13.0 SCREENING FOR DEFICIENCY ANEMIA: ICD-10-CM

## 2022-02-04 DIAGNOSIS — R41.3 MEMORY CHANGES: ICD-10-CM

## 2022-02-04 DIAGNOSIS — R73.03 PREDIABETES: ICD-10-CM

## 2022-02-04 DIAGNOSIS — K21.9 GASTROESOPHAGEAL REFLUX DISEASE WITHOUT ESOPHAGITIS: ICD-10-CM

## 2022-02-04 DIAGNOSIS — I10 ESSENTIAL HYPERTENSION: Chronic | ICD-10-CM

## 2022-02-04 DIAGNOSIS — E78.5 HYPERLIPIDEMIA, UNSPECIFIED HYPERLIPIDEMIA TYPE: ICD-10-CM

## 2022-02-04 DIAGNOSIS — R51.9 ACHING HEADACHE: Primary | ICD-10-CM

## 2022-02-04 PROCEDURE — 99214 OFFICE O/P EST MOD 30 MIN: CPT | Performed by: NURSE PRACTITIONER

## 2022-02-04 RX ORDER — ATORVASTATIN CALCIUM 20 MG/1
TABLET, FILM COATED ORAL
Qty: 90 TABLET | Refills: 1 | Status: SHIPPED | OUTPATIENT
Start: 2022-02-04 | End: 2022-05-23

## 2022-02-04 RX ORDER — FLUTICASONE PROPIONATE 50 MCG
2 SPRAY, SUSPENSION (ML) NASAL DAILY
Qty: 1 EACH | Refills: 3 | Status: SHIPPED | OUTPATIENT
Start: 2022-02-04 | End: 2022-07-25

## 2022-02-04 RX ORDER — VALSARTAN 80 MG/1
TABLET ORAL
Qty: 90 TABLET | Refills: 1 | Status: SHIPPED | OUTPATIENT
Start: 2022-02-04 | End: 2022-08-08

## 2022-02-04 RX ORDER — DULOXETIN HYDROCHLORIDE 60 MG/1
CAPSULE, DELAYED RELEASE ORAL
Qty: 90 CAPSULE | Refills: 1 | Status: SHIPPED | OUTPATIENT
Start: 2022-02-04 | End: 2022-05-23

## 2022-02-04 RX ORDER — MONTELUKAST SODIUM 10 MG/1
10 TABLET ORAL NIGHTLY
Qty: 90 TABLET | Refills: 3 | Status: SHIPPED | OUTPATIENT
Start: 2022-02-04 | End: 2022-04-07

## 2022-02-04 RX ORDER — LANSOPRAZOLE 30 MG/1
30 CAPSULE, DELAYED RELEASE ORAL DAILY
Qty: 90 CAPSULE | Refills: 1 | Status: SHIPPED | OUTPATIENT
Start: 2022-02-04 | End: 2022-05-23

## 2022-02-04 RX ORDER — AMLODIPINE BESYLATE 10 MG/1
TABLET ORAL
Qty: 90 TABLET | Refills: 1 | Status: SHIPPED | OUTPATIENT
Start: 2022-02-04 | End: 2022-05-23

## 2022-02-04 ASSESSMENT — ENCOUNTER SYMPTOMS
SHORTNESS OF BREATH: 0
NAUSEA: 0
CHEST TIGHTNESS: 0
VOMITING: 0
CONSTIPATION: 0
ABDOMINAL PAIN: 0
DIARRHEA: 0
BLOOD IN STOOL: 0

## 2022-02-04 ASSESSMENT — PATIENT HEALTH QUESTIONNAIRE - PHQ9
2. FEELING DOWN, DEPRESSED OR HOPELESS: 0
1. LITTLE INTEREST OR PLEASURE IN DOING THINGS: 0
SUM OF ALL RESPONSES TO PHQ QUESTIONS 1-9: 0
SUM OF ALL RESPONSES TO PHQ9 QUESTIONS 1 & 2: 0

## 2022-02-14 ENCOUNTER — OFFICE VISIT (OUTPATIENT)
Dept: GYNECOLOGY | Age: 42
End: 2022-02-14
Payer: COMMERCIAL

## 2022-02-14 VITALS
BODY MASS INDEX: 51.91 KG/M2 | HEIGHT: 63 IN | WEIGHT: 293 LBS | RESPIRATION RATE: 17 BRPM | DIASTOLIC BLOOD PRESSURE: 78 MMHG | HEART RATE: 89 BPM | SYSTOLIC BLOOD PRESSURE: 126 MMHG | OXYGEN SATURATION: 97 %

## 2022-02-14 DIAGNOSIS — M79.7 FIBROMYALGIA: ICD-10-CM

## 2022-02-14 DIAGNOSIS — Z01.419 WELL WOMAN EXAM WITH ROUTINE GYNECOLOGICAL EXAM: Primary | ICD-10-CM

## 2022-02-14 DIAGNOSIS — I89.0 LYMPHEDEMA: ICD-10-CM

## 2022-02-14 DIAGNOSIS — R10.2 PELVIC PAIN IN FEMALE: ICD-10-CM

## 2022-02-14 PROCEDURE — 99396 PREV VISIT EST AGE 40-64: CPT | Performed by: OBSTETRICS & GYNECOLOGY

## 2022-02-14 PROCEDURE — G8484 FLU IMMUNIZE NO ADMIN: HCPCS | Performed by: OBSTETRICS & GYNECOLOGY

## 2022-02-14 RX ORDER — LEVOCETIRIZINE DIHYDROCHLORIDE 5 MG/1
TABLET, FILM COATED ORAL
Qty: 90 TABLET | Refills: 3 | Status: SHIPPED | OUTPATIENT
Start: 2022-02-14 | End: 2022-03-11 | Stop reason: SDUPTHER

## 2022-02-14 RX ORDER — ERGOCALCIFEROL 1.25 MG/1
CAPSULE ORAL
Qty: 12 CAPSULE | Refills: 0 | Status: SHIPPED | OUTPATIENT
Start: 2022-02-14 | End: 2022-03-11 | Stop reason: SDUPTHER

## 2022-02-14 ASSESSMENT — ENCOUNTER SYMPTOMS
GASTROINTESTINAL NEGATIVE: 1
RESPIRATORY NEGATIVE: 1
EYES NEGATIVE: 1

## 2022-02-14 NOTE — TELEPHONE ENCOUNTER
----- Message from Rosa Denton sent at 2/14/2022  9:23 AM EST -----  Subject: Refill Request    QUESTIONS  Name of Medication? levocetirizine (XYZAL) 5 MG tablet  Patient-reported dosage and instructions? 1 tab daily  How many days do you have left? 3  Preferred Pharmacy? [id number] [email or web address]  Pharmacy phone number (if available)? [phone]  ---------------------------------------------------------------------------  --------------,  Name of Medication? vitamin D (ERGOCALCIFEROL) 1.25 MG (89619 UT) CAPS   capsule  Patient-reported dosage and instructions? once a week  How many days do you have left? 0  Preferred Pharmacy? [email or web address]  Pharmacy phone number (if available)? [phone]  ---------------------------------------------------------------------------  --------------  Robert CRENSHAW  What is the best way for the office to contact you? OK to leave message on   voicemail  Preferred Call Back Phone Number?  [phone]

## 2022-02-14 NOTE — TELEPHONE ENCOUNTER
Medication:   Requested Prescriptions     Pending Prescriptions Disp Refills    levocetirizine (XYZAL) 5 MG tablet 90 tablet 3    vitamin D (ERGOCALCIFEROL) 1.25 MG (77712 UT) CAPS capsule 12 capsule 0        Last Filled:      Patient Phone Number: [phone] (home) [phone] (work)    Last appt: 2/4/2022   Next appt: 3/10/2022    Last OARRS: No flowsheet data found.

## 2022-02-15 NOTE — PROGRESS NOTES
Socioeconomic History    Marital status: Single     Spouse name: Not on file    Number of children: 3    Years of education: Not on file    Highest education level: Not on file   Occupational History    Occupation: unemployed   Tobacco Use    Smoking status: Never Smoker    Smokeless tobacco: Never Used   Vaping Use    Vaping Use: Never used   Substance and Sexual Activity    Alcohol use: Yes     Alcohol/week: 0.0 standard drinks     Comment: occasionally    Drug use: Never    Sexual activity: Not Currently     Partners: Male   Other Topics Concern    Not on file   Social History Narrative    Lives with 2 children     Social Determinants of Health     Financial Resource Strain: Low Risk     Difficulty of Paying Living Expenses: Not hard at all   Food Insecurity: No Food Insecurity    Worried About Running Out of Food in the Last Year: Never true    [address] in the Last Year: Never true   Transportation Needs:     Lack of Transportation (Medical): Not on file    Lack of Transportation (Non-Medical):  Not on file   Physical Activity:     Days of Exercise per Week: Not on file    Minutes of Exercise per Session: Not on file   Stress:     Feeling of Stress : Not on file   Social Connections:     Frequency of Communication with Friends and Family: Not on file    Frequency of Social Gatherings with Friends and Family: Not on file    Attends Islam Services: Not on file    Active Member of [employer] or Organizations: Not on file    Attends Club or Organization Meetings: Not on file    Marital Status: Not on file   Intimate Partner Violence:     Fear of Current or Ex-Partner: Not on file    Emotionally Abused: Not on file    Physically Abused: Not on file    Sexually Abused: Not on file   Housing Stability:     Unable to Pay for Housing in the Last Year: Not on file    Number of [place] in the Last Year: Not on file    Unstable Housing in the Last Year: Not on file 10 mL 4    ibuprofen (IBU) 600 MG tablet Take 1 tablet by mouth every 6 hours as needed for Pain 30 tablet 0    erythromycin (ROMYCIN) 5 MG/GM ophthalmic ointment Apply to the lower eyelid margin 4 times daily for 5 days. 1 Tube 0    EPINEPHrine (EPIPEN) 0.3 MG/0.3ML SOAJ injection Inject 1 pen into the thigh for emergency treatment of allergic reactions, may repeat for severe persistent reactions. 1 each 2    Urea (CARMOL) 40 % cream Apply 40 oz topically daily      polyethylene glycol (MIRALAX) 17 GM/SCOOP POWD powder Take 17 g by mouth daily 3 Bottle 1     Family History   Problem Relation Age of Onset    High Blood Pressure Mother     Other Father         gout    Cancer Father         prostate    Breast Cancer Maternal Aunt 47    Asthma Maternal Cousin     Rheum Arthritis Neg Hx     Osteoarthritis Neg Hx     Diabetes Neg Hx     Heart Failure Neg Hx     High Cholesterol Neg Hx     Hypertension Neg Hx     Migraines Neg Hx     Ovarian Cancer Neg Hx     Rashes/Skin Problems Neg Hx     Seizures Neg Hx     Stroke Neg Hx     Thyroid Disease Neg Hx      /78 (Site: Left Lower Arm, Position: Sitting, Cuff Size: Medium Adult)   Pulse 89   Resp 17   Ht 5' 3\" (1.6 m)   Wt (!) 307 lb (139.3 kg)   LMP 05/26/2014   SpO2 97%   BMI 54.38 kg/m²       Objective:   Physical Exam  Constitutional:       General: She is not in acute distress. Appearance: Normal appearance. She is well-developed and normal weight. She is not diaphoretic. HENT:      Head: Normocephalic. Nose: Nose normal.      Mouth/Throat:      Mouth: Mucous membranes are moist.      Pharynx: Oropharynx is clear. Eyes:      Pupils: Pupils are equal, round, and reactive to light. Neck:      Thyroid: No thyromegaly. Cardiovascular:      Rate and Rhythm: Normal rate and regular rhythm. Heart sounds: Normal heart sounds. No murmur heard. No friction rub. No gallop.     Pulmonary:      Effort: Pulmonary effort is normal. No respiratory distress. Breath sounds: Normal breath sounds. No stridor. No wheezing, rhonchi or rales. Chest:      Chest wall: No tenderness. Breasts:      Right: Normal. No swelling, bleeding, inverted nipple, mass, nipple discharge, skin change or tenderness. Left: Normal. No swelling, bleeding, inverted nipple, mass, nipple discharge, skin change or tenderness. Abdominal:      General: Abdomen is flat. There is no distension. Palpations: Abdomen is soft. There is no hepatomegaly or mass. Tenderness: There is no abdominal tenderness. There is no guarding or rebound. Hernia: No hernia is present. There is no hernia in the left inguinal area. Genitourinary:     Exam position: Lithotomy position. Pubic Area: No rash. Labia:         Right: No rash, tenderness, lesion or injury. Left: No rash, tenderness, lesion or injury. Urethra: No prolapse, urethral pain, urethral swelling or urethral lesion. Vagina: Normal. No signs of injury and foreign body. No vaginal discharge, erythema, tenderness, bleeding, lesions or prolapsed vaginal walls. Adnexa: Right adnexa normal and left adnexa normal.        Right: No mass, tenderness or fullness. Left: No mass, tenderness or fullness. Rectum: No anal fissure or external hemorrhoid. Comments: Normal urethral meatus, nl urethra, nl bladder. Musculoskeletal:         General: No tenderness. Normal range of motion. Cervical back: Normal range of motion and neck supple. No rigidity. Left lower leg: Edema present. Lymphadenopathy:      Cervical: No cervical adenopathy. Skin:     General: Skin is warm and dry. Neurological:      General: No focal deficit present. Mental Status: She is alert and oriented to person, place, and time. Mental status is at baseline. Deep Tendon Reflexes: Reflexes are normal and symmetric.    Psychiatric:         Mood and Affect: Mood normal.         Behavior: Behavior normal.         Thought Content: Thought content normal.         Judgment: Judgment normal.         Assessment:      1. Annual  2. Vaginal pain  3. Fibromyalgia  4. Lymphedema bilateral legs      Plan:      1. Pap, calcium, exercise, mammogram  2. Referral to urogynecology  3. Referral to Dr. Daisy Mendoza  4.  Referral to vascular MD Pat Luz MD

## 2022-02-23 ENCOUNTER — TELEPHONE (OUTPATIENT)
Dept: VASCULAR SURGERY | Age: 42
End: 2022-02-23

## 2022-02-23 DIAGNOSIS — I89.0 LYMPHEDEMA: Primary | ICD-10-CM

## 2022-02-23 NOTE — TELEPHONE ENCOUNTER
Spoke with pt re referral rec'd. Pt stated she would call at a later date to schedule. Testing will be needed prior to appt.

## 2022-02-24 ENCOUNTER — HOSPITAL ENCOUNTER (OUTPATIENT)
Age: 42
Discharge: HOME | End: 2022-02-24
Payer: COMMERCIAL

## 2022-02-24 ENCOUNTER — HOSPITAL ENCOUNTER (OUTPATIENT)
Dept: CT IMAGING | Age: 42
Discharge: HOME | End: 2022-02-24
Payer: COMMERCIAL

## 2022-02-24 DIAGNOSIS — R41.3 MEMORY CHANGES: ICD-10-CM

## 2022-02-24 DIAGNOSIS — I10 ESSENTIAL HYPERTENSION: Chronic | ICD-10-CM

## 2022-02-24 DIAGNOSIS — R73.03 PREDIABETES: ICD-10-CM

## 2022-02-24 DIAGNOSIS — Z13.0 SCREENING FOR DEFICIENCY ANEMIA: ICD-10-CM

## 2022-02-24 DIAGNOSIS — R51.9 ACHING HEADACHE: ICD-10-CM

## 2022-02-24 DIAGNOSIS — E78.5 HYPERLIPIDEMIA, UNSPECIFIED HYPERLIPIDEMIA TYPE: ICD-10-CM

## 2022-02-24 DIAGNOSIS — Z13.29 SCREENING FOR THYROID DISORDER: ICD-10-CM

## 2022-02-24 LAB
A/G RATIO: 1.1 (ref 1.1–2.2)
ALBUMIN SERPL-MCNC: 4.3 G/DL (ref 3.4–5)
ALP BLD-CCNC: 87 U/L (ref 40–129)
ALT SERPL-CCNC: 28 U/L (ref 10–40)
ANION GAP SERPL CALCULATED.3IONS-SCNC: 11 MMOL/L (ref 3–16)
AST SERPL-CCNC: 28 U/L (ref 15–37)
BASOPHILS ABSOLUTE: 0 K/UL (ref 0–0.2)
BASOPHILS RELATIVE PERCENT: 0.3 %
BILIRUB SERPL-MCNC: 0.4 MG/DL (ref 0–1)
BUN BLDV-MCNC: 10 MG/DL (ref 7–20)
CALCIUM SERPL-MCNC: 9.4 MG/DL (ref 8.3–10.6)
CHLORIDE BLD-SCNC: 98 MMOL/L (ref 99–110)
CHOLESTEROL, TOTAL: 180 MG/DL (ref 0–199)
CO2: 26 MMOL/L (ref 21–32)
CREAT SERPL-MCNC: 0.6 MG/DL (ref 0.6–1.1)
EOSINOPHILS ABSOLUTE: 0.2 K/UL (ref 0–0.6)
EOSINOPHILS RELATIVE PERCENT: 4 %
ESTIMATED AVERAGE GLUCOSE: 114 MG/DL
GFR AFRICAN AMERICAN: >60
GFR NON-AFRICAN AMERICAN: >60
GLUCOSE BLD-MCNC: 89 MG/DL (ref 70–99)
HBA1C MFR BLD: 5.6 %
HCT VFR BLD CALC: 40.2 % (ref 36–48)
HDLC SERPL-MCNC: 52 MG/DL (ref 40–60)
HEMOGLOBIN: 12.9 G/DL (ref 12–16)
LDL CHOLESTEROL CALCULATED: 115 MG/DL
LYMPHOCYTES ABSOLUTE: 1.8 K/UL (ref 1–5.1)
LYMPHOCYTES RELATIVE PERCENT: 38 %
MCH RBC QN AUTO: 26.4 PG (ref 26–34)
MCHC RBC AUTO-ENTMCNC: 32 G/DL (ref 31–36)
MCV RBC AUTO: 82.3 FL (ref 80–100)
MONOCYTES ABSOLUTE: 0.3 K/UL (ref 0–1.3)
MONOCYTES RELATIVE PERCENT: 5.7 %
NEUTROPHILS ABSOLUTE: 2.4 K/UL (ref 1.7–7.7)
NEUTROPHILS RELATIVE PERCENT: 52 %
PDW BLD-RTO: 14.7 % (ref 12.4–15.4)
PLATELET # BLD: 226 K/UL (ref 135–450)
PMV BLD AUTO: 8.1 FL (ref 5–10.5)
POTASSIUM SERPL-SCNC: 4.4 MMOL/L (ref 3.5–5.1)
RBC # BLD: 4.89 M/UL (ref 4–5.2)
SODIUM BLD-SCNC: 135 MMOL/L (ref 136–145)
T4 FREE: 1 NG/DL (ref 0.9–1.8)
TOTAL PROTEIN: 8.2 G/DL (ref 6.4–8.2)
TRIGL SERPL-MCNC: 64 MG/DL (ref 0–150)
TSH REFLEX: 0.89 UIU/ML (ref 0.27–4.2)
VLDLC SERPL CALC-MCNC: 13 MG/DL
WBC # BLD: 4.7 K/UL (ref 4–11)

## 2022-02-24 PROCEDURE — 84443 ASSAY THYROID STIM HORMONE: CPT

## 2022-02-24 PROCEDURE — 80053 COMPREHEN METABOLIC PANEL: CPT

## 2022-02-24 PROCEDURE — 83036 HEMOGLOBIN GLYCOSYLATED A1C: CPT

## 2022-02-24 PROCEDURE — 36415 COLL VENOUS BLD VENIPUNCTURE: CPT

## 2022-02-24 PROCEDURE — 70450 CT HEAD/BRAIN W/O DYE: CPT

## 2022-02-24 PROCEDURE — 85025 COMPLETE CBC W/AUTO DIFF WBC: CPT

## 2022-02-24 PROCEDURE — 80061 LIPID PANEL: CPT

## 2022-02-24 PROCEDURE — 84439 ASSAY OF FREE THYROXINE: CPT

## 2022-03-09 NOTE — TELEPHONE ENCOUNTER
Was sent to the wrong pharmacy        Medication:   Requested Prescriptions     Pending Prescriptions Disp Refills    vitamin D (ERGOCALCIFEROL) 1.25 MG (26161 UT) CAPS capsule 12 capsule 0     Sig: TAKE ONE CAPSULE BY MOUTH ONCE WEEKLY    levocetirizine (XYZAL) 5 MG tablet 90 tablet 3     Sig: Take 1 tablet daily        Last Filled:      Patient Phone Number: [phone] (home) [phone] (work)    Last appt: 2/4/2022   Next appt: Visit date not found    Last OARRS: No flowsheet data found.

## 2022-03-11 RX ORDER — ERGOCALCIFEROL 1.25 MG/1
CAPSULE ORAL
Qty: 12 CAPSULE | Refills: 0 | Status: SHIPPED | OUTPATIENT
Start: 2022-03-11 | End: 2022-05-19 | Stop reason: ALTCHOICE

## 2022-03-11 RX ORDER — LEVOCETIRIZINE DIHYDROCHLORIDE 5 MG/1
TABLET, FILM COATED ORAL
Qty: 90 TABLET | Refills: 3 | Status: SHIPPED | OUTPATIENT
Start: 2022-03-11

## 2022-03-17 ENCOUNTER — HOSPITAL ENCOUNTER (OUTPATIENT)
Dept: WOMENS IMAGING | Age: 42
Discharge: HOME | End: 2022-03-17
Payer: COMMERCIAL

## 2022-03-17 DIAGNOSIS — Z12.31 ENCOUNTER FOR SCREENING MAMMOGRAM FOR MALIGNANT NEOPLASM OF BREAST: ICD-10-CM

## 2022-03-17 PROCEDURE — 77067 SCR MAMMO BI INCL CAD: CPT

## 2022-04-03 NOTE — PROGRESS NOTES
Sunshine Sandoval (:  1980) is a 35 y.o. female,Established patient, here for evaluation of the following chief complaint(s):  Follow-up, Irritable Bowel Syndrome, and Chronic Pain         ASSESSMENT/PLAN:  1. Irritable bowel syndrome with diarrhea- no previous medication effective. Denies blood in stool,   -     ELMIRA Garcia MD, Gastroenterology (ERCP), [facility]  2. Stasis dermatitis of both legs- h/o lymphedema  -     ELMIRA Arizmendi MD, Dermatology [facility]  -keep legs wrapped to decrease swelling  3. Abnormal facial hair - h/o PCOS, Aldactone ineffective  -     ELMIRA Arizmendi MD, Dermatology [facility]  4. Rash of both hands- reports previously prescribed medication ineffective  -     ELMIRA Arizmendi MD, Dermatology [facility]  5. Prediabetes  -Decrease sugary foods, drinks, starches  -Exercise regularly  6. Essential hypertension  -Continue Diovan  Continue Amlodipine  7. Migraine without status migrainosus, not intractable, unspecified migraine type- Head CT without abnormalities  -Keep appointment with Neurology  8. Lymphedema- chronic. No weeping or ulcerations  -Wrap legs daily  -Consider lymphedema clinic for treatment    Requesting [facility] paperwork to be completed. Sent through my chart. Return in about 3 months (around 2022). Subjective   SUBJECTIVE/OBJECTIVE:  HPI  Headache   Associated symptoms include dizziness. Pertinent negatives include no abdominal pain, ear pain, fever, nausea or vomiting. Right side of head, small knot, near ear, has moved down. The knot comes and goes. Denies pain, redness, or rash. Any type of stretching, moving causes headache. Tylenol is somewhat effective. Headache has been occurring for several months, has been out of antihypertensive medication for 1 month. Denies visual changes, hearing deficits. During headache feels right side of head is swelling and dizziness. Reports difficulty with memory. Denies trauma. This headache is different than previous migraines. Has appointment with Neurology on 4/14/2022. No acute abnormality of head CT on 2/24. Prediabetes  No regular exercise. No dietary adjustments.     HypertensionThis is a chronic problem. The current episode started more than 1 year ago. Associated symptoms include chest pain. Pertinent negatives include no headaches, malaise/fatigue, palpitations, peripheral edema or shortness of breath. Agents associated with hypertension include NSAIDs. Risk factors for coronary artery disease include family history, obesity and dyslipidemia. Past treatments include angiotensin blockers. The current treatment provides mild improvement. Compliance problems include diet and exercise. Jacques Lutz is no history of kidney disease, CVA, left ventricular hypertrophy or PVD.      Gastroesophageal Reflux   She complains of chest pain, heartburn, nausea and water brash. She reports no abdominal pain. This is a chronic problem. The current episode started more than 1 year ago. The problem occurs occasionally. The heartburn wakes her from sleep. The heartburn does not limit her activity. The heartburn doesn't change with position. The symptoms are aggravated by certain foods. Risk factors include obesity and NSAIDs. She has tried a PPI and a histamine-2 antagonist for the symptoms. The treatment provided mild relief. Past procedures do not include an abdominal ultrasound or a UGI. Has persistent nausea, Gerd controlled with Prevacid every morning and Famotidine every night. Intermittent abdominal cramping and epigastric discomfort. Reports the cramping is itnermitent, hands, abdomen, legs and feet. controlling symptoms. \"Having issues with my  IBS,\"  Cramping, nausea, and gas. Previously seen at [facility], unsure of previously prescribed medication. States medication was ineffective. Denies depression or anxiety    Works at [employer] off Sunday and Thursday.   Missing work due to Fibromyalgia and IBS. Works in [employer]. Taking Cymbalta for Fibromyalgia, not very effective. Has appointment with Rheumatology this month. Dr. Lolita Longo gave referral for  Vascular surgeon, has BLE studies ordered. 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 States Dermatology has appointments on days when she has to work, requesting a new referral.  Would like laser surgery for facial hair, skin tag removal, pruritic eruption of hands. Has non pitting edema of BLE, reports difficulty wearing shoes. Previously used lyphedema stockings. States swelling is too great and has to wrap her legs. Has open order for BLE arterial studies.        Obesity  Started exercising, has decreased snacks and portion sizes. General weight loss/lifestyle modification strategies discussed (elicit support from others; identify saboteurs; non-food rewards, etc). -Avoid high fat and high sugar foods  -Include protein with all meals and snacks  -Avoid carbonation and caffeine  -Avoid calorie containing beverages  -Increase physical activity as tolerated  Overweight/Obesity related to lack of exercise, sedentary lifestyle, unhealthy eating habits, and unsuccessful diet attempts as evidenced by BMI. BMI: 55.52    Pulmonary Embolism  She reports a history of PE, had IVC filter placed. Reports clots developed in legs and lungs. Stated has not had IVC filter removed, would like a referral to vascular.  She reports intermittent epigastric discomfort, possibly related to IVC filter.  Recent CT below. IVC filter removed on 2/7      Polycystic Ovarian syndrome (PCOS)  Reports a history of PCOS, treated with Spironolactone, for facial hair growth. Reports discontinuing due to loss of eyebrows but no improvement of unwanted facial hair. Review of Systems   Constitutional: Negative for activity change and fever.    HENT: Negative for congestion, dental problem, ear discharge, ear pain, hearing loss, postnasal drip, rhinorrhea, sinus pressure, sneezing, tinnitus, trouble swallowing and voice change. Small lump on left anterior area of neck   Eyes: Negative for itching and visual disturbance. Respiratory: Negative for chest tightness and shortness of breath. SHYLA - uses CPAP at night   Cardiovascular: Negative for chest pain, palpitations and leg swelling. Hypertension   Gastrointestinal: Negative for abdominal pain, blood in stool, constipation, diarrhea, nausea, rectal pain and vomiting. Gerd   Endocrine: Negative for polyuria. Genitourinary: Negative for dysuria. Musculoskeletal: Negative for arthralgias and myalgias. Bilateral hip pain   Skin: Negative for rash. Allergic/Immunologic:        Has severe allergic symptoms. Neurological: Negative for dizziness, light-headedness and headaches. Psychiatric/Behavioral: Negative for agitation, decreased concentration and sleep disturbance. The patient is not nervous/anxious. Objective     height is 5' 3\" (1.6 m) and weight is 313 lb 6.4 oz (142.2 kg) (abnormal). Her temperature is 97 °F (36.1 °C). Her blood pressure is 118/75 and her pulse is 73. Her oxygen saturation is 98%. Wt Readings from Last 3 Encounters:   04/07/22 (!) 313 lb 6.4 oz (142.2 kg)   02/14/22 (!) 307 lb (139.3 kg)   06/11/21 (!) 334 lb 10.5 oz (151.8 kg)     BP Readings from Last 3 Encounters:   04/07/22 118/75   02/14/22 126/78   10/02/21 127/82     Physical Exam  Vitals reviewed. Constitutional:       Appearance: She is well-developed. HENT:      Head: Normocephalic. Comments: Facial hair noted on chin and under chin     Right Ear: Hearing and external ear normal.      Left Ear: Hearing and external ear normal.      Nose: Nose normal.   Eyes:      General: Lids are normal.   Cardiovascular:      Rate and Rhythm: Normal rate and regular rhythm.       Heart sounds: Normal heart sounds, S1 normal and S2 normal.      Comments: Gross non pitting edema of BLE  Pulmonary:      Effort: Pulmonary effort is normal.      Breath sounds: Normal breath sounds. Musculoskeletal:         General: Normal range of motion. Skin:     General: Skin is warm and dry. Findings: Rash present. Comments: Dorsal aspect of both hands with dry pruritic rash. No redness, swelling, ulcerations   Neurological:      Mental Status: She is alert and oriented to person, place, and time. GCS: GCS eye subscore is 4. GCS verbal subscore is 5. GCS motor subscore is 6. Psychiatric:         Speech: Speech normal.         Behavior: Behavior normal. Behavior is cooperative. On this date 4/7/2022 I have spent 20 minutes reviewing previous notes, test results and face to face with the patient discussing the diagnosis and importance of compliance with the treatment plan as well as documenting on the day of the visit. An electronic signature was used to authenticate this note.     --Jhonny Tyler, APRN - CNP

## 2022-04-07 ENCOUNTER — OFFICE VISIT (OUTPATIENT)
Dept: PRIMARY CARE CLINIC | Age: 42
End: 2022-04-07
Payer: COMMERCIAL

## 2022-04-07 VITALS
OXYGEN SATURATION: 98 % | WEIGHT: 293 LBS | SYSTOLIC BLOOD PRESSURE: 118 MMHG | DIASTOLIC BLOOD PRESSURE: 75 MMHG | HEART RATE: 73 BPM | HEIGHT: 63 IN | TEMPERATURE: 97 F | BODY MASS INDEX: 51.91 KG/M2

## 2022-04-07 DIAGNOSIS — L67.8 ABNORMAL FACIAL HAIR: ICD-10-CM

## 2022-04-07 DIAGNOSIS — R21 RASH OF BOTH HANDS: ICD-10-CM

## 2022-04-07 DIAGNOSIS — I10 ESSENTIAL HYPERTENSION: Chronic | ICD-10-CM

## 2022-04-07 DIAGNOSIS — K58.0 IRRITABLE BOWEL SYNDROME WITH DIARRHEA: Primary | ICD-10-CM

## 2022-04-07 DIAGNOSIS — R73.03 PREDIABETES: ICD-10-CM

## 2022-04-07 DIAGNOSIS — G43.909 MIGRAINE WITHOUT STATUS MIGRAINOSUS, NOT INTRACTABLE, UNSPECIFIED MIGRAINE TYPE: Chronic | ICD-10-CM

## 2022-04-07 DIAGNOSIS — I87.2 STASIS DERMATITIS OF BOTH LEGS: ICD-10-CM

## 2022-04-07 DIAGNOSIS — I89.0 LYMPHEDEMA: ICD-10-CM

## 2022-04-07 PROBLEM — Q82.5 BIRTH MARK: Status: RESOLVED | Noted: 2020-11-24 | Resolved: 2022-04-07

## 2022-04-07 PROCEDURE — 1036F TOBACCO NON-USER: CPT | Performed by: NURSE PRACTITIONER

## 2022-04-07 PROCEDURE — G8417 CALC BMI ABV UP PARAM F/U: HCPCS | Performed by: NURSE PRACTITIONER

## 2022-04-07 PROCEDURE — 99214 OFFICE O/P EST MOD 30 MIN: CPT | Performed by: NURSE PRACTITIONER

## 2022-04-07 PROCEDURE — G8427 DOCREV CUR MEDS BY ELIG CLIN: HCPCS | Performed by: NURSE PRACTITIONER

## 2022-04-07 ASSESSMENT — ENCOUNTER SYMPTOMS
BLOOD IN STOOL: 0
ABDOMINAL PAIN: 0
NAUSEA: 0
CHEST TIGHTNESS: 0
TROUBLE SWALLOWING: 0
VOICE CHANGE: 0
RHINORRHEA: 0
EYE ITCHING: 0
SHORTNESS OF BREATH: 0
VOMITING: 0
DIARRHEA: 0
RECTAL PAIN: 0
SINUS PRESSURE: 0
CONSTIPATION: 0

## 2022-04-07 ASSESSMENT — PATIENT HEALTH QUESTIONNAIRE - PHQ9
SUM OF ALL RESPONSES TO PHQ QUESTIONS 1-9: 2
SUM OF ALL RESPONSES TO PHQ QUESTIONS 1-9: 2
SUM OF ALL RESPONSES TO PHQ9 QUESTIONS 1 & 2: 2
SUM OF ALL RESPONSES TO PHQ QUESTIONS 1-9: 2
1. LITTLE INTEREST OR PLEASURE IN DOING THINGS: 1
2. FEELING DOWN, DEPRESSED OR HOPELESS: 1
SUM OF ALL RESPONSES TO PHQ QUESTIONS 1-9: 2

## 2022-04-14 ENCOUNTER — TELEPHONE (OUTPATIENT)
Dept: NEUROLOGY | Age: 42
End: 2022-04-14

## 2022-04-14 NOTE — TELEPHONE ENCOUNTER
Pt no showed on 4/14/22 after confirming appt therefore she can-not be rescheduled with Dr Jasper Simpson.

## 2022-04-21 ENCOUNTER — OFFICE VISIT (OUTPATIENT)
Dept: RHEUMATOLOGY | Age: 42
End: 2022-04-21
Payer: COMMERCIAL

## 2022-04-21 VITALS
SYSTOLIC BLOOD PRESSURE: 120 MMHG | BODY MASS INDEX: 51.91 KG/M2 | HEIGHT: 63 IN | WEIGHT: 293 LBS | DIASTOLIC BLOOD PRESSURE: 68 MMHG

## 2022-04-21 DIAGNOSIS — M79.7 FIBROMYALGIA: ICD-10-CM

## 2022-04-21 DIAGNOSIS — M79.7 FIBROMYALGIA: Primary | ICD-10-CM

## 2022-04-21 LAB — RHEUMATOID FACTOR: <10 IU/ML

## 2022-04-21 PROCEDURE — G8417 CALC BMI ABV UP PARAM F/U: HCPCS | Performed by: INTERNAL MEDICINE

## 2022-04-21 PROCEDURE — 99244 OFF/OP CNSLTJ NEW/EST MOD 40: CPT | Performed by: INTERNAL MEDICINE

## 2022-04-21 PROCEDURE — G8427 DOCREV CUR MEDS BY ELIG CLIN: HCPCS | Performed by: INTERNAL MEDICINE

## 2022-04-21 RX ORDER — TIZANIDINE 4 MG/1
TABLET ORAL
Qty: 90 TABLET | Refills: 0 | Status: SHIPPED | OUTPATIENT
Start: 2022-04-21

## 2022-04-21 NOTE — PROGRESS NOTES
[address], MD                                                           [address] [place] [address] Aakash Beaver [address], [address] [facility]                                                             [phone] (P) [phone] (F)      Note is transcribed using voice recognition software. Inadvertent computerized transcription errors may be present. Patient identification: Janet Shell, female : ,35 y.o. REASON FOR CONSULTATION:  Patient is being seen at the request of  ZELDA Uriostegui CNP / Gigi Bronson MD for evaluation and management of fibromyalgia. HISTORY OF PRESENT ILLNESS:  35 y.o. female with morbid obesity with BMI of 55, obstructive sleep apnea, hypertension, was diagnosed with fibromyalgia in her 25s based on generalized myalgias. Followed by [facility] in the past.  She tried Lyrica, Savella and Cymbalta-none of the medication really helped her much. Currently she is maintained on 60 mg of Cymbalta daily. States that she hurts all over in upper, lower extremities, trunk-symptoms are worse with repetitive use, has nonrestorative sleep, and extreme fatigue. She is also developing bilateral knee pain, hip pain with walking, weightbearing, likely from early onset of osteoarthritis, likely from morbid obesity. Subjective discomfort in her finger joints. No specific a.m. stiffness, but does describe hand swelling. Tells me that she tried keto diet as well as [facility] weight management in the past, lost some weight however regained everything. Her eating habits are not great, drinks 4 to 5-20 ounces of soda, fast food, other sugar containing beverages. Prior [id number] GUNNER, SSA, SSB. In remote past, mildly elevated CK. All other ROS are negative.     PMH, PSH,Social history , Meds reviewed. FH-no family history of autoimmune rheumatic disorders. PHYSICAL EXAM:    Vitals:    /68   Ht 5' 3\" (1.6 m)   Wt (!) 313 lb (142 kg)   LMP 05/26/2014   BMI 55.45 kg/m²   AA)x3 well nourished, and well groomed, normal judgement. MKS: Generalized hyperesthesia in upper, lower extremities as well as trunk front and back-wherever I apply even light pressure. I do not appreciate any focally tender, swollen, inflamed joints in upper or lower extremities. Subjective arthralgias across her finger joints PIPs, wrists, knees, or hips. Full range of motion of the fingers including strong fist.  No objective weakness in upper or lower extremities proximally or distally. She is able to get up from sitting position without assistance. Skin: No rashes, no induration or skin thickening or nodules. HEENT: Normal lids, lacrimal glands and pupils. No oral or nasal ulcers. Salivary glands reveal no evidence of abnormality. External inspection of the ears and nose within normal limits. S1-S2 regular no added sounds. DATA:       ASSESSMENT AND PLAN:   Diagnosis Orders   1. Fibromyalgia  Cyclic Citrul Peptide Antibody, IgG    Rheumatoid Factor   2. BMI 50.0-59.9, adult ([facility] [address]  [facility]       Noninflammatory musculoskeletal discomfort from fibromyalgia and early onset of osteoarthritis in weightbearing joints. Given intermittent hand pain, check RF and CCP. Discussed about diagnosis, pathophysiology and management options. Fibromyalgia is a non life and non joint threatening complex pain perception disorder rather than structural disease, often times associated with arthritis, mental health condition, high BMI, insomnia, sleep apnea etc. For optimal treatment response, it is important to identify and address underlying factors and manage in multidisciplinary ways. Physical reconditioning plays equally important role as pharmacologic treatments.  We went over various FDA approved medications- Lyrica, Savella and Cymbalta, also discussed about off level use of muscle relaxants. I gave her written information on FMS. Pharmacologically-I agree with continuing Cymbalta. Add muscle relaxant as needed. The major factor is addressing other underlying comorbidities including morbid obesity, mental health disorder, and sleep apnea. She already has a CPAP machine. We discussed in details about wt management and likely benefit she would notice in terms of overall health not only osteoarthritis and fibromyalgia. Counseled on high BMI/Wt management- calorie count (the Key), physical activity, behavioral modification, and goals for weight loss. The goal of dietary therapy is to reduce the total number of calories consumed, choosing a dietary pattern of healthy food. The first thing to go is stop sugar containing beverages, and supplement with water and crystal lite, and avoid all refined carbohydrates. Encourage patient to eat more vegetables, baked chicken or meat and the key is portion control and calorie count-keeping calories around 1300 to 1500 shannon a day and her body weight. I also placed referral for [facility] weight management. She is not interested in bariatric surgery however he can see dietitian hopefully dietitian is able to help her to make a healthy and calorie limited meals and snacks. Recommend seeing primary care physician for completing FMLA paperwork. All her questions are answered. Nothing much I can add at this time in her care other than above. Therefore recommend following up with primary care physician and dietitian. #################################################################################################  Patient indicates understanding and agrees with the management plan.   Total time >60 minutes that includes the following-  Preparing to see the patient such as reviewing patients records, pre-charting, preparing the visit on the same day, performing a medically appropriate history and physical examination, counseling and educating patient about diagnosis, management plan, ordering appropriate testings, prescriptions, communicating findings to other care providers, and documenting clinical information in electronic medical record. I thank you for giving me the opportunity to be involved in [address] [place] [address] and I look forward following Kerry along with you. If you have any questions or concerns please feel free to contact me at any time.   Fuad Grissom MD 04/21/22

## 2022-04-22 LAB — CYCLIC CITRULLINATED PEPTIDE ANTIBODY IGG: 0.6 U/ML (ref 0–2.9)

## 2022-05-13 ENCOUNTER — HOSPITAL ENCOUNTER (EMERGENCY)
Age: 42
Discharge: HOME | End: 2022-05-13
Attending: STUDENT IN AN ORGANIZED HEALTH CARE EDUCATION/TRAINING PROGRAM
Payer: COMMERCIAL

## 2022-05-13 ENCOUNTER — APPOINTMENT (OUTPATIENT)
Dept: CT IMAGING | Age: 42
End: 2022-05-13
Payer: COMMERCIAL

## 2022-05-13 VITALS
WEIGHT: 293 LBS | OXYGEN SATURATION: 99 % | BODY MASS INDEX: 51.91 KG/M2 | TEMPERATURE: 97.5 F | SYSTOLIC BLOOD PRESSURE: 122 MMHG | HEIGHT: 63 IN | HEART RATE: 88 BPM | DIASTOLIC BLOOD PRESSURE: 76 MMHG | RESPIRATION RATE: 15 BRPM

## 2022-05-13 DIAGNOSIS — G50.0 TRIGEMINAL NEURALGIA: Primary | ICD-10-CM

## 2022-05-13 PROCEDURE — 70450 CT HEAD/BRAIN W/O DYE: CPT

## 2022-05-13 PROCEDURE — 99284 EMERGENCY DEPT VISIT MOD MDM: CPT

## 2022-05-13 RX ORDER — CARBAMAZEPINE 100 MG/1
100 TABLET, EXTENDED RELEASE ORAL 2 TIMES DAILY
Qty: 28 TABLET | Refills: 0 | Status: SHIPPED | OUTPATIENT
Start: 2022-05-13 | End: 2022-05-19 | Stop reason: DRUGHIGH

## 2022-05-13 ASSESSMENT — PAIN SCALES - GENERAL: PAINLEVEL_OUTOF10: 7

## 2022-05-13 ASSESSMENT — PAIN DESCRIPTION - LOCATION: LOCATION: HEAD

## 2022-05-13 ASSESSMENT — LIFESTYLE VARIABLES: HOW OFTEN DO YOU HAVE A DRINK CONTAINING ALCOHOL: NEVER

## 2022-05-13 ASSESSMENT — PAIN - FUNCTIONAL ASSESSMENT: PAIN_FUNCTIONAL_ASSESSMENT: 0-10

## 2022-05-13 NOTE — ED PROVIDER NOTES
Primary Care Physician: ZELDA Rivas CNP   Attending Physician: No att. providers found     History   Chief Complaint   Patient presents with    Headache     with any form of movement she has increased pain especially bending over been going on for months, states for the past month the pain has been constant. states not able to sleep that the pressure of laying her head down increases the pain         HPI   Cassidy Dawson  is a 35 y.o. female history of anxiety, hypertension, PEs and DVTs, who presents complaining of a significant headache. Patient stated that she has had headaches but this is lasted almost a month. Stating that she is having some burning sensation mostly on the right side of her head. Going down to her ear. Denies any fevers chills nausea vomiting or chest pain. No focal deficits. Past Medical History:   Diagnosis Date    Allergic rhinitis     Anemia     Anxiety and depression     Dermatomyositis ([facility] [address] 2008    Eczema     Fibromyalgia     Fibromyalgia     GERD (gastroesophageal reflux disease)     Headache(784.0)     History of blood transfusion 2009    also two in 2014     HTN (hypertension)     Hx of blood clots 2014    PE and DVT    Irritable bowel syndrome     Morbid obesity with BMI of 50.0-59.9, adult (HCC)     Osteoarthritis     PCOS (polycystic ovarian syndrome)     Prediabetes     Pulmonary embolism ([facility] [address] 7/17/2014    Overview:  Continue anticoagulation. Medicine managing Coumadin bridge. Daily H/H, PTT, PT/INR. Last Assessment & Plan:  HPI: Seen in hospital follow up. The patient is a 26year old y/o female who admitted to the hospital for pulmonary emboli.  She began to experience left lower extremity swelling and pain for two days, and day of admission she began to experience chest pain and shortness of     Sleep apnea     Vaginal high risk HPV DNA test positive 05/2016    Wears glasses         Past Surgical History:   Procedure Laterality Date    COLONOSCOPY N/A 01/13/2021    COLONOSCOPY performed by Rd Edwards MD at [address]      crossed eyes    HYSTERECTOMY  2014    full-ovaries gone    MUSCLE BIOPSY      left thigh    OVARIAN CYST REMOVAL  1998    SKIN BIOPSY  2021    birth merritt removal left arm    VENA CAVA FILTER PLACEMENT      inserted in 2014 and removed in 2020        Family History   Problem Relation Age of Onset    High Blood Pressure Mother     Other Father         gout    Cancer Father         prostate    Breast Cancer Maternal Aunt 47    Asthma Maternal Cousin     Rheum Arthritis Neg Hx     Osteoarthritis Neg Hx     Diabetes Neg Hx     Heart Failure Neg Hx     High Cholesterol Neg Hx     Hypertension Neg Hx     Migraines Neg Hx     Ovarian Cancer Neg Hx     Rashes/Skin Problems Neg Hx     Seizures Neg Hx     Stroke Neg Hx     Thyroid Disease Neg Hx         Social History     Socioeconomic History    Marital status: Single     Spouse name: None    Number of children: 3    Years of education: None    Highest education level: None   Occupational History    Occupation: unemployed   Tobacco Use    Smoking status: Never Smoker    Smokeless tobacco: Never Used   Vaping Use    Vaping Use: Never used   Substance and Sexual Activity    Alcohol use: Yes     Alcohol/week: 0.0 standard drinks     Comment: occasionally    Drug use: Never    Sexual activity: Not Currently     Partners: Male   Other Topics Concern    None   Social History Narrative    Lives with 2 children     Social Determinants of Health     Financial Resource Strain: Low Risk     Difficulty of Paying Living Expenses: Not hard at all   Food Insecurity: No Food Insecurity    Worried About Running Out of Food in the Last Year: Never true    Slick of Food in the Last Year: Never true   Transportation Needs:     Lack of Transportation (Medical): Not on file    Lack of Transportation (Non-Medical):  Not on file   Physical Activity:     Days of Exercise per Week: Not on file    Minutes of Exercise per Session: Not on file   Stress:     Feeling of Stress : Not on file   Social Connections:     Frequency of Communication with Friends and Family: Not on file    Frequency of Social Gatherings with Friends and Family: Not on file    Attends Cheondoism Services: Not on file    Active Member of [address] [place] [address] or Organizations: Not on file    Attends Club or Organization Meetings: Not on file    Marital Status: Not on file   Intimate Partner Violence:     Fear of Current or Ex-Partner: Not on file    Emotionally Abused: Not on file    Physically Abused: Not on file    Sexually Abused: Not on file   Housing Stability:     Unable to Pay for Housing in the Last Year: Not on file    Number of [place] in the Last Year: Not on file    Unstable Housing in the Last Year: Not on file        Review of Systems   10 total systems reviewed and found to be negative unless otherwise noted in HPI     Physical Exam   /76   Pulse 88   Temp 97.5 °F (36.4 °C)   Resp 15   Ht 5' 3\" (1.6 m)   Wt (!) 320 lb 8.8 oz (145.4 kg)   LMP 05/26/2014   SpO2 99%   BMI 56.78 kg/m²      CONSTITUTIONAL: Well appearing, in no acute distress   HEAD: atraumatic, normocephalic   EYES: PERRL, No injection, discharge or scleral icterus. ENT: Moist mucous membranes. NECK: Normal ROM, NO LAD   CARDIOVASCULAR: Regular rate and rhythm. No murmurs or gallop. PULMONARY/CHEST: Airway patent. No retractions. Breath sounds clear with good air entry bilaterally. ABDOMEN: Soft, Non-distended and non-tender, without guarding or rebound. SKIN: Acyanotic, warm, dry   MUSCULOSKELETAL: No swelling, tenderness or deformity   NEUROLOGICAL: Awake and oriented x 3. Pulses intact. Grossly nonfocal   Nursing note and vitals reviewed.      ED Course & Medical Decision Making   Medications - No data to display   Labs Reviewed - No data to display   CT Head WO Contrast Final Result      No acute intracranial abnormality noted. PROCEDURES:   Procedures    ASSESSMENT AND PLAN:  [id number], Lashaun Guzman is a 35 y.o. female who is presenting complaining of headache which is sharp and worse with touch. On exam she was tender to touch on the right side. Given the fact that symptoms have persisted for 1 month I did get a CT scan. CT was negative. I did not think that patient needed labs. I was concerned that this could be secondary to trigeminal neuralgia. Patient was discharged home with carbamazepine recommendation to follow-up with her primary care doctor. ClINICAL IMPRESSION:  1. Trigeminal neuralgia          PATIENT REFERRED TO:  Jase Barnes, APRN - CNP  [address]  [phone]    Schedule an appointment as soon as possible for a visit in 2 days        DISCHARGE MEDICATIONS:  Discharge Medication List as of 5/13/2022  3:06 PM      START taking these medications    Details   carBAMazepine (TEGRETOL-XR) 100 MG extended release tablet Take 1 tablet by mouth 2 times daily for 14 days, Disp-28 tablet, R-0Print           DISCONTINUED MEDICATIONS:  Discharge Medication List as of 5/13/2022  3:06 PM        DISPOSITION    Discharge  -We have instructed the patient, Lashuan Guzman) to return to the ED or call her PCP if her pain/symptoms worsen. -Findings and recommendations explained to patient. She expressed understanding and agreed with the plan.    ___________________________________________________________________________________  _________________________________________________________________________________________  This record is transcribed utilizing voice recognition technology. There are inherent limitations in this technology. In addition, there may be limitations in editing of this report. If there are any discrepancies, please contact me directly.         Noah Kelley MD  05/14/22 0830

## 2022-05-19 ENCOUNTER — OFFICE VISIT (OUTPATIENT)
Dept: PRIMARY CARE CLINIC | Age: 42
End: 2022-05-19
Payer: COMMERCIAL

## 2022-05-19 VITALS
WEIGHT: 293 LBS | TEMPERATURE: 96.9 F | SYSTOLIC BLOOD PRESSURE: 112 MMHG | DIASTOLIC BLOOD PRESSURE: 77 MMHG | HEART RATE: 86 BPM | RESPIRATION RATE: 13 BRPM | BODY MASS INDEX: 51.91 KG/M2 | OXYGEN SATURATION: 97 % | HEIGHT: 63 IN

## 2022-05-19 DIAGNOSIS — G50.0 TRIGEMINAL NEURALGIA PAIN: Primary | ICD-10-CM

## 2022-05-19 DIAGNOSIS — L02.91 ABSCESS: ICD-10-CM

## 2022-05-19 DIAGNOSIS — I10 ESSENTIAL HYPERTENSION: Chronic | ICD-10-CM

## 2022-05-19 DIAGNOSIS — G43.909 MIGRAINE WITHOUT STATUS MIGRAINOSUS, NOT INTRACTABLE, UNSPECIFIED MIGRAINE TYPE: ICD-10-CM

## 2022-05-19 PROCEDURE — 1036F TOBACCO NON-USER: CPT | Performed by: NURSE PRACTITIONER

## 2022-05-19 PROCEDURE — 99213 OFFICE O/P EST LOW 20 MIN: CPT | Performed by: NURSE PRACTITIONER

## 2022-05-19 PROCEDURE — G8427 DOCREV CUR MEDS BY ELIG CLIN: HCPCS | Performed by: NURSE PRACTITIONER

## 2022-05-19 PROCEDURE — G8417 CALC BMI ABV UP PARAM F/U: HCPCS | Performed by: NURSE PRACTITIONER

## 2022-05-19 RX ORDER — CARBAMAZEPINE 200 MG/1
200 TABLET, EXTENDED RELEASE ORAL 2 TIMES DAILY
Qty: 60 TABLET | Refills: 3 | Status: SHIPPED | OUTPATIENT
Start: 2022-05-19 | End: 2022-09-12

## 2022-05-19 RX ORDER — CEPHALEXIN 500 MG/1
500 CAPSULE ORAL 3 TIMES DAILY
Qty: 21 CAPSULE | Refills: 0 | Status: SHIPPED | OUTPATIENT
Start: 2022-05-19

## 2022-05-19 ASSESSMENT — ENCOUNTER SYMPTOMS
SINUS PRESSURE: 0
ABDOMINAL PAIN: 0
VOICE CHANGE: 0
CHEST TIGHTNESS: 0
TROUBLE SWALLOWING: 0
VOMITING: 0
CONSTIPATION: 0
DIARRHEA: 0
NAUSEA: 0
EYE ITCHING: 0
BLOOD IN STOOL: 0
SHORTNESS OF BREATH: 0
RHINORRHEA: 0
RECTAL PAIN: 0

## 2022-05-19 ASSESSMENT — PATIENT HEALTH QUESTIONNAIRE - PHQ9
SUM OF ALL RESPONSES TO PHQ QUESTIONS 1-9: 0
2. FEELING DOWN, DEPRESSED OR HOPELESS: 0
SUM OF ALL RESPONSES TO PHQ QUESTIONS 1-9: 0
SUM OF ALL RESPONSES TO PHQ9 QUESTIONS 1 & 2: 0
1. LITTLE INTEREST OR PLEASURE IN DOING THINGS: 0

## 2022-05-19 ASSESSMENT — VISUAL ACUITY: OU: 1

## 2022-05-19 NOTE — PATIENT INSTRUCTIONS
Patient Education        Trigeminal Neuralgia: Care Instructions  Your Care Instructions     Trigeminal neuralgia is a problem with the large nerve that brings feeling to your face. It causes a sudden, sharp pain on one side of your face. Just touching your cheek or talking can set off shooting pain toward the ear, eye,or nostril. Living with this pain can be very hard. Some people have long periods when they do not have pain, and then it comes back. Some people have periods of pain often. But medicine or other treatmentoften can make the pain go away. If you keep having pain, surgery may help. This problem is also called tic douloureux (say \"tik doo-ilene-SUREKHA\"). Follow-up care is a key part of your treatment and safety. Be sure to make and go to all appointments, and call your doctor if you are having problems. It's also a good idea to know your test results and keep alist of the medicines you take. How can you care for yourself at home?  Write down when you have pain and what you were doing when it started. Try to find what causes the pain. Being in a cold wind, yawning, or shaving are examples. Avoid or limit these triggers if you can.  Be safe with medicines. Take your medicines exactly as prescribed. ? Your doctor may have prescribed medicines used to treat depression and seizures. They can reduce your pain, help you sleep better, and improve your mood. ? Call your doctor if you think you are having a problem with your medicine. You will get more details on the specific medicines your doctor prescribes. ? If you are not taking a prescription pain medicine, take an over-the-counter medicine such as acetaminophen (Tylenol), ibuprofen (Advil, Motrin), or naproxen (Aleve). Read and follow all instructions on the label. ? Do not take two or more pain medicines at the same time unless the doctor told you to. Many pain medicines have acetaminophen, which is Tylenol.  Too much acetaminophen (Tylenol) can be harmful.  Reduce stress in your life. Ask your doctor about ways to relax. These may include breathing exercises and massage.  Think about joining a support group with other people who have this problem. These groups can give comfort and information about what to do to feel better. Your doctor can tell you how to find a support group. When should you call for help? Call your doctor now or seek immediate medical care if:     You have severe pain that you can't control. Watch closely for changes in your health, and be sure to contact your doctor if:     You are not able to sleep because of the pain.      You do not get better as expected. Where can you learn more? Go to [email or web address] org and sign in to your [employer] account. Enter R378 in the [employer] box to learn more about \"Trigeminal Neuralgia: Care Instructions. \"     If you do not have an account, please click on the \"Sign Up Now\" link. Current as of: July 1, 2021               Content Version: 13.2  © 2006-2022 [employer]. Care instructions adapted under license by [facility] ([facility]). If you have questions about a medical condition or this instruction, always ask your healthcare professional. [place] [address] any warranty or liability for your use of this information. Patient Education        carbamazepine (oral)  Pronunciation: hernandez Blanca  Brand: Carbatrol, Epitol, Equetro, TEGretol, TEGretol XR  What is the most important information I should know about carbamazepine? You should not take carbamazepine if you have a history of bone marrow suppression, or if you are allergic to carbamazepine or to certainantidepressant medications. Tell your doctor about all your current medicines and any you start or stop using. Many drugs can interact, and some drugs should not be used together.   Carbamazepine may cause serious blood problems or a life-threatening skin rash or allergic reaction. Call your doctor if you have a fever, unusual weakness, bleeding, bruising, or a skin rash that causes blistering and peeling. Some people have thoughts about suicide while taking seizure medicine. Stay alert to changes in your mood or symptoms. Report any new or worsening symptoms to your doctor. Do not stop taking carbamazepine without asking your doctor first, even if you feel fine. What is carbamazepine? Carbamazepine is an anticonvulsant that is used to treat seizures and nerve pain such as trigeminal neuralgia and diabetic neuropathy. Carbamazepine isalso used to treat bipolar disorder. Carbamazepine may also be used for purposes not listed in this medication guide. What should I discuss with my healthcare provider before taking carbamazepine? You should not take carbamazepine if you have a history of bone marrow suppression, or if you are allergic to carbamazepine or to an antidepressantsuch as amitriptyline, desipramine, doxepin, imipramine, or nortriptyline. Do not use carbamazepine if you have taken an MAO inhibitor in the past 14 days. A dangerous drug interaction could occur. MAO inhibitors include furazolidone, isocarboxazid, linezolid, phenelzine, rasagiline, selegiline, andtranylcypromine. Carbamazepine may cause severe or life-threatening skin rash, and especially in people of  ancestry. Your doctor may recommend a blood test before youstart the medication to determine your risk. Tell your doctor if you have ever had:   heart problems;   liver or kidney disease;   glaucoma;   porphyria;   depression, mood disorder; or   suicidal thoughts or actions. You may have thoughts about suicide while taking carbamazepine. Your doctor should check your progress at regular visits. Your family or other caregiversshould also be alert to changes in your mood or symptoms. Do not start or stop taking seizure medication during pregnancy without your doctor's advice. Carbamazepine may harm an unborn baby, but having a seizure during pregnancy could harm both mother and baby. The benefit of preventing seizures may outweigh any risk. Tell your doctor right away if you become pregnant. If you are pregnant, your name may be listed on a pregnancy registry to trackthe effects of carbamazepine on the baby. Carbamazepine can make birth control pills or implants less effective. Use a barrier form of birth control (such as a condom or diaphragm with spermicide) to prevent pregnancy. You should not breastfeed while you are using carbamazepine. How should I take carbamazepine? Follow all directions on your prescription label and read all medication guides or instruction sheets. Your doctor may occasionally change your dose. Use themedicine exactly as directed. Take with food. Swallow the extended-release tablet or capsule whole and do not crush, chew, or break it. Tell your doctor if you cannotswallow a pill whole. The chewable tablet must be chewed before you swallow it. Shake the oral suspension (liquid) before you measure a dose. Use the dosing syringe provided, or use amedicine dose-measuring device (not a kitchen spoon). It may take up to 4 weeks before your symptoms improve. Keep using the medication as directed and call your doctor promptly if this medicine seems to stop working as well in preventing your seizures. You will need frequent medical tests. Store at room temperature away from moisture, heat, and light. Do not stop using carbamazepine suddenly, even if you feel fine. Stopping suddenly may cause increased seizures. Followyour doctor's instructions about tapering your dose. What happens if I miss a dose? Take the medicine as soon as you can, but skip the missed dose if it is almost time for your next dose. Do not take two doses at one time. What happens if I overdose? Seek emergency medical attention or call the Poison Help line at 1-455.135.2255.   Overdose symptoms may include severe drowsiness, weak or shallow breathing, andloss of consciousness. What should I avoid while taking carbamazepine? Drinking alcohol with this medicine can cause side effects, and can alsoincrease your risk of seizures. Grapefruit may interact with carbamazepine and lead to unwanted side effects. Avoid the use of grapefruit products. Avoid driving or hazardous activity until you know how this medicine willaffect you. Your reactions could be impaired. Carbamazepine could make you sunburn more easily. Avoid sunlight or tanning beds. Wear protective clothing and use sunscreen (SPF 30 or higher) when youare outdoors. What are the possible side effects of carbamazepine? Get emergency medical help if you have signs of an allergic reaction (hives, difficult breathing, swelling in your face or throat) or a severe skin reaction (fever, sore throat, burning in your eyes, skin pain, red or purple skin rashthat spreads and causes blistering and peeling). Seek medical treatment if you have a serious drug reaction that can affect many parts of your body. Symptoms may include: skin rash, fever, swollen glands, muscle aches, severeweakness, unusual bruising, or yellowing of your skin or eyes. Report any new or worsening symptoms to your doctor, such as: sudden mood or behavior changes, depression, anxiety, insomnia, or if you feel agitated, hostile, restless, irritable, or have thoughts aboutsuicide or hurting yourself.   Call your doctor at once if you have:   a skin rash, no matter how mild;   loss of appetite, right-sided upper stomach pain, dark urine;   slow, fast, or pounding heartbeats;   anemia or other blood problems --fever, chills, sore throat, mouth sores, bleeding gums, nosebleeds, pale skin, easy bruising, unusual tiredness, feeling light-headed or short of breath; or   low levels of sodium in the body --headache, confusion, severe weakness, feeling unsteady, increased seizures. Common side effects may include:   dizziness, loss of coordination, problems with walking;   nausea, vomiting; or   drowsiness. This is not a complete list of side effects and others may occur. Call your doctor for medical advice about side effects. You may report side effects toFDA at 3-585-YUO-4456. What other drugs will affect carbamazepine? Sometimes it is not safe to use certain medications at the same time. Some drugs can affect your blood levels of other drugs you take, which mayincrease side effects or make the medications less effective. Using carbamazepine with other drugs that make you drowsy can worsen this effect. Ask your doctor before using opioid medication, a sleeping pill, amuscle relaxer, or medicine for anxiety or seizures. Many drugs can affect carbamazepine, and some drugs should not be used at the same time. Tell your doctor about all your current medicines and any medicine you start or stop using. This includes prescription and over-the-counter medicines, vitamins, and herbal products. Not all possible interactions are listed here. Where can I get more information? Your pharmacist can provide more information about carbamazepine. Remember, keep this and all other medicines out of the reach of children, never share your medicines with others, and use this medication only for the indication prescribed. Every effort has been made to ensure that the information provided by [address] is accurate, up-to-date, and complete, but no guarantee is made to that effect. Drug information contained herein may be time sensitive. [facility] information has been compiled for use by healthcare practitioners and consumers in the [place] and therefore [facility] does not warrant that uses outside of the [place] are appropriate, unless specifically indicated otherwise. [facility] drug information does not endorse drugs, diagnose patients or recommend therapy.  [facility] drug information is an informational resource designed to assist licensed healthcare practitioners in caring for their patients and/or to serve consumers viewing this service as a supplement to, and not a substitute for, the expertise, skill, knowledge and judgment of healthcare practitioners. The absence of a warning for a given drug or drug combination in no way should be construed to indicate that the drug or drug combination is safe, effective or appropriate for any given patient. [facility] does not assume any responsibility for any aspect of healthcare administered with the aid of information [facility] provides. The information contained herein is not intended to cover all possible uses, directions, precautions, warnings, drug interactions, allergic reactions, or adverse effects. If you have questions about the drugs you are taking, check with yourdoctor, nurse or pharmacist.  Copyright 3209-3286 [address]. Version: 14.01. Revision date:12/30/2019. Care instructions adapted under license by [facility] ([facility]). If you have questions about a medical condition or this instruction, always ask your healthcare professional. [place] [address] any warranty or liability for your use of this information.

## 2022-05-19 NOTE — PROGRESS NOTES
Anthony Camarillo (:  1980) is a 35 y.o. female,Established patient, here for evaluation of the following chief complaint(s):  ED Follow-up (headaches)         ASSESSMENT/PLAN:  1. Trigeminal neuralgia pain  -     AFL - Shara Conn MD, Neurology, [facility]  -     carBAMazepine (TEGRETOL-XR) 200 MG extended release tablet; Take 1 tablet by mouth 2 times daily, Disp-60 tablet, R-3Normal  2. Migraine without status migrainosus, not   intractable, unspecified migraine type  -     AFL - Shara Conn MD, Neurology, [facility]  3. Essential hypertension  -Continue Amlodipine  -continue Diovan  -Decrease sodium in diet  4. Abscess  -     cephALEXin (KEFLEX) 500 MG capsule; Take 1 capsule by mouth 3 times daily, Disp-21 capsule, R-0Normal  -Keep area clean and dry      No follow-ups on file. Subjective   SUBJECTIVE/OBJECTIVE:  HPI   Ms. Duglas Gandhi was evaluated in the emergency department on  for headache and right sided facial pain. CT was negative, as was head CT in February, no labs drawn. Denies visual disturbance, hearing deficit, extremity weakness. Denies previous injury or trauma. No rash, redness, swelling noted. Reports numbness of right side of face and head that radiates across head,  dizziness. Denies syncopal episode. Has chronic nausea, unsure if nausea is a related symptom. Referral placed for Neurology, missed new patient appointment on , therefore dismissed. Today she is reporting all and any activity causes headache, pain with lying head on pillow, therefore difficulty sleeping. Evaluated last month  by Rheumatology for fibromyalgia, Cymbalta and tizanidine continued. Taking carbamazepine, prescribed in the emergency department. Feels as if medication is somewhat effective. Reports recurrent small bump on coccyx which is similar to start of recurrent abscess. Has small abscess under abdominal fold since Saturday, has had cyst I& D before.   Multiple cysts in the past under breast, axilla and abdomen. Review of Systems   Constitutional: Negative for activity change and fever. HENT: Negative for congestion, dental problem, ear discharge, ear pain, hearing loss, postnasal drip, rhinorrhea, sinus pressure, sneezing, tinnitus, trouble swallowing and voice change. Small lump on left anterior area of neck   Eyes: Negative for itching and visual disturbance. Respiratory: Negative for chest tightness and shortness of breath. SHYLA - uses CPAP at night   Cardiovascular: Negative for chest pain, palpitations and leg swelling. Hypertension   Gastrointestinal: Negative for abdominal pain, blood in stool, constipation, diarrhea, nausea, rectal pain and vomiting. Gerd   Endocrine: Negative for polyuria. Genitourinary: Negative for dysuria. Musculoskeletal: Negative for arthralgias and myalgias. Bilateral hip pain   Skin: Negative for rash. Bump on coccyx  Abscess under abdominal fold   Allergic/Immunologic:        Has severe allergic symptoms. Neurological: Negative for dizziness, light-headedness and headaches. Psychiatric/Behavioral: Negative for agitation, decreased concentration and sleep disturbance. The patient is not nervous/anxious. Objective     height is 5' 3\" (1.6 m) and weight is 322 lb (146.1 kg) (abnormal). Her temporal temperature is 96.9 °F (36.1 °C). Her blood pressure is 112/77 and her pulse is 86. Her respiration is 13 and oxygen saturation is 97%. BP Readings from Last 3 Encounters:   05/19/22 112/77   05/13/22 122/76   04/21/22 120/68     Wt Readings from Last 3 Encounters:   05/19/22 (!) 322 lb (146.1 kg)   05/13/22 (!) 320 lb 8.8 oz (145.4 kg)   04/21/22 (!) 313 lb (142 kg)     Physical Exam  Vitals reviewed. Eyes:      General: Lids are normal. Vision grossly intact. Cardiovascular:      Rate and Rhythm: Normal rate and regular rhythm.       Heart sounds: Normal heart sounds, S1 normal and S2 normal.   Pulmonary:      Effort: Pulmonary effort is normal.      Breath sounds: Normal breath sounds. Neurological:      General: No focal deficit present. Mental Status: She is alert and oriented to person, place, and time. GCS: GCS eye subscore is 4. GCS verbal subscore is 5. GCS motor subscore is 6. Psychiatric:         Attention and Perception: Attention normal.         Speech: Speech normal.         Behavior: Behavior normal. Behavior is cooperative. On this date 5/19/2022 I have spent 20 minutes reviewing previous notes, test results and face to face with the patient discussing the diagnosis and importance of compliance with the treatment plan as well as documenting on the day of the visit. An electronic signature was used to authenticate this note.     --ZELDA Ayala - CNP

## 2022-05-20 PROBLEM — G50.0 TRIGEMINAL NEURALGIA PAIN: Status: ACTIVE | Noted: 2022-05-20

## 2022-05-20 PROBLEM — L02.91 ABSCESS: Status: ACTIVE | Noted: 2022-05-20

## 2022-05-23 DIAGNOSIS — E78.5 HYPERLIPIDEMIA, UNSPECIFIED HYPERLIPIDEMIA TYPE: ICD-10-CM

## 2022-05-23 DIAGNOSIS — K21.9 GASTROESOPHAGEAL REFLUX DISEASE WITHOUT ESOPHAGITIS: ICD-10-CM

## 2022-05-23 DIAGNOSIS — I10 ESSENTIAL HYPERTENSION: Chronic | ICD-10-CM

## 2022-05-23 RX ORDER — AMLODIPINE BESYLATE 10 MG/1
TABLET ORAL
Qty: 90 TABLET | Refills: 0 | Status: SHIPPED | OUTPATIENT
Start: 2022-05-23 | End: 2022-08-23

## 2022-05-23 RX ORDER — ATORVASTATIN CALCIUM 20 MG/1
TABLET, FILM COATED ORAL
Qty: 90 TABLET | Refills: 0 | Status: SHIPPED | OUTPATIENT
Start: 2022-05-23 | End: 2022-08-23

## 2022-05-23 RX ORDER — DULOXETIN HYDROCHLORIDE 60 MG/1
CAPSULE, DELAYED RELEASE ORAL
Qty: 90 CAPSULE | Refills: 0 | Status: SHIPPED | OUTPATIENT
Start: 2022-05-23 | End: 2022-08-23

## 2022-05-23 RX ORDER — ERGOCALCIFEROL 1.25 MG/1
CAPSULE ORAL
Qty: 12 CAPSULE | Refills: 0 | Status: SHIPPED | OUTPATIENT
Start: 2022-05-23 | End: 2022-06-27 | Stop reason: SDUPTHER

## 2022-05-23 RX ORDER — LANSOPRAZOLE 30 MG/1
CAPSULE, DELAYED RELEASE ORAL
Qty: 90 CAPSULE | Refills: 0 | Status: SHIPPED | OUTPATIENT
Start: 2022-05-23 | End: 2022-10-24

## 2022-06-27 RX ORDER — ERGOCALCIFEROL 1.25 MG/1
CAPSULE ORAL
Qty: 12 CAPSULE | Refills: 1 | Status: SHIPPED | OUTPATIENT
Start: 2022-06-27

## 2022-06-27 NOTE — TELEPHONE ENCOUNTER
Medication:   Requested Prescriptions     Pending Prescriptions Disp Refills    vitamin D (ERGOCALCIFEROL) 1.25 MG (62561 UT) CAPS capsule 12 capsule 0     Sig: Take 1 capsule by mouth once a week        Last Filled:      Patient Phone Number: [phone] (home) [phone] (work)    Last appt: 5/19/2022   Next appt: 7/7/2022    Last OARRS: No flowsheet data found.

## 2022-06-27 NOTE — TELEPHONE ENCOUNTER
Refill on Vitamin D (ERGOCALCIFEROL) 1.25 MG & montelukast (SINGULAIR) 10 MG please send to [facility] on [address] [facility]

## 2022-07-13 RX ORDER — MONTELUKAST SODIUM 10 MG/1
10 TABLET ORAL DAILY
Qty: 30 TABLET | Refills: 3 | Status: SHIPPED | OUTPATIENT
Start: 2022-07-13 | End: 2022-09-02

## 2022-07-13 NOTE — TELEPHONE ENCOUNTER
----- Message from Carrasco sent at 7/13/2022  2:08 PM EDT -----  Subject: Refill Request    QUESTIONS  Name of Medication? Other - singular   Patient-reported dosage and instructions? unknown dosage once per day   How many days do you have left? 0  Preferred Pharmacy? [address]  Pharmacy phone number (if available)? [phone]  Additional Information for Provider? Patient said that her pharmacy said   the singular prescription was cancelled. She would like it ordered. ---------------------------------------------------------------------------  --------------  Sigrid Duron INFO  What is the best way for the office to contact you? OK to leave message on   voicemail  Preferred Call Back Phone Number? [phone]  ---------------------------------------------------------------------------  --------------  SCRIPT ANSWERS  Relationship to Patient?  Self

## 2022-07-13 NOTE — TELEPHONE ENCOUNTER
Medication:   Requested Prescriptions     Pending Prescriptions Disp Refills    montelukast (SINGULAIR) 10 MG tablet 30 tablet 3     Sig: Take 1 tablet by mouth daily        Last Filled:      Patient Phone Number: [phone] (home) [phone] (work)    Last appt: 5/19/2022   Next appt: Visit date not found    Last OARRS: No flowsheet data found.

## 2022-07-18 ENCOUNTER — HOSPITAL ENCOUNTER (EMERGENCY)
Age: 42
Discharge: HOME | End: 2022-07-18
Attending: EMERGENCY MEDICINE
Payer: COMMERCIAL

## 2022-07-18 ENCOUNTER — APPOINTMENT (OUTPATIENT)
Dept: GENERAL RADIOLOGY | Age: 42
End: 2022-07-18
Payer: COMMERCIAL

## 2022-07-18 VITALS
DIASTOLIC BLOOD PRESSURE: 94 MMHG | HEART RATE: 78 BPM | WEIGHT: 293 LBS | HEIGHT: 63 IN | SYSTOLIC BLOOD PRESSURE: 159 MMHG | RESPIRATION RATE: 16 BRPM | BODY MASS INDEX: 51.91 KG/M2 | TEMPERATURE: 97.7 F | OXYGEN SATURATION: 97 %

## 2022-07-18 DIAGNOSIS — R07.9 CHEST PAIN, UNSPECIFIED TYPE: Primary | ICD-10-CM

## 2022-07-18 LAB
A/G RATIO: 1.1 (ref 1.1–2.2)
ALBUMIN SERPL-MCNC: 4 G/DL (ref 3.4–5)
ALP BLD-CCNC: 112 U/L (ref 40–129)
ALT SERPL-CCNC: 23 U/L (ref 10–40)
ANION GAP SERPL CALCULATED.3IONS-SCNC: 13 MMOL/L (ref 3–16)
AST SERPL-CCNC: 19 U/L (ref 15–37)
BACTERIA: ABNORMAL /HPF
BASOPHILS ABSOLUTE: 0 K/UL (ref 0–0.2)
BASOPHILS RELATIVE PERCENT: 0.2 %
BILIRUB SERPL-MCNC: <0.2 MG/DL (ref 0–1)
BILIRUBIN URINE: NEGATIVE
BLOOD, URINE: NEGATIVE
BUN BLDV-MCNC: 11 MG/DL (ref 7–20)
CALCIUM OXALATE CRYSTALS: PRESENT
CALCIUM SERPL-MCNC: 8.8 MG/DL (ref 8.3–10.6)
CHLORIDE BLD-SCNC: 102 MMOL/L (ref 99–110)
CLARITY: ABNORMAL
CO2: 24 MMOL/L (ref 21–32)
COLOR: YELLOW
CREAT SERPL-MCNC: 0.6 MG/DL (ref 0.6–1.1)
EOSINOPHILS ABSOLUTE: 0.2 K/UL (ref 0–0.6)
EOSINOPHILS RELATIVE PERCENT: 3.4 %
EPITHELIAL CELLS, UA: 20 /HPF (ref 0–5)
GFR AFRICAN AMERICAN: >60
GFR NON-AFRICAN AMERICAN: >60
GLUCOSE BLD-MCNC: 143 MG/DL (ref 70–99)
GLUCOSE URINE: NEGATIVE MG/DL
HCG QUALITATIVE: NEGATIVE
HCT VFR BLD CALC: 37.1 % (ref 36–48)
HEMOGLOBIN: 12.2 G/DL (ref 12–16)
HYALINE CASTS: 0 /LPF (ref 0–8)
KETONES, URINE: ABNORMAL MG/DL
LEUKOCYTE ESTERASE, URINE: NEGATIVE
LIPASE: 25 U/L (ref 13–60)
LYMPHOCYTES ABSOLUTE: 2.7 K/UL (ref 1–5.1)
LYMPHOCYTES RELATIVE PERCENT: 45.5 %
MCH RBC QN AUTO: 28.1 PG (ref 26–34)
MCHC RBC AUTO-ENTMCNC: 33 G/DL (ref 31–36)
MCV RBC AUTO: 85.1 FL (ref 80–100)
MICROSCOPIC EXAMINATION: YES
MONOCYTES ABSOLUTE: 0.4 K/UL (ref 0–1.3)
MONOCYTES RELATIVE PERCENT: 6.4 %
NEUTROPHILS ABSOLUTE: 2.6 K/UL (ref 1.7–7.7)
NEUTROPHILS RELATIVE PERCENT: 44.5 %
NITRITE, URINE: NEGATIVE
PDW BLD-RTO: 14.7 % (ref 12.4–15.4)
PH UA: 5.5 (ref 5–8)
PLATELET # BLD: 202 K/UL (ref 135–450)
PMV BLD AUTO: 7.8 FL (ref 5–10.5)
POTASSIUM REFLEX MAGNESIUM: 3.9 MMOL/L (ref 3.5–5.1)
PROTEIN UA: ABNORMAL MG/DL
RBC # BLD: 4.36 M/UL (ref 4–5.2)
RBC UA: 6 /HPF (ref 0–4)
SODIUM BLD-SCNC: 139 MMOL/L (ref 136–145)
SPECIFIC GRAVITY UA: 1.03 (ref 1–1.03)
TOTAL PROTEIN: 7.7 G/DL (ref 6.4–8.2)
TROPONIN: <0.01 NG/ML
URINE REFLEX TO CULTURE: ABNORMAL
URINE TYPE: ABNORMAL
UROBILINOGEN, URINE: 1 E.U./DL
WBC # BLD: 5.9 K/UL (ref 4–11)
WBC UA: 3 /HPF (ref 0–5)

## 2022-07-18 PROCEDURE — 36415 COLL VENOUS BLD VENIPUNCTURE: CPT

## 2022-07-18 PROCEDURE — 84703 CHORIONIC GONADOTROPIN ASSAY: CPT

## 2022-07-18 PROCEDURE — 71045 X-RAY EXAM CHEST 1 VIEW: CPT

## 2022-07-18 PROCEDURE — 99285 EMERGENCY DEPT VISIT HI MDM: CPT

## 2022-07-18 PROCEDURE — 83690 ASSAY OF LIPASE: CPT

## 2022-07-18 PROCEDURE — 85025 COMPLETE CBC W/AUTO DIFF WBC: CPT

## 2022-07-18 PROCEDURE — 84484 ASSAY OF TROPONIN QUANT: CPT

## 2022-07-18 PROCEDURE — 6360000002 HC RX W HCPCS: Performed by: EMERGENCY MEDICINE

## 2022-07-18 PROCEDURE — 81001 URINALYSIS AUTO W/SCOPE: CPT

## 2022-07-18 PROCEDURE — 96372 THER/PROPH/DIAG INJ SC/IM: CPT

## 2022-07-18 PROCEDURE — 6370000000 HC RX 637 (ALT 250 FOR IP): Performed by: EMERGENCY MEDICINE

## 2022-07-18 PROCEDURE — 80053 COMPREHEN METABOLIC PANEL: CPT

## 2022-07-18 RX ORDER — ASPIRIN 325 MG
325 TABLET ORAL ONCE
Status: COMPLETED | OUTPATIENT
Start: 2022-07-18 | End: 2022-07-18

## 2022-07-18 RX ORDER — NAPROXEN 500 MG/1
500 TABLET ORAL 2 TIMES DAILY WITH MEALS
Qty: 60 TABLET | Refills: 5 | Status: SHIPPED | OUTPATIENT
Start: 2022-07-18

## 2022-07-18 RX ORDER — TIZANIDINE 4 MG/1
4 TABLET ORAL EVERY 6 HOURS PRN
Qty: 20 TABLET | Refills: 0 | Status: SHIPPED | OUTPATIENT
Start: 2022-07-18

## 2022-07-18 RX ORDER — KETOROLAC TROMETHAMINE 30 MG/ML
15 INJECTION, SOLUTION INTRAMUSCULAR; INTRAVENOUS ONCE
Status: COMPLETED | OUTPATIENT
Start: 2022-07-18 | End: 2022-07-18

## 2022-07-18 RX ORDER — KETOROLAC TROMETHAMINE 30 MG/ML
15 INJECTION, SOLUTION INTRAMUSCULAR; INTRAVENOUS ONCE
Status: DISCONTINUED | OUTPATIENT
Start: 2022-07-18 | End: 2022-07-18

## 2022-07-18 RX ORDER — LIDOCAINE 4 G/G
1 PATCH TOPICAL DAILY
Qty: 30 PATCH | Refills: 0 | Status: SHIPPED | OUTPATIENT
Start: 2022-07-18 | End: 2022-08-17

## 2022-07-18 RX ADMIN — KETOROLAC TROMETHAMINE 15 MG: 30 INJECTION, SOLUTION INTRAMUSCULAR at 02:01

## 2022-07-18 RX ADMIN — ASPIRIN 325 MG ORAL TABLET 325 MG: 325 PILL ORAL at 00:44

## 2022-07-18 ASSESSMENT — ENCOUNTER SYMPTOMS
PHOTOPHOBIA: 0
COUGH: 0
WHEEZING: 0
TROUBLE SWALLOWING: 0
COLOR CHANGE: 0
VOMITING: 0
SHORTNESS OF BREATH: 1
ABDOMINAL PAIN: 0
CHEST TIGHTNESS: 0

## 2022-07-18 ASSESSMENT — PAIN DESCRIPTION - LOCATION: LOCATION: CHEST

## 2022-07-18 ASSESSMENT — PAIN DESCRIPTION - DESCRIPTORS: DESCRIPTORS: ACHING

## 2022-07-18 ASSESSMENT — HEART SCORE: ECG: 1

## 2022-07-18 ASSESSMENT — PAIN - FUNCTIONAL ASSESSMENT: PAIN_FUNCTIONAL_ASSESSMENT: ACTIVITIES ARE NOT PREVENTED

## 2022-07-18 ASSESSMENT — PAIN DESCRIPTION - ORIENTATION: ORIENTATION: RIGHT

## 2022-07-18 ASSESSMENT — PAIN SCALES - GENERAL: PAINLEVEL_OUTOF10: 10

## 2022-07-18 NOTE — ED NOTES
D/C: Order noted for d/c. Pt confirmed d/c paperwork  have correct name. Discharge and education instructions reviewed with patient. Teach-back successful. Pt verbalized understanding and signed d/c papers. Pt denied questions at this time. No acute distress noted. Patient instructed to follow-up as noted - return to emergency department if symptoms worsen. Patient verbalized understanding. Discharged per EDMD with discharge instructions. Pt discharged per  to private vehicle. Patient stable upon departure. Thanked patient for choosing [facility]) for care.           Mitchell Wills RN  07/18/22 [id number]

## 2022-07-18 NOTE — ED TRIAGE NOTES
Shasta Lovett is a 35 y.o. female brought herself to the ER for eval of upper right chest pain that radiates into her shoulder and goes down to her elbow. The pain starts back up at her wrist 10/10. The patient is alert and oriented with an open and patent airway.

## 2022-07-18 NOTE — ED PROVIDER NOTES
[facility]      Pt Name: Roscoe Hidalgo  MRN: [id number]  [place] 1980  Date ofevaluation: 7/18/2022  Provider: Nicolette Singh MD    CHIEF COMPLAINT       Chief Complaint   Patient presents with    Chest Pain     Right sided upper chest pain that goes into her shoulder and down to her elbow and starts again at her wrist 10/10, the patient woke up with it         HISTORY OF PRESENT ILLNESS   (Location/Symptom, Timing/Onset,Context/Setting, Quality, Duration, Modifying Factors, Severity)  Note limiting factors. Roscoe Hidalgo is a 35 y.o. female  who  has a past medical history of Allergic rhinitis, Anemia, Anxiety and depression, Dermatomyositis ([facility] [address] Eczema, Fibromyalgia, Fibromyalgia, GERD (gastroesophageal reflux disease), Headache(784.0), History of blood transfusion, HTN (hypertension), Hx of blood clots, Irritable bowel syndrome, Morbid obesity with BMI of 50.0-59.9, adult ([address] Osteoarthritis, PCOS (polycystic ovarian syndrome), Prediabetes, Pulmonary embolism ([facility] [address] Sleep apnea, Vaginal high risk HPV DNA test positive, and Wears glasses. who presents to the emergency department for evaluation of right-sided chest wall and arm pain. Patient reports waking up from sleep noticing pain when she is a bathroom the previous evening around midnight. Pain is been persistent since onset. Reports that there is pain in the anterior chest wall and entire arm down to her hand. Reports she has been having some cramping in her hand. Is injury or trauma or recent overexertion. Denies numbness or weakness. Denies neck injury. Patient does report shortness of breath that has been ongoing for the past few weeks. Patient states that she been told she has CHF is not currently being treated for it. Denies a history of previous MI. Patient does have a history of hypertension. HPI    NursingNotes were reviewed.     REVIEW OF SYSTEMS    (2-9 systems for level 4, 10 or more for level 5)     Review of Systems   Constitutional:  Negative for activity change, fatigue and fever. HENT:  Negative for congestion, mouth sores and trouble swallowing. Eyes:  Negative for photophobia and visual disturbance. Respiratory:  Positive for shortness of breath. Negative for cough, chest tightness and wheezing. Cardiovascular:  Positive for chest pain and leg swelling. Negative for palpitations. Gastrointestinal:  Negative for abdominal pain and vomiting. Genitourinary:  Negative for difficulty urinating and frequency. Musculoskeletal:  Positive for myalgias. Negative for gait problem and neck pain. Skin:  Negative for color change and rash. Neurological:  Negative for dizziness, light-headedness and headaches. Psychiatric/Behavioral:  Negative for confusion. The patient is not nervous/anxious. All other systems reviewed and are negative. Except as noted above the remainder of the review of systems was reviewed and negative. PAST MEDICAL HISTORY     Past Medical History:   Diagnosis Date    Allergic rhinitis     Anemia     Anxiety and depression     Dermatomyositis ([facility] [address] 2008    Eczema     Fibromyalgia     Fibromyalgia     GERD (gastroesophageal reflux disease)     Headache(784.0)     History of blood transfusion 2009    also two in 2014     HTN (hypertension)     Hx of blood clots 2014    PE and DVT    Irritable bowel syndrome     Morbid obesity with BMI of 50.0-59.9, adult ([facility] [address]     Osteoarthritis     PCOS (polycystic ovarian syndrome)     Prediabetes     Pulmonary embolism ([facility] [address] 7/17/2014    Overview:  Continue anticoagulation. Medicine managing Coumadin bridge. Daily H/H, PTT, PT/INR. Last Assessment & Plan:  HPI: Seen in hospital follow up. The patient is a 26year old y/o female who admitted to the hospital for pulmonary emboli.  She began to experience left lower extremity swelling and pain for two days, and day of admission she began to experience chest pain and shortness of     Sleep apnea     Vaginal high risk HPV DNA test positive 05/2016    Wears glasses          SURGICALHISTORY       Past Surgical History:   Procedure Laterality Date    COLONOSCOPY N/A 01/13/2021    COLONOSCOPY performed by Bubba Singh MD at [address]      crossed eyes    HYSTERECTOMY  2014    full-ovaries gone    MUSCLE BIOPSY      left thigh    OVARIAN CYST REMOVAL  1998    SKIN BIOPSY  2021    birth merritt removal left arm    VENA CAVA FILTER PLACEMENT      inserted in 2014 and removed in 2020         CURRENT MEDICATIONS       Previous Medications    AMLODIPINE (NORVASC) 10 MG TABLET    Take 1 tablet by mouth once daily    ATORVASTATIN (LIPITOR) 20 MG TABLET    Take 1 tablet by mouth once daily    CARBAMAZEPINE (TEGRETOL-XR) 200 MG EXTENDED RELEASE TABLET    Take 1 tablet by mouth 2 times daily    CEPHALEXIN (KEFLEX) 500 MG CAPSULE    Take 1 capsule by mouth 3 times daily    DULOXETINE (CYMBALTA) 60 MG EXTENDED RELEASE CAPSULE    Take 1 capsule by mouth once daily    EPINEPHRINE (EPIPEN) 0.3 MG/0.3ML SOAJ INJECTION    Inject 1 pen into the thigh for emergency treatment of allergic reactions, may repeat for severe persistent reactions. ERYTHROMYCIN (ROMYCIN) 5 MG/GM OPHTHALMIC OINTMENT    Apply to the lower eyelid margin 4 times daily for 5 days. FLUTICASONE (FLONASE) 50 MCG/ACT NASAL SPRAY    2 sprays by Nasal route daily    LANSOPRAZOLE (PREVACID) 30 MG DELAYED RELEASE CAPSULE    Take 1 capsule by mouth once daily    LEVOCETIRIZINE (XYZAL) 5 MG TABLET    Take 1 tablet daily    MONTELUKAST (SINGULAIR) 10 MG TABLET    Take 1 tablet by mouth daily    TIZANIDINE (ZANAFLEX) 4 MG TABLET    Take 1 tab po daily at night, can take during the day if it does not make sleepy. PRN.     UREA (CARMOL) 40 % CREAM    Apply 40 oz topically daily    VALSARTAN (DIOVAN) 80 MG TABLET    TAKE ONE TABLET BY MOUTH DAILY    VITAMIN D (ERGOCALCIFEROL) 1.25 MG (76239 UT) CAPS CAPSULE    Take 1 capsule by mouth once a week            Latex, Corn-containing products, Imitrex [sumatriptan], Other, Peanut-containing drug products, Soy allergy, Tree nut [macadamia nut oil], Wheat bran, Mixed grasses, and Tramadol    FAMILY HISTORY       Family History   Problem Relation Age of Onset    High Blood Pressure Mother     Other Father         gout    Cancer Father         prostate    Breast Cancer Maternal Aunt 47    Asthma Maternal Cousin     Rheum Arthritis Neg Hx     Osteoarthritis Neg Hx     Diabetes Neg Hx     Heart Failure Neg Hx     High Cholesterol Neg Hx     Hypertension Neg Hx     Migraines Neg Hx     Ovarian Cancer Neg Hx     Rashes/Skin Problems Neg Hx     Seizures Neg Hx     Stroke Neg Hx     Thyroid Disease Neg Hx           SOCIAL HISTORY       Social History     Socioeconomic History    Marital status: Single    Number of children: 3   Occupational History    Occupation: unemployed   Tobacco Use    Smoking status: Never    Smokeless tobacco: Never   Vaping Use    Vaping Use: Never used   Substance and Sexual Activity    Alcohol use: Yes     Alcohol/week: 0.0 standard drinks     Comment: occasionally    Drug use: Never    Sexual activity: Not Currently     Partners: Male   Social History Narrative    Lives with 2 children       SCREENINGS    [place]ona Coma Scale  Eye Opening: Spontaneous  Best Verbal Response: Oriented  Best Motor Response: Obeys commands  Nona Coma Scale Score: 15        PHYSICAL EXAM    (up to 7 for level 4, 8 or more for level 5)     ED Triage Vitals [07/18/22 0031]   BP Temp Temp Source Heart Rate Resp SpO2 Height Weight   (!) 171/92 97.7 °F (36.5 °C) Oral 93 18 96 % 5' 3\" (1.6 m) (!) 334 lb 14.1 oz (151.9 kg)       Physical Exam  Constitutional:       Appearance: Normal appearance. She is well-developed. HENT:      Head: Normocephalic and atraumatic.       Mouth/Throat:      Mouth: Mucous membranes are moist.   Eyes:      Extraocular Movements: Extraocular movements intact. Conjunctiva/sclera: Conjunctivae normal.      Pupils: Pupils are equal, round, and reactive to light. Neck:      Trachea: No tracheal deviation. Cardiovascular:      Rate and Rhythm: Normal rate and regular rhythm. Heart sounds: Normal heart sounds. Pulmonary:      Effort: Pulmonary effort is normal.      Breath sounds: Normal breath sounds. Chest:      Chest wall: Tenderness present. Abdominal:      General: There is no distension. Palpations: Abdomen is soft. Tenderness: There is no abdominal tenderness. Musculoskeletal:         General: Tenderness present. No swelling or signs of injury. Normal range of motion. Cervical back: Normal range of motion. Right lower leg: Edema present. Left lower leg: Edema present. Skin:     General: Skin is warm and dry. Findings: No bruising. Neurological:      General: No focal deficit present. Mental Status: She is alert and oriented to person, place, and time. Sensory: No sensory deficit. Motor: No weakness. RESULTS     EKG: All EKG's are interpreted by the Emergency Department Physician who either signs or Co-signsthis chart in the absence of a cardiologist.    EG demonstrates a sinus rhythm ventricular rate of 80 bpm.  AL interval and QTc interval within normal limits. Normal axis. There are no significant ST elevations or depressions EKG is nondiagnostic for ACS.   Compared EKG from 12/12/2017 I do not appreciate significant change    RADIOLOGY:   Non-plain filmimages such as CT, Ultrasound and MRI are read by the radiologist. Plain radiographic images are visualized and preliminarily interpreted by the emergency physician with the below findings:        Interpretation per the Radiologist below, if available at the time ofthis note:    XR CHEST PORTABLE    (Results Pending)         ED BEDSIDE ULTRASOUND:   Performed by ED Physician - none    LABS:  Labs Reviewed   CBC embolism. Patient has a low heart score. .  Patient feels improved on reevaluation. Symptomatic treatment with expectant management discussed with the patient and they and/or family members present are amenable to treatment plan and outpatient follow-up. Strict return precautions were discussed with the patient and those present. They demonstrated understanding of when to return to the emergency department for new or worsening symptoms. .  The patient is agreeable with plan of care and disposition. REASSESSMENT          CRITICAL CARE TIME   Total Critical Care time was 10 minutes, excluding separately reportable procedures. There was a high probability of clinically significant/life threatening deterioration in the patient's condition which required my urgent intervention. CONSULTS:  None    PROCEDURES:  Unless otherwise noted below, none     Procedures    FINAL IMPRESSION      1. Chest pain, unspecified type          DISPOSITION/PLAN   DISPOSITION        PATIENT REFERREDTO:  No follow-up provider specified.     DISCHARGEMEDICATIONS:  New Prescriptions    No medications on file          (Please note that portions of this note were completed with a voice recognition program.  Efforts were made to edit the dictations but occasionally words are mis-transcribed.)    Wallace Hayes MD (electronically signed)  Attending Emergency Physician         Wallace Hayes MD  07/18/22 7156

## 2022-07-22 DIAGNOSIS — J30.9 ALLERGIC RHINITIS, UNSPECIFIED SEASONALITY, UNSPECIFIED TRIGGER: ICD-10-CM

## 2022-07-22 NOTE — TELEPHONE ENCOUNTER
Medication:   Requested Prescriptions     Pending Prescriptions Disp Refills    fluticasone (FLONASE) 50 MCG/ACT nasal spray [Pharmacy Med Name: Fluticasone Propionate 50 MCG/ACT Nasal Suspension] 16 g 0     Sig: Use 2 spray(s) in each nostril once daily        Last Filled:      Patient Phone Number: [phone] (home) [phone] (work)    Last appt: 5/19/2022   Next appt: Visit date not found    Last OARRS: No flowsheet data found.

## 2022-07-25 RX ORDER — FLUTICASONE PROPIONATE 50 MCG
SPRAY, SUSPENSION (ML) NASAL
Qty: 16 G | Refills: 3 | Status: SHIPPED | OUTPATIENT
Start: 2022-07-25

## 2022-07-28 ENCOUNTER — TELEPHONE (OUTPATIENT)
Dept: PRIMARY CARE CLINIC | Age: 42
End: 2022-07-28

## 2022-07-28 NOTE — TELEPHONE ENCOUNTER
Patient would like referral faxed to [phone] Dr Gretta Cowart Neurologist  @ [facility] b/c Dr. Satish Berger is not network with patient's insurance

## 2022-08-01 DIAGNOSIS — G43.909 MIGRAINE WITHOUT STATUS MIGRAINOSUS, NOT INTRACTABLE, UNSPECIFIED MIGRAINE TYPE: Primary | ICD-10-CM

## 2022-08-05 DIAGNOSIS — I10 ESSENTIAL HYPERTENSION: Chronic | ICD-10-CM

## 2022-08-08 RX ORDER — VALSARTAN 80 MG/1
TABLET ORAL
Qty: 90 TABLET | Refills: 5 | Status: SHIPPED | OUTPATIENT
Start: 2022-08-08

## 2022-08-21 DIAGNOSIS — I10 ESSENTIAL HYPERTENSION: Chronic | ICD-10-CM

## 2022-08-21 DIAGNOSIS — E78.5 HYPERLIPIDEMIA, UNSPECIFIED HYPERLIPIDEMIA TYPE: ICD-10-CM

## 2022-08-23 RX ORDER — AMLODIPINE BESYLATE 10 MG/1
TABLET ORAL
Qty: 90 TABLET | Refills: 1 | Status: SHIPPED | OUTPATIENT
Start: 2022-08-23

## 2022-08-23 RX ORDER — ATORVASTATIN CALCIUM 20 MG/1
TABLET, FILM COATED ORAL
Qty: 90 TABLET | Refills: 1 | Status: SHIPPED | OUTPATIENT
Start: 2022-08-23

## 2022-08-23 RX ORDER — DULOXETIN HYDROCHLORIDE 60 MG/1
CAPSULE, DELAYED RELEASE ORAL
Qty: 90 CAPSULE | Refills: 1 | Status: SHIPPED | OUTPATIENT
Start: 2022-08-23

## 2022-08-23 NOTE — TELEPHONE ENCOUNTER
Medication:   Requested Prescriptions     Pending Prescriptions Disp Refills    atorvastatin (LIPITOR) 20 MG tablet [Pharmacy Med Name: Atorvastatin Calcium 20 MG Oral Tablet] 90 tablet 0     Sig: Take 1 tablet by mouth once daily    amLODIPine (NORVASC) 10 MG tablet [Pharmacy Med Name: amLODIPine Besylate 10 MG Oral Tablet] 90 tablet 0     Sig: Take 1 tablet by mouth once daily    DULoxetine (CYMBALTA) 60 MG extended release capsule [Pharmacy Med Name: DULoxetine HCl 60 MG Oral Capsule Delayed Release Particles] 90 capsule 0     Sig: Take 1 capsule by mouth once daily        Last Filled:      Patient Phone Number: [phone] (home) [phone] (work)    Last appt: 5/19/2022   Next appt: Visit date not found    Last OARRS: No flowsheet data found.

## 2022-09-02 RX ORDER — MONTELUKAST SODIUM 10 MG/1
10 TABLET ORAL DAILY
Qty: 90 TABLET | Refills: 1 | Status: SHIPPED | OUTPATIENT
Start: 2022-09-02

## 2022-09-10 DIAGNOSIS — G50.0 TRIGEMINAL NEURALGIA PAIN: ICD-10-CM

## 2022-09-12 RX ORDER — CARBAMAZEPINE 200 MG/1
TABLET, EXTENDED RELEASE ORAL
Qty: 60 TABLET | Refills: 0 | Status: SHIPPED | OUTPATIENT
Start: 2022-09-12

## 2022-09-12 NOTE — TELEPHONE ENCOUNTER
Medication:   Requested Prescriptions     Pending Prescriptions Disp Refills    carBAMazepine (TEGRETOL XR) 200 MG extended release tablet [Pharmacy Med Name: carBAMazepine  MG Oral Tablet Extended Release 12 Hour] 54 tablet 0     Sig: Take 1 tablet by mouth twice daily        Last Filled:      Patient Phone Number: [phone] (home) [phone] (work)    Last appt: 5/19/2022   Next appt: Visit date not found    Last OARRS: No flowsheet data found.

## 2022-10-15 ENCOUNTER — HOSPITAL ENCOUNTER (EMERGENCY)
Age: 42
Discharge: LEFT BEFORE DISCHARGE | End: 2022-10-15

## 2022-10-15 VITALS
SYSTOLIC BLOOD PRESSURE: 141 MMHG | BODY MASS INDEX: 58.89 KG/M2 | HEART RATE: 68 BPM | RESPIRATION RATE: 17 BRPM | OXYGEN SATURATION: 98 % | TEMPERATURE: 97.9 F | DIASTOLIC BLOOD PRESSURE: 95 MMHG | WEIGHT: 293 LBS

## 2022-10-15 DIAGNOSIS — M79.642 BILATERAL HAND PAIN: Primary | ICD-10-CM

## 2022-10-15 DIAGNOSIS — M79.641 BILATERAL HAND PAIN: Primary | ICD-10-CM

## 2022-10-15 RX ORDER — HYDROCODONE BITARTRATE AND ACETAMINOPHEN 5; 325 MG/1; MG/1
1 TABLET ORAL EVERY 6 HOURS PRN
Qty: 10 TABLET | Refills: 0 | Status: SHIPPED | OUTPATIENT
Start: 2022-10-15 | End: 2022-10-18

## 2022-10-15 RX ORDER — METHYLPREDNISOLONE 4 MG/1
TABLET ORAL
Qty: 1 KIT | Refills: 0 | Status: SHIPPED | OUTPATIENT
Start: 2022-10-15

## 2022-10-15 ASSESSMENT — PAIN DESCRIPTION - FREQUENCY: FREQUENCY: CONTINUOUS

## 2022-10-15 ASSESSMENT — PAIN - FUNCTIONAL ASSESSMENT
PAIN_FUNCTIONAL_ASSESSMENT: 0-10
PAIN_FUNCTIONAL_ASSESSMENT: PREVENTS OR INTERFERES SOME ACTIVE ACTIVITIES AND ADLS

## 2022-10-15 ASSESSMENT — PAIN DESCRIPTION - ORIENTATION: ORIENTATION: RIGHT;LEFT

## 2022-10-15 ASSESSMENT — PAIN DESCRIPTION - ONSET: ONSET: ON-GOING

## 2022-10-15 ASSESSMENT — PAIN SCALES - GENERAL: PAINLEVEL_OUTOF10: 8

## 2022-10-15 ASSESSMENT — PAIN DESCRIPTION - LOCATION: LOCATION: ARM

## 2022-10-15 NOTE — DISCHARGE INSTRUCTIONS
Take prescribed medication as prescribed only  Follow-up with Dr. Laurita Barrett hand surgeon  Return to emergency room as needed

## 2022-10-15 NOTE — ED PROVIDER NOTES
**ADVANCED PRACTICE PROVIDER, I HAVE EVALUATED THIS PATIENT**        [address]      Pt Name: Ros Lacey  RDI:[id number]  [place] 1980  Date of evaluation: 10/15/2022  Provider: Kevin Crow PA-C      Chief Complaint:    Chief Complaint   Patient presents with    Hand Pain     Pt with history of carpel tunnel. Pain in left elbow and wrist.  Pain in right wrist.  Swelling to hands. Nursing Notes, Past Medical Hx, Past Surgical Hx, Social Hx, Allergies, and Family Hx were all reviewed and agreed with or any disagreements were addressed in the HPI.    HPI: (Location, Duration, Timing, Severity, Quality, Assoc Sx, Context, Modifying factors)    Chief Complaint of complaint of bilateral hand pain. Patient states she was told before that she has early carpal tunnel. She is seen by Dr. Jermain Estrada in the past state that she never followed back up. She has been wearing brace at night and is not helping. She denies any new injury. Pain goes from her elbow down to her fingers bilaterally. Denies any weakness but it does hurt to hold even a gallon of milk that she cannot keep holding. Denies headache, no chest pain, no extremity weakness. No other complaints. This is a  38 y.o. female who presents to the emergency room with the above complaint.     PastMedical/Surgical History:      Diagnosis Date    Allergic rhinitis     Anemia     Anxiety and depression     Dermatomyositis ([facility] [address] 2008    Eczema     Fibromyalgia     Fibromyalgia     GERD (gastroesophageal reflux disease)     Headache(784.0)     History of blood transfusion 2009    also two in 2014     HTN (hypertension)     Hx of blood clots 2014    PE and DVT    Irritable bowel syndrome     Morbid obesity with BMI of 50.0-59.9, adult (HCC)     Osteoarthritis     PCOS (polycystic ovarian syndrome)     Prediabetes     Pulmonary embolism ([address] 7/17/2014    Overview:  Continue anticoagulation. Medicine managing Coumadin bridge. Daily H/H, PTT, PT/INR. Last Assessment & Plan:  HPI: Seen in hospital follow up. The patient is a 26year old y/o female who admitted to the hospital for pulmonary emboli. She began to experience left lower extremity swelling and pain for two days, and day of admission she began to experience chest pain and shortness of     Sleep apnea     Vaginal high risk HPV DNA test positive 05/2016    Wears glasses          Procedure Laterality Date    COLONOSCOPY N/A 01/13/2021    COLONOSCOPY performed by Everardo Wooten MD at [address] [place] [address]      crossed eyes    HYSTERECTOMY  2014    full-ovaries gone    MUSCLE BIOPSY      left thigh    OVARIAN CYST REMOVAL  1998    SKIN BIOPSY  2021    birth merritt removal left arm    VENA CAVA FILTER PLACEMENT      inserted in 2014 and removed in 2020       Medications:  Previous Medications    AMLODIPINE (NORVASC) 10 MG TABLET    Take 1 tablet by mouth once daily    ATORVASTATIN (LIPITOR) 20 MG TABLET    Take 1 tablet by mouth once daily    CARBAMAZEPINE (TEGRETOL XR) 200 MG EXTENDED RELEASE TABLET    Take 1 tablet by mouth twice daily    CEPHALEXIN (KEFLEX) 500 MG CAPSULE    Take 1 capsule by mouth 3 times daily    DULOXETINE (CYMBALTA) 60 MG EXTENDED RELEASE CAPSULE    Take 1 capsule by mouth once daily    EPINEPHRINE (EPIPEN) 0.3 MG/0.3ML SOAJ INJECTION    Inject 1 pen into the thigh for emergency treatment of allergic reactions, may repeat for severe persistent reactions. ERYTHROMYCIN (ROMYCIN) 5 MG/GM OPHTHALMIC OINTMENT    Apply to the lower eyelid margin 4 times daily for 5 days.     FLUTICASONE (FLONASE) 50 MCG/ACT NASAL SPRAY    Use 2 spray(s) in each nostril once daily    LANSOPRAZOLE (PREVACID) 30 MG DELAYED RELEASE CAPSULE    Take 1 capsule by mouth once daily    LEVOCETIRIZINE (XYZAL) 5 MG TABLET    Take 1 tablet daily    MONTELUKAST (SINGULAIR) 10 MG TABLET    Take 1 tablet by mouth daily    NAPROXEN (NAPROSYN) 500 MG TABLET    Take 1 tablet by mouth in the morning and 1 tablet in the evening. Take with meals. TIZANIDINE (ZANAFLEX) 4 MG TABLET    Take 1 tab po daily at night, can take during the day if it does not make sleepy. PRN. TIZANIDINE (ZANAFLEX) 4 MG TABLET    Take 1 tablet by mouth every 6 hours as needed (muscle aches)    UREA (CARMOL) 40 % CREAM    Apply 40 oz topically daily    VALSARTAN (DIOVAN) 80 MG TABLET    Take 1 tablet by mouth once daily    VITAMIN D (ERGOCALCIFEROL) 1.25 MG (55872 UT) CAPS CAPSULE    Take 1 capsule by mouth once a week         Review of Systems:  (2-9 systems needed)  Review of Systems   Constitutional:  Negative for chills and fever. HENT:  Negative for congestion and sore throat. Eyes:  Negative for pain and visual disturbance. Respiratory:  Negative for cough and shortness of breath. Cardiovascular:  Negative for chest pain and leg swelling. Gastrointestinal:  Negative for abdominal pain, nausea and vomiting. Genitourinary:  Negative for dysuria and frequency. Musculoskeletal:  Negative for back pain and neck pain. Skin:  Negative for rash and wound. Neurological:  Positive for numbness. Negative for dizziness and light-headedness. \"Positives and Pertinent negatives as per HPI\"    Physical Exam:  Physical Exam  Vitals and nursing note reviewed. Cardiovascular:      Rate and Rhythm: Normal rate and regular rhythm. Heart sounds: Normal heart sounds. No murmur heard. No friction rub. No gallop. Pulmonary:      Effort: Pulmonary effort is normal. No respiratory distress. Breath sounds: Normal breath sounds. No wheezing or rales. Chest:      Chest wall: No tenderness. Musculoskeletal:         General: Normal range of motion. Right hand: Tenderness present. No swelling, deformity or bony tenderness. Normal range of motion. Normal capillary refill. Normal pulse. Left hand: Tenderness present.  No swelling, deformity or bony tenderness. Normal range of motion. Normal capillary refill. Normal pulse. Skin:     General: Skin is warm. Neurological:      General: No focal deficit present. MEDICAL DECISION MAKING    Vitals:    Vitals:    10/15/22 1201   BP: (!) 141/95   Pulse: 68   Resp: 17   Temp: 97.9 °F (36.6 °C)   TempSrc: Oral   SpO2: 98%   Weight: (!) 332 lb 7.3 oz (150.8 kg)       LABS:Labs Reviewed - No data to display     Remainder of labs reviewed and were negative at this time or not returned at the time of this note. RADIOLOGY:   Non-plain film images such as CT, Ultrasound and MRI are read by the radiologist. Clark Gottlieb PA-C have directly visualized the radiologic plain film image(s) with the below findings:      Interpretation per the Radiologist below, if available at the time of this note:    No orders to display        No results found. MEDICAL DECISION MAKING / ED COURSE:      PROCEDURES:   Procedures    None    Patient was given:  Medications - No data to display  Emergency room course: Patient on exam cardiovascular regular rhythm, lungs are clear. No wheeze, rales or rhonchi noted. Bilateral upper extremity full range of motion shoulder, elbow, wrist.  She has no deformity noted. No swelling. No hypothenar present. She does have positive Phalen. Full range of motion all digits sensation is intact to soft touch. Alert oriented x4. Does not appear to be in acute distress. At this point I did discuss with patient I will give her some for pain put on steroids and have her follow back up with Dr. Steve Brumfield and she is to wear her brace at night and during the day if it helps. She was okay with this plan. She will be discharged stable condition. The patient tolerated their visit well. I evaluated the patient. The physician was available for consultation as needed.   The patient and / or the family were informed of the results of any tests, a time was given to answer questions, a plan was proposed and they agreed with plan. I am the Primary Clinician of Record. CLINICAL IMPRESSION:  1. Bilateral hand pain        DISPOSITION Decision To Discharge 10/15/2022 01:55:11 PM      PATIENT REFERRED TO:  ZELDA Post - CNP  [address]  [phone]    Call       Selena Funes MD  [address] [place] [address]  [phone]    Call in 2 days      DISCHARGE MEDICATIONS:  New Prescriptions    HYDROCODONE-ACETAMINOPHEN (NORCO) 5-325 MG PER TABLET    Take 1 tablet by mouth every 6 hours as needed for Pain for up to 3 days. METHYLPREDNISOLONE (MEDROL DOSEPACK) 4 MG TABLET    Take by mouth.        DISCONTINUED MEDICATIONS:  Discontinued Medications    No medications on file              (Please note the MDM and HPI sections of this note were completed with a voice recognition program.  Efforts were made to edit the dictations but occasionally words are mis-transcribed.)    Electronically signed, Dinora Ware PA-C,          Dinora Ware PA-C  10/21/22 2549

## 2022-10-21 ASSESSMENT — ENCOUNTER SYMPTOMS
BACK PAIN: 0
NAUSEA: 0
SHORTNESS OF BREATH: 0
VOMITING: 0
SORE THROAT: 0
EYE PAIN: 0
ABDOMINAL PAIN: 0
COUGH: 0

## 2022-10-23 DIAGNOSIS — K21.9 GASTROESOPHAGEAL REFLUX DISEASE WITHOUT ESOPHAGITIS: ICD-10-CM

## 2022-10-24 ENCOUNTER — OFFICE VISIT (OUTPATIENT)
Dept: ORTHOPEDIC SURGERY | Age: 42
End: 2022-10-24
Payer: COMMERCIAL

## 2022-10-24 VITALS — WEIGHT: 293 LBS | RESPIRATION RATE: 16 BRPM | BODY MASS INDEX: 51.91 KG/M2 | HEIGHT: 63 IN

## 2022-10-24 DIAGNOSIS — G56.03 BILATERAL CARPAL TUNNEL SYNDROME: Primary | ICD-10-CM

## 2022-10-24 PROCEDURE — L3908 WHO COCK-UP NONMOLDE PRE OTS: HCPCS | Performed by: ORTHOPAEDIC SURGERY

## 2022-10-24 PROCEDURE — 1036F TOBACCO NON-USER: CPT | Performed by: ORTHOPAEDIC SURGERY

## 2022-10-24 PROCEDURE — 99214 OFFICE O/P EST MOD 30 MIN: CPT | Performed by: ORTHOPAEDIC SURGERY

## 2022-10-24 PROCEDURE — G8484 FLU IMMUNIZE NO ADMIN: HCPCS | Performed by: ORTHOPAEDIC SURGERY

## 2022-10-24 PROCEDURE — G8428 CUR MEDS NOT DOCUMENT: HCPCS | Performed by: ORTHOPAEDIC SURGERY

## 2022-10-24 PROCEDURE — G8417 CALC BMI ABV UP PARAM F/U: HCPCS | Performed by: ORTHOPAEDIC SURGERY

## 2022-10-24 PROCEDURE — 20526 THER INJECTION CARP TUNNEL: CPT | Performed by: ORTHOPAEDIC SURGERY

## 2022-10-24 RX ORDER — TRIAMCINOLONE ACETONIDE 40 MG/ML
40 INJECTION, SUSPENSION INTRA-ARTICULAR; INTRAMUSCULAR ONCE
Status: COMPLETED | OUTPATIENT
Start: 2022-10-24 | End: 2022-10-24

## 2022-10-24 RX ORDER — LANSOPRAZOLE 30 MG/1
CAPSULE, DELAYED RELEASE ORAL
Qty: 90 CAPSULE | Refills: 0 | Status: SHIPPED | OUTPATIENT
Start: 2022-10-24

## 2022-10-24 RX ADMIN — TRIAMCINOLONE ACETONIDE 40 MG: 40 INJECTION, SUSPENSION INTRA-ARTICULAR; INTRAMUSCULAR at 16:40

## 2022-10-24 NOTE — LETTER
[facility] SURGERY  Surgery Scheduling Form:      DEMOGRAPHICS:                                                                                                              .    Patient Name:  Roney Richards  Patient :  1980   Patient SS#:  [id number]    Patient Phone:  [phone] (home) [phone] (work) Alt. Patient Phone:    Patient Address:  [address] [address]    PCP:  ZELDA Marques CNP  Insurance:  Payor: [facility] / Plan: Enmanuel Acosta / Product Type: *No Product type* /   Insurance ID Number:    DIAGNOSIS & PROCEDURE:                                                                                            .    Diagnosis:   left Cubital Tunnel Syndrome / Ulnar Nerve Entrapment @ Elbow (354.2) &  bilateral Carpal Tunnel Syndrome  Operation:  left  Ulnar Nerve Decompression  (at Elbow) &  left Carpal Tunnel Release followed 3 weeks later by  right Carpal Tunnel Release  [CPT: 61611 + 87585]  Location:  [facility]  Surgeon:  Kaya Lara    SCHEDULING INFORMATION:                                                                                         .    Surgeon's Scheduling Instruction:  elective    RN Post-op Appt:  [x] Yes   [] No  Preferred Thursday:   [] Yes   [x] No    Requested Date:    OR Time:   Patient Arrival Time:    OR Time Required:  20  Minutes  Anesthesia:  General  Equipment:  None  Mini C-Arm:  No   Standard C-Arm:  No  Status:  outpatient  PAT Required:  Yes  Comments: Patient must wait 90 days from 10/24/22                     Salud Dewitt MD  10/24/22 3:14 PM    BILLING INFORMATION:                                                                                                    .    Procedure:       CPT Code Modifier  left  Ulnar Nerve Decompression  (at Elbow) &  left Carpal Tunnel Release followed 3 weeks later by  right Carpal Tunnel Release    .                                        Pre Operative Physician Prophylaxis Orders - SCIP Protocols      Pre-Operative Antibiotic Order:    Allergies   Allergen Reactions    Latex Itching and Rash    Corn-Containing Products Shortness Of Breath, Itching and Swelling    Imitrex [Sumatriptan] Other (See Comments)     Chest pain    Other Anaphylaxis     enviromental  allegies    Corn, wheat, soy bean, peanuts and walnuts    Peanut-Containing Drug Products Shortness Of Breath    Soy Allergy Shortness Of Breath and Swelling    Tree Nut [Macadamia Nut Oil] Shortness Of Breath and Swelling    Wheat Bran Hives, Shortness Of Breath, Itching and Swelling    Mixed Grasses Itching and Other (See Comments)     Molds--over 51 different environmental allergies  Sinus issues/redden eyes    Tramadol Other (See Comments)     Made her feel really bad, and head dizzy and chest pain          [x]  ----  No Antibiotic Ordered       []  ----  Give the following Antibiotic within 1 hour prior to start time:         Ancef 1 gram IV if patient is less than 200 pounds    or       Ancef 2 grams IV if patient is greater than 200 pounds    or      Vancomycin 1 gram IV (over 1 hour) if patient is allergic to           PENICILLINS or CEFALOSPORINS            Procedure: left Ulnar Nerve Decompression  (at Elbow) &  left Carpal Tunnel Release followed 3 weeks later by right Carpal Tunnel Release     Patient: Leidy Sales  :    1980    Physician Signature:     Date: 10/24/22  Time: 3:14 PM

## 2022-10-24 NOTE — PATIENT INSTRUCTIONS
Information & Instructions   After Finger, Hand, Wrist, or Elbow Injection    Chase Sexton MD    You have received an injection of local anesthetic (Bupivicaine without Epinephrine) for comfort & a steroid (Kenalog) for its strong anti-inflammatory effects. In order to give the medication a chance to reduce your inflammation and discomfort, it is recommended that you take it easy for a day or so. You may use your hand and arm as you feel comfortable, but you should avoid highly strenuous activity and heavy use for several days. Relief from the injection will often not begin for several days, and you may not feel full relief for up to one month. It is not uncommon to experience some local discomfort or pain at or around the injection site for a few days. To relieve these symptoms you may do the following if you feel necessary:       Apply ice to the affected area 20 minutes on and 20 minutes off. Do not apply ice directly to the skin. Use a thin layer (T-shirt, pillowcase, towel, etc.) to protect the skin. - If allowed by your other medical physicians, you may take -     2 Tylenol extra strength tablets every 4-6 hours       1-2 Aleve tablets twice a day     2-3 Advil tablets two to three times a day    If you are diabetic, the steroid medication may increase your blood sugar, so you are advised to monitor your sugar more closely so you can adjust it accordingly for a few days following your injection. If you need assistance with the control of you blood sugar, please contact you primary care physician for further advice. I will request that you please call the office one month after your injection at [phone] if you have not experienced relief of your symptoms (unless I have instructed you otherwise). If your injection has given you good relief of you symptoms as expected, then you only need to call the office if your symptoms return.

## 2022-10-24 NOTE — LETTER
CONSENT TO OPERATION  AND/OR OTHER PROCEDURE(S)          PATIENT : Justo Parra   YOB: 1980      DATE : 10/24/22          1. I request and consent that Dr. Allison Thomas. Seble Capellan,  and/or his associates or assistants perform an operation and/or procedures on the above patient at  [place] [address], to treat the condition(s) which appear indicated by the diagnostic studies already performed. I have been told that in general terms the nature, purpose and reasonable expectations of the operation and/or procedure(s) are:      left Ulnar Nerve Decompression  (at Elbow) &  left Carpal Tunnel Release followed 3 weeks later by  right Carpal Tunnel Release     2. It has been explained to me by the informing physician that during the course of the operation and/or procedure(s) unforeseen conditions may be revealed that necessitate an extension of the original operation and/or procedure(s) or different operation and/or procedures than those set forth in Paragraph 1. I therefore authorize and request that my physician and/or his associates or assistants perform such operations and/or procedures as are necessary and desirable in the exercise of professional judgment. The authority granted under this Paragraph 2 shall extend to all conditions that require treatment and are known to my physician at the time the operation is commenced. 3. I have been made aware by the informing physician of certain risks and consequences that are associated with the operation and/or procedure(s) described in Paragraph 1, the reasonable alternative methods or treatment, the possible consequences, the possibility that the operation and/or procedure(s) may be unsuccessful and the possibility of complications.   I understand the reasonably known risks to be:      - Bleeding  - Infection  - Poor Healing  - Possible Damage to Nerve, Vessel, Tendon/Muscle or Bone  - Need for further Treatment/Surgery  - Stiffness  - Pain  - Residual or Recurrent Symptoms  - Anesthetic and/or Medical Risks  - We have discussed the specific limitations and risks of hospital and/or office based treatment at this time due to the COVID-19 pandemic                I have been counseled about the risks of kaley Covid-19 in the pj-operative and post-operative periods related to this procedure. I have been made aware that kaley Covid-19 around the time of a surgical procedure may worsen my prognosis for recovering from the virus and lend to a higher morbidity and or mortality risk. With this knowledge, I have requested to proceed with the procedure as scheduled. 4. I have also been informed by the informing physician that there are other risks from both known and unknown causes that are attendant to the performance of any surgical procedure. I am aware that the practice of medicine and surgery is not an exact science, and that no guarantees have been made to me concerning the results of the operation and/or procedure(s). 5. I   CONSENT / REFUSE CONSENT  (strike the phrase that does not apply) to the taking of photographs before, during and/or after the operation or procedure for scientific/educational purposes. 6. I consent to the administration of anesthesia and to the use of such anesthetics as may be deemed advisable by the anesthesiologist who has been engaged by me or my physician.     7. I certify that I have read and understand the above consent to operation and/or other procedure(s); that the explanations therein referred to were made to me by the informing physician in advance of my signing this consent; that all blanks or statements requiring insertion or completion were filled in and inapplicable paragraphs, if any, were stricken before I signed; and that all questions asked by me about the operation and/or procedure(s) which I have consented to have been fully answered in a satisfactory manner.                                 _______________________           10/24/22                              Witness     Signature Of Patient         Date        Radha Garcia                                                 Informing Physician                                           Signature of Informing Physician                              If patient is unable to sign or is a minor, complete one of the following:    (A)  Patient is a minor   years of age. (B)  Patient is unable to sign because: The undersigned represents that he or she is duly authorized to execute this consent for and on behalf of the above named patient. Witness               o  Parent  o  Guardian   o  Spouse       o  Other (specify)                                           Patient Name: Sherren Blender  Patient YOB: 1980  Dr. Melanie Ordonez' Return To Work Policy  Regarding your ability to return to work after surgery or injury, Dr. Melanie Ordonez will not state that any patient is off of work or cannot work at all. He will place you on restrictions after your surgical procedure or injury. Depending on the details of your particular situation, Dr. Melanie Ordonez may state that you will have either light use or no use of your hand for a specific number of weeks. It is your obligation to communicate with your employer regarding your restrictions. It is your employer's decision as to whether they will accommodate your restrictions (i.e. allow you to come to work in your restricted capacity) or to not allow you to return to work under your restrictions. Dr. Melanie Ordonez does not participate in making this decision and cannot influence your employer regarding their decision. If you do not communicate your restrictions to your employer, or if you do not present to work as you are scheduled to, Dr. Melanie Ordonez will not provide an 'excuse' to explain your absence.   A doctors note, or official forms ([facility] [place], etc.) will be filled out, upon request, to indicate your date of surgery and your restrictions as stated above. Dr. VALERIE JUNG [facility]' Narcotic Policy  Patients will only be prescribed narcotics after surgical procedures or significant injury. Not all procedures cause pain great enough to require Narcotics and thus, not all patients will receive prescriptions after surgical procedures or injuries. Narcotics are never prescribed for chronic conditions. Narcotics are never prescribed for use longer than one week at a time. Refills are only granted in unusual circumstances and only at Dr. Renetta Ordaz discretion. Patients who are receiving narcotic medication from another physician or who are under pain management contracts will not be given a prescription for narcotics for any reason. Surgery Arrival Time:  You have been advised of the START TIME for your surgery as well as the ARRIVAL TIME at which you need to arrive at the surgery facility. Please understand that there is a certain amount of preparation which takes place at the surgery facility prior to the start of your surgery. If you arrive later than your scheduled ARRIVAL TIME, it may be necessary to cancel your surgery for that day and reschedule your procedure due to lack of adequate time for pre-surgery preparations. Thank you for being on time for your arrival.    I have read the above policies and understand that by agreeing to proceed with treatment by Dr. Javier Melendez and his team, that I am agreeing to abide by these policies.   Patient Name:  Gaby Mcmahon    Patient Signature:  _____________________________    Stepan Dunnellon Date:   10/24/22

## 2022-10-24 NOTE — Clinical Note
Dear  Ludy Khan, APRN - CNP,  Thank you very much for your referral or Ms. Dee Shah to me for evaluation and treatment of her Hand & Wrist condition. I appreciate your confidence in me and thank you for allowing me the opportunity to care for your patients. If I can be of any further assistance to you on this or any other patient, please do not hesitate to contact me. Sincerely,  Chase Bajwa.  Vignesh Schultz MD

## 2022-10-24 NOTE — PROGRESS NOTES
Administrations This Visit       triamcinolone acetonide (KENALOG-40) injection 40 mg       Admin Date  10/24/2022  16:40 Action  Given Dose  40 mg Route  Other Site  Other Administered By  Verito Lee MA    Ordering Provider: Belgica Spence MD    NDC: 0908-6455-84    Lot#: [id number]    : [facility] (PRIMARY CARE)    Patient Supplied?: No    Comments: Left Hand      Admin Date  10/24/2022  16:40 Action  Given Dose  40 mg Route  Other Site  Other Administered By  Verito Lee MA    Ordering Provider: Belgica Spence MD    NDC: 2999-9510-22    Lot#: [id number]    : [facility] [facility] (PRIMARY CARE)    Patient Supplied?: No    Comments: Right Hand

## 2022-10-24 NOTE — PROGRESS NOTES
Ms. Makenna Ascencio returns today in follow-up of her previously treated  bilateral carpal tunnel syndrome & Cubital Tunnel Syndrome. She was last seen by Patricia Najera PA-C in February, 2021 at which time she was treated with conservative measures, activity modification, avoidance of bothersome tasks, and OTC medication. She experienced no relief of her initial symptoms. She  has noticed symptom worsening over the last several months. She returns today with worsened symptoms of bilateral numbness in the Median Innervated digits, tingling in the Median Innervated digits, numbness in the Ulnar Innervated digits, and tingling in the Ulnar  Innervated digits, requesting further treatment. I have today reviewed with Makenna Ascencio the clinically relevant, past medical history, medications, allergies,  family history, social history, and Review Of Systems & I have documented any details relevant to today's presenting complaints in my history above. Ms. Cristal Bautista self-reported past medical history, medications, allergies,  family history, social history, and Review Of Systems have been scanned into the chart under the \"Media\" tab. Physical Exam:  Vitals  Resp: 16  Height: 5' 3\" (160 cm)  Weight: (!) 332 lb (150.6 kg)  Ms. Makenna Ascencio appears well, she is in no apparent distress, she demonstrates appropriate mood & affect. Skin: Normal in appearance, Normal Color, and Free of Lesions Bilaterally   Digital range of motion is equal bilaterally   Wrist range of motion is equal bilaterally   Elbow range of motion is equal bilaterally   There is no evidence of gross joint instability bilaterally.   Sensation is subjectively tingling in the Thumb, Index Finger, Middle Finger, and Ring Finger on the right, in the whole hand on the left and objectively present in the same distribution bilaterally  Vascular examination reveals good capillary refill and good color bilaterally  Swelling is absent in the medial elbow, there is no tenderness at the medial epicondyle bilaterally  Examination for Carpal Tunnel Syndrome shows Carpal Tunnel Compression Test to be mildly positive on the right & mildly positive on the left. The patient displays moderate baseline symptoms to potentially confound the exam.  The thenar musculature is not atrophied & weakened. Examination for Cubital Tunnel Syndrome shows mild tenderness to palpation at the Medial epicondyle on the Left, normal on the Right. The Ulnar Nerve rests behind the medial epicondyle without subluxation upon elbow flexion bilaterally. Elbow flexion-compression test is Negative, and there is an active Tinnel's Sign over the Cubital Tunnel on the Left, normal on the Right. The Ulnar Nerve innervated intrinsic musculature is not atrophied & weakened bilaterally    Review of Electrodiagnostic Testing:  Electrodiagnostic Studies performed by another Physician outside of my practice were Personally Reviewed & Interpreted by myself today. Test performed on: 12/24/2020    NERVE CONDUCTION STUDY:  RIGHT   Median Nerve: Sensory Latency: 4.3  Motor Latency: 4.8  Ulnar Nerve:  Conduction Velocity:  54    LEFT  Median Nerve: Sensory Latency: 4.4  Motor Latency: 4.4  Ulnar Nerve:  Conduction Velocity:  50    EMG:  RIGHT  Normal    LEFT  Normal    My Interpretation: This study is consistent with: Moderate RIGHT Median Nerve Entrapment at the Carpal Tunnel, Moderate LEFT Median Nerve Entrapment at the Carpal Tunnel, No RIGHT Ulnar Nerve Entrapment at the Cubital Tunnel, and Mild LEFT  Ulnar Nerve Entrapment at the Cubital Tunnel      Impression:  Ms. Sherren Blender is showing evidence of persistent carpal tunnel syndrome and Cubital Tunnel Syndrome after previous treatment. She requests additional treatment at this time.     Plan:  I have had a thorough discussion with Ms. Sherren Blender regarding the treatment options available for her worsened left  cubital tunnel syndrome, which is causing her significant symptoms and difficulty. I have outlined for Ms. Mikel August the risk, benefits and consequences of the various treatment modalities, including a reasonable expectation for the long term success of each. We have discussed the likelihood that further, more aggressive treatment may be required for her current presenting condition. Based upon our current discussion and a reasonable understating of the options available to her, Ms. Mikel August has selected to proceed with a conservative plan of treatment consisting of: attention to elbow positioning and pressure,  activity modification, and the judicious use of over-the-counter anti-inflammatory medications if allowed by her primary care physician. Instructions were given regarding the use of other modalities as appropriate. I have clearly explained to her that the above outlined treatment plan should not be expected to 'cure' her  cubital tunnel syndrome, but we are rather treating the symptoms with which she presents. She has understood that in order to achieve more durable relief of her symptoms and to prevent future worsening or further damage, that definitive surgical treatment would be required. Ms. Mikel August  voiced an appropriate understanding of our discussion, the options available to her, and of the expectations of her selected  treatment. I have had a thorough discussion with Ms. Mikel August regarding the treatment options available for her worsened Bilateral carpal tunnel syndrome, which is causing her functional limitations. I have outlined for Ms. Mikel August the the various treatment modalities available to her, including the options and utility of the  judicious use of over-the-counter anti-inflammatory medications (if allowed by her primary care physician), the temporary relief afforded by injection of corticosteroids, and the more definitive option of surgical treatment for this problem if she feels that the duration or severity of her symptoms merits surgical intervention. I have explained  the reasonable expectations for the long term success of each option and the likelihood that further more aggressive treatment could be required for her current presenting condition. Based upon our current discussion and a reasonable understating of the options available to her, Ms. Brice Molina has selected to proceed with  an injection to both carpal tunnel(s). I have outlined for her the nature of the injection, and the pre, pj and post injection considerations and the appropriate expectations for this injection. I have clearly explained to her that the above outlined treatment plan should not be expected to 'cure' her carpal tunnel syndrome, but we are rather treating the symptoms with which she presents. She has understood that in order to achieve long lasting relief of her symptoms and to prevent future worsening or further damage, that definitive surgical treatment would be required. Ms. Brice Molina  voiced an appropriate understanding of our discussion, the options available to her, and of the expectations of her selected  treatment. She did wish to proceed with Bilateral carpal tunnel injection. Procedure: right Carpal Tunnel Injection  [first Injection]: After full discussion of the nature of this process and outlining a treatment plan with Ms. Brice Molina, we discussed the complications, limitations, expectations, alternatives, and risks of injection to the carpal tunnel. I have explained the potential for bleeding, infection, potential side effects of the medication, and the remote possibility of damage to surrounding structures as result of the injection. She understood this information well and verbally consented to this treatment.     The skin of the symptomatic extremity was prepped with Isopropyl Alcohol and under aseptic conditions the carpal tunnel was injected with a combination of 1 ml of 0.25% Bupivacaine without Epinephrine and 40 mg of Triamcinolone (40 mg/ml). There was good filling of the carpal tunnel. A  dry, sterile bandage was applied and she tolerated the injection without difficulty. I advised her of the expected response, possible reactions and the instructions for care of the hand. She is instructed in the judicious use of over-the-counter anti-inflammatory medications or pain relievers for her symptoms if allowed by his primary care physician. Procedure: left Carpal Tunnel Injection  [first Injection]: After full discussion of the nature of this process and outlining a treatment plan with Ms. Martin Mace, we discussed the complications, limitations, expectations, alternatives, and risks of injection to the carpal tunnel. I have explained the potential for bleeding, infection, potential side effects of the medication, and the remote possibility of damage to surrounding structures as result of the injection. She understood this information well and verbally consented to this treatment. The skin of the symptomatic extremity was prepped with Isopropyl Alcohol and under aseptic conditions the carpal tunnel was injected with a combination of 1 ml of 0.25% Bupivacaine without Epinephrine and 40 mg of Triamcinolone (40 mg/ml). There was good filling of the carpal tunnel. A  dry, sterile bandage was applied and she tolerated the injection without difficulty. I advised her of the expected response, possible reactions and the instructions for care of the hand. She is instructed in the judicious use of over-the-counter anti-inflammatory medications or pain relievers for her symptoms if allowed by his primary care physician. Ms. Martin Mace did express interest in proceeding with definitive surgical treatment for this condition in the short term future.   At her request, we will proceed with the necessary administrative steps to facilitate her scheduling the indicated surgery when she wishes to do so. I have had a thorough discussion with Ms. Jose Troy regarding the treatment options available for her continued carpal tunnel syndrome, which is causing her significant  limitations. I have outlined for Ms. Jose Troy the benefits and consequences of the various treatment modalities, including the fact that surgical treatment is the only modality which is reasonably expected to provide long lasting or permanent resolution of her symptoms. Based upon our current discussion and a reasonable understating of the options available to her, Ms. Jose Troy has selected to proceed with surgical Carpal Tunnel Release. I have discussed the details of the surgical procedure, the pre, pj and postoperative concerns and the appropriate expectations after surgery with Ms. Jose Troy today. She was given the opportunity to ask questions, voiced an understanding of the procedure, and she did wish to proceed with Staged Bilateral Carpal Tunnel Release. I had an extensive discussion with Ms. Jose Troy (and any family members present with her today) regarding the natural history, etiology, and long term consequences of this problem. We have mutually selected a surgical treatment plan  and, in my opinion, surgical intervention is indicated at this time. I have discussed with her the potential complications, limitations, expectations, alternatives, and risks of Carpal Tunnel Release. She has had full opportunity to ask her questions. I have answered them all to her satisfaction. I feel that Ms. Jose Troy (and any family members present with her today) do understand our discussion today and she has provided informed consent for Staged Bilateral Carpal Tunnel Release.      I have had a thorough discussion with Ms. Jose Troy regarding the treatment options available for her continued cubital tunnel syndrome, which is causing her significant  limitations. I have outlined for Ms. Jose Troy the benefits and consequences of the various treatment modalities, including the fact that surgical treatment is the only modality which is reasonably expected to provide long lasting or permanent resolution of her symptoms. Based upon our current discussion and a reasonable understating of the options available to her, Ms. Jose Troy has selected to proceed with surgical Ulnar Nerve decompression at the Cubital Tunnel. I have discussed the details of the surgical procedure, the pre, pj and postoperative concerns and the appropriate expectations after surgery with Ms. Jose Troy today. She was given the opportunity to ask questions, voiced an understanding of the procedure, and she did wish to proceed with Left Ulnar Nerve decompression at the Cubital Tunnel. I had an extensive discussion with Ms. Jose Troy (and any family members present with her today) regarding the natural history, etiology, and long term consequences of this problem. We have mutually selected a surgical treatment plan  and, in my opinion, surgical intervention is indicated at this time. I have discussed with her the potential complications, limitations, expectations, alternatives, and risks of Ulnar Nerve decompression at the Cubital Tunnel. She has had full opportunity to ask her questions. I have answered them all to her satisfaction. I feel that Ms. Jose Troy (and any family members present with her today) do understand our discussion today and she has provided informed consent for Left Ulnar Nerve decompression at the Cubital Tunnel. I have also discussed with Ms. Jose Troy the other treatment options available to her for this condition. We have today selected to proceed with treatment by injection with steroid medication.   She and I have agreed that if our current course of Injection treatment does not prove to be effective over the short term future, that she will schedule a follow-up appointment to discuss and select an alternate course of therapy including possibly further conservative treatment or surgical treatment. Ms. Roney Richards has been given a full verbal list of instructions and precautions related to her present condition. I have asked her to followup with me in the office at the prescribed time. She is also specifically requested to call or return to the office sooner if her symptoms change or worsen prior to the next scheduled appointment.

## 2022-10-31 ENCOUNTER — TELEPHONE (OUTPATIENT)
Dept: ORTHOPEDIC SURGERY | Age: 42
End: 2022-10-31

## 2022-10-31 NOTE — TELEPHONE ENCOUNTER
Pt telephone number not working at this time, called alternate number and left message with mother to have pt return call

## 2022-10-31 NOTE — TELEPHONE ENCOUNTER
General Question     Subject: R HAND ISSUE   Patient and /or Facility Request: [facility]  Contact Number: [phone]    PATIENT CALLED IN TO SEE IF SHE CAN SPEAK TO SOMEONE IN THE OFFICE ABOUT HER R HAND ISSUE DUE HAVING AN  INJECTION. .. PLEASE CALL PATIENT BACK AT THE ABOVE NUMBER. ..

## 2022-11-03 ENCOUNTER — TELEPHONE (OUTPATIENT)
Dept: ORTHOPEDIC SURGERY | Age: 42
End: 2022-11-03

## 2022-11-05 ENCOUNTER — HOSPITAL ENCOUNTER (EMERGENCY)
Age: 42
Discharge: HOME | End: 2022-11-05
Payer: COMMERCIAL

## 2022-11-05 ENCOUNTER — APPOINTMENT (OUTPATIENT)
Dept: GENERAL RADIOLOGY | Age: 42
End: 2022-11-05
Payer: COMMERCIAL

## 2022-11-05 VITALS
RESPIRATION RATE: 18 BRPM | HEIGHT: 63 IN | DIASTOLIC BLOOD PRESSURE: 77 MMHG | WEIGHT: 293 LBS | BODY MASS INDEX: 51.91 KG/M2 | TEMPERATURE: 98.6 F | OXYGEN SATURATION: 96 % | HEART RATE: 77 BPM | SYSTOLIC BLOOD PRESSURE: 128 MMHG

## 2022-11-05 DIAGNOSIS — M25.562 ACUTE PAIN OF LEFT KNEE: Primary | ICD-10-CM

## 2022-11-05 PROCEDURE — 99283 EMERGENCY DEPT VISIT LOW MDM: CPT

## 2022-11-05 PROCEDURE — 73560 X-RAY EXAM OF KNEE 1 OR 2: CPT

## 2022-11-05 RX ORDER — NAPROXEN 500 MG/1
500 TABLET ORAL 2 TIMES DAILY WITH MEALS
Qty: 20 TABLET | Refills: 0 | Status: SHIPPED | OUTPATIENT
Start: 2022-11-05

## 2022-11-05 RX ORDER — HYDROCODONE BITARTRATE AND ACETAMINOPHEN 5; 325 MG/1; MG/1
1 TABLET ORAL EVERY 6 HOURS PRN
Qty: 8 TABLET | Refills: 0 | Status: SHIPPED | OUTPATIENT
Start: 2022-11-05 | End: 2022-11-07

## 2022-11-05 ASSESSMENT — ENCOUNTER SYMPTOMS
SORE THROAT: 0
SHORTNESS OF BREATH: 0
COUGH: 0
ABDOMINAL PAIN: 0
EYE PAIN: 0
BACK PAIN: 0
VOMITING: 0
NAUSEA: 0

## 2022-11-05 ASSESSMENT — PAIN - FUNCTIONAL ASSESSMENT: PAIN_FUNCTIONAL_ASSESSMENT: 0-10

## 2022-11-05 ASSESSMENT — PAIN SCALES - GENERAL: PAINLEVEL_OUTOF10: 10

## 2022-11-05 ASSESSMENT — PAIN DESCRIPTION - PAIN TYPE: TYPE: ACUTE PAIN

## 2022-11-05 NOTE — ED PROVIDER NOTES
**ADVANCED PRACTICE PROVIDER, I HAVE EVALUATED THIS PATIENT**        [address]      Pt Name: Jean-Claude Elizabeth  YSX:[id number]  [place] 1980  Date of evaluation: 11/5/2022  Provider: Eileen Chaves PA-C      Chief Complaint:    Chief Complaint   Patient presents with    Leg Pain     Left leg pain from knee down, denies injury states she may have some swelling         Nursing Notes, Past Medical Hx, Past Surgical Hx, Social Hx, Allergies, and Family Hx were all reviewed and agreed with or any disagreements were addressed in the HPI.    HPI: (Location, Duration, Timing, Severity, Quality, Assoc Sx, Context, Modifying factors)    Chief Complaint of left knee pain. Patient states that hip pain and left knee anterior day goes [facility] down her shin. Denies calf pain, no shortness of breath, no chest pain, no lightheaded dizziness. No numbness or tingling her feet or finger. Status start about 4 days ago. She was just standing at work. She works on assembly line. She denies any injury. No other complaints. This is a  38 y.o. female who presents to the emergency room with the above complaint. PastMedical/Surgical History:      Diagnosis Date    Allergic rhinitis     Anemia     Anxiety and depression     Dermatomyositis ([facility] [address] 2008    Eczema     Fibromyalgia     Fibromyalgia     GERD (gastroesophageal reflux disease)     Headache(784.0)     History of blood transfusion 2009    also two in 2014     HTN (hypertension)     Hx of blood clots 2014    PE and DVT    Irritable bowel syndrome     Morbid obesity with BMI of 50.0-59.9, adult (HCC)     Osteoarthritis     PCOS (polycystic ovarian syndrome)     Prediabetes     Pulmonary embolism ([facility] [address] [address] 7/17/2014    Overview:  Continue anticoagulation. Medicine managing Coumadin bridge. Daily H/H, PTT, PT/INR. Last Assessment & Plan:  HPI: Seen in hospital follow up.   The patient is a 26year old y/o female who admitted to the hospital for pulmonary emboli. She began to experience left lower extremity swelling and pain for two days, and day of admission she began to experience chest pain and shortness of     Sleep apnea     Vaginal high risk HPV DNA test positive 05/2016    Wears glasses          Procedure Laterality Date    COLONOSCOPY N/A 01/13/2021    COLONOSCOPY performed by Vamshi Holland MD at [address] [facility] [address]      crossed eyes    HYSTERECTOMY ([address] [address])  2014    full-ovaries gone    MUSCLE BIOPSY      left thigh    OVARIAN CYST REMOVAL  1998    SKIN BIOPSY  2021    birth merritt removal left arm    VENA CAVA FILTER PLACEMENT      inserted in 2014 and removed in 2020       Medications:  Previous Medications    AMLODIPINE (NORVASC) 10 MG TABLET    Take 1 tablet by mouth once daily    ATORVASTATIN (LIPITOR) 20 MG TABLET    Take 1 tablet by mouth once daily    CARBAMAZEPINE (TEGRETOL XR) 200 MG EXTENDED RELEASE TABLET    Take 1 tablet by mouth twice daily    CEPHALEXIN (KEFLEX) 500 MG CAPSULE    Take 1 capsule by mouth 3 times daily    DULOXETINE (CYMBALTA) 60 MG EXTENDED RELEASE CAPSULE    Take 1 capsule by mouth once daily    EPINEPHRINE (EPIPEN) 0.3 MG/0.3ML SOAJ INJECTION    Inject 1 pen into the thigh for emergency treatment of allergic reactions, may repeat for severe persistent reactions. ERYTHROMYCIN (ROMYCIN) 5 MG/GM OPHTHALMIC OINTMENT    Apply to the lower eyelid margin 4 times daily for 5 days. FLUTICASONE (FLONASE) 50 MCG/ACT NASAL SPRAY    Use 2 spray(s) in each nostril once daily    LANSOPRAZOLE (PREVACID) 30 MG DELAYED RELEASE CAPSULE    Take 1 capsule by mouth once daily    LEVOCETIRIZINE (XYZAL) 5 MG TABLET    Take 1 tablet daily    METHYLPREDNISOLONE (MEDROL DOSEPACK) 4 MG TABLET    Take by mouth.     MONTELUKAST (SINGULAIR) 10 MG TABLET    Take 1 tablet by mouth daily    NAPROXEN (NAPROSYN) 500 MG TABLET    Take 1 tablet by mouth in the morning and 1 tablet in the evening. Take with meals. TIZANIDINE (ZANAFLEX) 4 MG TABLET    Take 1 tab po daily at night, can take during the day if it does not make sleepy. PRN. TIZANIDINE (ZANAFLEX) 4 MG TABLET    Take 1 tablet by mouth every 6 hours as needed (muscle aches)    UREA (CARMOL) 40 % CREAM    Apply 40 oz topically daily    VALSARTAN (DIOVAN) 80 MG TABLET    Take 1 tablet by mouth once daily    VITAMIN D (ERGOCALCIFEROL) 1.25 MG (43077 UT) CAPS CAPSULE    Take 1 capsule by mouth once a week         Review of Systems:  (2-9 systems needed)  Review of Systems   Constitutional:  Negative for chills and fever. HENT:  Negative for congestion and sore throat. Eyes:  Negative for pain and visual disturbance. Respiratory:  Negative for cough and shortness of breath. Cardiovascular:  Negative for chest pain and leg swelling. Gastrointestinal:  Negative for abdominal pain, nausea and vomiting. Genitourinary:  Negative for dysuria and frequency. Musculoskeletal:  Negative for back pain and neck pain. Skin:  Negative for rash and wound. Neurological:  Negative for dizziness and light-headedness. \"Positives and Pertinent negatives as per HPI\"    Physical Exam:  Physical Exam  Vitals and nursing note reviewed. Cardiovascular:      Rate and Rhythm: Normal rate and regular rhythm. Heart sounds: Normal heart sounds. No murmur heard. No friction rub. No gallop. Pulmonary:      Effort: Pulmonary effort is normal. No respiratory distress. Breath sounds: Normal breath sounds. No wheezing or rales. Chest:      Chest wall: No tenderness. Musculoskeletal:      Left knee: Bony tenderness present. No swelling, deformity, erythema or crepitus. Normal range of motion. Tenderness present over the patellar tendon. Normal alignment. Normal pulse. Skin:     General: Skin is warm. Neurological:      General: No focal deficit present.        MEDICAL DECISION MAKING    Vitals:    Vitals:    11/05/22 1100 BP: 128/77   Pulse: 77   Resp: 18   Temp: 98.6 °F (37 °C)   TempSrc: Oral   SpO2: 96%   Weight: (!) 331 lb 12.7 oz (150.5 kg)   Height: 5' 3\" (1.6 m)       LABS:Labs Reviewed - No data to display     Remainder of labs reviewed and were negative at this time or not returned at the time of this note. RADIOLOGY:   Non-plain film images such as CT, Ultrasound and MRI are read by the radiologist. Kaushal Alvarez PA-C have directly visualized the radiologic plain film image(s) with the below findings:      Interpretation per the Radiologist below, if available at the time of this note:    XR KNEE LEFT (1-2 VIEWS)   Final Result   No acute osseous injury. Mild osteoarthritis of the patellofemoral compartment. XR KNEE LEFT (1-2 VIEWS)    Result Date: 11/5/2022  EXAMINATION: 2 XRAY VIEWS OF THE LEFT KNEE 11/5/2022 11:08 am COMPARISON: 03/12/2015 HISTORY: ORDERING SYSTEM PROVIDED HISTORY: Pain TECHNOLOGIST PROVIDED HISTORY: Reason for exam:->Pain Reason for Exam: medial knee pain x 4 days NKI FINDINGS: The alignment of the left knee is normal.  There is no acute fracture or dislocation. Patellar osteophyte formation is noted. No joint effusion is seen. No acute osseous injury. Mild osteoarthritis of the patellofemoral compartment. MEDICAL DECISION MAKING / ED COURSE:      PROCEDURES:   Procedures    None    Patient was given:  Medications - No data to display    Emergency room course: Patien on exam shows cardiovascular regular rhythm, lungs are clear. No wheeze, rales or rhonchi noted. Patient left lower extremity shows hip was nontender. Left knee shows some mild tenderness to the anterior and at the patellofemoral insertion point. There is no erythema. There is no swelling noted, no effusion noted. She does get some pain and discomfort with flexion extension but mostly with weightbearing. She is some mild tenderness over the proximal tibia.   There is no obvious deformity noted. Calf show no tenderness. Ankle show no tenderness. Pedal pulse good at 2+. She has full range of motion all other extremity. Alert oriented x4. She is ambulatory. Does not appear to be in acute distress. X-ray of the left knee showed no acute osseous abnormality. Does show some mild degenerative changes at the patellofemoral compartment. See above. Discussed x-ray results with her. I will put her on naproxen. , and give her tramadol for breakthrough pain. She is not to use the tramadol work or be driving. Follow-up orthopedics. Return for any worsening. The patient tolerated their visit well. I evaluated the patient. The physician was available for consultation as needed. The patient and / or the family were informed of the results of any tests, a time was given to answer questions, a plan was proposed and they agreed with plan. I am the Primary Clinician of Record. CLINICAL IMPRESSION:  1. Acute pain of left knee        DISPOSITION Decision To Discharge 11/05/2022 11:42:36 AM      PATIENT REFERRED TO:  ZELDA Robles CNP  [address] [facility]  [phone]    Call   As needed    Diomedes Atkins MD  [address] [facility]  [address] [facility],[address]  [address] [place] [address] Road  [phone]    Call in 1 week  If symptoms worsen    DISCHARGE MEDICATIONS:  New Prescriptions    HYDROCODONE-ACETAMINOPHEN (NORCO) 5-325 MG PER TABLET    Take 1 tablet by mouth every 6 hours as needed for Pain for up to 2 days.     NAPROXEN (NAPROSYN) 500 MG TABLET    Take 1 tablet by mouth 2 times daily (with meals)       DISCONTINUED MEDICATIONS:  Discontinued Medications    No medications on file              (Please note the MDM and HPI sections of this note were completed with a voice recognition program.  Efforts were made to edit the dictations but occasionally words are mis-transcribed.)    Electronically signed, Magy Ken PA-C,          Magy Ken PA-C  11/05/22 [id number]

## 2022-11-05 NOTE — DISCHARGE INSTRUCTIONS
Take prescribed medication as prescribed only  Follow-up with orthopedics Dr. Jazmyne Gonsalez if worsens or no improvement  Apply ice over the area 3-4 times a day 20 to 30 minutes on  Do not take any narcotics and do any strenuous work or driving.

## 2023-01-19 ENCOUNTER — APPOINTMENT (OUTPATIENT)
Dept: GENERAL RADIOLOGY | Age: 43
End: 2023-01-19
Payer: COMMERCIAL

## 2023-01-19 ENCOUNTER — HOSPITAL ENCOUNTER (EMERGENCY)
Age: 43
Discharge: HOME | End: 2023-01-19
Payer: COMMERCIAL

## 2023-01-19 VITALS
HEART RATE: 100 BPM | DIASTOLIC BLOOD PRESSURE: 88 MMHG | OXYGEN SATURATION: 95 % | RESPIRATION RATE: 20 BRPM | TEMPERATURE: 98 F | SYSTOLIC BLOOD PRESSURE: 181 MMHG

## 2023-01-19 DIAGNOSIS — M25.562 CHRONIC PAIN OF LEFT KNEE: ICD-10-CM

## 2023-01-19 DIAGNOSIS — I10 ESSENTIAL HYPERTENSION: Chronic | ICD-10-CM

## 2023-01-19 DIAGNOSIS — G89.29 CHRONIC PAIN OF LEFT KNEE: ICD-10-CM

## 2023-01-19 DIAGNOSIS — R60.9 DEPENDENT EDEMA: Primary | ICD-10-CM

## 2023-01-19 DIAGNOSIS — I10 POOR HIGH BLOOD PRESSURE CONTROL: ICD-10-CM

## 2023-01-19 DIAGNOSIS — I89.0 LYMPHEDEMA OF BOTH LOWER EXTREMITIES: ICD-10-CM

## 2023-01-19 LAB
A/G RATIO: 1.1 (ref 1.1–2.2)
ALBUMIN SERPL-MCNC: 3.8 G/DL (ref 3.4–5)
ALP BLD-CCNC: 95 U/L (ref 40–129)
ALT SERPL-CCNC: 31 U/L (ref 10–40)
ANION GAP SERPL CALCULATED.3IONS-SCNC: 12 MMOL/L (ref 3–16)
AST SERPL-CCNC: 24 U/L (ref 15–37)
BASOPHILS ABSOLUTE: 0 K/UL (ref 0–0.2)
BASOPHILS RELATIVE PERCENT: 0.3 %
BILIRUB SERPL-MCNC: 0.4 MG/DL (ref 0–1)
BUN BLDV-MCNC: 10 MG/DL (ref 7–20)
CALCIUM SERPL-MCNC: 9.3 MG/DL (ref 8.3–10.6)
CHLORIDE BLD-SCNC: 103 MMOL/L (ref 99–110)
CO2: 26 MMOL/L (ref 21–32)
CREAT SERPL-MCNC: 0.6 MG/DL (ref 0.6–1.1)
EOSINOPHILS ABSOLUTE: 0.3 K/UL (ref 0–0.6)
EOSINOPHILS RELATIVE PERCENT: 4.2 %
GFR SERPL CREATININE-BSD FRML MDRD: >60 ML/MIN/{1.73_M2}
GLUCOSE BLD-MCNC: 129 MG/DL (ref 70–99)
HCT VFR BLD CALC: 36.4 % (ref 36–48)
HEMOGLOBIN: 11.5 G/DL (ref 12–16)
LYMPHOCYTES ABSOLUTE: 2.5 K/UL (ref 1–5.1)
LYMPHOCYTES RELATIVE PERCENT: 33.7 %
MAGNESIUM: 2.1 MG/DL (ref 1.8–2.4)
MCH RBC QN AUTO: 27.7 PG (ref 26–34)
MCHC RBC AUTO-ENTMCNC: 31.6 G/DL (ref 31–36)
MCV RBC AUTO: 87.5 FL (ref 80–100)
MONOCYTES ABSOLUTE: 0.5 K/UL (ref 0–1.3)
MONOCYTES RELATIVE PERCENT: 6.6 %
NEUTROPHILS ABSOLUTE: 4 K/UL (ref 1.7–7.7)
NEUTROPHILS RELATIVE PERCENT: 55.2 %
PDW BLD-RTO: 15 % (ref 12.4–15.4)
PLATELET # BLD: 243 K/UL (ref 135–450)
PMV BLD AUTO: 8.5 FL (ref 5–10.5)
POTASSIUM REFLEX MAGNESIUM: 3.5 MMOL/L (ref 3.5–5.1)
PRO-BNP: 25 PG/ML (ref 0–124)
RBC # BLD: 4.17 M/UL (ref 4–5.2)
SODIUM BLD-SCNC: 141 MMOL/L (ref 136–145)
TOTAL PROTEIN: 7.3 G/DL (ref 6.4–8.2)
TROPONIN: <0.01 NG/ML
WBC # BLD: 7.3 K/UL (ref 4–11)

## 2023-01-19 PROCEDURE — 85025 COMPLETE CBC W/AUTO DIFF WBC: CPT

## 2023-01-19 PROCEDURE — 83880 ASSAY OF NATRIURETIC PEPTIDE: CPT

## 2023-01-19 PROCEDURE — 80053 COMPREHEN METABOLIC PANEL: CPT

## 2023-01-19 PROCEDURE — 99285 EMERGENCY DEPT VISIT HI MDM: CPT

## 2023-01-19 PROCEDURE — 84484 ASSAY OF TROPONIN QUANT: CPT

## 2023-01-19 PROCEDURE — 93005 ELECTROCARDIOGRAM TRACING: CPT | Performed by: GENERAL ACUTE CARE HOSPITAL

## 2023-01-19 PROCEDURE — 71046 X-RAY EXAM CHEST 2 VIEWS: CPT

## 2023-01-19 PROCEDURE — 83735 ASSAY OF MAGNESIUM: CPT

## 2023-01-19 RX ORDER — FUROSEMIDE 10 MG/ML
40 INJECTION INTRAMUSCULAR; INTRAVENOUS ONCE
Status: DISCONTINUED | OUTPATIENT
Start: 2023-01-19 | End: 2023-01-19

## 2023-01-19 RX ORDER — FUROSEMIDE 40 MG/1
40 TABLET ORAL ONCE
Status: DISCONTINUED | OUTPATIENT
Start: 2023-01-19 | End: 2023-01-19 | Stop reason: HOSPADM

## 2023-01-19 ASSESSMENT — ENCOUNTER SYMPTOMS
NAUSEA: 0
VOICE CHANGE: 0
VOMITING: 0
COUGH: 0
WHEEZING: 0
SORE THROAT: 0
BACK PAIN: 0
CHEST TIGHTNESS: 0
SHORTNESS OF BREATH: 1
ABDOMINAL PAIN: 0

## 2023-01-19 ASSESSMENT — PAIN SCALES - GENERAL
PAINLEVEL_OUTOF10: 6
PAINLEVEL_OUTOF10: 8

## 2023-01-19 ASSESSMENT — PAIN - FUNCTIONAL ASSESSMENT
PAIN_FUNCTIONAL_ASSESSMENT: 0-10

## 2023-01-19 ASSESSMENT — PAIN DESCRIPTION - LOCATION
LOCATION: LEG

## 2023-01-19 ASSESSMENT — PAIN DESCRIPTION - PAIN TYPE: TYPE: CHRONIC PAIN

## 2023-01-19 NOTE — ED NOTES
Pt states she used to take lasix prior to giving birth to son, pt states developed corn containing allergy postpartum and has not had lasix since allergy established.  Lasix po not given      Rigoberto Phillip RN  01/19/23 [id number]

## 2023-01-19 NOTE — LETTER
[facility] Emergency Department  [address] [facility] [address]  Phone: [phone]               January 19, 2023    Patient: Cathryn Rivera   YOB: 1980   Date of Visit: 1/19/2023       To Whom It May Concern:    Imelda Gruber was seen and treated in our emergency department on 1/19/2023.        Sincerely,       Tatum Fay RN         Signature:__________________________________

## 2023-01-20 LAB
EKG ATRIAL RATE: 71 BPM
EKG DIAGNOSIS: NORMAL
EKG P AXIS: 47 DEGREES
EKG P-R INTERVAL: 160 MS
EKG Q-T INTERVAL: 398 MS
EKG QRS DURATION: 88 MS
EKG QTC CALCULATION (BAZETT): 432 MS
EKG R AXIS: 13 DEGREES
EKG T AXIS: 22 DEGREES
EKG VENTRICULAR RATE: 71 BPM

## 2023-01-20 PROCEDURE — 93010 ELECTROCARDIOGRAM REPORT: CPT | Performed by: INTERNAL MEDICINE

## 2023-01-20 NOTE — ED PROVIDER NOTES
Pt Name: Karrie Gomez  MRN: [id number]  [place] 1980  Date of evaluation: 1/19/2023    EKG Interpretation    The purpose of this note is for preliminary EKG interpretation only. This patient was seen independently by the mid-level provider and was not seen by this provider. EKG visualized preliminarily interpreted by myself shows sinus rhythm at a rate of 71. The axis is 13. ST-T waves intervals are all unremarkable.   No significant change compared to an EKG from December 12, 2017        Barbara Fay MD  01/19/23 [id number]

## 2023-01-20 NOTE — ED PROVIDER NOTES
[facility]        Pt Name: Howie Gong  MRN: [id number]  [place] 1980  Date of evaluation: 1/19/2023  Provider: ZELDA Duran CNP  PCP: No primary care provider on file. Note Started: 8:24 PM EST 1/19/23      MACIE. I have evaluated this patient. My supervising physician was available for consultation. CHIEF COMPLAINT       Chief Complaint   Patient presents with    Edema     BLE x 1 days       HISTORY OF PRESENT ILLNESS: 1 or more Elements     History From: Patient  {Limitations to history (Optional):62528}    Howie Gong is a 38 y.o. female who presents to the emergency department today reporting increased bilateral lower extremity edema. Patient does report history of bilateral lower extremity lymphedema. She states that she also has mild CHF but is not on any diuretics at present. Nursing Notes were all reviewed and agreed with or any disagreements were addressed in the HPI. REVIEW OF SYSTEMS :      Review of Systems   Constitutional:  Negative for chills, fatigue and fever. HENT:  Negative for congestion, sore throat and voice change. Eyes:  Negative for visual disturbance. Respiratory:  Positive for shortness of breath. Negative for cough, chest tightness and wheezing. Cardiovascular:  Positive for leg swelling. Negative for chest pain and palpitations. Gastrointestinal:  Negative for abdominal pain, nausea and vomiting. Endocrine: Negative for polydipsia and polyuria. Genitourinary:  Negative for difficulty urinating, dysuria and flank pain. Musculoskeletal:  Negative for back pain, neck pain and neck stiffness. Skin:  Positive for rash. Negative for wound. Allergic/Immunologic: Negative for immunocompromised state. Neurological:  Negative for dizziness, weakness and light-headedness. Hematological:  Does not bruise/bleed easily.    Psychiatric/Behavioral:  Negative for sleep disturbance and suicidal ideas. Positives and Pertinent negatives as per HPI. SURGICAL HISTORY     Past Surgical History:   Procedure Laterality Date    COLONOSCOPY N/A 01/13/2021    COLONOSCOPY performed by Bishop Amos MD at [address]      crossed eyes    HYSTERECTOMY ([facility])  2014    full-ovaries gone    MUSCLE BIOPSY      left thigh    OVARIAN CYST REMOVAL  1998    SKIN BIOPSY  2021    birth merritt removal left arm    VENA CAVA FILTER PLACEMENT      inserted in 2014 and removed in 2020       CURRENTMEDICATIONS       Previous Medications    AMLODIPINE (NORVASC) 10 MG TABLET    Take 1 tablet by mouth once daily    ATORVASTATIN (LIPITOR) 20 MG TABLET    Take 1 tablet by mouth once daily    CARBAMAZEPINE (TEGRETOL XR) 200 MG EXTENDED RELEASE TABLET    Take 1 tablet by mouth twice daily    CEPHALEXIN (KEFLEX) 500 MG CAPSULE    Take 1 capsule by mouth 3 times daily    DULOXETINE (CYMBALTA) 60 MG EXTENDED RELEASE CAPSULE    Take 1 capsule by mouth once daily    EPINEPHRINE (EPIPEN) 0.3 MG/0.3ML SOAJ INJECTION    Inject 1 pen into the thigh for emergency treatment of allergic reactions, may repeat for severe persistent reactions. ERYTHROMYCIN (ROMYCIN) 5 MG/GM OPHTHALMIC OINTMENT    Apply to the lower eyelid margin 4 times daily for 5 days. FLUTICASONE (FLONASE) 50 MCG/ACT NASAL SPRAY    Use 2 spray(s) in each nostril once daily    LANSOPRAZOLE (PREVACID) 30 MG DELAYED RELEASE CAPSULE    Take 1 capsule by mouth once daily    LEVOCETIRIZINE (XYZAL) 5 MG TABLET    Take 1 tablet daily    METHYLPREDNISOLONE (MEDROL DOSEPACK) 4 MG TABLET    Take by mouth. MONTELUKAST (SINGULAIR) 10 MG TABLET    Take 1 tablet by mouth daily    NAPROXEN (NAPROSYN) 500 MG TABLET    Take 1 tablet by mouth in the morning and 1 tablet in the evening. Take with meals.     NAPROXEN (NAPROSYN) 500 MG TABLET    Take 1 tablet by mouth 2 times daily (with meals)    TIZANIDINE (ZANAFLEX) 4 MG TABLET Take 1 tab po daily at night, can take during the day if it does not make sleepy. PRN. TIZANIDINE (ZANAFLEX) 4 MG TABLET    Take 1 tablet by mouth every 6 hours as needed (muscle aches)    UREA (CARMOL) 40 % CREAM    Apply 40 oz topically daily    VALSARTAN (DIOVAN) 80 MG TABLET    Take 1 tablet by mouth once daily    VITAMIN D (ERGOCALCIFEROL) 1.25 MG (31267 UT) CAPS CAPSULE    Take 1 capsule by mouth once a week       ALLERGIES     Latex, Corn-containing products, Imitrex [sumatriptan], Other, Peanut-containing drug products, Soy allergy, Tree nut [macadamia nut oil], Wheat bran, Mixed grasses, and Tramadol    FAMILYHISTORY       Family History   Problem Relation Age of Onset    High Blood Pressure Mother     Other Father         gout    Cancer Father         prostate    Breast Cancer Maternal Aunt 47    Asthma Maternal Cousin     Rheum Arthritis Neg Hx     Osteoarthritis Neg Hx     Diabetes Neg Hx     Heart Failure Neg Hx     High Cholesterol Neg Hx     Hypertension Neg Hx     Migraines Neg Hx     Ovarian Cancer Neg Hx     Rashes/Skin Problems Neg Hx     Seizures Neg Hx     Stroke Neg Hx     Thyroid Disease Neg Hx         SOCIAL HISTORY       Social History     Tobacco Use    Smoking status: Never    Smokeless tobacco: Never   Vaping Use    Vaping Use: Never used   Substance Use Topics    Alcohol use: Yes     Alcohol/week: 0.0 standard drinks     Comment: occasionally    Drug use: Never       SCREENINGS        [place] Coma Scale  Eye Opening: Spontaneous  Best Verbal Response: Oriented  Best Motor Response: Obeys commands  [place] Coma Scale Score: 15                CIWA Assessment  BP: 127/75  Heart Rate: 83           PHYSICAL EXAM  1 or more Elements     ED Triage Vitals [01/19/23 1733]   BP Temp Temp Source Heart Rate Resp SpO2 Height Weight   127/75 98.4 °F (36.9 °C) Oral 83 20 98 % -- --       Physical Exam  Vitals and nursing note reviewed. Constitutional:       General: She is not in acute distress. Never used   Substance Use Topics    Alcohol use: Not Currently     Comment: occasionally    Drug use: Never       SCREENINGS        [place] Coma Scale  Eye Opening: Spontaneous  Best Verbal Response: Oriented  Best Motor Response: Obeys commands  [place] Coma Scale Score: 15                CIWA Assessment  BP: (!) 181/88  Heart Rate: 100           PHYSICAL EXAM  1 or more Elements     ED Triage Vitals [01/19/23 1733]   BP Temp Temp Source Heart Rate Resp SpO2 Height Weight   127/75 98.4 °F (36.9 °C) Oral 83 20 98 % -- --       Physical Exam  Vitals and nursing note reviewed. Constitutional:       General: She is not in acute distress. Appearance: Normal appearance. She is not ill-appearing. HENT:      Head: Normocephalic and atraumatic. Right Ear: External ear normal.      Left Ear: External ear normal.      Nose: Nose normal.      Mouth/Throat:      Pharynx: Oropharynx is clear. Eyes:      General:         Right eye: No discharge. Left eye: No discharge. Extraocular Movements: Extraocular movements intact. Conjunctiva/sclera: Conjunctivae normal.      Pupils: Pupils are equal, round, and reactive to light. Cardiovascular:      Rate and Rhythm: Normal rate and regular rhythm. Pulses: Normal pulses. Heart sounds: Normal heart sounds. Pulmonary:      Effort: Pulmonary effort is normal. No respiratory distress. Breath sounds: Normal breath sounds. Abdominal:      General: Bowel sounds are normal.      Palpations: Abdomen is soft. Tenderness: There is no abdominal tenderness. There is no right CVA tenderness or left CVA tenderness. Musculoskeletal:         General: Normal range of motion. Cervical back: Normal range of motion and neck supple. No rigidity or tenderness. Right lower leg: Edema present. Left lower leg: Edema present. Skin:     General: Skin is warm and dry. Capillary Refill: Capillary refill takes less than 2 seconds. components:    Glucose 129 (*)     All other components within normal limits   BRAIN NATRIURETIC PEPTIDE   TROPONIN   MAGNESIUM   URINALYSIS WITH REFLEX TO CULTURE       When ordered only abnormal lab results are displayed. All other labs were within normal range or not returned as of this dictation. EKG: When ordered, EKG's are interpreted by the Emergency Department Physician in the absence of a cardiologist.  Please see their note for interpretation of EKG. RADIOLOGY:   Non-plain film images such as CT, Ultrasound and MRI are read by the radiologist. Plain radiographic images are visualized and preliminarily interpreted by the ED Provider with the below findings:    ***    Interpretation per the Radiologist below, if available at the time of this note:    XR CHEST (2 VW)   Final Result   No acute cardiopulmonary findings. XR CHEST (2 VW)    Result Date: 1/19/2023  EXAMINATION: TWO XRAY VIEWS OF THE CHEST 1/19/2023 6:13 pm COMPARISON: None. HISTORY: ORDERING SYSTEM PROVIDED HISTORY: Chest Discomfort TECHNOLOGIST PROVIDED HISTORY: Reason for exam:->Chest Discomfort Reason for Exam: Chest Discomfort FINDINGS: Normal cardiomediastinal silhouette. No acute airspace infiltrate. No pneumothorax or pleural effusion     No acute cardiopulmonary findings. No results found.     PROCEDURES   Unless otherwise noted below, none     Procedures    CRITICAL CARE TIME (.cctime)   ***    PAST MEDICAL HISTORY      has a past medical history of Allergic rhinitis, Anemia, Anxiety and depression, Dermatomyositis ([address] (2008), Eczema, Fibromyalgia, Fibromyalgia, GERD (gastroesophageal reflux disease), Headache(784.0), History of blood transfusion (2009), HTN (hypertension), blood clots (2014), Irritable bowel syndrome, Morbid obesity with BMI of 50.0-59.9, adult ([address] Osteoarthritis, PCOS (polycystic ovarian syndrome), Prediabetes, Pulmonary embolism ([address] (7/17/2014), Sleep apnea, Vaginal high risk HPV DNA test positive (05/2016), and Wears glasses. EMERGENCY DEPARTMENT COURSE and DIFFERENTIAL DIAGNOSIS/MDM:   Vitals:    Vitals:    01/19/23 1733   BP: 127/75   Pulse: 83   Resp: 20   Temp: 98.4 °F (36.9 °C)   TempSrc: Oral   SpO2: 98%       Patient was given the following medications:  Medications   furosemide (LASIX) tablet 40 mg (has no administration in time range)             Is this patient to be included in the SEP-1 Core Measure due to severe sepsis or septic shock? {[id number]:11428}    Chronic Conditions affecting care: ***   has a past medical history of Allergic rhinitis, Anemia, Anxiety and depression, D[address]atomyositis ([facility] (2008), Eczema, Fibromyalgia, Fibromyalgia, GERD (gastroesophageal reflux disease), Headache(784.0), History of blood transfusion (2009), HTN (hypertension), blood clots (2014), Irritable bowel syndrome, Morbid obesity with BMI of 50.0-59.9, adult ([facility] Osteoarthritis, PCOS (polycystic ovarian syndrome), Prediabetes, Pulmonary embolism ([facility] (7/17/2014), Sleep apnea, Vaginal high risk HPV DNA test positive (05/2016), and Wears glasses. CONSULTS: (Who and What was discussed)  None  ***    {Social Determinants (Optional):33737::\"None\"}    Records Reviewed (Source): ***    CC/HPI Summary, DDx, ED Course, and Reassessment: ***    Disposition Considerations (tests considered but not done, Admit vs D/C, Shared Decision Making, Pt Expectation of Test or Tx.): ***       I am the Primary Clinician of Record. FINAL IMPRESSION    No diagnosis found. DISPOSITION/PLAN     DISPOSITION        PATIENT REFERRED TO:  No follow-up provider specified.     DISCHARGE MEDICATIONS:  New Prescriptions    No medications on file       DISCONTINUED MEDICATIONS:  Discontinued Medications    No medications on file              (Please note that portions of this note were completed with a voice recognition program.  Efforts were made to edit the dictations but occasionally words are mis-transcribed.)    ZELDA Lin - DEENA (electronically signed) cellulitis. Low suspicion for DVT. Patient reassessed. She has remained hypertensive but stable throughout ED visit. Shared decision making employed and patient is agreeable to discharge with emphasis on close outpatient follow-up. She is counseled on the importance of wearing the compression stockings as previously advised. She is advised to elevate both lower extremities is much as possible. She agrees to follow-up with her PCP tomorrow. She agrees return for high fever, incessant vomiting, severe pain, any other worsening symptoms. Patient is discharged home in stable condition. I am the Primary Clinician of Record. FINAL IMPRESSION      1. Dependent edema    2. Lymphedema of both lower extremities    3. Poor high blood pressure control    4. Chronic pain of left knee    5.  Essential hypertension          DISPOSITION/PLAN     DISPOSITION Decision To Discharge 01/19/2023 08:26:53 PM      PATIENT REFERRED TO:  ZELDA Moe CNP    In 1 day  your PCP    [facility]) Pre-Services  MD Jennifer  [address]  [place] [address]  [phone]          DISCHARGE MEDICATIONS:  Discharge Medication List as of 1/19/2023  9:46 PM          DISCONTINUED MEDICATIONS:  Discharge Medication List as of 1/19/2023  9:46 PM                 (Please note that portions of this note were completed with a voice recognition program.  Efforts were made to edit the dictations but occasionally words are mis-transcribed.)    ZELDA Mcdowell CNP (electronically signed)        ZELDA Mcdowell CNP  01/26/23 2345

## 2023-01-20 NOTE — DISCHARGE INSTRUCTIONS
Wear compression stockings as directed. Keep bilateral lower extremities elevated is much as possible. Contact your PCP tomorrow to arrange for close outpatient follow-up.

## 2023-01-24 ENCOUNTER — APPOINTMENT (OUTPATIENT)
Dept: GENERAL RADIOLOGY | Age: 43
End: 2023-01-24
Payer: COMMERCIAL

## 2023-01-24 ENCOUNTER — HOSPITAL ENCOUNTER (EMERGENCY)
Age: 43
Discharge: HOME | End: 2023-01-24
Attending: EMERGENCY MEDICINE
Payer: COMMERCIAL

## 2023-01-24 VITALS
SYSTOLIC BLOOD PRESSURE: 141 MMHG | HEART RATE: 86 BPM | TEMPERATURE: 97.6 F | WEIGHT: 293 LBS | RESPIRATION RATE: 17 BRPM | DIASTOLIC BLOOD PRESSURE: 86 MMHG | OXYGEN SATURATION: 99 % | BODY MASS INDEX: 62.6 KG/M2

## 2023-01-24 DIAGNOSIS — R60.0 BILATERAL LOWER EXTREMITY EDEMA: Primary | ICD-10-CM

## 2023-01-24 LAB
ALBUMIN SERPL-MCNC: 3.6 G/DL (ref 3.4–5)
ALP BLD-CCNC: 94 U/L (ref 40–129)
ALT SERPL-CCNC: 29 U/L (ref 10–40)
ANION GAP SERPL CALCULATED.3IONS-SCNC: 10 MMOL/L (ref 3–16)
ANISOCYTOSIS: ABNORMAL
AST SERPL-CCNC: 31 U/L (ref 15–37)
BASOPHILS ABSOLUTE: 0 K/UL (ref 0–0.2)
BASOPHILS RELATIVE PERCENT: 0.5 %
BILIRUB SERPL-MCNC: 0.3 MG/DL (ref 0–1)
BILIRUBIN DIRECT: <0.2 MG/DL (ref 0–0.3)
BILIRUBIN, INDIRECT: NORMAL MG/DL (ref 0–1)
BUN BLDV-MCNC: 9 MG/DL (ref 7–20)
CALCIUM SERPL-MCNC: 8.8 MG/DL (ref 8.3–10.6)
CHLORIDE BLD-SCNC: 102 MMOL/L (ref 99–110)
CO2: 27 MMOL/L (ref 21–32)
CREAT SERPL-MCNC: 0.5 MG/DL (ref 0.6–1.1)
D DIMER: 0.41 UG/ML FEU (ref 0–0.6)
EOSINOPHILS ABSOLUTE: 0.3 K/UL (ref 0–0.6)
EOSINOPHILS RELATIVE PERCENT: 4.6 %
GFR SERPL CREATININE-BSD FRML MDRD: >60 ML/MIN/{1.73_M2}
GLUCOSE BLD-MCNC: 148 MG/DL (ref 70–99)
HCT VFR BLD CALC: 37.4 % (ref 36–48)
HEMOGLOBIN: 11.6 G/DL (ref 12–16)
LYMPHOCYTES ABSOLUTE: 1.8 K/UL (ref 1–5.1)
LYMPHOCYTES RELATIVE PERCENT: 29 %
MCH RBC QN AUTO: 27.4 PG (ref 26–34)
MCHC RBC AUTO-ENTMCNC: 31.1 G/DL (ref 31–36)
MCV RBC AUTO: 87.9 FL (ref 80–100)
MONOCYTES ABSOLUTE: 0.3 K/UL (ref 0–1.3)
MONOCYTES RELATIVE PERCENT: 4.9 %
NEUTROPHILS ABSOLUTE: 3.8 K/UL (ref 1.7–7.7)
NEUTROPHILS RELATIVE PERCENT: 61 %
PDW BLD-RTO: 15 % (ref 12.4–15.4)
PLATELET # BLD: 257 K/UL (ref 135–450)
PLATELET SLIDE REVIEW: ADEQUATE
PMV BLD AUTO: 8.6 FL (ref 5–10.5)
POTASSIUM SERPL-SCNC: 4.4 MMOL/L (ref 3.5–5.1)
PRO-BNP: 19 PG/ML (ref 0–124)
RBC # BLD: 4.26 M/UL (ref 4–5.2)
SLIDE REVIEW: ABNORMAL
SODIUM BLD-SCNC: 139 MMOL/L (ref 136–145)
TOTAL PROTEIN: 7 G/DL (ref 6.4–8.2)
TROPONIN: <0.01 NG/ML
WBC # BLD: 6.2 K/UL (ref 4–11)

## 2023-01-24 PROCEDURE — 71046 X-RAY EXAM CHEST 2 VIEWS: CPT

## 2023-01-24 PROCEDURE — 84484 ASSAY OF TROPONIN QUANT: CPT

## 2023-01-24 PROCEDURE — 99285 EMERGENCY DEPT VISIT HI MDM: CPT

## 2023-01-24 PROCEDURE — 6370000000 HC RX 637 (ALT 250 FOR IP)

## 2023-01-24 PROCEDURE — 80076 HEPATIC FUNCTION PANEL: CPT

## 2023-01-24 PROCEDURE — 83880 ASSAY OF NATRIURETIC PEPTIDE: CPT

## 2023-01-24 PROCEDURE — 85025 COMPLETE CBC W/AUTO DIFF WBC: CPT

## 2023-01-24 PROCEDURE — 80048 BASIC METABOLIC PNL TOTAL CA: CPT

## 2023-01-24 PROCEDURE — 85379 FIBRIN DEGRADATION QUANT: CPT

## 2023-01-24 RX ORDER — TORSEMIDE 20 MG/1
5 TABLET ORAL DAILY
Status: DISCONTINUED | OUTPATIENT
Start: 2023-01-24 | End: 2023-01-24

## 2023-01-24 RX ORDER — TORSEMIDE 20 MG/1
5 TABLET ORAL ONCE
Status: COMPLETED | OUTPATIENT
Start: 2023-01-24 | End: 2023-01-24

## 2023-01-24 RX ORDER — POTASSIUM CHLORIDE 750 MG/1
10 TABLET, EXTENDED RELEASE ORAL DAILY
Qty: 6 TABLET | Refills: 0 | Status: SHIPPED | OUTPATIENT
Start: 2023-01-24 | End: 2023-01-30

## 2023-01-24 RX ORDER — TORSEMIDE 5 MG/1
5 TABLET ORAL DAILY
Qty: 6 TABLET | Refills: 0 | Status: SHIPPED | OUTPATIENT
Start: 2023-01-24 | End: 2023-01-30

## 2023-01-24 RX ADMIN — TORSEMIDE 5 MG: 20 TABLET ORAL at 14:08

## 2023-01-24 ASSESSMENT — PAIN SCALES - GENERAL
PAINLEVEL_OUTOF10: 0
PAINLEVEL_OUTOF10: 8

## 2023-01-24 ASSESSMENT — PAIN - FUNCTIONAL ASSESSMENT
PAIN_FUNCTIONAL_ASSESSMENT: 0-10
PAIN_FUNCTIONAL_ASSESSMENT: 0-10

## 2023-01-24 NOTE — ED NOTES
Discharge and education instructions reviewed. Patient verbalized understanding, teach-back successful. Patient denied questions at this time. No acute distress noted. Patient instructed to follow-up as noted - return to emergency department if symptoms worsen. Patient verbalized understanding. Discharged per EDMD with discharge instructions.           Madeline Weaver RN  01/24/23 2582

## 2023-01-24 NOTE — ED PROVIDER NOTES
ED Attending Attestation Note    This patient was seen by the advanced practice provider. I personally saw the patient and performed a substantive portion of the visit including all aspects of the medical decision making. Briefly, 38 y.o. female presents with bilateral lower extremity edema. Focused exam:   Gen: 38 y.o. female, NAD  HEENT: NCAT. PERRL. EOMI. CV: RRR w/o MRG  Lungs: CTAB. No incr WOB. Abdomen: Soft, nontender, nondistended. No rebound/guarding. EKG interpreted by myself. Rate: normal  Rhythm: Normal sinus with sinus arrhythmia   Axis: normal  Intervals: within normal limits  ST Segments: no acute abnormality  T waves: no acute abnormality  Comparison: Compared to 1/19/23, there is no significant change  Impression: Normal sinus with sinus arrhythmia     XR CHEST (2 VW)   Final Result   Findings consistent with central hilar and perihilar bronchitis/pneumonitis. Otherwise, radiographically nonacute chest.  Underlying chronic lung disease   may contribute to this appearance. MDM:   64-year-old female presents with bilateral lower extremity edema. Patient had also complained of some poorly characterized chest pain. EKG shows no acute ischemic abnormality and troponin is within normal limits. Low suspicion for ACS. Chest x-ray negative to my read. Radiology reads as findings consistent with central hilar and perihilar bronchitis/pneumonitis. In the absence of fever, leukocytosis, or cough productive of purulent sputum low suspicion for an infectious etiology of the patient's presentation. D-dimer is within normal limits. Considered CTPA however with low pretest probability for pulmonary embolism and a normal D-dimer, risk of radiation exposure is deemed greater than any potential diagnostic benefit. Given the bilateral nature of the leg swelling and the normal D-dimer I am not concerned for DVT. BNP is within normal limits.     For further details of the patient's emergency department visit, please see the advanced practice provider's documentation. Emmanuel Mora MD     This report has been produced using speech recognition software and may contain errors related to that system including errors in grammar, punctuation, and spelling, as well as words and phrases that may be inappropriate. If there are any questions or concerns please feel free to contact the dictating provider for clarification.        Emmanuel Mora MD  01/24/23 2484

## 2023-01-24 NOTE — DISCHARGE INSTRUCTIONS
Keep your legs elevated as we discussed. Please follow-up with your family doctor as we discussed. Return to the ED for worsening symptoms    We are giving you a few days of a water pill. It is the same pill you received in the ED. This requires to get lab levels be rechecked. Please make sure you follow-up with your family doctor and get your labs rechecked in the next 3 to 4 days. If you are unable to get with your family doctor or the contact details for [facility] center for a family doctor is also provided.

## 2023-01-25 ASSESSMENT — ENCOUNTER SYMPTOMS
ABDOMINAL PAIN: 0
BACK PAIN: 0
NAUSEA: 0
VOMITING: 0
COUGH: 0
CONSTIPATION: 0
SHORTNESS OF BREATH: 0
EYE PAIN: 0
SORE THROAT: 0
DIARRHEA: 0
RHINORRHEA: 0

## 2023-01-25 NOTE — ED PROVIDER NOTES
[address] [facility]        Pt Name: Raven Scales  MRN: [id number]  [place] 1980  Date of evaluation: 1/24/2023  Provider: ZELDA Hogan CNP  PCP: No primary care provider on file. Note Started: 5:28 PM EST 1/25/23       I have seen and evaluated this patient with my supervising physician Dr Nancy Villanueva       Chief Complaint   Patient presents with    Leg Swelling     States seen Saturday and unable to get follow up care until february       HISTORY OF PRESENT ILLNESS: 1 or more Elements     History From: patient      Raven Scales is a 38 y.o. female who presents to Ed with BLE edema ongoing x several weeks. Was seen for same ~ 1 week ago and dx with dependant edema and lymphedema. Denies any change to leg swelling pattern, however was offered diuretic at that time. She initially refused as she has a reported allergy to corn products as well as had mild hypoK with prior diuretic. Also complaining of chest pain since Saturday. Has not stopped at all. No waxing or waning pattern. Described as \"funny\" feeling and even after multiple attempts unable/unwilling to characterize it further. Denies sharp pain, stabbing, ripping, tearing, or pressure. No medication taken Pta. Would like Rx for diuretic    Still on feet a lot. Not elevating legs as directed during last ED visit    Has not f/u with PCP yet. Has appt scheduled for 2/1    Nursing Notes were all reviewed and agreed with or any disagreements were addressed in the HPI. REVIEW OF SYSTEMS :      Review of Systems   Constitutional:  Negative for chills, diaphoresis and fever. HENT:  Negative for congestion, rhinorrhea and sore throat. Eyes:  Negative for pain and visual disturbance. Respiratory:  Negative for cough and shortness of breath. Cardiovascular:  Positive for leg swelling. Negative for chest pain.         Chest feels \"funny\" Gastrointestinal:  Negative for abdominal pain, constipation, diarrhea, nausea and vomiting. Genitourinary:  Negative for frequency and hematuria. Musculoskeletal:  Negative for back pain and neck pain. Skin:  Negative for rash and wound. Neurological:  Negative for dizziness and light-headedness. Positives and Pertinent negatives as per HPI.      SURGICAL HISTORY     Past Surgical History:   Procedure Laterality Date    COLONOSCOPY N/A 01/13/2021    COLONOSCOPY performed by Juno Acevedo MD at [address]      crossed eyes    HYSTERECTOMY ([facility])  2014    full-ovaries gone    MUSCLE BIOPSY      left thigh    OVARIAN CYST REMOVAL  1998    SKIN BIOPSY  2021    birth merritt removal left arm    VENA CAVA FILTER PLACEMENT      inserted in 2014 and removed in [address] [facility]       Discharge Medication List as of 1/24/2023  2:47 PM        CONTINUE these medications which have NOT CHANGED    Details   !! naproxen (NAPROSYN) 500 MG tablet Take 1 tablet by mouth 2 times daily (with meals), Disp-20 tablet, R-0Print      lansoprazole (PREVACID) 30 MG delayed release capsule Take 1 capsule by mouth once daily, Disp-90 capsule, R-0Normal      methylPREDNISolone (MEDROL DOSEPACK) 4 MG tablet Take by mouth., Disp-1 kit, R-0Print      carBAMazepine (TEGRETOL XR) 200 MG extended release tablet Take 1 tablet by mouth twice daily, Disp-60 tablet, R-0Normal      montelukast (SINGULAIR) 10 MG tablet Take 1 tablet by mouth daily, Disp-90 tablet, R-1Normal      atorvastatin (LIPITOR) 20 MG tablet Take 1 tablet by mouth once daily, Disp-90 tablet, R-1Normal      amLODIPine (NORVASC) 10 MG tablet Take 1 tablet by mouth once daily, Disp-90 tablet, R-1Normal      DULoxetine (CYMBALTA) 60 MG extended release capsule Take 1 capsule by mouth once daily, Disp-90 capsule, R-1Normal      valsartan (DIOVAN) 80 MG tablet Take 1 tablet by mouth once daily, Disp-90 tablet, R-5Normal fluticasone (FLONASE) 50 MCG/ACT nasal spray Use 2 spray(s) in each nostril once daily, Disp-16 g, R-3Normal      !! tiZANidine (ZANAFLEX) 4 MG tablet Take 1 tablet by mouth every 6 hours as needed (muscle aches), Disp-20 tablet, R-0Normal      !! naproxen (NAPROSYN) 500 MG tablet Take 1 tablet by mouth in the morning and 1 tablet in the evening. Take with meals. , Disp-60 tablet, R-5Normal      vitamin D (ERGOCALCIFEROL) 1.25 MG (69021 UT) CAPS capsule Take 1 capsule by mouth once a week, Disp-12 capsule, R-1Normal      cephALEXin (KEFLEX) 500 MG capsule Take 1 capsule by mouth 3 times daily, Disp-21 capsule, R-0Normal      !! tiZANidine (ZANAFLEX) 4 MG tablet Take 1 tab po daily at night, can take during the day if it does not make sleepy. PRN., Disp-90 tablet, R-0Normal      levocetirizine (XYZAL) 5 MG tablet Take 1 tablet daily, Disp-90 tablet, R-3Normal      erythromycin (ROMYCIN) 5 MG/GM ophthalmic ointment Apply to the lower eyelid margin 4 times daily for 5 days. , Disp-1 Tube, R-0, Normal      EPINEPHrine (EPIPEN) 0.3 MG/0.3ML SOAJ injection Inject 1 pen into the thigh for emergency treatment of allergic reactions, may repeat for severe persistent reactions. , Disp-1 each, R-2Normal      Urea (CARMOL) 40 % cream Apply 40 oz topically daily, Topical, DAILY Starting Fri 1/22/2021, Historical Med       !! - Potential duplicate medications found. Please discuss with provider.           ALLERGIES     Latex, Corn-containing products, Imitrex [sumatriptan], Other, Peanut-containing drug products, Soy allergy, Tree nut [macadamia nut oil], Wheat bran, Mixed grasses, and Tramadol    FAMILYHISTORY       Family History   Problem Relation Age of Onset    High Blood Pressure Mother     Other Father         gout    Cancer Father         prostate    Breast Cancer Maternal Aunt 47    Asthma Maternal Cousin     Rheum Arthritis Neg Hx     Osteoarthritis Neg Hx     Diabetes Neg Hx     Heart Failure Neg Hx     High Cholesterol Neg Hx     Hypertension Neg Hx     Migraines Neg Hx     Ovarian Cancer Neg Hx     Rashes/Skin Problems Neg Hx     Seizures Neg Hx     Stroke Neg Hx     Thyroid Disease Neg Hx         SOCIAL HISTORY       Social History     Tobacco Use    Smoking status: Never    Smokeless tobacco: Never   Vaping Use    Vaping Use: Never used   Substance Use Topics    Alcohol use: Not Currently     Comment: occasionally    Drug use: Never       SCREENINGS        [place] Coma Scale  Eye Opening: Spontaneous  Best Verbal Response: Oriented  Best Motor Response: Obeys commands  [place] Coma Scale Score: 15                CIWA Assessment  BP: (!) 141/86  Heart Rate: 86           PHYSICAL EXAM  1 or more Elements     ED Triage Vitals   BP Temp Temp Source Heart Rate Resp SpO2 Height Weight   01/24/23 1231 01/24/23 1231 01/24/23 1451 01/24/23 1231 01/24/23 1231 01/24/23 1231 -- 01/24/23 1231   (!) 149/91 97.4 °F (36.3 °C) Oral 79 16 100 %  (!) 353 lb 6.4 oz (160.3 kg)       Physical Exam  Vitals and nursing note reviewed. Constitutional:       Appearance: Normal appearance. She is morbidly obese. She is not ill-appearing, toxic-appearing or diaphoretic. HENT:      Head: Normocephalic and atraumatic. Right Ear: External ear normal.      Left Ear: External ear normal.      Nose: Nose normal. No congestion or rhinorrhea. Mouth/Throat:      Mouth: Mucous membranes are moist.      Pharynx: Oropharynx is clear. No oropharyngeal exudate or posterior oropharyngeal erythema. Eyes:      General: No scleral icterus. Right eye: No discharge. Left eye: No discharge. Extraocular Movements: Extraocular movements intact. Conjunctiva/sclera: Conjunctivae normal.      Pupils: Pupils are equal, round, and reactive to light. Cardiovascular:      Rate and Rhythm: Normal rate and regular rhythm. Pulses: Normal pulses. Heart sounds: Normal heart sounds. No murmur heard. No friction rub. No gallop. Pulmonary:      Effort: Pulmonary effort is normal. No respiratory distress. Breath sounds: Normal breath sounds. No stridor. No wheezing, rhonchi or rales. Abdominal:      General: Abdomen is flat. Bowel sounds are normal. There is no distension. Palpations: Abdomen is soft. Tenderness: There is no abdominal tenderness. There is no right CVA tenderness, left CVA tenderness or guarding. Musculoskeletal:         General: Normal range of motion. Cervical back: Normal range of motion and neck supple. No rigidity or tenderness. Right lower leg: Edema present. Left lower leg: Edema present. Skin:     General: Skin is warm and dry. Capillary Refill: Capillary refill takes less than 2 seconds. Coloration: Skin is not jaundiced or pale. Findings: No rash. Neurological:      General: No focal deficit present. Mental Status: She is alert and oriented to person, place, and time. Mental status is at baseline. Psychiatric:         Mood and Affect: Mood normal.         Behavior: Behavior normal.     DIAGNOSTIC RESULTS   LABS:    Labs Reviewed   CBC WITH AUTO DIFFERENTIAL - Abnormal; Notable for the following components:       Result Value    Hemoglobin 11.6 (*)     Anisocytosis 1+ (*)     All other components within normal limits   BASIC METABOLIC PANEL - Abnormal; Notable for the following components:    Glucose 148 (*)     Creatinine 0.5 (*)     All other components within normal limits   HEPATIC FUNCTION PANEL   BRAIN NATRIURETIC PEPTIDE   TROPONIN   D-DIMER, QUANTITATIVE       When ordered only abnormal lab results are displayed. All other labs were within normal range or not returned as of this dictation. EKG: When ordered, EKG's are interpreted by the Emergency Department Physician in the absence of a cardiologist.  Please see their note for interpretation of EKG.     RADIOLOGY:   Non-plain film images such as CT, Ultrasound and MRI are read by the radiologist. Plain radiographic images are visualized and preliminarily interpreted by the ED Provider with the below findings:    I do not see any acute finding chest however radiology read as below. Patient does not have a leukocytosis, cough or acute concerns for infectious process at this time    Interpretation per the Radiologist below, if available at the time of this note:    XR CHEST (2 VW)   Final Result   Findings consistent with central hilar and perihilar bronchitis/pneumonitis. Otherwise, radiographically nonacute chest.  Underlying chronic lung disease   may contribute to this appearance. XR CHEST (2 VW)    Result Date: 1/24/2023  EXAMINATION: TWO XRAY VIEWS OF THE CHEST 1/24/2023 1:09 pm COMPARISON: 01/19/2023, 07/18/2022 HISTORY: ORDERING SYSTEM PROVIDED HISTORY: chest \"feels funny\", ble edema TECHNOLOGIST PROVIDED HISTORY: Reason for exam:->chest \"feels funny\", ble edema Reason for Exam: chest discomfort, BLE edema FINDINGS: Central and perihilar bronchial pulmonary marking prominence consistent with central bronchitis/pneumonitis. Underlying chronic lung disease may contribute to this appearance. No pulmonary consolidations, airspace infiltrates, pleural effusion, pneumothorax, pulmonary edema, cardiomegaly or mediastinal widening. Findings consistent with central hilar and perihilar bronchitis/pneumonitis. Otherwise, radiographically nonacute chest.  Underlying chronic lung disease may contribute to this appearance. XR CHEST (2 VW)    Result Date: 1/19/2023  EXAMINATION: TWO XRAY VIEWS OF THE CHEST 1/19/2023 6:13 pm COMPARISON: None. HISTORY: ORDERING SYSTEM PROVIDED HISTORY: Chest Discomfort TECHNOLOGIST PROVIDED HISTORY: Reason for exam:->Chest Discomfort Reason for Exam: Chest Discomfort FINDINGS: Normal cardiomediastinal silhouette. No acute airspace infiltrate. No pneumothorax or pleural effusion     No acute cardiopulmonary findings. No results found.     PROCEDURES   Unless otherwise noted below, none     Procedures    CRITICAL CARE TIME (.cctime)   I , Alin Sosa CNP, am the primary clinician of record  I provided 15 minutes of non concurrent Critical Care time, excluding separately reportable procedures. There was a high probability of clinically significant/life threatening deterioration in the patient's condition which required my urgent intervention. This time spent assessing, reassessing patient, chart review and discussing case with other providers      PAST MEDICAL HISTORY      has a past medical history of Allergic rhinitis, Anemia, Anxiety and depression, Dermatomyositis ([facility] (2008), Eczema, Fibromyalgia, Fibromyalgia, GERD (gastroesophageal reflux disease), Headache(784.0), History of blood transfusion (2009), HTN (hypertension), blood clots (2014), Irritable bowel syndrome, Morbid obesity with BMI of 50.0-59.9, adult ([facility] Osteoarthritis, PCOS (polycystic ovarian syndrome), Prediabetes, Pul[facility] ([facility] (7/17/2014), Sleep apnea, Vaginal high risk HPV DNA test positive (05/2016), and Wears glasses. EMERGENCY DEPARTMENT COURSE and DIFFERENTIAL DIAGNOSIS/MDM:   Vitals:    Vitals:    01/24/23 1231 01/24/23 1451   BP: (!) 149/91 (!) 141/86   Pulse: 79 86   Resp: 16 17   Temp: 97.4 °F (36.3 °C) 97.6 °F (36.4 °C)   TempSrc:  Oral   SpO2: 100% 99%   Weight: (!) 353 lb 6.4 oz (160.3 kg)        Patient was given the following medications:  Medications   torsemide (DEMADEX) tablet 5 mg (5 mg Oral Given 1/24/23 1408)             Is this patient to be included in the SEP-1 Core Measure due to severe sepsis or septic shock?    No   Exclusion criteria - the patient is NOT to be included for SEP-1 Core Measure due to:  2+ SIRS criteria are not met    Chronic Conditions affecting care:    has a past medical history of Allergic rhinitis, Anemia, Anxiety and depression, Dermatomyositis ([facility] (2008), Eczema, Fibromyalgia, Fibromyalgia, GERD (gastroesophageal reflux disease), Headache(784.0), History of blood transfusion (2009), HTN (hypertension), blood clots (2014), Irritable bowel syndrome, Morbid obesity with BMI of 50.0-59.9, adult ([facility] Osteoarthritis, PCOS (polycystic ovarian syndrome), Prediabetes, Pulmonary embolism ([facility] (7/17/2014), Sleep apnea, Vaginal high risk HPV DNA test positive (05/2016), and Wears glasses. CONSULTS: (Who and What was discussed)  None      Social Determinants : Patient does not have insurance and Patient has / had difficulty affording medications     Records Reviewed (Source): ED notes from last visit. Echo from 2016 with an EF of 55%. CC/HPI Summary, DDx, ED Course, and Reassessment:     68-year-old presenting to ED with concern for bilateral lower extremity swelling. Has not been elevating her legs as instructed in the past as above. Has had a history of a provoked blood clot in past.    Based on above, labs and imaging obtained. Given her vague chest pain for greater than 6 hours troponin also obtained. EKG as interpreted by my attending. Labs:  CBC with mild anemia appears chronic. No leukocytosis. CMP without gross electrolyte derangement. Renal function within normal limits. No transaminitis on hepatic panel  BNP within normal limits. Troponin within normal limits. D-dimer is not elevated    Chest x-ray as above    The patient was trialed on a dose of torsemide. I discussed extensively her history and presentation with our pharmacist Jose Covington. The patient has tolerated torsemide before. She will be discharged home on a low-dose of torsemide as well as potassium supplement until she can get in with her PCP on the first.  Phone number for [facility] provided in case she is unable to get in with her PCP. I again encouraged her to keep her legs elevated and to try compression stockings. The patient is at low risk for mortality based on demographic, history and clinical factors.  Given the best available information and clinical assessment, I estimate the risk of hospitalization to be greater than risk of treatment at home. I have explained to the patient that the risk could rapidly change, given precautions for return and instructions. Explained to patient that the risk for mortality is low based on demographic, history and clinical factors. I discussed with patient the results of evaluation in the ED, diagnosis, care, and prognosis. The plan is to discharge to home. Patient is in agreement with plan and questions have been answered. I also discussed with patient the reasons which may require a return visit and the importance of follow-up care. The patient is well-appearing, nontoxic, and improved at the time of discharge. Patient agrees to call to arrange follow-up care as directed. Patient understands to return immediately for worsening/change in symptoms. PT and massage therapy referral provided given her history of lymphedema      Disposition Considerations (tests considered but not done, Admit vs D/C, Shared Decision Making, Pt Expectation of Test or Tx.):          FINAL IMPRESSION      1.  Bilateral lower extremity edema          DISPOSITION/PLAN     DISPOSITION Decision To Discharge 01/24/2023 02:44:24 PM      PATIENT REFERRED TO:  [facility]) Pre-Services  [address] [facility]  [address] [facility] [address]  [address] [address]  [phone]        DISCHARGE MEDICATIONS:  Discharge Medication List as of 1/24/2023  2:47 PM        START taking these medications    Details   torsemide (DEMADEX) 5 MG tablet Take 1 tablet by mouth daily for 6 days, Disp-6 tablet, R-0Normal      potassium chloride (KLOR-CON M) 10 MEQ extended release tablet Take 1 tablet by mouth daily for 6 doses, Disp-6 tablet, R-0Normal             DISCONTINUED MEDICATIONS:  Discharge Medication List as of 1/24/2023  2:47 PM                 (Please note that portions of this note were completed with a voice recognition program.  Efforts were made to edit the dictations but occasionally words are mis-transcribed.)    ZELDA Marshall - CNP (electronically signed)       ZELDA Marshall CNP  01/25/23 1941

## 2023-01-30 ENCOUNTER — OFFICE VISIT (OUTPATIENT)
Dept: ORTHOPEDIC SURGERY | Age: 43
End: 2023-01-30
Payer: COMMERCIAL

## 2023-01-30 VITALS — BODY MASS INDEX: 51.91 KG/M2 | HEIGHT: 63 IN | WEIGHT: 293 LBS | RESPIRATION RATE: 16 BRPM

## 2023-01-30 DIAGNOSIS — M75.102 TEAR OF LEFT ROTATOR CUFF, UNSPECIFIED TEAR EXTENT, UNSPECIFIED WHETHER TRAUMATIC: ICD-10-CM

## 2023-01-30 DIAGNOSIS — M25.562 LEFT KNEE PAIN, UNSPECIFIED CHRONICITY: Primary | ICD-10-CM

## 2023-01-30 DIAGNOSIS — M17.12 PRIMARY OSTEOARTHRITIS OF LEFT KNEE: ICD-10-CM

## 2023-01-30 DIAGNOSIS — M25.512 LEFT SHOULDER PAIN, UNSPECIFIED CHRONICITY: ICD-10-CM

## 2023-01-30 PROCEDURE — 1036F TOBACCO NON-USER: CPT | Performed by: PHYSICIAN ASSISTANT

## 2023-01-30 PROCEDURE — 99213 OFFICE O/P EST LOW 20 MIN: CPT | Performed by: PHYSICIAN ASSISTANT

## 2023-01-30 PROCEDURE — G8484 FLU IMMUNIZE NO ADMIN: HCPCS | Performed by: PHYSICIAN ASSISTANT

## 2023-01-30 PROCEDURE — G8417 CALC BMI ABV UP PARAM F/U: HCPCS | Performed by: PHYSICIAN ASSISTANT

## 2023-01-30 PROCEDURE — G8427 DOCREV CUR MEDS BY ELIG CLIN: HCPCS | Performed by: PHYSICIAN ASSISTANT

## 2023-01-30 PROCEDURE — 20610 DRAIN/INJ JOINT/BURSA W/O US: CPT | Performed by: PHYSICIAN ASSISTANT

## 2023-01-30 RX ORDER — TRIAMCINOLONE ACETONIDE 40 MG/ML
40 INJECTION, SUSPENSION INTRA-ARTICULAR; INTRAMUSCULAR ONCE
Status: COMPLETED | OUTPATIENT
Start: 2023-01-30 | End: 2023-01-30

## 2023-01-30 RX ORDER — BUPIVACAINE HYDROCHLORIDE 5 MG/ML
2 INJECTION, SOLUTION PERINEURAL ONCE
Status: COMPLETED | OUTPATIENT
Start: 2023-01-30 | End: 2023-01-30

## 2023-01-30 RX ADMIN — TRIAMCINOLONE ACETONIDE 40 MG: 40 INJECTION, SUSPENSION INTRA-ARTICULAR; INTRAMUSCULAR at 11:22

## 2023-01-30 RX ADMIN — BUPIVACAINE HYDROCHLORIDE 10 MG: 5 INJECTION, SOLUTION PERINEURAL at 11:22

## 2023-01-30 NOTE — PROGRESS NOTES
This dictation was done with Dragon dictation and may contain mechanical errors related to translation. I have today reviewed with Sherren Blender the clinically relevant, past medical history, medications, allergies, family history, social history, and Review Of Systems form the patients most recent history form & I have documented any details relevant to today's presenting complaints in my history below. Ms. Samuel Lopez self-reported past medical history, medications, allergies, family history, social history, and Review Of Systems form has been scanned into the chart under the \"Media\" tab. Subjective:  Sherren Blender is a 38 y. o. who is here as an established patient at [place] [address] complaining of new injury to her left knee and a chronic soreness and pain in her left shoulder. She had seen Dr. Ovidio Jimenez for her shoulders previously and continues to have soreness and pain that she states never really went away. The left knee is more acute over the last 10 to 12 weeks. Its pain in the medial aspect that coincides with the osteoarthritis seen on the x-rays. At today's visit she had 3 view x-rays of her left shoulder and 4 view x-rays of her left knee.   She denies having a cortisone shot either joint she denies any previous surgery and states that it can be an 8 out of 10 pain      Patient Active Problem List   Diagnosis    Essential hypertension    Migraine    GERD (gastroesophageal reflux disease)    Morbid obesity with BMI of 50.0-59.9, adult ([facility] [address]    Obstructive sleep apnea syndrome    Prediabetes    PCOS (polycystic ovarian syndrome)    Primary osteoarthritis involving multiple joints    Chronic pain    Obstructive sleep apnea    Chronic diastolic CHF (congestive heart failure) ([facility])    Allergic reaction to food    Bilateral hand numbness    Bilateral hip pain    Abdominal cramping    Lymphedema    Irritable bowel syndrome with diarrhea    Stasis dermatitis of both legs    Abnormal facial hair Rash of both hands    Abscess    Trigeminal neuralgia pain           Current Outpatient Medications on File Prior to Visit   Medication Sig Dispense Refill    torsemide (DEMADEX) 5 MG tablet Take 1 tablet by mouth daily for 6 days 6 tablet 0    potassium chloride (KLOR-CON M) 10 MEQ extended release tablet Take 1 tablet by mouth daily for 6 doses 6 tablet 0    naproxen (NAPROSYN) 500 MG tablet Take 1 tablet by mouth 2 times daily (with meals) 20 tablet 0    lansoprazole (PREVACID) 30 MG delayed release capsule Take 1 capsule by mouth once daily 90 capsule 0    methylPREDNISolone (MEDROL DOSEPACK) 4 MG tablet Take by mouth. 1 kit 0    carBAMazepine (TEGRETOL XR) 200 MG extended release tablet Take 1 tablet by mouth twice daily 60 tablet 0    montelukast (SINGULAIR) 10 MG tablet Take 1 tablet by mouth daily 90 tablet 1    atorvastatin (LIPITOR) 20 MG tablet Take 1 tablet by mouth once daily 90 tablet 1    amLODIPine (NORVASC) 10 MG tablet Take 1 tablet by mouth once daily 90 tablet 1    DULoxetine (CYMBALTA) 60 MG extended release capsule Take 1 capsule by mouth once daily 90 capsule 1    valsartan (DIOVAN) 80 MG tablet Take 1 tablet by mouth once daily 90 tablet 5    fluticasone (FLONASE) 50 MCG/ACT nasal spray Use 2 spray(s) in each nostril once daily 16 g 3    tiZANidine (ZANAFLEX) 4 MG tablet Take 1 tablet by mouth every 6 hours as needed (muscle aches) 20 tablet 0    naproxen (NAPROSYN) 500 MG tablet Take 1 tablet by mouth in the morning and 1 tablet in the evening. Take with meals. 60 tablet 5    vitamin D (ERGOCALCIFEROL) 1.25 MG (98130 UT) CAPS capsule Take 1 capsule by mouth once a week 12 capsule 1    cephALEXin (KEFLEX) 500 MG capsule Take 1 capsule by mouth 3 times daily 21 capsule 0    tiZANidine (ZANAFLEX) 4 MG tablet Take 1 tab po daily at night, can take during the day if it does not make sleepy. PRN.  90 tablet 0    levocetirizine (XYZAL) 5 MG tablet Take 1 tablet daily 90 tablet 3    erythromycin (ROMYCIN) 5 MG/GM ophthalmic ointment Apply to the lower eyelid margin 4 times daily for 5 days. 1 Tube 0    EPINEPHrine (EPIPEN) 0.3 MG/0.3ML SOAJ injection Inject 1 pen into the thigh for emergency treatment of allergic reactions, may repeat for severe persistent reactions. 1 each 2    Urea (CARMOL) 40 % cream Apply 40 oz topically daily       No current facility-administered medications on file prior to visit.         Objective:   Resp. rate 16, height 5' 3\" (1.6 m), weight (!) 353 lb (160.1 kg), last menstrual period 05/26/2014, not currently breastfeeding.    On examination is a very pleasant 42-year-old female with a 62 BMI she has 0 to calf on thigh range of motion with the left knee with crepitus during flexion extension and exquisite pain in her medial joint line with a mildly positive Deandre.  Negative for Lachman negative for straight leg raise.  Quad tone is fair.  She has comfortable internal and external rotation of both hips with a negative straight leg raise.    Her left shoulder has pain at 160 degrees of forward flexion pain with crossover and impingement testing.  Negative for sulcus sign or relocation test.  She has weakness with supraspinous strength testing.  She has good  strength good wrist extension strength.  Neuro exam grossly intact both lower extremities. Intact sensation to light touch. Motor exam 4+ to 5/5 in all major motor groups.  Negative Singh's sign.    Skin is warm, dry and intact with out erythema or significant increased temperature around the knee joint(s).     There are no cutaneous lesions or lymphadenopathy present.    X-RAYS:  X-rays taken the office today include an AP lateral sunrise view and a tunnel view.  These x-rays showed osteoarthritis in the medial compartment with close to bone-on-bone osteoarthritis without fracture or other bone abnormalities.  There is some subchondral sclerosis but no other bone abnormalities noted.    Three-view shoulders include  an AP transaxillary view and Y-view.  These x-rays showed a pretty significant sloping of the acromion.  There is no superior migration of the humeral head there is very minimal glenohumeral joint and acromioclavicular joint arthritis.      Assessment:  Left shoulder possible rotator cuff tear and left knee medial joint line osteoarthritis and possible meniscus tear    Plan:  During today's visit, there was approximately 35 minutes of face-to-face discussion in regards to the patient's current condition and treatment options. More than 50 % of the time was counseling and coordination of care as indicated above.  We talked about short and long-term expectations and initially we will do a cortisone shot on the left knee and have her do 100 straight leg raises today.    After a discussion of the multiple options, they consented to a cortisone shot. 1 ml of 40mg/ml Kenalog and 2 ml's of 0.25%Marcaine were injected into the Left knee joint space.  The leg was slightly flexed and injected just lateral to the patella tendon to under the patella. This was done with sterile technique and they tolerated it well.  .  Because of the chronicity of her left shoulder pain her clinical exam, I would order an MRI prior to an injection.  She is done formal physical therapy in the past was seen has done anti-inflammatories and continues have soreness and pain and disability with her left shoulder      PROCEDURE NOTE:  Order MRI for left shoulder and cortisone shot was given for the left knee      They will schedule a follow up in 2 to 3 weeks

## 2023-02-03 ENCOUNTER — OFFICE VISIT (OUTPATIENT)
Dept: ORTHOPEDIC SURGERY | Age: 43
End: 2023-02-03

## 2023-02-03 DIAGNOSIS — G56.03 BILATERAL CARPAL TUNNEL SYNDROME: Primary | ICD-10-CM

## 2023-02-03 NOTE — PROGRESS NOTES
Ms. Sunitha Seo returns today in follow-up of her previously treated  bilateral Carpal Tunnel Syndrome. She was last seen by Dr. Swati Bocanegra. Lynn Gandhi in October, 2022 at which time she was treated with Steroid injection to the Bilateral, Carpal Tunnel. She experienced moderate relief of her initial symptoms for 6 weeks. She  has noticed symptom worsening over the last several months. She returns today with worsened symptoms of bilateral numbness in the Median Innervated digits and tingling in the Median Innervated digits, requesting further treatment. I have today reviewed with Sunitha Seo the clinically relevant, past medical history, medications, allergies,  family history, social history, and Review Of Systems & I have documented any details relevant to today's presenting complaints in my history above. Ms. Angel Su self-reported past medical history, medications, allergies,  family history, social history, and Review Of Systems have been scanned into the chart under the \"Media\" tab. Physical Exam:     Ms. Sunitha Seo appears well, she is in no apparent distress, she demonstrates appropriate mood & affect. Skin: Abnormal in appearance, Altered Pigmentation, and Abnormal Texture Bilaterally. Evidence of moderate lymphedema noted to bilateral hands. Digital range of motion is  limited by swelling  bilaterally  Wrist range of motion is Full bilaterally  There is no evidence of gross joint instability bilaterally. Sensation is subjectively tingling in the Median Innervated Digits bilaterally and objectively present in the same distribution bilaterally. Vascular examination reveals normal, good capillary refill, and good color bilaterally. Swelling is moderate in the  whole hand  bilaterally. Muscular strength is clinically appropriate bilaterally.   Examination for Carpal Tunnel Syndrome shows Carpal Tunnel Compression Test to be Mildly Positive on the right & Mildly Positive on the left.  The patient displays mild baseline symptoms to potentially confound the exam.  The thenar musculature is not atrophied & weakened. Impression:  Ms. Jose Troy is showing evidence of persistent Carpal Tunnel Syndrome after previous treatment. She requests additional treatment at this time. Plan:    We discussed her following up for her lymphedema of her hands. I believe that the lymphedema could be an extrinsic factor contributing to her carpal tunnel syndrome and could be why she only experienced 6 weeks of relief from her steroid injection. I have had a thorough discussion with Ms. Jose Troy regarding the treatment options available for her worsened Bilateral carpal tunnel syndrome, which is causing her functional limitations. I have outlined for Ms. Jose Troy the the various treatment modalities available to her, including the options and utility of the  judicious use of over-the-counter anti-inflammatory medications (if allowed by her primary care physician), the temporary relief afforded by injection of corticosteroids, and the more definitive option of surgical treatment for this problem if she feels that the duration or severity of her symptoms merits surgical intervention. I have explained  the reasonable expectations for the long term success of each option and the likelihood that further more aggressive treatment could be required for her current presenting condition. Based upon our current discussion and a reasonable understating of the options available to her, Ms. Jose Troy has selected to proceed with  an injection to both carpal tunnel(s). I have outlined for her the nature of the injection, and the pre, pj and post injection considerations and the appropriate expectations for this injection.  I have clearly explained to her that the above outlined treatment plan should not be expected to 'cure' her carpal tunnel syndrome, but we are rather treating the symptoms with which she presents. She has understood that in order to achieve long lasting relief of her symptoms and to prevent future worsening or further damage, that definitive surgical treatment would be required. Ms. Pedro Luis Wooten  voiced an appropriate understanding of our discussion, the options available to her, and of the expectations of her selected  treatment. She did wish to proceed with Bilateral carpal tunnel injection. Procedure: right Carpal Tunnel Injection  [second Injection]: After full discussion of the nature of this process and outlining a treatment plan with Ms. Pedro Luis Wooetn, we discussed the complications, limitations, expectations, alternatives, and risks of injection to the carpal tunnel. I have explained the potential for bleeding, infection, potential side effects of the medication, and the remote possibility of damage to surrounding structures as result of the injection. She understood this information well and verbally consented to this treatment. The skin of the symptomatic extremity was prepped with Isopropyl Alcohol and under aseptic conditions the carpal tunnel was injected with a combination of 1 ml of 0.25% Bupivacaine without Epinephrine and 40 mg of Triamcinolone (40 mg/ml). There was good filling of the carpal tunnel. A  dry, sterile bandage was applied and she tolerated the injection without difficulty. I advised her of the expected response, possible reactions and the instructions for care of the hand. She is instructed in the judicious use of over-the-counter anti-inflammatory medications or pain relievers for her symptoms if allowed by his primary care physician. Procedure: left Carpal Tunnel Injection  [second Injection]: After full discussion of the nature of this process and outlining a treatment plan with Ms. Pedro Luis Wooten, we discussed the complications, limitations, expectations, alternatives, and risks of injection to the carpal tunnel. I have explained the potential for bleeding, infection, potential side effects of the medication, and the remote possibility of damage to surrounding structures as result of the injection. She understood this information well and verbally consented to this treatment. The skin of the symptomatic extremity was prepped with Isopropyl Alcohol and under aseptic conditions the carpal tunnel was injected with a combination of 1 ml of 0.25% Bupivacaine without Epinephrine and 40 mg of Triamcinolone (40 mg/ml). There was good filling of the carpal tunnel. A  dry, sterile bandage was applied and she tolerated the injection without difficulty. I advised her of the expected response, possible reactions and the instructions for care of the hand. She is instructed in the judicious use of over-the-counter anti-inflammatory medications or pain relievers for her symptoms if allowed by his primary care physician. I have explained to Ms. Corinne Genao that despite successful treatment (surgical or otherwise) of her current presenting condition, that due to her coexistent conditions (both diagnosed and undiagnosed), that she is likely to have some permanent residual symptoms related to these conditions that do not improve long term. I have also explained that maximal recovery of function & symptom improvement may take a full year or longer to realize. She voiced a clear understanding of this. I have also discussed with Ms. Corinne Genao the other treatment options available to her for this condition. We have today selected to proceed with treatment by injection with steroid medication. She and I have agreed that if our current course of Injection treatment does not prove to be effective over the short term future, that she will schedule a follow-up appointment to discuss and select an alternate course of therapy including possibly further conservative treatment or surgical treatment.        Ms. Remigio Garcia Dick Valle has been given a full verbal list of instructions and precautions related to her present condition. I have asked her to followup with me in the office at the prescribed time. She is also specifically requested to call or return to the office sooner if her symptoms change or worsen prior to the next scheduled appointment.

## 2023-02-06 ENCOUNTER — HOSPITAL ENCOUNTER (OUTPATIENT)
Dept: PHYSICAL THERAPY | Age: 43
Setting detail: THERAPIES SERIES
Discharge: HOME | End: 2023-02-06

## 2023-02-06 NOTE — FLOWSHEET NOTE
[address] [facility] [address] [place] [address]  Phone: [phone]   Fax:     [phone]    Physical Therapy  Cancellation/No-show Note  Patient Name:  Beverlee Primrose  :  1980   Date:  2023  Cancelled visits to date: 0  No-shows to date: 1    Patient status for today's appointment patient:  []  Cancelled  []  Rescheduled appointment  [x]  No-show     Reason given by patient:  []  Patient ill  []  Conflicting appointment  []  No transportation    []  Conflict with work  []  No reason given  []  Other:     Comments:      Phone call information:   []  Phone call made today to patient at _ time at number provided:      []  Patient answered, conversation as follows:    []  Patient did not answer, message left as follows:  [x]  Phone call not made today; Patient no-show initial evaluation. Patient can call back to reschedule if desired.      Electronically signed by:  Karthik Sexton, PT, DPT

## 2023-02-14 ENCOUNTER — OFFICE VISIT (OUTPATIENT)
Dept: PRIMARY CARE CLINIC | Age: 43
End: 2023-02-14
Payer: COMMERCIAL

## 2023-02-14 VITALS
BODY MASS INDEX: 61.47 KG/M2 | HEART RATE: 89 BPM | RESPIRATION RATE: 20 BRPM | DIASTOLIC BLOOD PRESSURE: 85 MMHG | SYSTOLIC BLOOD PRESSURE: 128 MMHG | WEIGHT: 293 LBS | TEMPERATURE: 97 F

## 2023-02-14 DIAGNOSIS — I89.0 LYMPHEDEMA: ICD-10-CM

## 2023-02-14 DIAGNOSIS — E55.9 VITAMIN D DEFICIENCY: ICD-10-CM

## 2023-02-14 DIAGNOSIS — R21 SKIN RASH: ICD-10-CM

## 2023-02-14 DIAGNOSIS — R60.0 BILATERAL LEG EDEMA: Primary | ICD-10-CM

## 2023-02-14 DIAGNOSIS — I10 PRIMARY HYPERTENSION: ICD-10-CM

## 2023-02-14 DIAGNOSIS — Z90.79 S/P TAH-BSO (TOTAL ABDOMINAL HYSTERECTOMY AND BILATERAL SALPINGO-OOPHORECTOMY): ICD-10-CM

## 2023-02-14 DIAGNOSIS — Z90.722 S/P TAH-BSO (TOTAL ABDOMINAL HYSTERECTOMY AND BILATERAL SALPINGO-OOPHORECTOMY): ICD-10-CM

## 2023-02-14 DIAGNOSIS — G47.33 OSA (OBSTRUCTIVE SLEEP APNEA): ICD-10-CM

## 2023-02-14 DIAGNOSIS — Z90.710 S/P TAH-BSO (TOTAL ABDOMINAL HYSTERECTOMY AND BILATERAL SALPINGO-OOPHORECTOMY): ICD-10-CM

## 2023-02-14 DIAGNOSIS — E66.1 CLASS 3 DRUG-INDUCED OBESITY WITH SERIOUS COMORBIDITY AND BODY MASS INDEX (BMI) OF 60.0 TO 69.9 IN ADULT (HCC): ICD-10-CM

## 2023-02-14 PROBLEM — E66.813 CLASS 3 DRUG-INDUCED OBESITY WITH SERIOUS COMORBIDITY AND BODY MASS INDEX (BMI) OF 60.0 TO 69.9 IN ADULT: Status: ACTIVE | Noted: 2023-02-14

## 2023-02-14 PROCEDURE — 99214 OFFICE O/P EST MOD 30 MIN: CPT | Performed by: FAMILY MEDICINE

## 2023-02-14 PROCEDURE — G8417 CALC BMI ABV UP PARAM F/U: HCPCS | Performed by: FAMILY MEDICINE

## 2023-02-14 PROCEDURE — 3079F DIAST BP 80-89 MM HG: CPT | Performed by: FAMILY MEDICINE

## 2023-02-14 PROCEDURE — G8427 DOCREV CUR MEDS BY ELIG CLIN: HCPCS | Performed by: FAMILY MEDICINE

## 2023-02-14 PROCEDURE — 1036F TOBACCO NON-USER: CPT | Performed by: FAMILY MEDICINE

## 2023-02-14 PROCEDURE — 3074F SYST BP LT 130 MM HG: CPT | Performed by: FAMILY MEDICINE

## 2023-02-14 PROCEDURE — G8484 FLU IMMUNIZE NO ADMIN: HCPCS | Performed by: FAMILY MEDICINE

## 2023-02-14 RX ORDER — OXYBUTYNIN CHLORIDE 10 MG/1
10 TABLET, EXTENDED RELEASE ORAL DAILY
COMMUNITY

## 2023-02-14 RX ORDER — TORSEMIDE 5 MG/1
5 TABLET ORAL DAILY
Qty: 30 TABLET | Refills: 0 | Status: SHIPPED | OUTPATIENT
Start: 2023-02-14

## 2023-02-14 RX ORDER — ERGOCALCIFEROL 1.25 MG/1
CAPSULE ORAL
Qty: 12 CAPSULE | Refills: 1 | Status: SHIPPED | OUTPATIENT
Start: 2023-02-14

## 2023-02-14 RX ORDER — AMLODIPINE BESYLATE 10 MG/1
TABLET ORAL
Qty: 90 TABLET | Refills: 1 | Status: SHIPPED | OUTPATIENT
Start: 2023-02-14

## 2023-02-14 RX ORDER — CLOTRIMAZOLE AND BETAMETHASONE DIPROPIONATE 10; .64 MG/G; MG/G
CREAM TOPICAL
Qty: 45 G | Refills: 0 | Status: SHIPPED | OUTPATIENT
Start: 2023-02-14

## 2023-02-14 RX ORDER — CHLORZOXAZONE 500 MG/1
500 TABLET ORAL 2 TIMES DAILY
COMMUNITY

## 2023-02-14 RX ORDER — VALSARTAN 80 MG/1
TABLET ORAL
Qty: 90 TABLET | Refills: 5 | Status: SHIPPED | OUTPATIENT
Start: 2023-02-14

## 2023-02-14 RX ORDER — FAMOTIDINE 40 MG/1
40 TABLET, FILM COATED ORAL NIGHTLY PRN
COMMUNITY

## 2023-02-14 RX ORDER — PREGABALIN 100 MG/1
100 CAPSULE ORAL 3 TIMES DAILY
COMMUNITY

## 2023-02-14 ASSESSMENT — PATIENT HEALTH QUESTIONNAIRE - PHQ9
SUM OF ALL RESPONSES TO PHQ QUESTIONS 1-9: 0
2. FEELING DOWN, DEPRESSED OR HOPELESS: 0
SUM OF ALL RESPONSES TO PHQ9 QUESTIONS 1 & 2: 0
SUM OF ALL RESPONSES TO PHQ QUESTIONS 1-9: 0
1. LITTLE INTEREST OR PLEASURE IN DOING THINGS: 0

## 2023-02-14 NOTE — PROGRESS NOTES
Fifi Riff (:  1980) is a 38 y.o. female,Established patient, here for evaluation of the following chief complaint(s):  Follow-Up from [facility]-  Chest pain/ SOB)         ASSESSMENT/PLAN:  1. Bilateral leg edema  Improved leg edema  Recent renal  Prn low dose torsemide  -     torsemide (DEMADEX) 5 MG tablet; Take 1 tablet by mouth daily, Disp-30 tablet, R-0Normal  2. Lymphedema  Continue to wrap the legs  No longer uses a pump  -     torsemide (DEMADEX) 5 MG tablet; Take 1 tablet by mouth daily, Disp-30 tablet, R-0Normal  3. Class 3 drug-induced obesity with serious comorbidity and body mass index (BMI) of 60.0 to 69.9 in adult [facility])  Losing some wt  Mediterranean diet, low shannon, exercise  4. Primary hypertension  Bp at  goal  Recent renal  -     valsartan (DIOVAN) 80 MG tablet; Take 1 tablet by mouth once daily, Disp-90 tablet, R-5Normal  -     amLODIPine (NORVASC) 10 MG tablet; Take 1 tablet by mouth once daily, Disp-90 tablet, R-1Normal  5. Vitamin D deficiency  On replacement. No fx. No falls  -     vitamin D (ERGOCALCIFEROL) 1.25 MG (16871 UT) CAPS capsule; Take 1 capsule by mouth once a week, Disp-12 capsule, R-1Normal  -     Vitamin D 25 Hydroxy; Future  6. SHYLA (obstructive sleep apnea)  Some daytime sleepiness  Interruption of sleep  Needs new CPAP  -     Sheila Choudhury MD, Sleep Medicine, [facility]  7. S/P MARY-BSO (total abdominal hysterectomy and bilateral salpingo-oophorectomy)  -     vitamin D (ERGOCALCIFEROL) 1.25 MG (54819 UT) CAPS capsule; Take 1 capsule by mouth once a week, Disp-12 capsule, R-1Normal  -     Vitamin D 25 Hydroxy; Future      No follow-ups on file. Subjective   SUBJECTIVE/OBJECTIVE:  HPI  38 y.o. female follow up ER visits x 2 for bilateral leg edema. No sob or cp or liver or kidney disease. Prn dose of diuretic helps. She has bilateral chronic lymphedema also. Did have pump.  Now has  Wraps for this, some improvement with diuretic. Obese  She is now losing some weight, walking some , trying  To cut back on snaps, is a \"sugar addict\"    Wt Readings from Last 3 Encounters:   02/14/23 (!) 347 lb (157.4 kg)   01/30/23 (!) 353 lb (160.1 kg)   01/24/23 (!) 353 lb 6.4 oz (160.3 kg)     Hypertension  No chest pain, headache, sob, leg edema, stroke, kidney disease     Vitamin d def  No fx  No falls  S/p hyst  ovaries removed  Says she cannot take estrogen due to clots  Sees dr strickland gyn      SHYLA  Does not sleep well at night  Restless  Awakens frequency  No longer has CPAP      Skin rash lower abdominal skin folds  Steroid responsive vs candida   Trial of lotrisone    Review of Systems negative except for hpi info       Objective   Physical Exam  Constitutional:       General: She is not in acute distress. Appearance: She is well-developed. She is not diaphoretic. HENT:      Head: Normocephalic and atraumatic. Right Ear: External ear normal.      Left Ear: External ear normal.      Nose: Nose normal.      Mouth/Throat:      Pharynx: No oropharyngeal exudate. Eyes:      General: No scleral icterus. Left eye: No discharge. Conjunctiva/sclera: Conjunctivae normal.      Pupils: Pupils are equal, round, and reactive to light. Neck:      Thyroid: No thyromegaly. Vascular: No JVD. Trachea: No tracheal deviation. Cardiovascular:      Rate and Rhythm: Normal rate and regular rhythm. Heart sounds: Normal heart sounds. No murmur heard. No friction rub. No gallop. Pulmonary:      Effort: Pulmonary effort is normal. No respiratory distress. Breath sounds: Normal breath sounds. No stridor. No wheezing or rales. Chest:      Chest wall: No tenderness. Abdominal:      General: Bowel sounds are normal. There is no distension. Palpations: Abdomen is soft. There is no mass. Tenderness: There is no abdominal tenderness. There is no guarding or rebound.    Musculoskeletal:         General: No tenderness. Normal range of motion. Cervical back: Normal range of motion and neck supple. Comments: Bilat lymphedema    Trace pitting edema   Lymphadenopathy:      Cervical: No cervical adenopathy. Skin:     General: Skin is warm and dry. Coloration: Skin is not pale. Findings: No erythema or rash. Comments: Sl pigmented  rash lower abdominal folds   Neurological:      Mental Status: She is alert and oriented to person, place, and time. Cranial Nerves: No cranial nerve deficit. Coordination: Coordination normal.      Deep Tendon Reflexes: Reflexes are normal and symmetric. Reflexes normal.   Psychiatric:         Behavior: Behavior normal.         Thought Content: Thought content normal.         Judgment: Judgment normal.          On this date 2/14/2023 I have spent 30 minutes reviewing previous notes, test results and face to face with the patient discussing the diagnosis and importance of compliance with the treatment plan as well as documenting on the day of the visit. An electronic signature was used to authenticate this note.     --Allison Trent MD

## 2023-02-20 ENCOUNTER — OFFICE VISIT (OUTPATIENT)
Dept: GYNECOLOGY | Age: 43
End: 2023-02-20

## 2023-02-20 VITALS
WEIGHT: 293 LBS | DIASTOLIC BLOOD PRESSURE: 70 MMHG | SYSTOLIC BLOOD PRESSURE: 128 MMHG | HEIGHT: 63 IN | RESPIRATION RATE: 17 BRPM | BODY MASS INDEX: 51.91 KG/M2 | HEART RATE: 76 BPM

## 2023-02-20 DIAGNOSIS — R73.09 ELEVATED GLUCOSE: ICD-10-CM

## 2023-02-20 DIAGNOSIS — L91.8 SKIN TAG: ICD-10-CM
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
## 2023-02-20 DIAGNOSIS — R60.0 BILATERAL LOWER EXTREMITY EDEMA: ICD-10-CM

## 2023-02-20 DIAGNOSIS — Z01.419 WELL WOMAN EXAM WITH ROUTINE GYNECOLOGICAL EXAM: Primary | ICD-10-CM

## 2023-02-24 LAB — HPV COMMENT: NORMAL

## 2023-02-26 ASSESSMENT — ENCOUNTER SYMPTOMS
GASTROINTESTINAL NEGATIVE: 1
RESPIRATORY NEGATIVE: 1
EYES NEGATIVE: 1
ALLERGIC/IMMUNOLOGIC NEGATIVE: 1

## 2023-02-26 NOTE — PROGRESS NOTES
Subjective:      Patient ID: Ezequiel Olivares is a 38 y.o. female. Patient is here for annual. Patient wants STI screen. Patient with hx skin tags but has one that is pulling. Gynecologic Exam  Associated symptoms include arthralgias and myalgias. Review of Systems   Constitutional: Negative. HENT: Negative. Eyes: Negative. Respiratory: Negative. Cardiovascular: Negative. Gastrointestinal: Negative. Endocrine: Negative. Genitourinary: Negative. Musculoskeletal:  Positive for arthralgias and myalgias. Skin: Negative. Allergic/Immunologic: Negative. Neurological: Negative. Hematological: Negative. Psychiatric/Behavioral: Negative. Date of Birth 1980  Past Medical History:   Diagnosis Date    Allergic rhinitis     Anemia     Anxiety and depression     Dermatomyositis ([facility] [address] 2008    Eczema     Fibromyalgia     Fibromyalgia     GERD (gastroesophageal reflux disease)     Headache(784.0)     History of blood transfusion 2009    also two in 2014     HTN (hypertension)     Hx of blood clots 2014    PE and DVT    Irritable bowel syndrome     Lymphedema     Morbid obesity with BMI of 50.0-59.9, adult (HCC)     Osteoarthritis     PCOS (polycystic ovarian syndrome)     Prediabetes     Pulmonary embolism ([facility] [address] 07/17/2014    Overview:  Continue anticoagulation. Medicine managing Coumadin bridge. Daily H/H, PTT, PT/INR. Last Assessment & Plan:  HPI: Seen in hospital follow up. The patient is a 26year old y/o female who admitted to the hospital for pulmonary emboli.  She began to experience left lower extremity swelling and pain for two days, and day of admission she began to experience chest pain and shortness of     Sleep apnea     Vaginal high risk HPV DNA test positive 05/2016    Wears glasses      Past Surgical History:   Procedure Laterality Date    COLONOSCOPY N/A 01/13/2021    COLONOSCOPY performed by Jd Kee MD at [address] [place] [address] crossed eyes    HYSTERECTOMY (CERVIX STATUS UNKNOWN)      full-ovaries gone    MUSCLE BIOPSY      left thigh    OVARIAN CYST REMOVAL      SKIN BIOPSY      birth merritt removal left arm    VENA CAVA FILTER PLACEMENT      inserted in  and removed in      OB History    Para Term  AB Living   3 3 3     3   SAB IAB Ectopic Molar Multiple Live Births             3      # Outcome Date GA Lbr Maco/2nd Weight Sex Delivery Anes PTL Lv   3 Term 12    M Vag-Spont   JUSTIN   2 Term 2003    F Vag-Spont   JUSTIN   1 Term 10/04/00    M Vag-Spont   JUSTIN     Social History     Socioeconomic History    Marital status: Single     Spouse name: Not on file    Number of children: 3    Years of education: Not on file    Highest education level: Not on file   Occupational History    Occupation: unemployed   Tobacco Use    Smoking status: Never    Smokeless tobacco: Never   Vaping Use    Vaping Use: Never used   Substance and Sexual Activity    Alcohol use: Not Currently     Comment: occasionally    Drug use: Never    Sexual activity: Not Currently     Partners: Male   Other Topics Concern    Not on file   Social History Narrative    Lives with 2 children     Social Determinants of Health     Financial Resource Strain: Not on file   Food Insecurity: Not on file   Transportation Needs: Not on file   Physical Activity: Not on file   Stress: Not on file   Social Connections: Not on file   Intimate Partner Violence: Not on file   Housing Stability: Not on file     Allergies   Allergen Reactions    Latex Itching and Rash    Corn-Containing Products Shortness Of Breath, Itching and Swelling    Imitrex [Sumatriptan] Other (See Comments)     Chest pain    Other Anaphylaxis     enviromental  allegies    Corn, wheat, soy bean, peanuts and walnuts    Peanut-Containing Drug Products Shortness Of Breath    Soy Allergy Shortness Of Breath and Swelling    Tree Nut [Macadamia Nut Oil] Shortness Of Breath and Swelling Wheat Bran Hives, Shortness Of Breath, Itching and Swelling    Mixed Grasses Itching and Other (See Comments)     Molds--over 51 different environmental allergies  Sinus issues/redden eyes    Tramadol Other (See Comments)     Made her feel really bad, and head dizzy and chest pain     Outpatient Medications Marked as Taking for the 2/20/23 encounter (Office Visit) with Mouna Wilkerson MD   Medication Sig Dispense Refill    vitamin D (ERGOCALCIFEROL) 1.25 MG (39639 UT) CAPS capsule Take 1 capsule by mouth once a week 12 capsule 1    oxybutynin (DITROPAN-XL) 10 MG extended release tablet Take 10 mg by mouth daily      famotidine (PEPCID) 40 MG tablet Take 40 mg by mouth nightly as needed      chlorzoxazone (PARAFON FORTE) 500 MG tablet Take 500 mg by mouth 2 times daily      pregabalin (LYRICA) 100 MG capsule Take 100 mg by mouth 3 times daily. APPLE CIDER VINEGAR PO Take by mouth      Green Tea, Rina sinensis, (GREEN  PO) Take by mouth      ACAI MEDRANO PO Take by mouth      torsemide (DEMADEX) 5 MG tablet Take 1 tablet by mouth daily 30 tablet 0    valsartan (DIOVAN) 80 MG tablet Take 1 tablet by mouth once daily 90 tablet 5    amLODIPine (NORVASC) 10 MG tablet Take 1 tablet by mouth once daily 90 tablet 1    clotrimazole-betamethasone (LOTRISONE) 1-0.05 % cream Apply topically 2 times daily.  45 g 0    lansoprazole (PREVACID) 30 MG delayed release capsule Take 1 capsule by mouth once daily 90 capsule 0    carBAMazepine (TEGRETOL XR) 200 MG extended release tablet Take 1 tablet by mouth twice daily 60 tablet 0    montelukast (SINGULAIR) 10 MG tablet Take 1 tablet by mouth daily 90 tablet 1    atorvastatin (LIPITOR) 20 MG tablet Take 1 tablet by mouth once daily 90 tablet 1    DULoxetine (CYMBALTA) 60 MG extended release capsule Take 1 capsule by mouth once daily 90 capsule 1    fluticasone (FLONASE) 50 MCG/ACT nasal spray Use 2 spray(s) in each nostril once daily 16 g 3    naproxen (NAPROSYN) 500 MG tablet Take 1 tablet by mouth in the morning and 1 tablet in the evening. Take with meals. 60 tablet 5    levocetirizine (XYZAL) 5 MG tablet Take 1 tablet daily 90 tablet 3     Family History   Problem Relation Age of Onset    High Blood Pressure Mother     Other Father         gout    Cancer Father         prostate    Breast Cancer Maternal Aunt 47    Asthma Maternal Cousin     Rheum Arthritis Neg Hx     Osteoarthritis Neg Hx     Diabetes Neg Hx     Heart Failure Neg Hx     High Cholesterol Neg Hx     Hypertension Neg Hx     Migraines Neg Hx     Ovarian Cancer Neg Hx     Rashes/Skin Problems Neg Hx     Seizures Neg Hx     Stroke Neg Hx     Thyroid Disease Neg Hx      /70 (Site: Left Upper Arm, Position: Sitting, Cuff Size: Large Adult)   Pulse 76   Resp 17   Ht 5' 3\" (1.6 m)   Wt (!) 347 lb (157.4 kg)   LMP 05/26/2014   BMI 61.47 kg/m²       Objective:   Physical Exam  Constitutional:       General: She is not in acute distress. Appearance: Normal appearance. She is well-developed and normal weight. She is not diaphoretic. HENT:      Head: Normocephalic. Nose: Nose normal.      Mouth/Throat:      Mouth: Mucous membranes are moist.      Pharynx: Oropharynx is clear. Eyes:      Extraocular Movements: Extraocular movements intact. Neck:      Thyroid: No thyromegaly. Cardiovascular:      Rate and Rhythm: Normal rate and regular rhythm. Heart sounds: Normal heart sounds. No murmur heard. No friction rub. No gallop. Pulmonary:      Effort: Pulmonary effort is normal. No respiratory distress. Breath sounds: Normal breath sounds. No stridor. No wheezing, rhonchi or rales. Chest:      Chest wall: No tenderness. Breasts:     Right: Normal. No swelling, bleeding, inverted nipple, mass, nipple discharge, skin change or tenderness. Left: Normal. No swelling, bleeding, inverted nipple, mass, nipple discharge, skin change or tenderness.    Abdominal:      General: Abdomen is flat. There is no distension. Palpations: Abdomen is soft. There is no hepatomegaly or mass. Tenderness: There is no abdominal tenderness. There is no guarding or rebound. Hernia: No hernia is present. There is no hernia in the left inguinal area. Genitourinary:     Exam position: Lithotomy position. Pubic Area: No rash. Labia:         Right: No rash, tenderness, lesion or injury. Left: No rash, tenderness, lesion or injury. Urethra: No prolapse, urethral pain, urethral swelling or urethral lesion. Vagina: Normal. No signs of injury and foreign body. No vaginal discharge, erythema, tenderness, bleeding, lesions or prolapsed vaginal walls. Adnexa: Right adnexa normal and left adnexa normal.        Right: No mass, tenderness or fullness. Left: No mass, tenderness or fullness. Rectum: No anal fissure or external hemorrhoid. Comments: Normal urethral meatus, nl urethra, nl bladder. Musculoskeletal:         General: No tenderness. Normal range of motion. Cervical back: Normal range of motion and neck supple. No rigidity. Lymphadenopathy:      Cervical: No cervical adenopathy. Skin:     General: Skin is warm and dry. Neurological:      General: No focal deficit present. Mental Status: She is alert and oriented to person, place, and time. Mental status is at baseline. Deep Tendon Reflexes: Reflexes are normal and symmetric. Psychiatric:         Mood and Affect: Mood normal.         Behavior: Behavior normal.         Thought Content:  Thought content normal.         Judgment: Judgment normal.       Assessment:      Annual  Sti screen  Hx CHF  One pulling skin tag      Plan:      Pap, calcium, exercise, mammogram  Dna probe, blood work  Cardiac echo  For removal under right arm    Procedure Note:-area cleansed with betadine-skin tag removed with Vianey Davenport MD

## 2023-02-27 DIAGNOSIS — K21.9 GASTROESOPHAGEAL REFLUX DISEASE WITHOUT ESOPHAGITIS: ICD-10-CM

## 2023-02-27 RX ORDER — LANSOPRAZOLE 30 MG/1
CAPSULE, DELAYED RELEASE ORAL
Qty: 90 CAPSULE | Refills: 0 | Status: CANCELLED | OUTPATIENT
Start: 2023-02-27

## 2023-02-28 RX ORDER — LANSOPRAZOLE 30 MG/1
CAPSULE, DELAYED RELEASE ORAL
Qty: 90 CAPSULE | Refills: 0 | Status: SHIPPED | OUTPATIENT
Start: 2023-02-28

## 2023-03-06 ENCOUNTER — OFFICE VISIT (OUTPATIENT)
Dept: SLEEP MEDICINE | Age: 43
End: 2023-03-06
Payer: COMMERCIAL

## 2023-03-06 VITALS
SYSTOLIC BLOOD PRESSURE: 120 MMHG | TEMPERATURE: 97.8 F | RESPIRATION RATE: 18 BRPM | BODY MASS INDEX: 51.91 KG/M2 | DIASTOLIC BLOOD PRESSURE: 70 MMHG | OXYGEN SATURATION: 96 % | WEIGHT: 293 LBS | HEART RATE: 81 BPM | HEIGHT: 63 IN

## 2023-03-06 DIAGNOSIS — Z99.89 DEPENDENCE ON OTHER ENABLING MACHINES AND DEVICES: ICD-10-CM

## 2023-03-06 DIAGNOSIS — Z86.79 H/O CHF: ICD-10-CM

## 2023-03-06 DIAGNOSIS — G47.33 OSA ON CPAP: Primary | ICD-10-CM

## 2023-03-06 DIAGNOSIS — E66.1 CLASS 3 DRUG-INDUCED OBESITY WITH SERIOUS COMORBIDITY AND BODY MASS INDEX (BMI) OF 60.0 TO 69.9 IN ADULT (HCC): ICD-10-CM

## 2023-03-06 DIAGNOSIS — Z99.89 OSA ON CPAP: Primary | ICD-10-CM

## 2023-03-06 PROCEDURE — G8417 CALC BMI ABV UP PARAM F/U: HCPCS | Performed by: PSYCHIATRY & NEUROLOGY

## 2023-03-06 PROCEDURE — 3078F DIAST BP <80 MM HG: CPT | Performed by: PSYCHIATRY & NEUROLOGY

## 2023-03-06 PROCEDURE — G8427 DOCREV CUR MEDS BY ELIG CLIN: HCPCS | Performed by: PSYCHIATRY & NEUROLOGY

## 2023-03-06 PROCEDURE — 99213 OFFICE O/P EST LOW 20 MIN: CPT | Performed by: PSYCHIATRY & NEUROLOGY

## 2023-03-06 PROCEDURE — 3074F SYST BP LT 130 MM HG: CPT | Performed by: PSYCHIATRY & NEUROLOGY

## 2023-03-06 PROCEDURE — G8484 FLU IMMUNIZE NO ADMIN: HCPCS | Performed by: PSYCHIATRY & NEUROLOGY

## 2023-03-06 PROCEDURE — 1036F TOBACCO NON-USER: CPT | Performed by: PSYCHIATRY & NEUROLOGY

## 2023-03-06 ASSESSMENT — SLEEP AND FATIGUE QUESTIONNAIRES
HOW LIKELY ARE YOU TO NOD OFF OR FALL ASLEEP WHILE SITTING QUIETLY AFTER LUNCH WITHOUT ALCOHOL: 3
HOW LIKELY ARE YOU TO NOD OFF OR FALL ASLEEP WHILE SITTING INACTIVE IN A PUBLIC PLACE: 1
HOW LIKELY ARE YOU TO NOD OFF OR FALL ASLEEP IN A CAR, WHILE STOPPED FOR A FEW MINUTES IN TRAFFIC: 0
HOW LIKELY ARE YOU TO NOD OFF OR FALL ASLEEP WHEN YOU ARE A PASSENGER IN A CAR FOR AN HOUR WITHOUT A BREAK: 1
HOW LIKELY ARE YOU TO NOD OFF OR FALL ASLEEP WHILE SITTING AND TALKING TO SOMEONE: 0
ESS TOTAL SCORE: 12
HOW LIKELY ARE YOU TO NOD OFF OR FALL ASLEEP WHILE WATCHING TV: 3
HOW LIKELY ARE YOU TO NOD OFF OR FALL ASLEEP WHILE SITTING AND READING: 1
HOW LIKELY ARE YOU TO NOD OFF OR FALL ASLEEP WHILE LYING DOWN TO REST IN THE AFTERNOON WHEN CIRCUMSTANCES PERMIT: 3

## 2023-03-06 NOTE — PROGRESS NOTES
MD ADRIANNA Varela Board Certified in Sleep Medicine  Certified in [address] [place] [address] Certified in Neurology Izzy Prachi Gonsalez [address] [place] [address],  Ralph Vital [address]  [phone]   [address] [place] [address], [address] [place] [address] Gosia Ne                      [address] [facility] [address] [facility] [address]  [address] [address] [phone]    Subjective:     Patient ID: Raman Diaz is a 38 y.o. female. Chief Complaint   Patient presents with    Follow-up    Sleep Apnea         HPI:        Raman Diaz is a 38 y.o. female was seen today as annual follow for osevere obstructive sleep apnea with apnea hypopnea index of 56.8 with lowest O2 saturation of 44%, patient spent about 42 minutes below 90% (weight was 311 pounds 03/23/2020). Last time was seen 03/23/2020  Lost time she used the machine over 2 years, she lost her machine in the storage. The Patient scored [place] Sleepiness Score: 12 on [place] Sleepiness Scale ( more than 10 is indicative of daytime sleepiness)   BP is stable. Has gained 39 pounds since last visit. CHF is stable, last echo 08/18/2016  Summary  Technically limited study due to body habitus. Normal left ventricle size, wall thickness and systolic function with an EF   of 55%. Reversal of E/A inflow velocities across the mitral valve suggesting   impaired left ventricular relaxation. Trivial tricuspid regurgitation with RVSP estimated at 19 mmHg.     DOT/CDL - N/A        Previous Report(s)Reviewed: historical medical records         Social History     Socioeconomic History    Marital status: Single     Spouse name: Not on file    Number of children: 3    Years of education: Not on file    Highest education level: Not on file   Occupational History    Occupation: unemployed   Tobacco Use    Smoking status: Never     Passive exposure: Past    Smokeless tobacco: Never   Vaping Use    Vaping Use: Never used   Substance and Sexual Activity    Alcohol use: Not Currently     Comment: occasionally    Drug use: Never    Sexual activity: Not Currently     Partners: Male   Other Topics Concern    Not on file   Social History Narrative    Lives with 2 children     Social Determinants of Health     Financial Resource Strain: Not on file   Food Insecurity: Not on file   Transportation Needs: Not on file   Physical Activity: Not on file   Stress: Not on file   Social Connections: Not on file   Intimate Partner Violence: Not on file   Housing Stability: Not on file       Prior to Admission medications    Medication Sig Start Date End Date Taking? Authorizing Provider   lansoprazole (PREVACID) 30 MG delayed release capsule Take 1 capsule by mouth once daily 2/28/23  Yes Juan Rose MD   vitamin D (ERGOCALCIFEROL) 1.25 MG (44043 UT) CAPS capsule Take 1 capsule by mouth once a week 2/14/23  Yes Juan Rose MD   oxybutynin (DITROPAN-XL) 10 MG extended release tablet Take 10 mg by mouth daily   Yes Historical Provider, MD   famotidine (PEPCID) 40 MG tablet Take 40 mg by mouth nightly as needed   Yes Historical Provider, MD   chlorzoxazone (PARAFON FORTE) 500 MG tablet Take 500 mg by mouth 2 times daily   Yes Historical Provider, MD   pregabalin (LYRICA) 100 MG capsule Take 100 mg by mouth 3 times daily.    Yes Historical Provider, MD   APPLE CIDER VINEGAR PO Take by mouth   Yes Historical Provider, MD Marcial Clause Tea, Rina sinensis, (GREEN  PO) Take by mouth   Yes Historical Provider, MD   ACAI MEDRANO PO Take by mouth   Yes Historical Provider, MD   torsemide (DEMADEX) 5 MG tablet Take 1 tablet by mouth daily 2/14/23  Yes Juan Rose MD   valsartan (DIOVAN) 80 MG tablet Take 1 tablet by mouth once daily 2/14/23  Yes Juan Rose MD   amLODIPine (NORVASC) 10 MG tablet Take 1 tablet by mouth once daily 2/14/23  Yes Juan Rose MD clotrimazole-betamethasone (LOTRISONE) 1-0.05 % cream Apply topically 2 times daily. 2/14/23  Yes Gulshan Leonard MD   carBAMazepine (TEGRETOL XR) 200 MG extended release tablet Take 1 tablet by mouth twice daily 9/12/22  Yes ZELDA Mitchell CNP   montelukast (SINGULAIR) 10 MG tablet Take 1 tablet by mouth daily 9/2/22  Yes Gulshan Leonard MD   atorvastatin (LIPITOR) 20 MG tablet Take 1 tablet by mouth once daily 8/23/22  Yes ZELDA Mitchell CNP   DULoxetine (CYMBALTA) 60 MG extended release capsule Take 1 capsule by mouth once daily 8/23/22  Yes ZELDA Mitchell CNP   fluticasone [facility] [place]) 50 MCG/ACT nasal spray Use 2 spray(s) in each nostril once daily 7/25/22  Yes ZELDA Mitchell CNP   naproxen (NAPROSYN) 500 MG tablet Take 1 tablet by mouth in the morning and 1 tablet in the evening. Take with meals.  7/18/22  Yes Samuel Story MD   levocetirizine (XYZAL) 5 MG tablet Take 1 tablet daily 3/11/22  Yes ZELDA Mitchell CNP       Allergies as of 03/06/2023 - Fully Reviewed 02/26/2023   Allergen Reaction Noted    Latex Itching and Rash 07/10/2013    Corn-containing products Shortness Of Breath, Itching, and Swelling 06/21/2016    Imitrex [sumatriptan] Other (See Comments) 07/10/2013    Other Anaphylaxis 05/21/2014    Peanut-containing drug products Shortness Of Breath 07/09/2015    Soy allergy Shortness Of Breath and Swelling 06/21/2016    Tree nut [macadamia nut oil] Shortness Of Breath and Swelling 06/21/2016    Wheat bran Hives, Shortness Of Breath, Itching, and Swelling 05/08/2015    Mixed grasses Itching and Other (See Comments) 01/12/2021    Tramadol Other (See Comments) 07/07/2015       Patient Active Problem List   Diagnosis    Primary hypertension    Migraine    GERD (gastroesophageal reflux disease)    Morbid obesity with BMI of 50.0-59.9, adult ([facility] [address]    S/P MARY-BSO (total abdominal hysterectomy and bilateral salpingo-oophorectomy)    Obstructive sleep apnea syndrome    Prediabetes    PCOS (polycystic ovarian syndrome)    Primary osteoarthritis involving multiple joints    Chronic pain    SHYLA (obstructive sleep apnea)    Chronic diastolic CHF (congestive heart failure) (HCC)    Allergic reaction to food    Bilateral hand numbness    Bilateral hip pain    Abdominal cramping    Lymphedema    Irritable bowel syndrome with diarrhea    Stasis dermatitis of both legs    Abnormal facial hair    Rash of both hands    Abscess    Trigeminal neuralgia pain    Vitamin D deficiency    Class 3 drug-induced obesity with serious comorbidity and body mass index (BMI) of 60.0 to 69.9 in adult [facility])       Past Medical History:   Diagnosis Date    Allergic rhinitis     Anemia     Anxiety and depression     Dermatomyositis ([facility] [address] 2008    Eczema     Fibromyalgia     Fibromyalgia     GERD (gastroesophageal reflux disease)     Headache(784.0)     History of blood transfusion 2009    also two in 2014     HTN (hypertension)     Hx of blood clots 2014    PE and DVT    Irritable bowel syndrome     Lymphedema     Morbid obesity with BMI of 50.0-59.9, adult ([facility] [address]     Osteoarthritis     PCOS (polycystic ovarian syndrome)     Prediabetes     Pulmonary embolism ([facility] 07/17/2014    Overview:  Continue anticoagulation. Medicine managing Coumadin bridge. Daily H/H, PTT, PT/INR. Last Assessment & Plan:  HPI: Seen in hospital follow up. The patient is a 26year old y/o female who admitted to the hospital for pulmonary emboli.  She began to experience left lower extremity swelling and pain for two days, and day of admission she began to experience chest pain and shortness of     Sleep apnea     Vaginal high risk HPV DNA test positive 05/2016    Wears glasses        Past Surgical History:   Procedure Laterality Date    COLONOSCOPY N/A 01/13/2021    COLONOSCOPY performed by Keyana Grossman MD at [address] [facility]      crossed eyes    HYSTERECTOMY ([address] [facility])  2014    full-ovaries gone MUSCLE BIOPSY      left thigh    OVARIAN CYST REMOVAL  1998    SKIN BIOPSY  2021    birth merritt removal left arm    VENA CAVA FILTER PLACEMENT      inserted in 2014 and removed in 2020       Family History   Problem Relation Age of Onset    High Blood Pressure Mother     Other Father         gout    Cancer Father         prostate    Breast Cancer Maternal Aunt 47    Asthma Maternal Cousin     Rheum Arthritis Neg Hx     Osteoarthritis Neg Hx     Diabetes Neg Hx     Heart Failure Neg Hx     High Cholesterol Neg Hx     Hypertension Neg Hx     Migraines Neg Hx     Ovarian Cancer Neg Hx     Rashes/Skin Problems Neg Hx     Seizures Neg Hx     Stroke Neg Hx     Thyroid Disease Neg Hx        Review of Systems    Objective:     Vitals:  Weight BMI Neck circumference    Wt Readings from Last 3 Encounters:   03/06/23 (!) 350 lb 12.8 oz (159.1 kg)   02/20/23 (!) 347 lb (157.4 kg)   02/14/23 (!) 347 lb (157.4 kg)    Body mass index is 62.14 kg/m². BP HR SaO2   BP Readings from Last 3 Encounters:   03/06/23 120/70   02/20/23 128/70   02/14/23 128/85    Pulse Readings from Last 3 Encounters:   03/06/23 81   02/20/23 76   02/14/23 89    SpO2 Readings from Last 3 Encounters:   03/06/23 96%   01/24/23 99%   01/19/23 95%        Themandibular molar Class :   [x]1 []2 []3      Mallampati I Airway Classification:   []1 []2 []3 [x]4      Physical Exam  Vitals and nursing note reviewed. Constitutional:       Appearance: Normal appearance. She is obese. Cardiovascular:      Rate and Rhythm: Normal rate and regular rhythm. Pulmonary:      Effort: Pulmonary effort is normal.      Breath sounds: Normal breath sounds. Musculoskeletal:      Right lower leg: No edema. Left lower leg: No edema.       :   Severe Obstructive Sleep Apnea/Hypopnea Syndrome, lost the CPAP 2 years, has gained 40 pounds. Diagnosis Orders   1. SHYLA on CPAP        2. Dependence on other enabling machines and devices        3.  Class 3 drug-induced obesity with serious comorbidity and body mass index (BMI) of 60.0 to 69.9 in adult [facility])  Ambulatory Referral to Bariatric Surgery      4. H/O CHF          Plan:   New APAP between 15 and 20 cm, if the insurance did not cover the CPAP, she will try to buy used CPAP and I will set it for her. I will order PAP supplies, mask, filters. ... Most likely treating the SHYLA will have positive impact on HTN, and CHF control. We discussed the proportionality between weight and AHI. With 10% weight change, the AHI has a 27% proportionate change. With 20% weight change, the AHI has a 45-50% proportionate change. Orders Placed This Encounter   Procedures    Ambulatory Referral to Bariatric Surgery         Return for F/U between 31 and 90 days from the recieving CPAP.     Gaby Kimbrough MD  Medical Director - [facility]

## 2023-03-06 NOTE — PROGRESS NOTES
Christian Fitzgerald         : 1980  [x] [address]     [] [address]      [] [employer]     [][employer]    [] [employer]  [] [facility]  [] [facility]   [] [facility] [] Other:  Diagnosis: [x] SHYLA (G47.33) [] CSA (G47.31) [] Apnea (G47.30)   Length of Need: [] 12 Months [x] 99 Months [] Other:    Machine (IAN!):  [x] ResMed AirSense     Auto [] Other:     [x]  CPAP () [] Bilevel ()   Mode: [x] Auto [] Spontaneous    Mode: [] Auto [] Spontaneous      She lost her machine 2 years ago  New APAP between   Between 15 and 20 cm                 Comfort Settings:   - Ramp Pressure: 5 cmH2O                                        - Ramp time: 15 min                                     -  Flex/EPR - 3 full time                                    - For ResMed Bilevel (TiMax-4 sec   TiMin- 0.2 sec)     Humidifier: [x] Heated ()        [x] Water chamber replacement ()/ 1 per 6 months        Mask:  Please always start with the mask the patient used during the titraion   [x] Nasal () /1 per 3 months [x] Full Face () /1 per 3 months   [x] Patient choice -Size and fit mask [x] Patient Choice - Size and fit mask   [] Dispense:  [] Dispense:    [x] Headgear () / 1 per 3 months [x] Headgear () / 1 per 3 months   [x] Replacement Nasal Cushion ()/2 per month [x] Interface Replacement ()/1 per month   [x] Replacement Nasal Pillows ()/2 per month         Tubing: [x] Heated ()/1 per 3 months    [] Standard ()/1 per 3 months [] Other:           Filters: [x] Non-disposable ()/1 per 6 months     [x] Ultra-Fine, Disposable ()/2 per month        Miscellaneous: [x] Chin Strap ()/ 1 per 6 months [] O2 bleed-in:       LPM   [] Oximetry on CPAP/Bilevel []  Other:          Start Order Date: 23    MEDICAL JUSTIFICATION:  I, the undersigned, certify that the above prescribed supplies are medically necessary for this patients wellbeing.   In my opinion, the supplies are both reasonable and necessary in reference to accepted standards of medicalpractice in treatment of this patients condition.     Daysi Crocker MD      NPI: [id number]       Order Signed Date: 03/06/23    Electronically signed by Daysi Crocker MD on 3/6/2023 at 2:31 PM

## 2023-03-15 ENCOUNTER — OFFICE VISIT (OUTPATIENT)
Dept: PRIMARY CARE CLINIC | Age: 43
End: 2023-03-15
Payer: COMMERCIAL

## 2023-03-15 VITALS
SYSTOLIC BLOOD PRESSURE: 106 MMHG | DIASTOLIC BLOOD PRESSURE: 68 MMHG | BODY MASS INDEX: 61.29 KG/M2 | TEMPERATURE: 97.2 F | HEART RATE: 81 BPM | WEIGHT: 293 LBS | OXYGEN SATURATION: 98 %

## 2023-03-15 DIAGNOSIS — E78.2 MIXED HYPERLIPIDEMIA: ICD-10-CM

## 2023-03-15 DIAGNOSIS — I10 PRIMARY HYPERTENSION: Chronic | ICD-10-CM

## 2023-03-15 DIAGNOSIS — E66.1 CLASS 3 DRUG-INDUCED OBESITY WITH SERIOUS COMORBIDITY AND BODY MASS INDEX (BMI) OF 60.0 TO 69.9 IN ADULT (HCC): ICD-10-CM

## 2023-03-15 DIAGNOSIS — R25.2 MUSCLE CRAMPING: Primary | ICD-10-CM

## 2023-03-15 DIAGNOSIS — E55.9 VITAMIN D DEFICIENCY: ICD-10-CM

## 2023-03-15 DIAGNOSIS — I89.0 LYMPHEDEMA: ICD-10-CM

## 2023-03-15 DIAGNOSIS — L67.8 ABNORMAL FACIAL HAIR: ICD-10-CM

## 2023-03-15 DIAGNOSIS — R60.0 BILATERAL LEG EDEMA: ICD-10-CM

## 2023-03-15 DIAGNOSIS — G47.33 OSA (OBSTRUCTIVE SLEEP APNEA): ICD-10-CM

## 2023-03-15 DIAGNOSIS — J06.9 VIRAL URI WITH COUGH: ICD-10-CM

## 2023-03-15 DIAGNOSIS — I50.32 CHRONIC DIASTOLIC CHF (CONGESTIVE HEART FAILURE) (HCC): Chronic | ICD-10-CM

## 2023-03-15 PROCEDURE — 3078F DIAST BP <80 MM HG: CPT | Performed by: FAMILY MEDICINE

## 2023-03-15 PROCEDURE — 1036F TOBACCO NON-USER: CPT | Performed by: FAMILY MEDICINE

## 2023-03-15 PROCEDURE — G8417 CALC BMI ABV UP PARAM F/U: HCPCS | Performed by: FAMILY MEDICINE

## 2023-03-15 PROCEDURE — G8427 DOCREV CUR MEDS BY ELIG CLIN: HCPCS | Performed by: FAMILY MEDICINE

## 2023-03-15 PROCEDURE — G8484 FLU IMMUNIZE NO ADMIN: HCPCS | Performed by: FAMILY MEDICINE

## 2023-03-15 PROCEDURE — 3074F SYST BP LT 130 MM HG: CPT | Performed by: FAMILY MEDICINE

## 2023-03-15 PROCEDURE — 99214 OFFICE O/P EST MOD 30 MIN: CPT | Performed by: FAMILY MEDICINE

## 2023-03-15 RX ORDER — SODIUM SULFIDE 10 MG/G
30 GEL TOPICAL
Qty: 354 ML | Refills: 0 | Status: SHIPPED | OUTPATIENT
Start: 2023-03-15

## 2023-03-15 RX ORDER — DULOXETIN HYDROCHLORIDE 60 MG/1
CAPSULE, DELAYED RELEASE ORAL
Qty: 90 CAPSULE | Refills: 1 | Status: SHIPPED | OUTPATIENT
Start: 2023-03-15

## 2023-03-15 RX ORDER — TORSEMIDE 5 MG/1
5 TABLET ORAL DAILY
Qty: 90 TABLET | Refills: 1 | Status: SHIPPED | OUTPATIENT
Start: 2023-03-15

## 2023-03-15 RX ORDER — AMLODIPINE BESYLATE 10 MG/1
TABLET ORAL
Qty: 90 TABLET | Refills: 1 | Status: SHIPPED | OUTPATIENT
Start: 2023-03-15

## 2023-03-15 RX ORDER — ATORVASTATIN CALCIUM 20 MG/1
TABLET, FILM COATED ORAL
Qty: 90 TABLET | Refills: 1 | Status: SHIPPED | OUTPATIENT
Start: 2023-03-15

## 2023-03-15 RX ORDER — ERGOCALCIFEROL 1.25 MG/1
CAPSULE ORAL
Qty: 12 CAPSULE | Refills: 1 | Status: SHIPPED | OUTPATIENT
Start: 2023-03-15

## 2023-03-15 RX ORDER — VALSARTAN 80 MG/1
TABLET ORAL
Qty: 90 TABLET | Refills: 5 | Status: SHIPPED | OUTPATIENT
Start: 2023-03-15

## 2023-03-15 SDOH — ECONOMIC STABILITY: FOOD INSECURITY: WITHIN THE PAST 12 MONTHS, YOU WORRIED THAT YOUR FOOD WOULD RUN OUT BEFORE YOU GOT MONEY TO BUY MORE.: PATIENT DECLINED

## 2023-03-15 SDOH — ECONOMIC STABILITY: HOUSING INSECURITY
IN THE LAST 12 MONTHS, WAS THERE A TIME WHEN YOU DID NOT HAVE A STEADY PLACE TO SLEEP OR SLEPT IN A SHELTER (INCLUDING NOW)?: PATIENT REFUSED

## 2023-03-15 SDOH — ECONOMIC STABILITY: INCOME INSECURITY: HOW HARD IS IT FOR YOU TO PAY FOR THE VERY BASICS LIKE FOOD, HOUSING, MEDICAL CARE, AND HEATING?: PATIENT DECLINED

## 2023-03-15 SDOH — ECONOMIC STABILITY: FOOD INSECURITY: WITHIN THE PAST 12 MONTHS, THE FOOD YOU BOUGHT JUST DIDN'T LAST AND YOU DIDN'T HAVE MONEY TO GET MORE.: PATIENT DECLINED

## 2023-03-15 NOTE — PROGRESS NOTES
Alondra Tobar (:  1980) is a 38 y.o. female,Established patient, here for evaluation of the following chief complaint(s):  Established New Doctor      SUBJECTIVE:  3.15.23 - new to this physician    Leg swelling improved -- takes torsemide PRN  GERD -- on prevacid  Lipidemia - lipitor 20  Hypertension - amloodipine, valsartan  Trigeminal neuralgia/fibromyalgia worse on face -- lyrica 100 mg TID, being prescribed by Dr. Jose Coronado of neurology  Glover Piper -- uses wraps to help with swelling  Vitamin D Defic -- taking Vit D once weekly. SHYLA --admits she is not consistent with using her CPAP. Is working on improving this. Has cough, would like cough syrup -- will send in coricidin    Having cramping in her calves and sometimes her stomach that can wake her from sleep or cause enough discomfort that she has to get up and walk around to improve it. --- Will recheck potassium and magnesium levels.  is her surgery with Dr. Lucia Mtz -- will need Pre-op physical exam  -- at that time we can review her lab work        I have reviewed the chart notes available from myself and other providers. I have reviewed and addressed all active problems and created or updated the problems list in detail, as needed    I have extensively reviewed and reconciled the medication list, discontinued medications not taking or no longer appropriate, and updated the active meds list    OBJECTIVE:  Review of Systems    Vitals:    03/15/23 1132   BP: 106/68   Site: Right Upper Arm   Position: Sitting   Cuff Size: Large Adult   Pulse: 81   Temp: 97.2 °F (36.2 °C)   SpO2: 98%   Weight: (!) 346 lb (156.9 kg)      Body mass index is 61.29 kg/m². Physical Exam  Constitutional:       Appearance: Normal appearance. She is morbidly obese. Cardiovascular:      Rate and Rhythm: Normal rate and regular rhythm. Pulses: Normal pulses. Heart sounds: Normal heart sounds.    Pulmonary:      Effort: Pulmonary effort is normal. Breath sounds: Normal breath sounds. Musculoskeletal:      Right lower leg: No edema. Left lower leg: No edema. Lymphadenopathy:      Comments: Lymphedema to lower extremities   Skin:     Comments: Excess facial hair noted around lips and on chin   Neurological:      General: No focal deficit present. Mental Status: She is alert. Mental status is at baseline.          Lab Results   Component Value Date    LABA1C 5.9 02/20/2023     Lab Results   Component Value Date    WBC 6.2 01/24/2023    HGB 11.6 (L) 01/24/2023    HCT 37.4 01/24/2023    MCV 87.9 01/24/2023     01/24/2023      Lab Results   Component Value Date/Time     01/24/2023 01:02 PM    K 4.4 01/24/2023 01:02 PM    K 3.5 01/19/2023 06:17 PM     01/24/2023 01:02 PM    CO2 27 01/24/2023 01:02 PM    BUN 9 01/24/2023 01:02 PM    CREATININE 0.5 01/24/2023 01:02 PM    GLUCOSE 148 01/24/2023 01:02 PM    CALCIUM 8.8 01/24/2023 01:02 PM       Lab Results   Component Value Date    CHOL 180 02/24/2022    CHOL 266 (H) 04/19/2021    CHOL 242 (H) 06/02/2016     Lab Results   Component Value Date    TRIG 64 02/24/2022    TRIG 134 04/19/2021    TRIG 129 06/02/2016     Lab Results   Component Value Date    HDL 52 02/24/2022    HDL 46 04/19/2021    HDL 44 09/18/2018     Lab Results   Component Value Date    LDLCALC 115 (H) 02/24/2022    LDLCALC 193 (H) 04/19/2021    LDLCALC 215 (H) 09/18/2018     Lab Results   Component Value Date    LABVLDL 13 02/24/2022    LABVLDL 27 04/19/2021    LABVLDL 26 09/18/2018     No results found for: CHOLHDLRATIO     The 10-year ASCVD risk score (Noreen RODRÍGUEZ, et al., 2019) is: 0.9%    Values used to calculate the score:      Age: 38 years      Sex: Female      Is Non- : Yes      Diabetic: No      Tobacco smoker: No      Systolic Blood Pressure: 618 mmHg      Is BP treated: Yes      HDL Cholesterol: 52 mg/dL      Total Cholesterol: 180 mg/dL     Patient received counseling and, if relevant, printed instructions for all symptoms listed in CC and HPI, as well as for all diagnoses brought onto today's visit note below. Typical counseling includes, but is not limited to, non-pharmacologic measures to manage listed symptoms and conditions; appropriate use, risks and benefits for all prescribed medications; potential interactions between medications both prescribed and OTC; diet; exercise; healthy behaviors; and goalsetting to improve health. Patient or responsible party was involved in shared decision making and had opportunity to have all questions answered. Except as noted below, all chronic problems have been reviewed and are stable to continue medications or other therapy as previously documented in the patient's chart, with changes per orders or documentation below:    1. Muscle cramping  Comments:  will check electrolytes  Orders:  -     Comprehensive Metabolic Panel; Future  -     Magnesium; Future  2. Chronic diastolic CHF (congestive heart failure) (HCC)  Comments:  prn dose of torsemide helps  3. Mixed hyperlipidemia  Comments:  cont atorvastatin 20 mg  Orders:  -     atorvastatin (LIPITOR) 20 MG tablet; Take 1 tablet by mouth once daily, Disp-90 tablet, R-1Normal  -     Lipid Panel; Future  4. Primary hypertension  Comments:  continue amlodipine, valsartan  Orders:  -     amLODIPine (NORVASC) 10 MG tablet; Take 1 tablet by mouth once daily, Disp-90 tablet, R-1Normal  -     valsartan (DIOVAN) 80 MG tablet; Take 1 tablet by mouth once daily, Disp-90 tablet, R-5Normal  5. Vitamin D deficiency  -     vitamin D (ERGOCALCIFEROL) 1.25 MG (16837 UT) CAPS capsule; Take 1 capsule by mouth once a week, Disp-12 capsule, R-1Normal  6. SHYLA (obstructive sleep apnea)  7. Lymphedema  -     torsemide (DEMADEX) 5 MG tablet; Take 1 tablet by mouth daily, Disp-90 tablet, R-1Normal  8. Bilateral leg edema  -     torsemide (DEMADEX) 5 MG tablet; Take 1 tablet by mouth daily, Disp-90 tablet, R-1Normal  9.  Viral URI with cough  -     DM-Doxylamine-Acetaminophen (CORICIDIN HBP NIGHTTIME COLD) 15-6. MG/15ML LIQD; Take 30 mLs by mouth every 4-6 hours as needed (cough), Disp-354 mL, R-0Normal  10. Abnormal facial hair  11. Class 3 drug-induced obesity with serious comorbidity and body mass index (BMI) of 60.0 to 69.9 in adult [facility])        Problem List          High    Primary hypertension    Relevant Medications    amLODIPine (NORVASC) 10 MG tablet    valsartan (DIOVAN) 80 MG tablet       Medium    Vitamin D deficiency    Relevant Medications    vitamin D (ERGOCALCIFEROL) 1.25 MG (71802 UT) CAPS capsule    Class 3 drug-induced obesity with serious comorbidity and body mass index (BMI) of 60.0 to 69.9 in [facility])    Mixed hyperlipidemia    Relevant Medications    atorvastatin (LIPITOR) 20 MG tablet    amLODIPine (NORVASC) 10 MG tablet    valsartan (DIOVAN) 80 MG tablet    torsemide (DEMADEX) 5 MG tablet    Other Relevant Orders    Lipid Panel       Unprioritized    Abnormal facial hair (Chronic)    SHYLA (obstructive sleep apnea)    Chronic diastolic CHF (congestive heart failure) (HCC)    Relevant Medications    atorvastatin (LIPITOR) 20 MG tablet    amLODIPine (NORVASC) 10 MG tablet    valsartan (DIOVAN) 80 MG tablet    torsemide (DEMADEX) 5 MG tablet    Lymphedema    Relevant Medications    torsemide (DEMADEX) 5 MG tablet     Orders Placed This Encounter   Procedures    Comprehensive Metabolic Panel     Standing Status:   Future     Standing Expiration Date:   3/15/2024    Magnesium     Standing Status:   Future     Standing Expiration Date:   3/15/2024    Lipid Panel     Standing Status:   Future     Standing Expiration Date:   3/15/2024       Return in about 3 weeks (around 4/5/2023) for pre-op, lab follow up (ok to do both per Dr. Angelito Santillan) -- 30 min.         Dr. Delores Leone and [facility]        Usual doctor's hours are:       Monday 7:00 am to 5:30 pm  Wednesday 7:00 am to 4:30 pm  Thursday 7:00 am to 4:30 pm  Friday 7:00 am to 3:30 pm  Saturdays, Sundays, and after hours: E-Visits are available    We observe most federal holidays and Good Friday. We ask that you only contact the office one time per issue or question, and please allow one full business day for a call back. Calling us back multiple times keeps us from being able to complete the work efficiently for you and our other patients. For medication renewals, please call your pharmacist to contact us, and be sure to allow at least 3 business days for processing before you need to  your medication. If you are sick or need an appointment that hasn't been planned, same day appointments are available every day the office is open: Monday, Tuesday, Wednesday, Thursday, and Friday. Call during office hours to schedule, even if it may not be with your regular physician. You may also call the office after 8 am on office days if you need to be seen from an issue the night before. During hours when the office is not normally open, your call will go to the messaging service which cannot provide any service other than paging the doctor. No prescriptions or other nonurgent matters will be handled and no voicemail is available, so please call back during office hours for these matters.        Electronically signed by Emmanuelle Olivares DO on 3/15/2023 at 10:09 AM.

## 2023-03-16 PROBLEM — R20.0 BILATERAL HAND NUMBNESS: Status: RESOLVED | Noted: 2020-12-04 | Resolved: 2023-03-16

## 2023-03-16 PROBLEM — L02.91 ABSCESS: Status: RESOLVED | Noted: 2022-05-20 | Resolved: 2023-03-16

## 2023-03-16 PROBLEM — E78.2 MIXED HYPERLIPIDEMIA: Status: ACTIVE | Noted: 2023-03-16

## 2023-03-16 PROBLEM — M25.551 BILATERAL HIP PAIN: Status: RESOLVED | Noted: 2021-06-15 | Resolved: 2023-03-16

## 2023-03-16 PROBLEM — T78.1XXA ALLERGIC REACTION TO FOOD: Status: RESOLVED | Noted: 2020-05-27 | Resolved: 2023-03-16

## 2023-03-16 PROBLEM — M25.552 BILATERAL HIP PAIN: Status: RESOLVED | Noted: 2021-06-15 | Resolved: 2023-03-16

## 2023-03-16 PROBLEM — L67.8 ABNORMAL FACIAL HAIR: Chronic | Status: ACTIVE | Noted: 2022-04-07

## 2023-03-29 ENCOUNTER — HOSPITAL ENCOUNTER (OUTPATIENT)
Dept: WOMENS IMAGING | Age: 43
Discharge: HOME | End: 2023-03-29
Payer: COMMERCIAL

## 2023-03-29 ENCOUNTER — HOSPITAL ENCOUNTER (OUTPATIENT)
Dept: NON INVASIVE DIAGNOSTICS | Age: 43
Discharge: HOME | End: 2023-03-29
Payer: COMMERCIAL

## 2023-03-29 DIAGNOSIS — Z01.419 WELL WOMAN EXAM WITH ROUTINE GYNECOLOGICAL EXAM: ICD-10-CM

## 2023-03-29 DIAGNOSIS — R60.0 BILATERAL LOWER EXTREMITY EDEMA: ICD-10-CM

## 2023-03-29 LAB
LV EF: 63 %
LVEF MODALITY: NORMAL

## 2023-03-29 PROCEDURE — 77063 BREAST TOMOSYNTHESIS BI: CPT

## 2023-04-04 DIAGNOSIS — I51.7 RIGHT VENTRICULAR ENLARGEMENT: Primary | ICD-10-CM

## 2023-04-05 ENCOUNTER — OFFICE VISIT (OUTPATIENT)
Dept: PRIMARY CARE CLINIC | Age: 43
End: 2023-04-05

## 2023-04-05 VITALS
BODY MASS INDEX: 61.82 KG/M2 | HEART RATE: 70 BPM | WEIGHT: 293 LBS | DIASTOLIC BLOOD PRESSURE: 79 MMHG | OXYGEN SATURATION: 98 % | TEMPERATURE: 97.4 F | SYSTOLIC BLOOD PRESSURE: 130 MMHG

## 2023-04-05 DIAGNOSIS — R25.2 MUSCLE CRAMPING: ICD-10-CM

## 2023-04-05 DIAGNOSIS — E78.2 MIXED HYPERLIPIDEMIA: ICD-10-CM

## 2023-04-05 DIAGNOSIS — E78.5 HYPERLIPIDEMIA, UNSPECIFIED HYPERLIPIDEMIA TYPE: Chronic | ICD-10-CM

## 2023-04-05 DIAGNOSIS — Z01.818 PRE-OP EVALUATION: Primary | ICD-10-CM

## 2023-04-05 DIAGNOSIS — R25.2 MUSCLE CRAMPING: Chronic | ICD-10-CM

## 2023-04-05 DIAGNOSIS — N81.6 RECTOCELE: ICD-10-CM

## 2023-04-05 LAB
ALBUMIN SERPL-MCNC: 4.3 G/DL (ref 3.4–5)
ALBUMIN/GLOB SERPL: 1.4 {RATIO} (ref 1.1–2.2)
ALP SERPL-CCNC: 103 U/L (ref 40–129)
ALT SERPL-CCNC: 18 U/L (ref 10–40)
ANION GAP SERPL CALCULATED.3IONS-SCNC: 14 MMOL/L (ref 3–16)
AST SERPL-CCNC: 18 U/L (ref 15–37)
BILIRUB SERPL-MCNC: 0.5 MG/DL (ref 0–1)
BUN SERPL-MCNC: 14 MG/DL (ref 7–20)
CALCIUM SERPL-MCNC: 9.1 MG/DL (ref 8.3–10.6)
CHLORIDE SERPL-SCNC: 99 MMOL/L (ref 99–110)
CHOLEST SERPL-MCNC: 180 MG/DL (ref 0–199)
CO2 SERPL-SCNC: 26 MMOL/L (ref 21–32)
CREAT SERPL-MCNC: 0.7 MG/DL (ref 0.6–1.1)
GFR SERPLBLD CREATININE-BSD FMLA CKD-EPI: >60 ML/MIN/{1.73_M2}
GLUCOSE SERPL-MCNC: 71 MG/DL (ref 70–99)
HDLC SERPL-MCNC: 44 MG/DL (ref 40–60)
LDLC SERPL CALC-MCNC: 118 MG/DL
MAGNESIUM SERPL-MCNC: 2.2 MG/DL (ref 1.8–2.4)
POTASSIUM SERPL-SCNC: 4.6 MMOL/L (ref 3.5–5.1)
PROT SERPL-MCNC: 7.3 G/DL (ref 6.4–8.2)
SODIUM SERPL-SCNC: 139 MMOL/L (ref 136–145)
TRIGL SERPL-MCNC: 90 MG/DL (ref 0–150)
VLDLC SERPL CALC-MCNC: 18 MG/DL

## 2023-04-05 RX ORDER — LEVOCETIRIZINE DIHYDROCHLORIDE 5 MG/1
TABLET, FILM COATED ORAL
Qty: 90 TABLET | Refills: 3 | Status: SHIPPED | OUTPATIENT
Start: 2023-04-05

## 2023-04-05 RX ORDER — MIRABEGRON 50 MG/1
TABLET, FILM COATED, EXTENDED RELEASE ORAL
COMMUNITY
Start: 2023-03-20

## 2023-04-05 RX ORDER — AMMONIUM LACTATE 12 G/100G
CREAM TOPICAL
COMMUNITY
Start: 2022-09-21

## 2023-04-05 RX ORDER — ROSUVASTATIN CALCIUM 5 MG/1
5 TABLET, COATED ORAL DAILY
Qty: 90 TABLET | Refills: 1 | Status: SHIPPED | OUTPATIENT
Start: 2023-04-05

## 2023-04-05 ASSESSMENT — ENCOUNTER SYMPTOMS
NAUSEA: 0
CONSTIPATION: 0
VOMITING: 0
DIARRHEA: 0
SHORTNESS OF BREATH: 0
COUGH: 0

## 2023-04-05 NOTE — TELEPHONE ENCOUNTER
Patient requesting a medication refill.   [facility]  Next office visit: 4/5/2023    Last regular office visit: 3/15/2023

## 2023-04-05 NOTE — PROGRESS NOTES
HDL 46 04/19/2021    HDL 44 09/18/2018     Lab Results   Component Value Date    LDLCALC 115 (H) 02/24/2022    LDLCALC 193 (H) 04/19/2021    LDLCALC 215 (H) 09/18/2018     Lab Results   Component Value Date    LABVLDL 13 02/24/2022    LABVLDL 27 04/19/2021    LABVLDL 26 09/18/2018     No results found for: CHOLHDLRATIO     The 10-year ASCVD risk score (Noreen RODRÍGUEZ, et al., 2019) is: 2.3%    Values used to calculate the score:      Age: 38 years      Sex: Female      Is Non- : Yes      Diabetic: No      Tobacco smoker: No      Systolic Blood Pressure: 363 mmHg      Is BP treated: Yes      HDL Cholesterol: 52 mg/dL      Total Cholesterol: 180 mg/dL     Patient received counseling and, if relevant, printed instructions for all symptoms listed in CC and HPI, as well as for all diagnoses brought onto today's visit note below. Typical counseling includes, but is not limited to, non-pharmacologic measures to manage listed symptoms and conditions; appropriate use, risks and benefits for all prescribed medications; potential interactions between medications both prescribed and OTC; diet; exercise; healthy behaviors; and goalsetting to improve health. Patient or responsible party was involved in shared decision making and had opportunity to have all questions answered. Except as noted below, all chronic problems have been reviewed and are stable to continue medications or other therapy as previously documented in the patient's chart, with changes per orders or documentation below:    1. Pre-op evaluation  2. Rectocele  3. Hyperlipidemia, unspecified hyperlipidemia type  Comments:  will try to switch statins to see if this improves symptoms  Orders:  -     rosuvastatin (CRESTOR) 5 MG tablet; Take 1 tablet by mouth daily, Disp-90 tablet, R-1Normal  4.  Muscle cramping  Comments:  still present, pt to get lab work completed after this visit          Problem List    None  No orders of the defined types
Transportation (Non-Medical): Patient refused   Physical Activity: Not on file   Stress: Not on file   Social Connections: Not on file   Intimate Partner Violence: Not on file   Housing Stability: Unknown    Unable to Pay for Housing in the Last Year: Not on file    Number of Laura in the Last Year: Not on file    Unstable Housing in the Last Year: Patient refused       Review of Systems  A comprehensive review of systems was negative. Physical Exam   Constitutional: She is oriented to person, place, and time. She appears well-developed and well-nourished. No distress. HENT:   Head: Normocephalic and atraumatic. Mouth/Throat: Uvula is midline, oropharynx is clear and moist and mucous membranes are normal.   Eyes: Conjunctivae and EOM are normal. Pupils are equal, round, and reactive to light. Neck: Trachea normal and normal range of motion. Neck supple. No mass and no thyromegaly present. Cardiovascular: Normal rate, regular rhythm, normal heart sounds and intact distal pulses. Exam reveals no gallop and no friction rub. No murmur heard. Pulmonary/Chest: Effort normal and breath sounds normal. No respiratory distress. She has no wheezes. She has no rales. Abdominal: Soft. Normal aorta and bowel sounds are normal. She exhibits no distension and no mass. Musculoskeletal: She exhibits no edema and no tenderness. Neurological: She is alert and oriented to person, place, and time. She has normal strength. No cranial nerve deficit or sensory deficit. Skin: Skin is warm and dry. No rash noted. No erythema. Psychiatric: She has a normal mood and affect. Her behavior is normal.     EKG Interpretation: Recent ECHO on 5.59.43, normal systolic and diastolic function with normal EF, minimal enlargement of right ventricle and right atrium    Lab Review not applicable        Assessment:       38 y.o. patient with planned surgery as above.     Known risk factors for perioperative complications:

## 2023-04-17 RX ORDER — MONTELUKAST SODIUM 10 MG/1
10 TABLET ORAL NIGHTLY
Qty: 90 TABLET | Refills: 1 | Status: SHIPPED | OUTPATIENT
Start: 2023-04-17

## 2023-04-17 RX ORDER — NAPROXEN 500 MG/1
500 TABLET ORAL 2 TIMES DAILY WITH MEALS
Qty: 60 TABLET | Refills: 5 | Status: SHIPPED | OUTPATIENT
Start: 2023-04-17

## 2023-04-18 ENCOUNTER — HOSPITAL ENCOUNTER (OUTPATIENT)
Age: 43
Discharge: HOME | End: 2023-04-18
Payer: COMMERCIAL

## 2023-04-18 DIAGNOSIS — Z01.818 PREOP TESTING: ICD-10-CM

## 2023-04-18 LAB
ALBUMIN SERPL-MCNC: 4.3 G/DL (ref 3.4–5)
ALBUMIN/GLOB SERPL: 1.1 {RATIO} (ref 1.1–2.2)
ALP SERPL-CCNC: 105 U/L (ref 40–129)
ALT SERPL-CCNC: 20 U/L (ref 10–40)
ANION GAP SERPL CALCULATED.3IONS-SCNC: 12 MMOL/L (ref 3–16)
AST SERPL-CCNC: 22 U/L (ref 15–37)
BASOPHILS # BLD: 0 K/UL (ref 0–0.2)
BASOPHILS NFR BLD: 0.6 %
BILIRUB SERPL-MCNC: 0.3 MG/DL (ref 0–1)
BUN SERPL-MCNC: 8 MG/DL (ref 7–20)
CALCIUM SERPL-MCNC: 9.2 MG/DL (ref 8.3–10.6)
CHLORIDE SERPL-SCNC: 103 MMOL/L (ref 99–110)
CO2 SERPL-SCNC: 24 MMOL/L (ref 21–32)
CREAT SERPL-MCNC: 0.6 MG/DL (ref 0.6–1.1)
DEPRECATED RDW RBC AUTO: 15.3 % (ref 12.4–15.4)
EOSINOPHIL # BLD: 0.1 K/UL (ref 0–0.6)
EOSINOPHIL NFR BLD: 2.1 %
GFR SERPLBLD CREATININE-BSD FMLA CKD-EPI: >60 ML/MIN/{1.73_M2}
GLUCOSE SERPL-MCNC: 132 MG/DL (ref 70–99)
HCT VFR BLD AUTO: 38.8 % (ref 36–48)
HGB BLD-MCNC: 12.5 G/DL (ref 12–16)
LYMPHOCYTES # BLD: 1.9 K/UL (ref 1–5.1)
LYMPHOCYTES NFR BLD: 29.6 %
MCH RBC QN AUTO: 27.2 PG (ref 26–34)
MCHC RBC AUTO-ENTMCNC: 32.2 G/DL (ref 31–36)
MCV RBC AUTO: 84.3 FL (ref 80–100)
MONOCYTES # BLD: 0.3 K/UL (ref 0–1.3)
MONOCYTES NFR BLD: 5.1 %
NEUTROPHILS # BLD: 4 K/UL (ref 1.7–7.7)
NEUTROPHILS NFR BLD: 62.6 %
PLATELET # BLD AUTO: 245 K/UL (ref 135–450)
PMV BLD AUTO: 9 FL (ref 5–10.5)
POTASSIUM SERPL-SCNC: 4 MMOL/L (ref 3.5–5.1)
PROT SERPL-MCNC: 8.1 G/DL (ref 6.4–8.2)
RBC # BLD AUTO: 4.61 M/UL (ref 4–5.2)
SODIUM SERPL-SCNC: 139 MMOL/L (ref 136–145)
WBC # BLD AUTO: 6.5 K/UL (ref 4–11)

## 2023-04-18 PROCEDURE — 36415 COLL VENOUS BLD VENIPUNCTURE: CPT

## 2023-04-18 PROCEDURE — 85025 COMPLETE CBC W/AUTO DIFF WBC: CPT

## 2023-04-18 PROCEDURE — 80053 COMPREHEN METABOLIC PANEL: CPT

## 2023-04-26 NOTE — PROGRESS NOTES
daily 2/28/23  Yes Francisca Sheridan MD   oxybutynin (DITROPAN-XL) 10 MG extended release tablet Take 1 tablet by mouth daily   Yes Historical Provider, MD   chlorzoxazone (PARAFON FORTE) 500 MG tablet Take 1 tablet by mouth 2 times daily   Yes Historical Provider, MD   pregabalin (LYRICA) 100 MG capsule Take 1 capsule by mouth 3 times daily. Yes Historical Provider, MD   APPLE CIDER VINEGAR PO Take by mouth   Yes Historical Provider, MD Rachel Hawthorne Tea, Rina sinensis, (GREEN  PO) Take by mouth   Yes Historical Provider, MD   ACAI MEDRANO PO Take by mouth   Yes Historical Provider, MD   clotrimazole-betamethasone (LOTRISONE) 1-0.05 % cream Apply topically 2 times daily. 2/14/23  Yes Francisca Sheridan MD   fluticasone Avi Mirtha) 50 MCG/ACT nasal spray Use 2 spray(s) in each nostril once daily 7/25/22  Yes Michelle Echols APRN - CNP        Review of Systems   Constitutional:  Negative for activity change, appetite change, diaphoresis, fatigue, fever and unexpected weight change. HENT:  Negative for congestion, facial swelling, mouth sores and nosebleeds. Eyes:  Negative for discharge and visual disturbance. Respiratory:  Negative for cough, chest tightness, shortness of breath and wheezing. Cardiovascular:  Negative for chest pain, palpitations and leg swelling. Gastrointestinal:  Negative for abdominal distention, abdominal pain, blood in stool and vomiting. Endocrine: Negative for cold intolerance, heat intolerance and polyuria. Genitourinary:  Negative for difficulty urinating, dysuria, frequency and hematuria. Musculoskeletal:  Negative for back pain, joint swelling, myalgias and neck pain. Skin:  Negative for color change, pallor and rash. Allergic/Immunologic: Negative for immunocompromised state. Neurological:  Negative for dizziness, syncope, weakness, light-headedness, numbness and headaches. Hematological:  Negative for adenopathy. Does not bruise/bleed easily.

## 2023-04-27 ENCOUNTER — OFFICE VISIT (OUTPATIENT)
Dept: CARDIOLOGY CLINIC | Age: 43
End: 2023-04-27
Payer: COMMERCIAL

## 2023-04-27 VITALS
SYSTOLIC BLOOD PRESSURE: 118 MMHG | WEIGHT: 293 LBS | DIASTOLIC BLOOD PRESSURE: 74 MMHG | HEIGHT: 63 IN | HEART RATE: 97 BPM | BODY MASS INDEX: 51.91 KG/M2 | OXYGEN SATURATION: 93 %

## 2023-04-27 DIAGNOSIS — I51.7 ENLARGED RV (RIGHT VENTRICLE): Primary | ICD-10-CM

## 2023-04-27 DIAGNOSIS — Z01.810 PREOP CARDIOVASCULAR EXAM: ICD-10-CM

## 2023-04-27 PROCEDURE — 1036F TOBACCO NON-USER: CPT | Performed by: INTERNAL MEDICINE

## 2023-04-27 PROCEDURE — G8427 DOCREV CUR MEDS BY ELIG CLIN: HCPCS | Performed by: INTERNAL MEDICINE

## 2023-04-27 PROCEDURE — G8417 CALC BMI ABV UP PARAM F/U: HCPCS | Performed by: INTERNAL MEDICINE

## 2023-04-27 PROCEDURE — 3078F DIAST BP <80 MM HG: CPT | Performed by: INTERNAL MEDICINE

## 2023-04-27 PROCEDURE — 3074F SYST BP LT 130 MM HG: CPT | Performed by: INTERNAL MEDICINE

## 2023-04-27 PROCEDURE — 99204 OFFICE O/P NEW MOD 45 MIN: CPT | Performed by: INTERNAL MEDICINE

## 2023-04-27 ASSESSMENT — ENCOUNTER SYMPTOMS
SHORTNESS OF BREATH: 0
ABDOMINAL PAIN: 0
COUGH: 0
CHEST TIGHTNESS: 0
EYE DISCHARGE: 0
FACIAL SWELLING: 0
WHEEZING: 0
BACK PAIN: 0
ABDOMINAL DISTENTION: 0
VOMITING: 0
BLOOD IN STOOL: 0
COLOR CHANGE: 0

## 2023-04-27 NOTE — ASSESSMENT & PLAN NOTE
Not very compliant with her CPAP. I suspect she has some degree of pulmonary hypertension from a sleep apnea. Plan for cardiac arrival for evaluation of RV size and function. Can be done after surgery.

## 2023-04-27 NOTE — ASSESSMENT & PLAN NOTE
Okay to proceed with planned surgery. Personally reviewed echocardiogram, if anything there is mild RV enlargement. Likely due to sleep apnea. We will plan on performing a cardiac MRI as an outpatient after her gynecological surgery.

## 2023-04-28 ENCOUNTER — HOSPITAL ENCOUNTER (OUTPATIENT)
Age: 43
Discharge: HOME | End: 2023-04-29
Attending: OBSTETRICS & GYNECOLOGY | Admitting: OBSTETRICS & GYNECOLOGY
Payer: COMMERCIAL

## 2023-04-28 ENCOUNTER — ANESTHESIA EVENT (OUTPATIENT)
Dept: OPERATING ROOM | Age: 43
End: 2023-04-28
Payer: COMMERCIAL

## 2023-04-28 ENCOUNTER — ANESTHESIA (OUTPATIENT)
Dept: OPERATING ROOM | Age: 43
End: 2023-04-28
Payer: COMMERCIAL

## 2023-04-28 DIAGNOSIS — Z01.818 PREOP TESTING: Primary | ICD-10-CM

## 2023-04-28 DIAGNOSIS — N81.6 RECTOCELE: ICD-10-CM

## 2023-04-28 DIAGNOSIS — G89.18 POST-OPERATIVE PAIN: ICD-10-CM

## 2023-04-28 PROBLEM — R06.9 RESPIRATORY ABNORMALITIES: Status: ACTIVE | Noted: 2023-04-28

## 2023-04-28 PROBLEM — N39.41 URGE INCONTINENCE: Status: ACTIVE | Noted: 2023-04-28

## 2023-04-28 PROBLEM — N94.12 DEEP DYSPAREUNIA: Status: ACTIVE | Noted: 2023-04-28

## 2023-04-28 PROCEDURE — 3600000004 HC SURGERY LEVEL 4 BASE: Performed by: OBSTETRICS & GYNECOLOGY

## 2023-04-28 PROCEDURE — 3600000014 HC SURGERY LEVEL 4 ADDTL 15MIN: Performed by: OBSTETRICS & GYNECOLOGY

## 2023-04-28 PROCEDURE — 2500000003 HC RX 250 WO HCPCS: Performed by: NURSE ANESTHETIST, CERTIFIED REGISTERED

## 2023-04-28 PROCEDURE — 7100000001 HC PACU RECOVERY - ADDTL 15 MIN: Performed by: OBSTETRICS & GYNECOLOGY

## 2023-04-28 PROCEDURE — 94660 CPAP INITIATION&MGMT: CPT

## 2023-04-28 PROCEDURE — 2580000003 HC RX 258: Performed by: OBSTETRICS & GYNECOLOGY

## 2023-04-28 PROCEDURE — 2700000000 HC OXYGEN THERAPY PER DAY

## 2023-04-28 PROCEDURE — 3700000001 HC ADD 15 MINUTES (ANESTHESIA): Performed by: OBSTETRICS & GYNECOLOGY

## 2023-04-28 PROCEDURE — 7100000000 HC PACU RECOVERY - FIRST 15 MIN: Performed by: OBSTETRICS & GYNECOLOGY

## 2023-04-28 PROCEDURE — 6360000002 HC RX W HCPCS: Performed by: OBSTETRICS & GYNECOLOGY

## 2023-04-28 PROCEDURE — 3700000000 HC ANESTHESIA ATTENDED CARE: Performed by: OBSTETRICS & GYNECOLOGY

## 2023-04-28 PROCEDURE — 6360000002 HC RX W HCPCS: Performed by: ANESTHESIOLOGY

## 2023-04-28 PROCEDURE — 6370000000 HC RX 637 (ALT 250 FOR IP): Performed by: OBSTETRICS & GYNECOLOGY

## 2023-04-28 PROCEDURE — A4217 STERILE WATER/SALINE, 500 ML: HCPCS | Performed by: OBSTETRICS & GYNECOLOGY

## 2023-04-28 PROCEDURE — 6360000002 HC RX W HCPCS: Performed by: NURSE ANESTHETIST, CERTIFIED REGISTERED

## 2023-04-28 PROCEDURE — 2500000003 HC RX 250 WO HCPCS: Performed by: OBSTETRICS & GYNECOLOGY

## 2023-04-28 PROCEDURE — 94761 N-INVAS EAR/PLS OXIMETRY MLT: CPT

## 2023-04-28 PROCEDURE — 2709999900 HC NON-CHARGEABLE SUPPLY: Performed by: OBSTETRICS & GYNECOLOGY

## 2023-04-28 RX ORDER — SODIUM CHLORIDE 0.9 % (FLUSH) 0.9 %
5-40 SYRINGE (ML) INJECTION PRN
Status: DISCONTINUED | OUTPATIENT
Start: 2023-04-28 | End: 2023-04-28 | Stop reason: HOSPADM

## 2023-04-28 RX ORDER — KETAMINE HCL IN NACL, ISO-OSM 100MG/10ML
SYRINGE (ML) INJECTION PRN
Status: DISCONTINUED | OUTPATIENT
Start: 2023-04-28 | End: 2023-04-28 | Stop reason: SDUPTHER

## 2023-04-28 RX ORDER — ACETAMINOPHEN 500 MG
1000 TABLET ORAL EVERY 6 HOURS
Status: DISCONTINUED | OUTPATIENT
Start: 2023-04-28 | End: 2023-04-29 | Stop reason: HOSPADM

## 2023-04-28 RX ORDER — LIDOCAINE HYDROCHLORIDE 10 MG/ML
1 INJECTION, SOLUTION EPIDURAL; INFILTRATION; INTRACAUDAL; PERINEURAL
Status: DISCONTINUED | OUTPATIENT
Start: 2023-04-28 | End: 2023-04-28 | Stop reason: HOSPADM

## 2023-04-28 RX ORDER — SODIUM CHLORIDE 0.9 % (FLUSH) 0.9 %
5-40 SYRINGE (ML) INJECTION EVERY 12 HOURS SCHEDULED
Status: DISCONTINUED | OUTPATIENT
Start: 2023-04-28 | End: 2023-04-28 | Stop reason: HOSPADM

## 2023-04-28 RX ORDER — TIZANIDINE 4 MG/1
2 TABLET ORAL 3 TIMES DAILY
Status: DISCONTINUED | OUTPATIENT
Start: 2023-04-28 | End: 2023-04-29 | Stop reason: HOSPADM

## 2023-04-28 RX ORDER — LABETALOL HYDROCHLORIDE 5 MG/ML
5 INJECTION, SOLUTION INTRAVENOUS
Status: DISCONTINUED | OUTPATIENT
Start: 2023-04-28 | End: 2023-04-28 | Stop reason: HOSPADM

## 2023-04-28 RX ORDER — SUCCINYLCHOLINE/SOD CL,ISO/PF 200MG/10ML
SYRINGE (ML) INTRAVENOUS PRN
Status: DISCONTINUED | OUTPATIENT
Start: 2023-04-28 | End: 2023-04-28 | Stop reason: SDUPTHER

## 2023-04-28 RX ORDER — TROSPIUM CHLORIDE 20 MG/1
20 TABLET, FILM COATED ORAL
Status: DISCONTINUED | OUTPATIENT
Start: 2023-04-29 | End: 2023-04-29 | Stop reason: HOSPADM

## 2023-04-28 RX ORDER — SODIUM CHLORIDE, SODIUM LACTATE, POTASSIUM CHLORIDE, CALCIUM CHLORIDE 600; 310; 30; 20 MG/100ML; MG/100ML; MG/100ML; MG/100ML
INJECTION, SOLUTION INTRAVENOUS CONTINUOUS
Status: DISCONTINUED | OUTPATIENT
Start: 2023-04-28 | End: 2023-04-28 | Stop reason: HOSPADM

## 2023-04-28 RX ORDER — VALSARTAN 80 MG/1
80 TABLET ORAL DAILY
Status: DISCONTINUED | OUTPATIENT
Start: 2023-04-28 | End: 2023-04-29 | Stop reason: HOSPADM

## 2023-04-28 RX ORDER — ONDANSETRON 2 MG/ML
4 INJECTION INTRAMUSCULAR; INTRAVENOUS EVERY 6 HOURS PRN
Status: DISCONTINUED | OUTPATIENT
Start: 2023-04-28 | End: 2023-04-29 | Stop reason: HOSPADM

## 2023-04-28 RX ORDER — TORSEMIDE 20 MG/1
5 TABLET ORAL DAILY
Status: DISCONTINUED | OUTPATIENT
Start: 2023-04-28 | End: 2023-04-29 | Stop reason: HOSPADM

## 2023-04-28 RX ORDER — SODIUM CHLORIDE, SODIUM LACTATE, POTASSIUM CHLORIDE, CALCIUM CHLORIDE 600; 310; 30; 20 MG/100ML; MG/100ML; MG/100ML; MG/100ML
INJECTION, SOLUTION INTRAVENOUS CONTINUOUS
Status: DISCONTINUED | OUTPATIENT
Start: 2023-04-28 | End: 2023-04-29 | Stop reason: HOSPADM

## 2023-04-28 RX ORDER — SODIUM CHLORIDE 9 MG/ML
INJECTION, SOLUTION INTRAVENOUS PRN
Status: DISCONTINUED | OUTPATIENT
Start: 2023-04-28 | End: 2023-04-28 | Stop reason: HOSPADM

## 2023-04-28 RX ORDER — HYDROMORPHONE HCL 110MG/55ML
PATIENT CONTROLLED ANALGESIA SYRINGE INTRAVENOUS PRN
Status: DISCONTINUED | OUTPATIENT
Start: 2023-04-28 | End: 2023-04-28 | Stop reason: SDUPTHER

## 2023-04-28 RX ORDER — DULOXETIN HYDROCHLORIDE 60 MG/1
60 CAPSULE, DELAYED RELEASE ORAL DAILY
Status: DISCONTINUED | OUTPATIENT
Start: 2023-04-28 | End: 2023-04-29 | Stop reason: HOSPADM

## 2023-04-28 RX ORDER — LIDOCAINE HYDROCHLORIDE 20 MG/ML
INJECTION, SOLUTION EPIDURAL; INFILTRATION; INTRACAUDAL; PERINEURAL PRN
Status: DISCONTINUED | OUTPATIENT
Start: 2023-04-28 | End: 2023-04-28 | Stop reason: SDUPTHER

## 2023-04-28 RX ORDER — PANTOPRAZOLE SODIUM 40 MG/1
40 TABLET, DELAYED RELEASE ORAL
Status: DISCONTINUED | OUTPATIENT
Start: 2023-04-29 | End: 2023-04-29 | Stop reason: HOSPADM

## 2023-04-28 RX ORDER — APREPITANT 40 MG/1
40 CAPSULE ORAL ONCE
Status: COMPLETED | OUTPATIENT
Start: 2023-04-28 | End: 2023-04-28

## 2023-04-28 RX ORDER — BUPIVACAINE HYDROCHLORIDE AND EPINEPHRINE 2.5; 5 MG/ML; UG/ML
INJECTION, SOLUTION INFILTRATION; PERINEURAL
Status: COMPLETED | OUTPATIENT
Start: 2023-04-28 | End: 2023-04-28

## 2023-04-28 RX ORDER — ONDANSETRON 2 MG/ML
INJECTION INTRAMUSCULAR; INTRAVENOUS PRN
Status: DISCONTINUED | OUTPATIENT
Start: 2023-04-28 | End: 2023-04-28 | Stop reason: SDUPTHER

## 2023-04-28 RX ORDER — OXYBUTYNIN CHLORIDE 5 MG/1
10 TABLET, EXTENDED RELEASE ORAL DAILY
Status: DISCONTINUED | OUTPATIENT
Start: 2023-04-28 | End: 2023-04-29 | Stop reason: HOSPADM

## 2023-04-28 RX ORDER — MONTELUKAST SODIUM 10 MG/1
10 TABLET ORAL NIGHTLY
Status: DISCONTINUED | OUTPATIENT
Start: 2023-04-28 | End: 2023-04-29 | Stop reason: HOSPADM

## 2023-04-28 RX ORDER — DEXAMETHASONE SODIUM PHOSPHATE 4 MG/ML
INJECTION, SOLUTION INTRA-ARTICULAR; INTRALESIONAL; INTRAMUSCULAR; INTRAVENOUS; SOFT TISSUE PRN
Status: DISCONTINUED | OUTPATIENT
Start: 2023-04-28 | End: 2023-04-28 | Stop reason: SDUPTHER

## 2023-04-28 RX ORDER — ONDANSETRON 2 MG/ML
4 INJECTION INTRAMUSCULAR; INTRAVENOUS
Status: DISCONTINUED | OUTPATIENT
Start: 2023-04-28 | End: 2023-04-28 | Stop reason: HOSPADM

## 2023-04-28 RX ORDER — HYDROMORPHONE HCL 110MG/55ML
0.5 PATIENT CONTROLLED ANALGESIA SYRINGE INTRAVENOUS EVERY 5 MIN PRN
Status: DISCONTINUED | OUTPATIENT
Start: 2023-04-28 | End: 2023-04-28 | Stop reason: HOSPADM

## 2023-04-28 RX ORDER — OXYCODONE HYDROCHLORIDE 5 MG/1
5 TABLET ORAL EVERY 4 HOURS PRN
Status: DISCONTINUED | OUTPATIENT
Start: 2023-04-28 | End: 2023-04-29 | Stop reason: HOSPADM

## 2023-04-28 RX ORDER — ENOXAPARIN SODIUM 100 MG/ML
40 INJECTION SUBCUTANEOUS DAILY
Qty: 14 EACH | Refills: 0 | Status: SHIPPED | OUTPATIENT
Start: 2023-04-28

## 2023-04-28 RX ORDER — POLYETHYLENE GLYCOL 3350 17 G/17G
17 POWDER, FOR SOLUTION ORAL DAILY
Qty: 1530 G | Refills: 1 | Status: SHIPPED | OUTPATIENT
Start: 2023-04-28 | End: 2023-06-27

## 2023-04-28 RX ORDER — ENOXAPARIN SODIUM 100 MG/ML
40 INJECTION SUBCUTANEOUS DAILY
Status: DISCONTINUED | OUTPATIENT
Start: 2023-04-28 | End: 2023-04-28 | Stop reason: DRUGHIGH

## 2023-04-28 RX ORDER — PHENAZOPYRIDINE HYDROCHLORIDE 100 MG/1
200 TABLET, FILM COATED ORAL ONCE
Status: COMPLETED | OUTPATIENT
Start: 2023-04-28 | End: 2023-04-28

## 2023-04-28 RX ORDER — ENOXAPARIN SODIUM 100 MG/ML
40 INJECTION SUBCUTANEOUS 2 TIMES DAILY
Status: DISCONTINUED | OUTPATIENT
Start: 2023-04-28 | End: 2023-04-29 | Stop reason: HOSPADM

## 2023-04-28 RX ORDER — FLUTICASONE PROPIONATE 50 MCG
1 SPRAY, SUSPENSION (ML) NASAL DAILY
Status: DISCONTINUED | OUTPATIENT
Start: 2023-04-28 | End: 2023-04-29 | Stop reason: HOSPADM

## 2023-04-28 RX ORDER — PROPOFOL 10 MG/ML
INJECTION, EMULSION INTRAVENOUS PRN
Status: DISCONTINUED | OUTPATIENT
Start: 2023-04-28 | End: 2023-04-28 | Stop reason: SDUPTHER

## 2023-04-28 RX ORDER — FENTANYL CITRATE 50 UG/ML
INJECTION, SOLUTION INTRAMUSCULAR; INTRAVENOUS PRN
Status: DISCONTINUED | OUTPATIENT
Start: 2023-04-28 | End: 2023-04-28 | Stop reason: SDUPTHER

## 2023-04-28 RX ORDER — CETIRIZINE HYDROCHLORIDE 10 MG/1
10 TABLET ORAL DAILY
Status: DISCONTINUED | OUTPATIENT
Start: 2023-04-28 | End: 2023-04-29 | Stop reason: HOSPADM

## 2023-04-28 RX ORDER — LIDOCAINE HYDROCHLORIDE 10 MG/ML
0.5 INJECTION, SOLUTION EPIDURAL; INFILTRATION; INTRACAUDAL; PERINEURAL ONCE
Status: DISCONTINUED | OUTPATIENT
Start: 2023-04-28 | End: 2023-04-28 | Stop reason: HOSPADM

## 2023-04-28 RX ORDER — SODIUM CHLORIDE 9 MG/ML
INJECTION, SOLUTION INTRAVENOUS CONTINUOUS
Status: DISCONTINUED | OUTPATIENT
Start: 2023-04-28 | End: 2023-04-28

## 2023-04-28 RX ORDER — MAGNESIUM HYDROXIDE 1200 MG/15ML
LIQUID ORAL
Status: COMPLETED | OUTPATIENT
Start: 2023-04-28 | End: 2023-04-28

## 2023-04-28 RX ORDER — OXYCODONE HYDROCHLORIDE 5 MG/1
5 TABLET ORAL EVERY 6 HOURS PRN
Qty: 20 TABLET | Refills: 0 | Status: SHIPPED | OUTPATIENT
Start: 2023-04-28 | End: 2023-05-03

## 2023-04-28 RX ORDER — PREGABALIN 100 MG/1
100 CAPSULE ORAL 3 TIMES DAILY
Status: DISCONTINUED | OUTPATIENT
Start: 2023-04-28 | End: 2023-04-29 | Stop reason: HOSPADM

## 2023-04-28 RX ORDER — MAGNESIUM HYDROXIDE 1200 MG/15ML
LIQUID ORAL CONTINUOUS PRN
Status: COMPLETED | OUTPATIENT
Start: 2023-04-28 | End: 2023-04-28

## 2023-04-28 RX ORDER — HYDROMORPHONE HCL 110MG/55ML
0.25 PATIENT CONTROLLED ANALGESIA SYRINGE INTRAVENOUS EVERY 5 MIN PRN
Status: DISCONTINUED | OUTPATIENT
Start: 2023-04-28 | End: 2023-04-28 | Stop reason: HOSPADM

## 2023-04-28 RX ORDER — AMLODIPINE BESYLATE 5 MG/1
10 TABLET ORAL DAILY
Status: DISCONTINUED | OUTPATIENT
Start: 2023-04-29 | End: 2023-04-29 | Stop reason: HOSPADM

## 2023-04-28 RX ORDER — ROSUVASTATIN CALCIUM 10 MG/1
5 TABLET, COATED ORAL DAILY
Status: DISCONTINUED | OUTPATIENT
Start: 2023-04-28 | End: 2023-04-29 | Stop reason: HOSPADM

## 2023-04-28 RX ORDER — KETOROLAC TROMETHAMINE 30 MG/ML
30 INJECTION, SOLUTION INTRAMUSCULAR; INTRAVENOUS EVERY 6 HOURS
Status: DISCONTINUED | OUTPATIENT
Start: 2023-04-28 | End: 2023-04-29 | Stop reason: HOSPADM

## 2023-04-28 RX ORDER — MAGNESIUM SULFATE HEPTAHYDRATE 500 MG/ML
INJECTION, SOLUTION INTRAMUSCULAR; INTRAVENOUS PRN
Status: DISCONTINUED | OUTPATIENT
Start: 2023-04-28 | End: 2023-04-28 | Stop reason: SDUPTHER

## 2023-04-28 RX ORDER — NITROFURANTOIN 25; 75 MG/1; MG/1
100 CAPSULE ORAL 2 TIMES DAILY
Qty: 20 CAPSULE | Refills: 0 | Status: SHIPPED | OUTPATIENT
Start: 2023-04-28 | End: 2023-05-08

## 2023-04-28 RX ORDER — ROCURONIUM BROMIDE 10 MG/ML
INJECTION, SOLUTION INTRAVENOUS PRN
Status: DISCONTINUED | OUTPATIENT
Start: 2023-04-28 | End: 2023-04-28 | Stop reason: SDUPTHER

## 2023-04-28 RX ORDER — MIDAZOLAM HYDROCHLORIDE 1 MG/ML
INJECTION INTRAMUSCULAR; INTRAVENOUS PRN
Status: DISCONTINUED | OUTPATIENT
Start: 2023-04-28 | End: 2023-04-28 | Stop reason: SDUPTHER

## 2023-04-28 RX ADMIN — DEXAMETHASONE SODIUM PHOSPHATE 12 MG: 4 INJECTION, SOLUTION INTRAMUSCULAR; INTRAVENOUS at 14:16

## 2023-04-28 RX ADMIN — ACETAMINOPHEN 1000 MG: 500 TABLET ORAL at 20:45

## 2023-04-28 RX ADMIN — CEFAZOLIN 3000 MG: 10 INJECTION, POWDER, FOR SOLUTION INTRAVENOUS at 20:53

## 2023-04-28 RX ADMIN — Medication 180 MG: at 14:07

## 2023-04-28 RX ADMIN — Medication 20 MG: at 14:49

## 2023-04-28 RX ADMIN — MIDAZOLAM 1 MG: 1 INJECTION INTRAMUSCULAR; INTRAVENOUS at 14:07

## 2023-04-28 RX ADMIN — ONDANSETRON 4 MG: 2 INJECTION INTRAMUSCULAR; INTRAVENOUS at 15:25

## 2023-04-28 RX ADMIN — ROCURONIUM BROMIDE 10 MG: 10 INJECTION, SOLUTION INTRAVENOUS at 14:07

## 2023-04-28 RX ADMIN — SODIUM CHLORIDE, POTASSIUM CHLORIDE, SODIUM LACTATE AND CALCIUM CHLORIDE: 600; 310; 30; 20 INJECTION, SOLUTION INTRAVENOUS at 12:58

## 2023-04-28 RX ADMIN — ROCURONIUM BROMIDE 20 MG: 10 INJECTION, SOLUTION INTRAVENOUS at 14:49

## 2023-04-28 RX ADMIN — ENOXAPARIN SODIUM 40 MG: 100 INJECTION SUBCUTANEOUS at 20:46

## 2023-04-28 RX ADMIN — MIDAZOLAM 1 MG: 1 INJECTION INTRAMUSCULAR; INTRAVENOUS at 13:50

## 2023-04-28 RX ADMIN — HYDROMORPHONE HYDROCHLORIDE 1 MG: 2 INJECTION, SOLUTION INTRAMUSCULAR; INTRAVENOUS; SUBCUTANEOUS at 14:49

## 2023-04-28 RX ADMIN — SUGAMMADEX 200 MG: 100 INJECTION, SOLUTION INTRAVENOUS at 15:35

## 2023-04-28 RX ADMIN — HYDROMORPHONE HYDROCHLORIDE 0.5 MG: 2 INJECTION, SOLUTION INTRAMUSCULAR; INTRAVENOUS; SUBCUTANEOUS at 15:07

## 2023-04-28 RX ADMIN — FENTANYL CITRATE 100 MCG: 50 INJECTION, SOLUTION INTRAMUSCULAR; INTRAVENOUS at 14:07

## 2023-04-28 RX ADMIN — APREPITANT 40 MG: 40 CAPSULE ORAL at 12:02

## 2023-04-28 RX ADMIN — PROPOFOL 200 MG: 10 INJECTION, EMULSION INTRAVENOUS at 14:07

## 2023-04-28 RX ADMIN — OXYCODONE 5 MG: 5 TABLET ORAL at 21:02

## 2023-04-28 RX ADMIN — Medication 10 MG: at 14:24

## 2023-04-28 RX ADMIN — TIZANIDINE 2 MG: 4 TABLET ORAL at 20:44

## 2023-04-28 RX ADMIN — SODIUM CHLORIDE, POTASSIUM CHLORIDE, SODIUM LACTATE AND CALCIUM CHLORIDE: 600; 310; 30; 20 INJECTION, SOLUTION INTRAVENOUS at 18:48

## 2023-04-28 RX ADMIN — HYDROMORPHONE HYDROCHLORIDE 0.5 MG: 2 INJECTION, SOLUTION INTRAMUSCULAR; INTRAVENOUS; SUBCUTANEOUS at 14:31

## 2023-04-28 RX ADMIN — MONTELUKAST SODIUM 10 MG: 10 TABLET, FILM COATED ORAL at 20:45

## 2023-04-28 RX ADMIN — MAGNESIUM SULFATE HEPTAHYDRATE 1 G: 500 INJECTION, SOLUTION INTRAMUSCULAR; INTRAVENOUS at 13:59

## 2023-04-28 RX ADMIN — LIDOCAINE HYDROCHLORIDE 100 MG: 20 INJECTION, SOLUTION EPIDURAL; INFILTRATION; INTRACAUDAL; PERINEURAL at 14:07

## 2023-04-28 RX ADMIN — PREGABALIN 100 MG: 100 CAPSULE ORAL at 20:44

## 2023-04-28 RX ADMIN — ROCURONIUM BROMIDE 40 MG: 10 INJECTION, SOLUTION INTRAVENOUS at 14:20

## 2023-04-28 RX ADMIN — PHENAZOPYRIDINE 200 MG: 100 TABLET ORAL at 12:02

## 2023-04-28 RX ADMIN — Medication 20 MG: at 14:07

## 2023-04-28 ASSESSMENT — PAIN DESCRIPTION - DESCRIPTORS
DESCRIPTORS: THROBBING
DESCRIPTORS: DISCOMFORT

## 2023-04-28 ASSESSMENT — PAIN DESCRIPTION - PAIN TYPE
TYPE: SURGICAL PAIN
TYPE: SURGICAL PAIN

## 2023-04-28 ASSESSMENT — PAIN - FUNCTIONAL ASSESSMENT: PAIN_FUNCTIONAL_ASSESSMENT: 0-10

## 2023-04-28 ASSESSMENT — PAIN DESCRIPTION - LOCATION
LOCATION: RECTUM
LOCATION: VAGINA

## 2023-04-28 ASSESSMENT — PAIN DESCRIPTION - FREQUENCY: FREQUENCY: CONTINUOUS

## 2023-04-28 ASSESSMENT — PAIN DESCRIPTION - ORIENTATION: ORIENTATION: MID;INNER

## 2023-04-28 ASSESSMENT — PAIN DESCRIPTION - ONSET: ONSET: ON-GOING

## 2023-04-28 ASSESSMENT — PAIN SCALES - GENERAL
PAINLEVEL_OUTOF10: 10
PAINLEVEL_OUTOF10: 8

## 2023-04-28 NOTE — PROGRESS NOTES
Pharmacist Review and Automatic Dose Adjustment of Prophylactic Enoxaparin         The reviewing pharmacist has made an adjustment to the ordered enoxaparin dose or converted to UFH per the approved [facility] protocol and table as identified below. Cem Riggs is a 38 y.o. female. No results for input(s): CREATININE in the last 72 hours. Estimated Creatinine Clearance: 179 mL/min (based on SCr of 0.6 mg/dL). No results for input(s): HGB, HCT, PLT in the last 72 hours. No results for input(s): INR in the last 72 hours.     Height:   Ht Readings from Last 1 Encounters:   04/13/23 5' 3\" (1.6 m)     Weight:  Wt Readings from Last 1 Encounters:   04/28/23 (!) 350 lb (158.8 kg)               Plan: Based upon the patient's weight and renal function    Ordered: Enoxaparin 40mg SUBQ Daily    Changed/converted to    New Order: Enoxaparin 40mg SUBQ BID      Thank you,  Izzy Winslow [id number], [facility]  4/28/2023, 6:42 PM

## 2023-04-28 NOTE — PROGRESS NOTES
Telephone orders per Dr. Deedee Warner to place boogie catheter since patient will be staying over night. Catheter placed with orange output noted.

## 2023-04-28 NOTE — OP NOTE
Operative Note      Patient: Brice Molina  YOB: 1980  MRN: [id number]    Date of Procedure: 2023    Pre-Op Diagnosis Codes:     * Postoperative adhesions of vagina [N99.2]     * Deep dyspareunia [N94.12]     * Urge incontinence [N39.41]     * Rectocele [N81.6]    Post-Op Diagnosis: Same       Procedure(s):  COLPORRAPHY, POSTERIOR REPAIR  CYSTOSCOPY    Surgeon(s):  Radha Mcdowell MD    Assistant:   Surgical Assistant: Caio Taveras; [facility]    Anesthesia: General    Estimated Blood Loss (mL): 371     Complications: None    Specimens:   * No specimens in log *    Implants:  * No implants in log *      Drains:   [REMOVED] Urinary Catheter [address] (Removed)       Findings: Heavily scarred vaginal hysterectomy cuff, proximal rectocele, cystoscopy with trabeculation, patent ureters and no injury      Patient History: Patient 35year-old  with evaluation of vaginal pain. She has vaginal pain and pressure as well as deep penetration pain with intercourse. She is unable to tolerate speculum pelvic exam and is no longer having no intercourse due to pain. Has vaginal pressure and a bulge at and beyond the introitus. She has urge related incontinence and urinary frequency. Daytime  voiding intervals 1 to 2 hours nocturia every 2 hours with urge related urinary incontinence. She is longstanding constipation moves her bowels every  2 to 3 days and must reduce her vaginal bulge and rectocele for a  bowel movement. Patient had a vaginal hysterectomy for bleeding 2015 suffered postoperative abscess subsequent laparotomy with drainage of abscess and BSO in 2 weeks after this procedure. She developed a DVT had an IVC filter placed at that time. Filter was removed  for pain. Procedure in detail:   After general endotracheal anesthesia was induced the patient was placed in dorsolithotomy in yellowfin stirrups prepped and draped as routine.     Lai catheter placed

## 2023-04-28 NOTE — DISCHARGE INSTRUCTIONS
[facility]  Urogynecology & Pelvic Reconstructive Surgery  Postoperative Instructions    [address], [address] [facility], [facility] [place]  [phone]    CATHETERS     Most patients require catheter drainage after bladder and prolapse surgery. The purpose of the catheter is to prevent urinary retention which would otherwise occur due to tissue swelling. Proper drainage allows the bladder to recover and start to function normally. The amount of time the catheter is needed varies on each patient. In general, 3-5 days is sufficient but a week or more is not an unusual length of time for patients to need a catheter. You may find you have one of the following after surgery:    URETHRAL - Most people are familiar with a urethral catheter. The boogie catheter used to drain the bladder has a small fluid filled balloon at the tip, which is inflated to keep it in the bladder. During the daylight hours and with activity, you may place the green cork in the end of the catheter and tuck the end into your undergarments. We have found this is the most convenient way to provide bladder drainage and frees you from the necessity of a leg bag. When you feel your bladder is full, simply uncork the catheter, lower the tip below the level of the bladder to drain. It is recommended that you drain your bladder in this fashion at least every 3 - 4 hours during the daytime. In the evening continue to cork and drain, or connect the boogie to the bedside bag, and tape the catheter to your thigh. This will prevent you from inadvertently pulling out the catheter in your sleep and will continually drain your bladder during the night. Prophylactic antibiotics will be given to prevent a bladder infection. INCISION CARE   If you had vaginal surgery your incision has been closed with suture that will dissolve on their own in several weeks.  You may have vaginal discharge for up to 4-5 weeks after surgery, which may initially be

## 2023-04-28 NOTE — PROGRESS NOTES
Pt arrived to PACU from OR in stable condition and is not yet alert. Pt can not yet move extremities to command. Respirations are even on BIPAP. Skin warm, dry, and with appropriate for ethnicity color. Abd is soft. Pain is presumably tolerable at this time.   Vaginal (none visible) surgical site(s) intact with dressing= iodoform packing, pj-pad      S/P: colporraphy, posterior repair, and cystoscopy with Dr. Gonzáles Overall at [facility].      [address], RN  Pre-Op/Recovery   Same Day Surgery

## 2023-04-28 NOTE — PROGRESS NOTES
Pt sleeping in bed with eyes closed- responds to voice. VSS- on Bipap satting mid 90s with FIO2 at 40%. Salome pad in place with scant drainage. Lai in place with orange output.  sent up to room. Phase one criteria met, will transfer to 4T        Pt to be on BIPAP when sleeping- pt oriented x4 and able to take off bipap if needed.  Pt wears CPAP at night but has new parts and needs to be put together- will be on bipap at night/when sleeping for this reason (verbal orders per anesthesia)

## 2023-04-28 NOTE — H&P
Date of Surgery Update:  Baron Larson was seen, history and physical examination reviewed, and patient examined by me today. There have been no significant clinical changes since the completion of the previous history and physical.    The risk, benefits, and alternatives of the proposed procedure have been explained to the patient (or appropriate guardian) and understanding verbalized. All questions answered. Patient wishes to proceed.     Electronically signed by: Krystal Perales MD,4/28/2023,1:24 PM

## 2023-04-28 NOTE — ANESTHESIA POSTPROCEDURE EVALUATION
Department of Anesthesiology  Postprocedure Note    Patient: Roney Richards  MRN: [id number]  YOB: 1980  Date of evaluation: 4/28/2023      Procedure Summary     Date: 04/28/23 Room / Location: [facility] OR [address] [place] [address]    Anesthesia Start: 1352 Anesthesia Stop: 1605    Procedures:       COLPORRAPHY, POSTERIOR REPAIR (Vagina )      CYSTOSCOPY (Bladder) Diagnosis:       Postoperative adhesions of vagina      Deep dyspareunia      Urge incontinence      Rectocele      (Postoperative adhesions of vagina [N99.2])      (Deep dyspareunia [N94.12])      (Urge incontinence [N39.41])      (Rectocele [N81.6])    Surgeons: Shola Ortega MD Responsible Provider:     Anesthesia Type: general ASA Status: 3          Anesthesia Type: No value filed. Yessenia Phase I: Yessenia Score: 7    Yessenia Phase II:        Anesthesia Post Evaluation    Patient location during evaluation: PACU  Patient participation: complete - patient participated  Level of consciousness: awake  Airway patency: patent  Nausea & Vomiting: no vomiting  Complications: no  Cardiovascular status: hemodynamically stable  Hydration status: euvolemic  Comments: Patient requiring non invasive ventilatory support post operatively. She desaturates immediately when she falls asleep if it is removed. Plan for her to stay here overnight on Bipap was discussed with her and her  and Dr. Mendel Haus.  states he will bring her CPAP to the hospital tomorrow to make sure it is in working order as she has not used it in months and recently received some new parts for it. She is following commands and is responsive.    Multimodal analgesia pain management approach

## 2023-04-29 VITALS
TEMPERATURE: 97.3 F | WEIGHT: 293 LBS | SYSTOLIC BLOOD PRESSURE: 127 MMHG | HEIGHT: 63 IN | BODY MASS INDEX: 51.91 KG/M2 | RESPIRATION RATE: 13 BRPM | OXYGEN SATURATION: 92 % | HEART RATE: 79 BPM | DIASTOLIC BLOOD PRESSURE: 80 MMHG

## 2023-04-29 PROCEDURE — 51798 US URINE CAPACITY MEASURE: CPT

## 2023-04-29 PROCEDURE — 6370000000 HC RX 637 (ALT 250 FOR IP): Performed by: OBSTETRICS & GYNECOLOGY

## 2023-04-29 PROCEDURE — 2580000003 HC RX 258: Performed by: OBSTETRICS & GYNECOLOGY

## 2023-04-29 PROCEDURE — 6360000002 HC RX W HCPCS: Performed by: OBSTETRICS & GYNECOLOGY

## 2023-04-29 RX ADMIN — VALSARTAN 80 MG: 80 TABLET, FILM COATED ORAL at 08:37

## 2023-04-29 RX ADMIN — KETOROLAC TROMETHAMINE 30 MG: 30 INJECTION, SOLUTION INTRAMUSCULAR at 01:41

## 2023-04-29 RX ADMIN — PREGABALIN 100 MG: 100 CAPSULE ORAL at 08:36

## 2023-04-29 RX ADMIN — ROSUVASTATIN 5 MG: 10 TABLET, FILM COATED ORAL at 08:37

## 2023-04-29 RX ADMIN — SODIUM CHLORIDE, POTASSIUM CHLORIDE, SODIUM LACTATE AND CALCIUM CHLORIDE: 600; 310; 30; 20 INJECTION, SOLUTION INTRAVENOUS at 07:09

## 2023-04-29 RX ADMIN — TORSEMIDE 5 MG: 20 TABLET ORAL at 08:37

## 2023-04-29 RX ADMIN — CETIRIZINE HYDROCHLORIDE 10 MG: 10 TABLET, FILM COATED ORAL at 08:37

## 2023-04-29 RX ADMIN — ACETAMINOPHEN 1000 MG: 500 TABLET ORAL at 11:16

## 2023-04-29 RX ADMIN — AMLODIPINE BESYLATE 10 MG: 5 TABLET ORAL at 08:37

## 2023-04-29 RX ADMIN — KETOROLAC TROMETHAMINE 30 MG: 30 INJECTION, SOLUTION INTRAMUSCULAR at 06:12

## 2023-04-29 RX ADMIN — FLUTICASONE PROPIONATE 1 SPRAY: 50 SPRAY, METERED NASAL at 08:36

## 2023-04-29 RX ADMIN — OXYBUTYNIN CHLORIDE 10 MG: 5 TABLET, EXTENDED RELEASE ORAL at 08:37

## 2023-04-29 RX ADMIN — ACETAMINOPHEN 1000 MG: 500 TABLET ORAL at 01:41

## 2023-04-29 RX ADMIN — KETOROLAC TROMETHAMINE 30 MG: 30 INJECTION, SOLUTION INTRAMUSCULAR at 11:16

## 2023-04-29 RX ADMIN — TROSPIUM CHLORIDE 20 MG: 20 TABLET, FILM COATED ORAL at 06:12

## 2023-04-29 RX ADMIN — ENOXAPARIN SODIUM 40 MG: 100 INJECTION SUBCUTANEOUS at 08:38

## 2023-04-29 RX ADMIN — TIZANIDINE 2 MG: 4 TABLET ORAL at 08:36

## 2023-04-29 RX ADMIN — ACETAMINOPHEN 1000 MG: 500 TABLET ORAL at 06:12

## 2023-04-29 RX ADMIN — DULOXETINE HYDROCHLORIDE 60 MG: 60 CAPSULE, DELAYED RELEASE ORAL at 08:36

## 2023-04-29 RX ADMIN — PANTOPRAZOLE SODIUM 40 MG: 40 TABLET, DELAYED RELEASE ORAL at 06:12

## 2023-04-29 ASSESSMENT — PAIN SCALES - GENERAL
PAINLEVEL_OUTOF10: 0
PAINLEVEL_OUTOF10: 0

## 2023-04-29 ASSESSMENT — PAIN SCALES - WONG BAKER: WONGBAKER_NUMERICALRESPONSE: 0

## 2023-04-29 NOTE — CARE COORDINATION
Discharge Planning Note:    Chart reviewed and it appears that patient has minimal needs for discharge at this time. Discussed with patients nurse and requested that case management be notified if discharge needs are identified.     - Current discharge plan is for the patient to return home. Case management will continue to follow progress and update discharge plan as needed. Risk of Readmission Score:  Outpatient in a bed    JACOB Kenney RN    [facility]  Phone: [phone]

## 2023-04-29 NOTE — PROGRESS NOTES
Discharge instructions and medications reviewed with patient. Patient voices understanding and denies further questions/ concerns at this time.

## 2023-05-05 ENCOUNTER — TELEPHONE (OUTPATIENT)
Dept: ORTHOPEDIC SURGERY | Age: 43
End: 2023-05-05

## 2023-05-23 ENCOUNTER — OFFICE VISIT (OUTPATIENT)
Dept: SLEEP MEDICINE | Age: 43
End: 2023-05-23
Payer: COMMERCIAL

## 2023-05-23 VITALS
TEMPERATURE: 97.9 F | HEART RATE: 76 BPM | DIASTOLIC BLOOD PRESSURE: 70 MMHG | HEIGHT: 63 IN | SYSTOLIC BLOOD PRESSURE: 120 MMHG | OXYGEN SATURATION: 98 % | BODY MASS INDEX: 51.91 KG/M2 | RESPIRATION RATE: 18 BRPM | WEIGHT: 293 LBS

## 2023-05-23 DIAGNOSIS — Z99.89 OSA ON CPAP: Primary | ICD-10-CM

## 2023-05-23 DIAGNOSIS — G47.33 OSA ON CPAP: Primary | ICD-10-CM

## 2023-05-23 DIAGNOSIS — Z99.89 DEPENDENCE ON OTHER ENABLING MACHINES AND DEVICES: ICD-10-CM

## 2023-05-23 DIAGNOSIS — E66.01 CLASS 3 SEVERE OBESITY DUE TO EXCESS CALORIES WITH SERIOUS COMORBIDITY AND BODY MASS INDEX (BMI) OF 60.0 TO 69.9 IN ADULT (HCC): ICD-10-CM

## 2023-05-23 PROCEDURE — 99214 OFFICE O/P EST MOD 30 MIN: CPT | Performed by: PSYCHIATRY & NEUROLOGY

## 2023-05-23 PROCEDURE — 3074F SYST BP LT 130 MM HG: CPT | Performed by: PSYCHIATRY & NEUROLOGY

## 2023-05-23 PROCEDURE — G8427 DOCREV CUR MEDS BY ELIG CLIN: HCPCS | Performed by: PSYCHIATRY & NEUROLOGY

## 2023-05-23 PROCEDURE — 1036F TOBACCO NON-USER: CPT | Performed by: PSYCHIATRY & NEUROLOGY

## 2023-05-23 PROCEDURE — G8417 CALC BMI ABV UP PARAM F/U: HCPCS | Performed by: PSYCHIATRY & NEUROLOGY

## 2023-05-23 PROCEDURE — 3078F DIAST BP <80 MM HG: CPT | Performed by: PSYCHIATRY & NEUROLOGY

## 2023-05-23 ASSESSMENT — SLEEP AND FATIGUE QUESTIONNAIRES
HOW LIKELY ARE YOU TO NOD OFF OR FALL ASLEEP WHEN YOU ARE A PASSENGER IN A CAR FOR AN HOUR WITHOUT A BREAK: 1
HOW LIKELY ARE YOU TO NOD OFF OR FALL ASLEEP WHILE SITTING AND TALKING TO SOMEONE: 0
ESS TOTAL SCORE: 10
HOW LIKELY ARE YOU TO NOD OFF OR FALL ASLEEP WHILE SITTING INACTIVE IN A PUBLIC PLACE: 1
HOW LIKELY ARE YOU TO NOD OFF OR FALL ASLEEP IN A CAR, WHILE STOPPED FOR A FEW MINUTES IN TRAFFIC: 0
HOW LIKELY ARE YOU TO NOD OFF OR FALL ASLEEP WHILE LYING DOWN TO REST IN THE AFTERNOON WHEN CIRCUMSTANCES PERMIT: 3
HOW LIKELY ARE YOU TO NOD OFF OR FALL ASLEEP WHILE WATCHING TV: 3
HOW LIKELY ARE YOU TO NOD OFF OR FALL ASLEEP WHILE SITTING QUIETLY AFTER LUNCH WITHOUT ALCOHOL: 1
HOW LIKELY ARE YOU TO NOD OFF OR FALL ASLEEP WHILE SITTING AND READING: 1

## 2023-05-23 NOTE — PROGRESS NOTES
and necessary in reference to accepted standards of medicalpractice in treatment of this patients condition.     Samantha Liao MD      NPI: [id number]       Order Signed Date: 05/23/23    Electronically signed by Samantha Liao MD on 5/23/2023 at 1:40 PM

## 2023-05-23 NOTE — PROGRESS NOTES
07/10/2013    Corn-containing products Shortness Of Breath, Itching, and Swelling 06/21/2016    Imitrex [sumatriptan] Other (See Comments) 07/10/2013    Other Anaphylaxis 05/21/2014    Peanut-containing drug products Shortness Of Breath 07/09/2015    Soy allergy Shortness Of Breath and Swelling 06/21/2016    Tree nut [macadamia nut oil] Shortness Of Breath and Swelling 06/21/2016    Wheat bran Hives, Shortness Of Breath, Itching, and Swelling 05/08/2015    Mixed grasses Itching and Other (See Comments) 01/12/2021    Tramadol Other (See Comments) 07/07/2015       Patient Active Problem List   Diagnosis    Primary hypertension    Migraine    GERD (gastroesophageal reflux disease)    Morbid obesity with BMI of 50.0-59.9, adult ([facility])    S/P MARY-BSO (total abdominal hysterectomy and bilateral salpingo-oophorectomy)    Obstructive sleep apnea syndrome    Prediabetes    PCOS (polycystic ovarian syndrome)    Primary osteoarthritis involving multiple joints    Chronic pain    SHYLA (obstructive sleep apnea)    Chronic diastolic CHF (congestive heart failure) ([facility])    Abdominal cramping    Lymphedema    Irritable bowel syndrome with diarrhea    Stasis dermatitis of both legs    Abnormal facial hair    Rash of both hands    Trigeminal neuralgia pain    Vitamin D deficiency    Class 3 drug-induced obesity with serious comorbidity and body mass index (BMI) of 60.0 to 69.9 in adult [facility])    Mixed hyperlipidemia    Preop cardiovascular exam    Deep dyspareunia    Rectocele    Urge incontinence    Respiratory abnormalities       Past Medical History:   Diagnosis Date    Abscess 5/20/2022    Allergic reaction to food 5/27/2020    Allergic rhinitis     Anemia     Anxiety and depression     Bilateral hand numbness 12/4/2020    Bilateral hip pain 6/15/2021    Dermatomyositis ([facility] [address] 2008    Eczema     Fibromyalgia     Fibromyalgia     GERD (gastroesophageal reflux disease)     Headache(784.0)     History of blood transfusion 2009    also

## 2023-05-27 PROBLEM — Z01.810 PREOP CARDIOVASCULAR EXAM: Status: RESOLVED | Noted: 2023-04-27 | Resolved: 2023-05-27

## 2023-06-09 DIAGNOSIS — K21.9 GASTROESOPHAGEAL REFLUX DISEASE WITHOUT ESOPHAGITIS: ICD-10-CM

## 2023-06-09 RX ORDER — LANSOPRAZOLE 30 MG/1
30 CAPSULE, DELAYED RELEASE ORAL DAILY
Qty: 90 CAPSULE | Refills: 1 | Status: SHIPPED | OUTPATIENT
Start: 2023-06-09

## 2023-06-12 ENCOUNTER — OFFICE VISIT (OUTPATIENT)
Dept: PRIMARY CARE CLINIC | Age: 43
End: 2023-06-12

## 2023-06-12 VITALS
DIASTOLIC BLOOD PRESSURE: 80 MMHG | WEIGHT: 293 LBS | TEMPERATURE: 97.1 F | OXYGEN SATURATION: 98 % | SYSTOLIC BLOOD PRESSURE: 133 MMHG | HEART RATE: 78 BPM | BODY MASS INDEX: 62.32 KG/M2

## 2023-06-12 DIAGNOSIS — E66.1 CLASS 3 DRUG-INDUCED OBESITY WITH SERIOUS COMORBIDITY AND BODY MASS INDEX (BMI) OF 60.0 TO 69.9 IN ADULT (HCC): Primary | ICD-10-CM

## 2023-06-12 DIAGNOSIS — G89.29 CHRONIC PAIN OF LEFT KNEE: ICD-10-CM

## 2023-06-12 DIAGNOSIS — E55.9 VITAMIN D DEFICIENCY: ICD-10-CM

## 2023-06-12 DIAGNOSIS — M25.551 CHRONIC HIP PAIN, BILATERAL: Chronic | ICD-10-CM

## 2023-06-12 DIAGNOSIS — G89.29 CHRONIC HIP PAIN, BILATERAL: Chronic | ICD-10-CM

## 2023-06-12 DIAGNOSIS — M25.562 CHRONIC PAIN OF LEFT KNEE: ICD-10-CM

## 2023-06-12 DIAGNOSIS — M25.552 CHRONIC HIP PAIN, BILATERAL: Chronic | ICD-10-CM

## 2023-06-12 DIAGNOSIS — I10 PRIMARY HYPERTENSION: Chronic | ICD-10-CM

## 2023-06-12 RX ORDER — ERGOCALCIFEROL 1.25 MG/1
CAPSULE ORAL
Qty: 12 CAPSULE | Refills: 1 | Status: SHIPPED | OUTPATIENT
Start: 2023-06-12

## 2023-06-12 RX ORDER — MELOXICAM 7.5 MG/1
7.5 TABLET ORAL DAILY PRN
Qty: 90 TABLET | Refills: 1 | Status: SHIPPED | OUTPATIENT
Start: 2023-06-12

## 2023-06-12 RX ORDER — VALSARTAN 80 MG/1
TABLET ORAL
Qty: 90 TABLET | Refills: 5 | Status: SHIPPED | OUTPATIENT
Start: 2023-06-12

## 2023-06-12 RX ORDER — AMLODIPINE BESYLATE 10 MG/1
TABLET ORAL
Qty: 90 TABLET | Refills: 1 | Status: SHIPPED | OUTPATIENT
Start: 2023-06-12

## 2023-06-12 ASSESSMENT — ENCOUNTER SYMPTOMS
SHORTNESS OF BREATH: 0
DIARRHEA: 0
VOMITING: 0
NAUSEA: 0
COUGH: 0
CONSTIPATION: 0

## 2023-06-21 LAB
BACTERIA URNS QL MICRO: NORMAL /HPF
BILIRUB UR QL STRIP.AUTO: NEGATIVE
CLARITY UR: CLEAR
COLOR UR: ABNORMAL
EPI CELLS #/AREA URNS AUTO: 2 /HPF (ref 0–5)
GLUCOSE UR STRIP.AUTO-MCNC: NEGATIVE MG/DL
HGB UR QL STRIP.AUTO: NEGATIVE
HYALINE CASTS #/AREA URNS AUTO: 2 /LPF (ref 0–8)
KETONES UR STRIP.AUTO-MCNC: ABNORMAL MG/DL
LEUKOCYTE ESTERASE UR QL STRIP.AUTO: ABNORMAL
NITRITE UR QL STRIP.AUTO: NEGATIVE
PH UR STRIP.AUTO: 6 [PH] (ref 5–8)
PROT UR STRIP.AUTO-MCNC: 30 MG/DL
RBC CLUMPS #/AREA URNS AUTO: 1 /HPF (ref 0–4)
SP GR UR STRIP.AUTO: 1.03 (ref 1–1.03)
UA COMPLETE W REFLEX CULTURE PNL UR: ABNORMAL
UA DIPSTICK W REFLEX MICRO PNL UR: YES
URN SPEC COLLECT METH UR: ABNORMAL
UROBILINOGEN UR STRIP-ACNC: 1 E.U./DL
WBC #/AREA URNS AUTO: 1 /HPF (ref 0–5)

## 2023-06-23 ENCOUNTER — PATIENT MESSAGE (OUTPATIENT)
Dept: PRIMARY CARE CLINIC | Age: 43
End: 2023-06-23

## 2023-06-24 LAB — BACTERIA UR CULT: NORMAL

## 2023-06-29 ENCOUNTER — TELEPHONE (OUTPATIENT)
Dept: ORTHOPEDIC SURGERY | Age: 43
End: 2023-06-29

## 2023-07-03 NOTE — TELEPHONE ENCOUNTER
Spoke with the patient an informed her per dr. Taye Terrazas, at least on first inspection, looks ok.   But please bring the bottle in with you on your next visit

## 2023-07-10 NOTE — TELEPHONE ENCOUNTER
Auth: # [id number]    Date Range: 7/27/2023 - 10/27/2023  Type of SX:  OUTPATIENT  Location: [facility]   CPT: 63188   DX Code: G56.01  SX area: [facility]  Insurance: Jayant Garibay

## 2023-07-11 ENCOUNTER — TELEPHONE (OUTPATIENT)
Dept: ORTHOPEDIC SURGERY | Age: 43
End: 2023-07-11

## 2023-07-11 NOTE — TELEPHONE ENCOUNTER
Left voicemail message for patient to call back   Patient needs to reschedule surgery that is on for 7/27/23. She needs cardiac clearance.

## 2023-07-12 NOTE — TELEPHONE ENCOUNTER
Spoke with patient, she is not cleared for surgery. She will call back to reschedule. Cancelled both surgeries

## 2023-07-14 ASSESSMENT — ENCOUNTER SYMPTOMS
EYE DISCHARGE: 0
COLOR CHANGE: 0
VOMITING: 0
FACIAL SWELLING: 0
WHEEZING: 0
ABDOMINAL PAIN: 0
SHORTNESS OF BREATH: 0
COUGH: 0
BLOOD IN STOOL: 0
ABDOMINAL DISTENTION: 0
BACK PAIN: 0
CHEST TIGHTNESS: 0

## 2023-07-14 NOTE — PROGRESS NOTES
Shortness Of Breath, Itching and Swelling    Imitrex [Sumatriptan] Other (See Comments)     Chest pain    Other Anaphylaxis     enviromental  allegies    Corn, wheat, soy bean, peanuts and walnuts    Peanut-Containing Drug Products Shortness Of Breath    Soy Allergy Shortness Of Breath and Swelling    Tree Nut [Macadamia Nut Oil] Shortness Of Breath and Swelling    Wheat Bran Hives, Shortness Of Breath, Itching and Swelling    Mixed Grasses Itching and Other (See Comments)     Molds--over 51 different environmental allergies  Sinus issues/redden eyes    Tramadol Other (See Comments)     Made her feel really bad, and head dizzy and chest pain    Toradol [Ketorolac Tromethamine]        Medications:     Home Medications:  Were reviewed and are listed in nursing record. and/or listed below    Prior to Admission medications    Medication Sig Start Date End Date Taking?  Authorizing Provider   Diclofenac Sodium 100 MG TB24 Take by mouth   Yes Historical Provider, MD   docusate sodium (COLACE) 100 MG capsule Take 1 capsule by mouth 2 times daily   Yes Historical Provider, MD   amLODIPine (NORVASC) 10 MG tablet Take 1 tablet by mouth once daily 23  Yes James Calvert DO   valsartan (DIOVAN) 80 MG tablet Take 1 tablet by mouth once daily 23  Yes James Calvert DO   vitamin D (ERGOCALCIFEROL) 1.25 MG (11705 UT) CAPS capsule Take 1 capsule by mouth once a week 23  Yes James Calvert DO   lansoprazole (PREVACID) 30 MG delayed release capsule Take 1 capsule by mouth daily 23  Yes James Calvert DO   montelukast (SINGULAIR) 10 MG tablet Take 1 tablet by mouth nightly 23  Yes James Calvert DO   levocetirizine (XYZAL) 5 MG tablet Take 1 tablet daily 23  Yes James Calvert DO   ammonium lactate (AMLACTIN) 12 % cream SMARTSI Gram(s) Topical Daily 22  Yes Historical Provider, MD   rosuvastatin (CRESTOR) 5 MG tablet Take 1 tablet by mouth daily 23  Yes James Calvert DO

## 2023-07-18 ENCOUNTER — TELEPHONE (OUTPATIENT)
Dept: BARIATRICS/WEIGHT MGMT | Age: 43
End: 2023-07-18

## 2023-07-18 NOTE — TELEPHONE ENCOUNTER
Called as a new pt courtesy call - spoke w patient. Did receive paperwork - told patient to have new pt paperwork completely filled out, insurance card, and id and arrival time. [place]irmed [place].  If they didn't have the paperwork filled out and arrive on time may be rescheduled

## 2023-07-20 ENCOUNTER — OFFICE VISIT (OUTPATIENT)
Dept: BARIATRICS/WEIGHT MGMT | Age: 43
End: 2023-07-20
Payer: COMMERCIAL

## 2023-07-20 VITALS
BODY MASS INDEX: 51.91 KG/M2 | HEIGHT: 63 IN | WEIGHT: 293 LBS | SYSTOLIC BLOOD PRESSURE: 128 MMHG | DIASTOLIC BLOOD PRESSURE: 81 MMHG

## 2023-07-20 DIAGNOSIS — K21.9 GASTROESOPHAGEAL REFLUX DISEASE, UNSPECIFIED WHETHER ESOPHAGITIS PRESENT: ICD-10-CM

## 2023-07-20 DIAGNOSIS — E78.2 MIXED HYPERLIPIDEMIA: ICD-10-CM

## 2023-07-20 DIAGNOSIS — E66.01 MORBID OBESITY WITH BMI OF 60.0-69.9, ADULT (HCC): Primary | ICD-10-CM

## 2023-07-20 DIAGNOSIS — I10 ESSENTIAL HYPERTENSION: ICD-10-CM

## 2023-07-20 PROCEDURE — 3074F SYST BP LT 130 MM HG: CPT | Performed by: SURGERY

## 2023-07-20 PROCEDURE — G8427 DOCREV CUR MEDS BY ELIG CLIN: HCPCS | Performed by: SURGERY

## 2023-07-20 PROCEDURE — 99204 OFFICE O/P NEW MOD 45 MIN: CPT | Performed by: SURGERY

## 2023-07-20 PROCEDURE — 1036F TOBACCO NON-USER: CPT | Performed by: SURGERY

## 2023-07-20 PROCEDURE — 3078F DIAST BP <80 MM HG: CPT | Performed by: SURGERY

## 2023-07-20 PROCEDURE — G8417 CALC BMI ABV UP PARAM F/U: HCPCS | Performed by: SURGERY

## 2023-07-20 RX ORDER — DOCUSATE SODIUM 100 MG/1
100 CAPSULE, LIQUID FILLED ORAL 2 TIMES DAILY
COMMUNITY

## 2023-07-21 ENCOUNTER — HOSPITAL ENCOUNTER (OUTPATIENT)
Dept: MRI IMAGING | Age: 43
Discharge: HOME | End: 2023-07-21
Attending: INTERNAL MEDICINE
Payer: COMMERCIAL

## 2023-07-21 ENCOUNTER — HOSPITAL ENCOUNTER (EMERGENCY)
Age: 43
Discharge: HOME | End: 2023-07-21
Attending: EMERGENCY MEDICINE
Payer: COMMERCIAL

## 2023-07-21 ENCOUNTER — TELEPHONE (OUTPATIENT)
Dept: ORTHOPEDIC SURGERY | Age: 43
End: 2023-07-21

## 2023-07-21 ENCOUNTER — APPOINTMENT (OUTPATIENT)
Dept: GENERAL RADIOLOGY | Age: 43
End: 2023-07-21
Payer: COMMERCIAL

## 2023-07-21 VITALS
OXYGEN SATURATION: 99 % | SYSTOLIC BLOOD PRESSURE: 183 MMHG | RESPIRATION RATE: 18 BRPM | HEART RATE: 86 BPM | TEMPERATURE: 98 F | DIASTOLIC BLOOD PRESSURE: 96 MMHG

## 2023-07-21 DIAGNOSIS — M79.605 LEFT LEG PAIN: Primary | ICD-10-CM

## 2023-07-21 DIAGNOSIS — I51.7 ENLARGED RV (RIGHT VENTRICLE): ICD-10-CM

## 2023-07-21 LAB
LV EF: 65 %
LVEF MODALITY: NORMAL

## 2023-07-21 PROCEDURE — 99284 EMERGENCY DEPT VISIT MOD MDM: CPT

## 2023-07-21 PROCEDURE — 6360000002 HC RX W HCPCS: Performed by: EMERGENCY MEDICINE

## 2023-07-21 PROCEDURE — 73562 X-RAY EXAM OF KNEE 3: CPT

## 2023-07-21 PROCEDURE — 73610 X-RAY EXAM OF ANKLE: CPT

## 2023-07-21 PROCEDURE — 73502 X-RAY EXAM HIP UNI 2-3 VIEWS: CPT

## 2023-07-21 PROCEDURE — 75565 CARD MRI VELOC FLOW MAPPING: CPT

## 2023-07-21 PROCEDURE — 96372 THER/PROPH/DIAG INJ SC/IM: CPT

## 2023-07-21 RX ORDER — KETOROLAC TROMETHAMINE 30 MG/ML
15 INJECTION, SOLUTION INTRAMUSCULAR; INTRAVENOUS ONCE
Status: COMPLETED | OUTPATIENT
Start: 2023-07-21 | End: 2023-07-21

## 2023-07-21 RX ADMIN — KETOROLAC TROMETHAMINE 15 MG: 30 INJECTION, SOLUTION INTRAMUSCULAR; INTRAVENOUS at 13:08

## 2023-07-21 ASSESSMENT — PAIN DESCRIPTION - ORIENTATION
ORIENTATION: LEFT
ORIENTATION: LEFT

## 2023-07-21 ASSESSMENT — PAIN DESCRIPTION - DESCRIPTORS
DESCRIPTORS: ACHING
DESCRIPTORS: ACHING

## 2023-07-21 ASSESSMENT — PAIN SCALES - GENERAL
PAINLEVEL_OUTOF10: 8
PAINLEVEL_OUTOF10: 8

## 2023-07-21 ASSESSMENT — PAIN DESCRIPTION - LOCATION
LOCATION: LEG
LOCATION: LEG

## 2023-07-21 ASSESSMENT — PAIN - FUNCTIONAL ASSESSMENT
PAIN_FUNCTIONAL_ASSESSMENT: PREVENTS OR INTERFERES WITH MANY ACTIVE NOT PASSIVE ACTIVITIES
PAIN_FUNCTIONAL_ASSESSMENT: 0-10

## 2023-07-21 ASSESSMENT — PAIN DESCRIPTION - ONSET: ONSET: PROGRESSIVE

## 2023-07-21 ASSESSMENT — PAIN DESCRIPTION - PAIN TYPE: TYPE: ACUTE PAIN

## 2023-07-21 ASSESSMENT — PAIN DESCRIPTION - FREQUENCY: FREQUENCY: CONTINUOUS

## 2023-07-21 NOTE — TELEPHONE ENCOUNTER
PA requsted via [facility] per online w/clinicals. Reference # [id number].

## 2023-07-21 NOTE — ED NOTES
Patient prepared for and ready to be discharged. Patient discharged at this time in no acute distress after verbalizing understanding of discharge instructions. Patient left after receiving After Visit Summary instructions.        Tonya Tucker RN  07/21/23 1930

## 2023-07-21 NOTE — ED PROVIDER NOTES
[facility]          EM ATTENDING NOTE       Date of evaluation: 7/21/2023    Chief Complaint     Leg Pain (Left leg pain from a fall two weeks ago. )      History of Present Illness     Jennifer Hanson is a 38 y.o. female who presents ***    Review of Systems     Please see HPI for pertinent positive and negatives. A complete review of systems was conducted and is otherwise negative unless noted. Past Medical, Surgical, Family, and Social History     She has a past medical history of Abscess, Allergic reaction to food, Allergic rhinitis, Anemia, Anxiety and depression, Bilateral hand numbness, Bilateral hip pain, chronic gerd, Dermatomyositis ([address]), Eczema, Fibromyalgia, Fibromyalgia, Headache(784.0), History of blood transfusion, HTN (hypertension), Hx of blood clots, Hx of blood clots, Irritable bowel syndrome, Lymphedema, Morbid obesity with BMI of 50.0-59.9, adult ([address]), obstructive sleep apnea, Osteoarthritis, PCOS (polycystic ovarian syndrome), Pre-operative clearance, Prediabetes, Pulmonary embolism ([address]), Vaginal high risk HPV DNA test positive, and Wears glasses. She has a past surgical history that includes Muscle biopsy; ovarian cyst removal (1998); Eye surgery; Hysterectomy (2014); Vena Cava Filter Placement; Colonoscopy (N/A, 01/13/2021); skin biopsy (2021); Vagina surgery (N/A, 04/28/2023); Cystoscopy (N/A, 04/28/2023); and Partial hysterectomy. Her family history includes Asthma in her daughter and maternal cousin; Breast Cancer (age of onset: 47) in her maternal aunt; Cancer in her father; Diabetes in her brother; Elevated Lipids in her mother; High Blood Pressure in her brother and mother; Migraines in her maternal aunt; Other in her father. She reports that she has never smoked. She has been exposed to tobacco smoke. She has never used smokeless tobacco. She reports that she does not currently use alcohol.  She reports that she does not use

## 2023-07-31 ENCOUNTER — OFFICE VISIT (OUTPATIENT)
Dept: CARDIOLOGY CLINIC | Age: 43
End: 2023-07-31
Payer: COMMERCIAL

## 2023-07-31 VITALS
BODY MASS INDEX: 61.18 KG/M2 | SYSTOLIC BLOOD PRESSURE: 132 MMHG | DIASTOLIC BLOOD PRESSURE: 70 MMHG | WEIGHT: 293 LBS | HEART RATE: 84 BPM | OXYGEN SATURATION: 97 %

## 2023-07-31 DIAGNOSIS — Z01.810 PREOP CARDIOVASCULAR EXAM: ICD-10-CM

## 2023-07-31 DIAGNOSIS — I51.7 ENLARGED RV (RIGHT VENTRICLE): ICD-10-CM

## 2023-07-31 DIAGNOSIS — E78.2 MIXED HYPERLIPIDEMIA: ICD-10-CM

## 2023-07-31 DIAGNOSIS — I10 PRIMARY HYPERTENSION: ICD-10-CM

## 2023-07-31 PROCEDURE — G8417 CALC BMI ABV UP PARAM F/U: HCPCS | Performed by: INTERNAL MEDICINE

## 2023-07-31 PROCEDURE — 3075F SYST BP GE 130 - 139MM HG: CPT | Performed by: INTERNAL MEDICINE

## 2023-07-31 PROCEDURE — 1036F TOBACCO NON-USER: CPT | Performed by: INTERNAL MEDICINE

## 2023-07-31 PROCEDURE — 3078F DIAST BP <80 MM HG: CPT | Performed by: INTERNAL MEDICINE

## 2023-07-31 PROCEDURE — G8427 DOCREV CUR MEDS BY ELIG CLIN: HCPCS | Performed by: INTERNAL MEDICINE

## 2023-07-31 PROCEDURE — 99214 OFFICE O/P EST MOD 30 MIN: CPT | Performed by: INTERNAL MEDICINE

## 2023-07-31 NOTE — TELEPHONE ENCOUNTER
Auth: # [id number]    Date Range: 8/17/2023 - 11/17/2023  Type of SX:  OUTPATIENT  Location: [facility]  CPT: 45109   DX Code: G56.02  SX area: [place]  Insurance: Lissy Bee    Have noted sx has been cancelled.

## 2023-08-03 ENCOUNTER — TELEPHONE (OUTPATIENT)
Dept: ORTHOPEDIC SURGERY | Age: 43
End: 2023-08-03

## 2023-08-03 NOTE — TELEPHONE ENCOUNTER
Appointment Request     Patient requesting earlier appointment: Yes  Appointment offered to patient: YES  Patient Contact Number: [phone]    PT INQUIRED OF EARLIER APPT TODAY IF POSSIBLE. PT STATED THAT SHE HAS AN APPT SCHEDULED FOR 8/11/23-9:15A FOR THERESA HAND INJECTION. PT STATED THAT BOTH OF HER HANDS ARE HURTING AND LEFT IS NUMB. PT REPORTED THIS WITH URGENCY.      PLEASE ADVISE

## 2023-08-03 NOTE — TELEPHONE ENCOUNTER
Left a vm for the patient letting her know at this time there are no sooner appointments with Dr. Wanda Potter. Told her she could call the office back if needed.

## 2023-08-11 ENCOUNTER — OFFICE VISIT (OUTPATIENT)
Dept: ORTHOPEDIC SURGERY | Age: 43
End: 2023-08-11

## 2023-08-11 VITALS — BODY MASS INDEX: 51.91 KG/M2 | WEIGHT: 293 LBS | HEIGHT: 63 IN | RESPIRATION RATE: 16 BRPM

## 2023-08-11 DIAGNOSIS — G56.03 BILATERAL CARPAL TUNNEL SYNDROME: Primary | ICD-10-CM

## 2023-08-11 RX ORDER — TRIAMCINOLONE ACETONIDE 40 MG/ML
40 INJECTION, SUSPENSION INTRA-ARTICULAR; INTRAMUSCULAR ONCE
Status: COMPLETED | OUTPATIENT
Start: 2023-08-11 | End: 2023-08-11

## 2023-08-11 RX ADMIN — TRIAMCINOLONE ACETONIDE 40 MG: 40 INJECTION, SUSPENSION INTRA-ARTICULAR; INTRAMUSCULAR at 14:54

## 2023-08-11 RX ADMIN — TRIAMCINOLONE ACETONIDE 40 MG: 40 INJECTION, SUSPENSION INTRA-ARTICULAR; INTRAMUSCULAR at 14:53

## 2023-08-11 NOTE — PROGRESS NOTES
Administrations This Visit       triamcinolone acetonide (KENALOG-40) injection 40 mg       Admin Date  08/11/2023  14:53 Action  Given Dose  40 mg Route  Other Site  Other Administered By  Janice Page MA    Ordering Provider: Araceli Rangel MD    NDC: 9309-1402-73    Lot#: [id number]    : [employer] (PRIMARY CARE)    Patient Supplied?: No    Comments: Right Hand               Admin Date  08/11/2023  14:54 Action  Given Dose  40 mg Route  Other Site  Other Administered By  Janice Page MA    Ordering Provider: Araceli Rangel MD    NDC: 9324-9839-30    Lot#: [id number]    : [employer] (PRIMARY CARE)    Patient Supplied?: No    Comments: Left Hand
primary care physician. Procedure: left Carpal Tunnel Injection  [third Injection]: After full discussion of the nature of this process and outlining a treatment plan with Ms. Brook Sosa, we discussed the complications, limitations, expectations, alternatives, and risks of injection to the carpal tunnel. I have explained the potential for bleeding, infection, potential side effects of the medication, and the remote possibility of damage to surrounding structures as result of the injection. She understood this information well and verbally consented to this treatment. The skin of the symptomatic extremity was prepped with Isopropyl Alcohol and under aseptic conditions the carpal tunnel was injected with a combination of 1 ml of 0.25% Bupivacaine without Epinephrine and 40 mg of Triamcinolone (40 mg/ml). There was good filling of the carpal tunnel. A  dry, sterile bandage was applied and she tolerated the injection without difficulty. I advised her of the expected response, possible reactions and the instructions for care of the hand. She is instructed in the judicious use of over-the-counter anti-inflammatory medications or pain relievers for her symptoms if allowed by his primary care physician. I have also discussed with Ms. Brook Sosa the other treatment options available to her for this condition. We have today selected to proceed with treatment by injection with steroid medication. She and I have agreed that if our current course of Injection treatment does not prove to be effective over the short term future, that she will schedule a follow-up appointment to discuss and select an alternate course of therapy including possibly further conservative treatment or surgical treatment. Ms. Brook Sosa has been given a full verbal list of instructions and precautions related to her present condition. I have asked her to followup with me in the office at the prescribed time.

## 2023-08-15 ENCOUNTER — OFFICE VISIT (OUTPATIENT)
Dept: ORTHOPEDIC SURGERY | Age: 43
End: 2023-08-15

## 2023-08-15 VITALS — BODY MASS INDEX: 51.91 KG/M2 | HEIGHT: 63 IN | WEIGHT: 293 LBS

## 2023-08-15 DIAGNOSIS — M19.012 ARTHRITIS OF LEFT ACROMIOCLAVICULAR JOINT: ICD-10-CM

## 2023-08-15 DIAGNOSIS — M75.82 TENDINITIS OF LEFT ROTATOR CUFF: Primary | ICD-10-CM

## 2023-08-15 RX ORDER — TRIAMCINOLONE ACETONIDE 40 MG/ML
80 INJECTION, SUSPENSION INTRA-ARTICULAR; INTRAMUSCULAR ONCE
Status: COMPLETED | OUTPATIENT
Start: 2023-08-15 | End: 2023-08-15

## 2023-08-15 RX ORDER — BUPIVACAINE HYDROCHLORIDE 2.5 MG/ML
3 INJECTION, SOLUTION INFILTRATION; PERINEURAL ONCE
Status: COMPLETED | OUTPATIENT
Start: 2023-08-15 | End: 2023-08-15

## 2023-08-15 RX ORDER — IBUPROFEN 800 MG/1
800 TABLET ORAL 2 TIMES DAILY PRN
Qty: 60 TABLET | Refills: 0 | Status: CANCELLED | OUTPATIENT
Start: 2023-08-15

## 2023-08-15 RX ADMIN — BUPIVACAINE HYDROCHLORIDE 7.5 MG: 2.5 INJECTION, SOLUTION INFILTRATION; PERINEURAL at 15:37

## 2023-08-15 RX ADMIN — TRIAMCINOLONE ACETONIDE 80 MG: 40 INJECTION, SUSPENSION INTRA-ARTICULAR; INTRAMUSCULAR at 15:37

## 2023-08-17 ENCOUNTER — OFFICE VISIT (OUTPATIENT)
Dept: BARIATRICS/WEIGHT MGMT | Age: 43
End: 2023-08-17
Payer: COMMERCIAL

## 2023-08-17 VITALS — WEIGHT: 293 LBS | HEIGHT: 63 IN | BODY MASS INDEX: 51.91 KG/M2

## 2023-08-17 DIAGNOSIS — E66.9 DIABETES MELLITUS TYPE 2 IN OBESE (HCC): ICD-10-CM

## 2023-08-17 DIAGNOSIS — E11.69 DIABETES MELLITUS TYPE 2 IN OBESE (HCC): ICD-10-CM

## 2023-08-17 DIAGNOSIS — K21.9 CHRONIC GERD: ICD-10-CM

## 2023-08-17 DIAGNOSIS — M15.9 PRIMARY OSTEOARTHRITIS INVOLVING MULTIPLE JOINTS: ICD-10-CM

## 2023-08-17 DIAGNOSIS — I10 ESSENTIAL HYPERTENSION: ICD-10-CM

## 2023-08-17 DIAGNOSIS — E88.81 METABOLIC SYNDROME: Primary | ICD-10-CM

## 2023-08-17 DIAGNOSIS — E78.2 MIXED HYPERLIPIDEMIA: ICD-10-CM

## 2023-08-17 DIAGNOSIS — G47.33 OBSTRUCTIVE SLEEP APNEA: ICD-10-CM

## 2023-08-17 DIAGNOSIS — E66.01 MORBID OBESITY WITH BMI OF 60.0-69.9, ADULT (HCC): ICD-10-CM

## 2023-08-17 PROCEDURE — 99214 OFFICE O/P EST MOD 30 MIN: CPT | Performed by: SURGERY

## 2023-08-17 PROCEDURE — 2022F DILAT RTA XM EVC RTNOPTHY: CPT | Performed by: SURGERY

## 2023-08-17 PROCEDURE — G8417 CALC BMI ABV UP PARAM F/U: HCPCS | Performed by: SURGERY

## 2023-08-17 PROCEDURE — 3044F HG A1C LEVEL LT 7.0%: CPT | Performed by: SURGERY

## 2023-08-17 PROCEDURE — 1036F TOBACCO NON-USER: CPT | Performed by: SURGERY

## 2023-08-17 PROCEDURE — G8427 DOCREV CUR MEDS BY ELIG CLIN: HCPCS | Performed by: SURGERY

## 2023-08-17 NOTE — PROGRESS NOTES
Karlene Shell lost 11 lbs over past ~month. Pt states she is really just cutting back on foods d/t food allergies. States food allergy to SOY / Hasty Schanz / Alvia Rhea / Ivy Mary Lou / Jimbo Guest which cause an itchy throat. Pt states she has a lot of health issues and personal issues going. Is pt eating at least 4 times everyday? M-Th, F-Sun she is off and may only eat 1x/day    B- oikos   S- string cheese   L- sm portion of dinner  D- grilled chicken w/ mashed potatoes & sometimes vegetables with croissant     Is pt eating a lean protein source with all meals and snacks? yes    Has pt decreased their portions using the plate method?  yes    Is pt choosing low fat/sugar free options? yes    Is pt drinking at least 64 oz of clear liquids everyday? yes - water     Has pt stopped drinking carbonation, caffeinated, and sugar sweetened beverages? A little pop here & there / ~1 every 3 days    Has pt sampled Unjury and/or Nectar protein?  discussed, to try    Has pt attended a support group?  Not scheduled     Participating in intentional exercise?  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
MARCELA PO, Take by mouth, Disp: , Rfl:     clotrimazole-betamethasone (LOTRISONE) 1-0.05 % cream, Apply topically 2 times daily. , Disp: 45 g, Rfl: 0    fluticasone (FLONASE) 50 MCG/ACT nasal spray, Use 2 spray(s) in each nostril once daily, Disp: 16 g, Rfl: 3      Review of Systems - History obtained from the patient  General ROS: negative  Psychological ROS: negative  Endocrine ROS: negative  Respiratory ROS: negative  Cardiovascular ROS: negative  Gastrointestinal ROS:negative  Genito-Urinary ROS: negative  Musculoskeletal ROS: negative   Skin ROS: negative    Physical Exam   Vitals Reviewed   Constitutional: Patient is oriented to person, place, and time. Patient appears well-developed and well-nourished. Patient is active and cooperative. Non-toxic appearance. No distress. Neck: Trachea normal and normal range of motion. No JVD present. Pulmonary/Chest: Effort normal. No accessory muscle usage or stridor. No apnea. No respiratory distress. Cardiovascular: Normal rate and no JVD. Abdominal: Normal appearance. Patient exhibits no distension. Abdomen is soft, obese, non tender. Musculoskeletal: Normal range of motion. Patient exhibits no edema. Neurological: Patient is alert and oriented to person, place, and time. Patient has normal strength. GCS eye subscore is 4. GCS verbal subscore is 5. GCS motor subscore is 6. Skin: Skin is warm and dry. No abrasion and no rash noted. Patient is not diaphoretic. No cyanosis or erythema. Psychiatric: Patient has a normal mood and affect. Speech is normal and behavior is normal. Cognition and memory are normal.       Lissette Suazo is 38 y.o. female, BMI 60 pre surgery, has lost 11lbs since last visit. The patient underwent dietary counseling with registered dietician. I have reviewed, discussed and agree with the dietary plan. Patient is trying hard to keep good dietary and behavior modifications.  Patient is monitoring portion sizes, food choices and

## 2023-08-17 NOTE — PATIENT INSTRUCTIONS
Patient received dietary handouts and education.     Plan/Recommendations:   - Continue to focus on lean protein 4x/day  - Continue to work towards eliminate soda  - Try protein powder  - Complete Support Group  - Walk 10 min 2x/wk
Cigarettes

## 2023-08-19 ENCOUNTER — APPOINTMENT (OUTPATIENT)
Dept: GENERAL RADIOLOGY | Age: 43
End: 2023-08-19
Payer: OTHER MISCELLANEOUS

## 2023-08-19 ENCOUNTER — HOSPITAL ENCOUNTER (INPATIENT)
Age: 43
LOS: 3 days | Discharge: LEFT BEFORE DISCHARGE | DRG: 720 | End: 2023-08-22
Attending: STUDENT IN AN ORGANIZED HEALTH CARE EDUCATION/TRAINING PROGRAM | Admitting: INTERNAL MEDICINE
Payer: COMMERCIAL

## 2023-08-19 ENCOUNTER — APPOINTMENT (OUTPATIENT)
Dept: CT IMAGING | Age: 43
DRG: 720 | End: 2023-08-19
Payer: COMMERCIAL

## 2023-08-19 ENCOUNTER — APPOINTMENT (OUTPATIENT)
Dept: GENERAL RADIOLOGY | Age: 43
DRG: 720 | End: 2023-08-19
Payer: COMMERCIAL

## 2023-08-19 ENCOUNTER — HOSPITAL ENCOUNTER (EMERGENCY)
Age: 43
Discharge: HOME | End: 2023-08-19
Attending: EMERGENCY MEDICINE
Payer: OTHER MISCELLANEOUS

## 2023-08-19 VITALS
BODY MASS INDEX: 51.91 KG/M2 | DIASTOLIC BLOOD PRESSURE: 89 MMHG | TEMPERATURE: 98.2 F | RESPIRATION RATE: 20 BRPM | SYSTOLIC BLOOD PRESSURE: 156 MMHG | HEIGHT: 63 IN | OXYGEN SATURATION: 94 % | HEART RATE: 69 BPM | WEIGHT: 293 LBS

## 2023-08-19 DIAGNOSIS — L03.116 CELLULITIS OF LEFT LOWER EXTREMITY: Primary | ICD-10-CM
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
## 2023-08-19 PROBLEM — A41.9 SEPSIS (HCC): Status: ACTIVE | Noted: 2023-08-19

## 2023-08-19 LAB
ANION GAP SERPL CALCULATED.3IONS-SCNC: 14 MMOL/L (ref 3–16)
BASOPHILS # BLD: 0 K/UL (ref 0–0.2)
BASOPHILS NFR BLD: 0.2 %
BUN SERPL-MCNC: 11 MG/DL (ref 7–20)
CALCIUM SERPL-MCNC: 9 MG/DL (ref 8.3–10.6)
CHLORIDE SERPL-SCNC: 98 MMOL/L (ref 99–110)
CO2 SERPL-SCNC: 24 MMOL/L (ref 21–32)
CREAT SERPL-MCNC: 0.8 MG/DL (ref 0.6–1.1)
DEPRECATED RDW RBC AUTO: 16.2 % (ref 12.4–15.4)
EOSINOPHIL # BLD: 0 K/UL (ref 0–0.6)
EOSINOPHIL NFR BLD: 0 %
FLUAV RNA RESP QL NAA+PROBE: NOT DETECTED
FLUBV RNA RESP QL NAA+PROBE: NOT DETECTED
GFR SERPLBLD CREATININE-BSD FMLA CKD-EPI: >60 ML/MIN/{1.73_M2}
GLUCOSE SERPL-MCNC: 94 MG/DL (ref 70–99)
HCT VFR BLD AUTO: 35.4 % (ref 36–48)
HGB BLD-MCNC: 11.6 G/DL (ref 12–16)
LACTATE BLDV-SCNC: 2.5 MMOL/L (ref 0.4–2)
LYMPHOCYTES # BLD: 1.6 K/UL (ref 1–5.1)
LYMPHOCYTES NFR BLD: 8.2 %
MCH RBC QN AUTO: 25.2 PG (ref 26–34)
MCHC RBC AUTO-ENTMCNC: 32.8 G/DL (ref 31–36)
MCV RBC AUTO: 76.7 FL (ref 80–100)
MONOCYTES # BLD: 0.6 K/UL (ref 0–1.3)
MONOCYTES NFR BLD: 3.1 %
NEUTROPHILS # BLD: 17.1 K/UL (ref 1.7–7.7)
NEUTROPHILS NFR BLD: 88.5 %
PLATELET # BLD AUTO: 290 K/UL (ref 135–450)
PMV BLD AUTO: 8.3 FL (ref 5–10.5)
POTASSIUM SERPL-SCNC: 4.6 MMOL/L (ref 3.5–5.1)
RBC # BLD AUTO: 4.61 M/UL (ref 4–5.2)
SARS-COV-2 RNA RESP QL NAA+PROBE: NOT DETECTED
SODIUM SERPL-SCNC: 136 MMOL/L (ref 136–145)
WBC # BLD AUTO: 19.3 K/UL (ref 4–11)

## 2023-08-19 PROCEDURE — 2580000003 HC RX 258: Performed by: STUDENT IN AN ORGANIZED HEALTH CARE EDUCATION/TRAINING PROGRAM

## 2023-08-19 PROCEDURE — 73552 X-RAY EXAM OF FEMUR 2/>: CPT

## 2023-08-19 PROCEDURE — 6370000000 HC RX 637 (ALT 250 FOR IP): Performed by: STUDENT IN AN ORGANIZED HEALTH CARE EDUCATION/TRAINING PROGRAM

## 2023-08-19 PROCEDURE — 73560 X-RAY EXAM OF KNEE 1 OR 2: CPT

## 2023-08-19 PROCEDURE — 87040 BLOOD CULTURE FOR BACTERIA: CPT

## 2023-08-19 PROCEDURE — 87636 SARSCOV2 & INF A&B AMP PRB: CPT

## 2023-08-19 PROCEDURE — 85025 COMPLETE CBC W/AUTO DIFF WBC: CPT

## 2023-08-19 PROCEDURE — 99285 EMERGENCY DEPT VISIT HI MDM: CPT

## 2023-08-19 PROCEDURE — 83605 ASSAY OF LACTIC ACID: CPT

## 2023-08-19 PROCEDURE — 71045 X-RAY EXAM CHEST 1 VIEW: CPT

## 2023-08-19 PROCEDURE — 80048 BASIC METABOLIC PNL TOTAL CA: CPT

## 2023-08-19 PROCEDURE — 1200000000 HC SEMI PRIVATE

## 2023-08-19 PROCEDURE — 72170 X-RAY EXAM OF PELVIS: CPT

## 2023-08-19 PROCEDURE — 6370000000 HC RX 637 (ALT 250 FOR IP): Performed by: EMERGENCY MEDICINE

## 2023-08-19 PROCEDURE — 73590 X-RAY EXAM OF LOWER LEG: CPT

## 2023-08-19 PROCEDURE — 99283 EMERGENCY DEPT VISIT LOW MDM: CPT

## 2023-08-19 PROCEDURE — 72080 X-RAY EXAM THORACOLMB 2/> VW: CPT

## 2023-08-19 PROCEDURE — 70450 CT HEAD/BRAIN W/O DYE: CPT

## 2023-08-19 RX ORDER — ONDANSETRON 4 MG/1
4 TABLET, ORALLY DISINTEGRATING ORAL 3 TIMES DAILY PRN
Qty: 21 TABLET | Refills: 0 | Status: SHIPPED | OUTPATIENT
Start: 2023-08-19

## 2023-08-19 RX ORDER — SODIUM CHLORIDE, SODIUM LACTATE, POTASSIUM CHLORIDE, AND CALCIUM CHLORIDE .6; .31; .03; .02 G/100ML; G/100ML; G/100ML; G/100ML
1000 INJECTION, SOLUTION INTRAVENOUS ONCE
Status: COMPLETED | OUTPATIENT
Start: 2023-08-19 | End: 2023-08-19

## 2023-08-19 RX ORDER — METHOCARBAMOL 500 MG/1
750 TABLET, FILM COATED ORAL ONCE
Status: COMPLETED | OUTPATIENT
Start: 2023-08-19 | End: 2023-08-19

## 2023-08-19 RX ORDER — ACETAMINOPHEN 325 MG/1
650 TABLET ORAL ONCE
Status: COMPLETED | OUTPATIENT
Start: 2023-08-19 | End: 2023-08-19

## 2023-08-19 RX ORDER — IBUPROFEN 400 MG/1
800 TABLET ORAL ONCE
Status: COMPLETED | OUTPATIENT
Start: 2023-08-19 | End: 2023-08-19

## 2023-08-19 RX ADMIN — ACETAMINOPHEN 650 MG: 325 TABLET ORAL at 01:03

## 2023-08-19 RX ADMIN — SODIUM CHLORIDE, POTASSIUM CHLORIDE, SODIUM LACTATE AND CALCIUM CHLORIDE 1000 ML: 600; 310; 30; 20 INJECTION, SOLUTION INTRAVENOUS at 21:34

## 2023-08-19 RX ADMIN — IBUPROFEN 800 MG: 400 TABLET, FILM COATED ORAL at 22:17

## 2023-08-19 RX ADMIN — METHOCARBAMOL 750 MG: 500 TABLET ORAL at 01:03

## 2023-08-19 ASSESSMENT — PAIN DESCRIPTION - LOCATION
LOCATION: OTHER (COMMENT)
LOCATION: GENERALIZED

## 2023-08-19 ASSESSMENT — PAIN DESCRIPTION - PAIN TYPE: TYPE: ACUTE PAIN

## 2023-08-19 ASSESSMENT — PAIN DESCRIPTION - FREQUENCY: FREQUENCY: CONTINUOUS

## 2023-08-19 ASSESSMENT — PAIN DESCRIPTION - DESCRIPTORS
DESCRIPTORS: DISCOMFORT
DESCRIPTORS: ACHING
DESCRIPTORS: ACHING

## 2023-08-19 ASSESSMENT — ENCOUNTER SYMPTOMS: BACK PAIN: 1

## 2023-08-19 ASSESSMENT — PAIN SCALES - GENERAL
PAINLEVEL_OUTOF10: 10
PAINLEVEL_OUTOF10: 9
PAINLEVEL_OUTOF10: 10
PAINLEVEL_OUTOF10: 10

## 2023-08-19 ASSESSMENT — PAIN DESCRIPTION - ORIENTATION: ORIENTATION: LEFT

## 2023-08-19 NOTE — DISCHARGE INSTRUCTIONS
Your x-ray showed no evidence of fracture today. You will likely be sore for the next few days. Take ibuprofen, and use Zofran as needed for nausea.

## 2023-08-20 LAB
ANION GAP SERPL CALCULATED.3IONS-SCNC: 9 MMOL/L (ref 3–16)
BASOPHILS # BLD: 0 K/UL (ref 0–0.2)
BASOPHILS NFR BLD: 0.1 %
BUN SERPL-MCNC: 8 MG/DL (ref 7–20)
CALCIUM SERPL-MCNC: 8.9 MG/DL (ref 8.3–10.6)
CHLORIDE SERPL-SCNC: 102 MMOL/L (ref 99–110)
CO2 SERPL-SCNC: 26 MMOL/L (ref 21–32)
CREAT SERPL-MCNC: 0.7 MG/DL (ref 0.6–1.1)
DEPRECATED RDW RBC AUTO: 16.6 % (ref 12.4–15.4)
EOSINOPHIL # BLD: 0 K/UL (ref 0–0.6)
EOSINOPHIL NFR BLD: 0.1 %
GFR SERPLBLD CREATININE-BSD FMLA CKD-EPI: >60 ML/MIN/{1.73_M2}
GLUCOSE SERPL-MCNC: 106 MG/DL (ref 70–99)
HCT VFR BLD AUTO: 33.5 % (ref 36–48)
HGB BLD-MCNC: 10.5 G/DL (ref 12–16)
LACTATE BLDV-SCNC: 1.2 MMOL/L (ref 0.4–2)
LACTATE BLDV-SCNC: 1.7 MMOL/L (ref 0.4–2)
LYMPHOCYTES # BLD: 1.5 K/UL (ref 1–5.1)
LYMPHOCYTES NFR BLD: 8.2 %
MCH RBC QN AUTO: 24.7 PG (ref 26–34)
MCHC RBC AUTO-ENTMCNC: 31.4 G/DL (ref 31–36)
MCV RBC AUTO: 78.4 FL (ref 80–100)
MONOCYTES # BLD: 0.6 K/UL (ref 0–1.3)
MONOCYTES NFR BLD: 3.3 %
NEUTROPHILS # BLD: 16.1 K/UL (ref 1.7–7.7)
NEUTROPHILS NFR BLD: 88.3 %
PLATELET # BLD AUTO: 273 K/UL (ref 135–450)
PMV BLD AUTO: 8.1 FL (ref 5–10.5)
POTASSIUM SERPL-SCNC: 4 MMOL/L (ref 3.5–5.1)
RBC # BLD AUTO: 4.28 M/UL (ref 4–5.2)
SODIUM SERPL-SCNC: 137 MMOL/L (ref 136–145)
WBC # BLD AUTO: 18.3 K/UL (ref 4–11)

## 2023-08-20 PROCEDURE — 83605 ASSAY OF LACTIC ACID: CPT

## 2023-08-20 PROCEDURE — 2580000003 HC RX 258: Performed by: INTERNAL MEDICINE

## 2023-08-20 PROCEDURE — 80048 BASIC METABOLIC PNL TOTAL CA: CPT

## 2023-08-20 PROCEDURE — 6360000002 HC RX W HCPCS: Performed by: STUDENT IN AN ORGANIZED HEALTH CARE EDUCATION/TRAINING PROGRAM

## 2023-08-20 PROCEDURE — 6360000002 HC RX W HCPCS: Performed by: INTERNAL MEDICINE

## 2023-08-20 PROCEDURE — 36415 COLL VENOUS BLD VENIPUNCTURE: CPT

## 2023-08-20 PROCEDURE — 2580000003 HC RX 258: Performed by: STUDENT IN AN ORGANIZED HEALTH CARE EDUCATION/TRAINING PROGRAM

## 2023-08-20 PROCEDURE — 1200000000 HC SEMI PRIVATE

## 2023-08-20 PROCEDURE — 6370000000 HC RX 637 (ALT 250 FOR IP): Performed by: INTERNAL MEDICINE

## 2023-08-20 PROCEDURE — 85025 COMPLETE CBC W/AUTO DIFF WBC: CPT

## 2023-08-20 PROCEDURE — 87040 BLOOD CULTURE FOR BACTERIA: CPT

## 2023-08-20 RX ORDER — ENOXAPARIN SODIUM 100 MG/ML
40 INJECTION SUBCUTANEOUS 2 TIMES DAILY
Status: DISCONTINUED | OUTPATIENT
Start: 2023-08-20 | End: 2023-08-22 | Stop reason: HOSPADM

## 2023-08-20 RX ORDER — SODIUM CHLORIDE 9 MG/ML
INJECTION, SOLUTION INTRAVENOUS PRN
Status: DISCONTINUED | OUTPATIENT
Start: 2023-08-20 | End: 2023-08-22 | Stop reason: HOSPADM

## 2023-08-20 RX ORDER — ONDANSETRON 4 MG/1
4 TABLET, ORALLY DISINTEGRATING ORAL EVERY 8 HOURS PRN
Status: DISCONTINUED | OUTPATIENT
Start: 2023-08-20 | End: 2023-08-22 | Stop reason: HOSPADM

## 2023-08-20 RX ORDER — IBUPROFEN 200 MG
400 TABLET ORAL EVERY 6 HOURS PRN
Status: DISCONTINUED | OUTPATIENT
Start: 2023-08-20 | End: 2023-08-22 | Stop reason: HOSPADM

## 2023-08-20 RX ORDER — ONDANSETRON 2 MG/ML
4 INJECTION INTRAMUSCULAR; INTRAVENOUS EVERY 6 HOURS PRN
Status: DISCONTINUED | OUTPATIENT
Start: 2023-08-20 | End: 2023-08-22 | Stop reason: HOSPADM

## 2023-08-20 RX ORDER — SODIUM CHLORIDE 9 MG/ML
25 INJECTION, SOLUTION INTRAVENOUS PRN
Status: DISCONTINUED | OUTPATIENT
Start: 2023-08-20 | End: 2023-08-22 | Stop reason: HOSPADM

## 2023-08-20 RX ORDER — SODIUM CHLORIDE 0.9 % (FLUSH) 0.9 %
5-40 SYRINGE (ML) INJECTION EVERY 12 HOURS SCHEDULED
Status: DISCONTINUED | OUTPATIENT
Start: 2023-08-20 | End: 2023-08-22 | Stop reason: HOSPADM

## 2023-08-20 RX ORDER — POLYETHYLENE GLYCOL 3350 17 G/17G
17 POWDER, FOR SOLUTION ORAL DAILY PRN
Status: DISCONTINUED | OUTPATIENT
Start: 2023-08-20 | End: 2023-08-22 | Stop reason: HOSPADM

## 2023-08-20 RX ORDER — SODIUM CHLORIDE 0.9 % (FLUSH) 0.9 %
5-40 SYRINGE (ML) INJECTION EVERY 12 HOURS SCHEDULED
Status: DISCONTINUED | OUTPATIENT
Start: 2023-08-20 | End: 2023-08-21

## 2023-08-20 RX ORDER — SODIUM CHLORIDE, SODIUM LACTATE, POTASSIUM CHLORIDE, CALCIUM CHLORIDE 600; 310; 30; 20 MG/100ML; MG/100ML; MG/100ML; MG/100ML
INJECTION, SOLUTION INTRAVENOUS CONTINUOUS
Status: ACTIVE | OUTPATIENT
Start: 2023-08-20 | End: 2023-08-20

## 2023-08-20 RX ORDER — SODIUM CHLORIDE 0.9 % (FLUSH) 0.9 %
5-40 SYRINGE (ML) INJECTION PRN
Status: DISCONTINUED | OUTPATIENT
Start: 2023-08-20 | End: 2023-08-22 | Stop reason: HOSPADM

## 2023-08-20 RX ORDER — LIDOCAINE HYDROCHLORIDE 10 MG/ML
5 INJECTION, SOLUTION EPIDURAL; INFILTRATION; INTRACAUDAL; PERINEURAL ONCE
Status: DISCONTINUED | OUTPATIENT
Start: 2023-08-20 | End: 2023-08-21

## 2023-08-20 RX ADMIN — CEFEPIME 2000 MG: 2 INJECTION, POWDER, FOR SOLUTION INTRAVENOUS at 17:29

## 2023-08-20 RX ADMIN — Medication 10 ML: at 20:54

## 2023-08-20 RX ADMIN — CEFEPIME 2000 MG: 2 INJECTION, POWDER, FOR SOLUTION INTRAVENOUS at 08:49

## 2023-08-20 RX ADMIN — ENOXAPARIN SODIUM 40 MG: 100 INJECTION SUBCUTANEOUS at 08:46

## 2023-08-20 RX ADMIN — HYDROMORPHONE HYDROCHLORIDE 0.5 MG: 1 INJECTION, SOLUTION INTRAMUSCULAR; INTRAVENOUS; SUBCUTANEOUS at 01:48

## 2023-08-20 RX ADMIN — VANCOMYCIN HYDROCHLORIDE 2500 MG: 1 INJECTION, POWDER, LYOPHILIZED, FOR SOLUTION INTRAVENOUS at 00:05

## 2023-08-20 RX ADMIN — SODIUM CHLORIDE, PRESERVATIVE FREE 10 ML: 5 INJECTION INTRAVENOUS at 08:46

## 2023-08-20 RX ADMIN — SODIUM CHLORIDE, POTASSIUM CHLORIDE, SODIUM LACTATE AND CALCIUM CHLORIDE: 600; 310; 30; 20 INJECTION, SOLUTION INTRAVENOUS at 03:45

## 2023-08-20 RX ADMIN — ENOXAPARIN SODIUM 40 MG: 100 INJECTION SUBCUTANEOUS at 20:54

## 2023-08-20 RX ADMIN — IBUPROFEN 400 MG: 200 TABLET, FILM COATED ORAL at 20:53

## 2023-08-20 RX ADMIN — CEFEPIME 2000 MG: 2 INJECTION, POWDER, FOR SOLUTION INTRAVENOUS at 03:47

## 2023-08-20 RX ADMIN — VANCOMYCIN HYDROCHLORIDE 1250 MG: 10 INJECTION, POWDER, LYOPHILIZED, FOR SOLUTION INTRAVENOUS at 13:49

## 2023-08-20 RX ADMIN — HYDROMORPHONE HYDROCHLORIDE 0.5 MG: 1 INJECTION, SOLUTION INTRAMUSCULAR; INTRAVENOUS; SUBCUTANEOUS at 12:08

## 2023-08-20 ASSESSMENT — PAIN DESCRIPTION - ORIENTATION
ORIENTATION: LEFT

## 2023-08-20 ASSESSMENT — PAIN DESCRIPTION - FREQUENCY
FREQUENCY: INTERMITTENT
FREQUENCY: INTERMITTENT
FREQUENCY: CONTINUOUS

## 2023-08-20 ASSESSMENT — PAIN - FUNCTIONAL ASSESSMENT
PAIN_FUNCTIONAL_ASSESSMENT: ACTIVITIES ARE NOT PREVENTED
PAIN_FUNCTIONAL_ASSESSMENT: ACTIVITIES ARE NOT PREVENTED

## 2023-08-20 ASSESSMENT — PAIN SCALES - GENERAL
PAINLEVEL_OUTOF10: 10
PAINLEVEL_OUTOF10: 1
PAINLEVEL_OUTOF10: 10
PAINLEVEL_OUTOF10: 3
PAINLEVEL_OUTOF10: 7

## 2023-08-20 ASSESSMENT — PAIN DESCRIPTION - PAIN TYPE
TYPE: ACUTE PAIN

## 2023-08-20 ASSESSMENT — PAIN DESCRIPTION - LOCATION
LOCATION: LEG
LOCATION: SHOULDER

## 2023-08-20 ASSESSMENT — PAIN DESCRIPTION - DIRECTION: RADIATING_TOWARDS: EVERYWHERE

## 2023-08-20 ASSESSMENT — PAIN DESCRIPTION - DESCRIPTORS
DESCRIPTORS: DISCOMFORT;ACHING
DESCRIPTORS: DISCOMFORT
DESCRIPTORS: DISCOMFORT

## 2023-08-20 ASSESSMENT — PAIN DESCRIPTION - ONSET
ONSET: AWAKENED FROM SLEEP
ONSET: AWAKENED FROM SLEEP

## 2023-08-20 NOTE — PROGRESS NOTES
Clinical Pharmacy Progress Note    Vancomycin - Management by Pharmacy    Consult Date(s): 08/20/23  Consulting Provider(s): Sai Dong MD    Assessment / Plan  SSTI (associated with Sepsis)- Vancomycin  Concurrent Antimicrobials: Cefepime  Day of Vanc Therapy / Ordered Duration: 1 / 3  Current Dosing Method: Bayesian-Guided AUC Dosing  Therapeutic Goal: AUC < 500 mg/L*hr  Current Dose / Plan:   Patient with Scr of 0.8 mg/dL. Baseline appears to be ~ 0.6 mg/dL. Patient was given a loading dose of 2500 mg IV x once in the ED. Vancomycin 1250 mg IV q12hr ordered. Predicted ssAUC = 510 mg/L*hr and ssTrough = 13.8 mg/L. Will plan to check a level 8/22 AM.  Will continue to monitor clinical condition and make adjustments to regimen as appropriate. Please call with any questions,    Francine Amado  PGY1 Pharmacy Resident  Phone: [phone] Jamil: [id number]  8/20/2023 10:48 AM        Interval update:  Leukocytosis and creatinine slightly decreased from last night. Patient afebrile, BP decreasing. Subjective/Objective:   Celena Marin is a 38 y.o. female with a PMHx significant for migraine, essential hypertension, hyperlipidemia, prediabetes, diastolic CHF, obstructive sleep apnea and morbid obesity who is admitted with sepsis secondary to LLE Cellulitis. Pharmacy is consulted to dose Vancomycin. Ht Readings from Last 1 Encounters:   08/19/23 5' 3\" (1.6 m)     Wt Readings from Last 1 Encounters:   08/19/23 (!) 342 lb 4.8 oz (155.3 kg)     Current & Prior Antimicrobial Regimen(s):  Cefepime (08/19 - Current)  Vancomycin (08/19 - Current)  2500 mg IV x ONCE (08/20)  1250 mg IV q12 hr    Vancomycin Level(s) / Doses:    Date Time Dose Type of Level / Level Interpretation                 Note: Serum levels collected for AUC-based dosing may be high if collected in close proximity to the dose administered. This is not necessarily indicative of toxicity.     Cultures & Sensitivities:    Date Site Micro

## 2023-08-20 NOTE — PROGRESS NOTES
Clinical Pharmacy Progress Note    Vancomycin - Management by Pharmacy    Consult Date(s): 08/20/23  Consulting Provider(s): Edd Thomas MD    Assessment / Plan  SSTI (associated with Sepsis)- Vancomycin  Concurrent Antimicrobials: Cefepime  Day of Vanc Therapy / Ordered Duration: 1 / 3  Current Dosing Method: Bayesian-Guided AUC Dosing  Therapeutic Goal: AUC < 500 mg/L*hr  Current Dose / Plan:   Patient with Scr of 0.8 mg/dL. Baseline appears to be ~ 0.6 mg/dL. Patient was given a loading dose of 2500 mg IV x once in the ED. Will start Vancomycin 1250 mg IV q12hr. This dose predicts a ssAUC of 510 mg/L*hr and ssTrough of 13.8 mg/L. Will check a level in ~ 48 hours. Will continue to monitor clinical condition and make adjustments to regimen as appropriate. Thank you for consulting pharmacy,    Dano Oakley, PharmD  Main Pharmacy: [id number]  8/20/2023 3:34 AM      Interval update:      Subjective/Objective:   Vini Lyon is a 38 y.o. female with a PMHx significant for migraine, essential hypertension, hyperlipidemia, prediabetes, diastolic CHF, obstructive sleep apnea and morbid obesity who is admitted with sepsis secondary to LLE Cellulitis. Pharmacy is consulted to dose Vancomycin. Ht Readings from Last 1 Encounters:   08/19/23 5' 3\" (1.6 m)     Wt Readings from Last 1 Encounters:   08/19/23 (!) 342 lb 4.8 oz (155.3 kg)     Current & Prior Antimicrobial Regimen(s):  Cefepime (08/19 - Current)  Vancomycin (08/19 - Current)  2500 mg IV x ONCE (08/20)  1250 mg IV q12 hr    Vancomycin Level(s) / Doses:    Date Time Dose Type of Level / Level Interpretation                 Note: Serum levels collected for AUC-based dosing may be high if collected in close proximity to the dose administered. This is not necessarily indicative of toxicity.     Cultures & Sensitivities:    Date Site Micro Susceptibility / Result   08/19 Blood x 2 Sent    08/19 COVID/ Flu NEGATIVE      Recent Labs     08/19/23  1316

## 2023-08-20 NOTE — PROGRESS NOTES
Pt A&Ox4 vss. IV abx given. Pain controlled with PRN medications. Specialty bed ordered. Purewick in place to wall suction. Pt tolerating po intake. LLE red and swollen, tender to touch. Pt in bed with wheels locked and in the lowest position with call light and personal belongings in reach. Bed alarm set. CPAP on patient.  Electronically signed by Anil Keller RN on 8/20/2023 at 4:38 PM

## 2023-08-20 NOTE — PROGRESS NOTES
V2.0    [facility] Progress Note      Name:  Car Patel /Age/Sex: 1980  (38 y.o. female)   MRN & CSN:  [id number] & [id number] Encounter Date/Time: 2023 12:27 PM EDT   Location:  5305/5305-01 PCP: Piedad Arroyo MD       Hospital Day: 2    Assessment and Recommendations   78-year-old Afro-American female with significant past medical history of morbid obesity with a BMI of 60.64 kg/m2, hypertension, obstructive sleep apnea, hyperlipidemia, diastolic CHF presented with left lower extremity pain and found to have cellulitis with sepsis    Severe sepsis secondary to left lower extremity cellulitis lactic acid elevation of 2.5  Lower extremity cellulitis  Morbid obesity  Hypertension  Hyperlipidemia  Prediabetes  Morbid obesity with a BMI of 60.60/kg/m2  Anemia        Plan:     Continue with IV cefepime and vancomycin closely monitor renal function and platelets level   Ace wrap left lower extremity and keep it elevated  Follow-up lactic acid normalized  Leukocytosis mildly trended down  Follow blood cultures  Labs in a.m. Comment: Please note this report has been produced using speech recognition software and may contain errors related to that system including errors in grammar, punctuation, and spelling, as well as words and phrases that may be inappropriate. If there are any questions or concerns please feel free to contact the dictating provider for clarification. Diet ADULT DIET;  Regular   DVT Prophylaxis [x] Lovenox, []  Heparin, [x] SCDs, [] Ambulation,  [] Eliquis, [] Xarelto   Code Status Full Code   Disposition From: Home  Expected Disposition:  Home  Estimated Date of Discharge:      Surrogate Decision Maker/ POA       Personally reviewed Lab Studies and Imaging     Discussed management of the case with     Imaging that was interpreted personally     Drugs that require monitoring for toxicity include IV cefepime and vancomycin with close monitoring of
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
## 2023-08-20 NOTE — PROGRESS NOTES
4 Eyes Admission Assessment     I agree as the admission nurse that 2 RN's have performed a thorough Head to Toe Skin Assessment on the patient. ALL assessment sites listed below have been assessed on admission. Areas assessed by both nurses:   [x]   Head, Face, and Ears   [x]   Shoulders, Back, and Chest  [x]   Arms, Elbows, and Hands   [x]   Coccyx, Sacrum, and Ischum  [x]   Legs, Feet, and Heels        Pt has redness to LLE. Skin intact no open wounds noted upon assessmet  Does the Patient have Skin Breakdown?   No         Cyril Prevention initiated:  Yes   Wound Care Orders initiated:  Yes      [facility] nurse consulted for Pressure Injury (Stage 3,4, Unstageable, DTI, NWPT, and Complex wounds):  NA      Nurse 1 eSignature: Electronically signed by Anderson Acevedo RN on 8/20/23 at 1:33 AM EDT    **SHARE this note so that the co-signing nurse is able to place an eSignature**    Nurse 2 eSignature: Electronically signed by Leeanna Villasenor RN on 8/20/23 at 1:48 AM EDT

## 2023-08-20 NOTE — ED NOTES
febrile, patient was given Ibuprofen and temp was reassessed axillary which was 102.7 and then an oral one was taken which showed patient to be 99.7. Patient has been changed and placed in a pure wick. Patient HR initially  was NSR with a rate of 80 to 94 BPM, most recently patient has become tachy with a rate of 110 to 124 BPM, will let attending know. Second Lactate sent.    If any further questions, please call Sending RN at [phone]     Electronically signed by: Electronically signed by Violeta Olszewski, RN on 8/19/2023 at 11:39 PM      Violeta Olszewski, [place]  08/19/23 [address], RN  08/19/23 8037

## 2023-08-20 NOTE — PROGRESS NOTES
Patient admitted to room 5305 from ED. Patient is A&O x 4. VSS. Patient oriented to the room all safety measures in place. Admission orders released and patient 4 eyes completed. Admission documentation completed. No other needs are noted at this time.     [x] Bed alarm on and cord plugged into wall  [x] Bed in lowest position  [x] Call light and bedside table within reach  [x] Patient educated on all safety measures  []Oxygen connected to wall (if applicable)     Nurse 1 Esignature: Electronically signed by Noé Faustin RN on 8/20/23 at 2:59 AM EDT  Nurse 2 Esignature: {Esignature:[id number]}

## 2023-08-21 LAB
ANION GAP SERPL CALCULATED.3IONS-SCNC: 16 MMOL/L (ref 3–16)
BASOPHILS # BLD: 0 K/UL (ref 0–0.2)
BASOPHILS NFR BLD: 0.3 %
BUN SERPL-MCNC: 9 MG/DL (ref 7–20)
CALCIUM SERPL-MCNC: 9.4 MG/DL (ref 8.3–10.6)
CHLORIDE SERPL-SCNC: 99 MMOL/L (ref 99–110)
CO2 SERPL-SCNC: 17 MMOL/L (ref 21–32)
CREAT SERPL-MCNC: 0.6 MG/DL (ref 0.6–1.1)
DEPRECATED RDW RBC AUTO: 16.8 % (ref 12.4–15.4)
EOSINOPHIL # BLD: 0 K/UL (ref 0–0.6)
EOSINOPHIL NFR BLD: 0.3 %
GFR SERPLBLD CREATININE-BSD FMLA CKD-EPI: >60 ML/MIN/{1.73_M2}
GLUCOSE SERPL-MCNC: 160 MG/DL (ref 70–99)
HCT VFR BLD AUTO: 40.2 % (ref 36–48)
HGB BLD-MCNC: 11.9 G/DL (ref 12–16)
LYMPHOCYTES # BLD: 1.5 K/UL (ref 1–5.1)
LYMPHOCYTES NFR BLD: 9.9 %
MCH RBC QN AUTO: 24.8 PG (ref 26–34)
MCHC RBC AUTO-ENTMCNC: 29.7 G/DL (ref 31–36)
MCV RBC AUTO: 83.5 FL (ref 80–100)
MONOCYTES # BLD: 0.4 K/UL (ref 0–1.3)
MONOCYTES NFR BLD: 2.4 %
NEUTROPHILS # BLD: 13.1 K/UL (ref 1.7–7.7)
NEUTROPHILS NFR BLD: 87.1 %
PLATELET # BLD AUTO: 247 K/UL (ref 135–450)
PMV BLD AUTO: 8.1 FL (ref 5–10.5)
POTASSIUM SERPL-SCNC: 4.5 MMOL/L (ref 3.5–5.1)
RBC # BLD AUTO: 4.82 M/UL (ref 4–5.2)
SODIUM SERPL-SCNC: 132 MMOL/L (ref 136–145)
WBC # BLD AUTO: 15 K/UL (ref 4–11)

## 2023-08-21 PROCEDURE — 02HV33Z INSERTION OF INFUSION DEVICE INTO SUPERIOR VENA CAVA, PERCUTANEOUS APPROACH: ICD-10-PCS | Performed by: INTERNAL MEDICINE

## 2023-08-21 PROCEDURE — 6360000002 HC RX W HCPCS: Performed by: INTERNAL MEDICINE

## 2023-08-21 PROCEDURE — 2580000003 HC RX 258: Performed by: INTERNAL MEDICINE

## 2023-08-21 PROCEDURE — 36569 INSJ PICC 5 YR+ W/O IMAGING: CPT

## 2023-08-21 PROCEDURE — C1751 CATH, INF, PER/CENT/MIDLINE: HCPCS

## 2023-08-21 PROCEDURE — 36415 COLL VENOUS BLD VENIPUNCTURE: CPT

## 2023-08-21 PROCEDURE — 85025 COMPLETE CBC W/AUTO DIFF WBC: CPT

## 2023-08-21 PROCEDURE — 80048 BASIC METABOLIC PNL TOTAL CA: CPT

## 2023-08-21 PROCEDURE — 1200000000 HC SEMI PRIVATE

## 2023-08-21 RX ADMIN — SODIUM CHLORIDE, PRESERVATIVE FREE 10 ML: 5 INJECTION INTRAVENOUS at 22:57

## 2023-08-21 RX ADMIN — CEFEPIME 2000 MG: 2 INJECTION, POWDER, FOR SOLUTION INTRAVENOUS at 20:04

## 2023-08-21 RX ADMIN — HYDROMORPHONE HYDROCHLORIDE 0.5 MG: 1 INJECTION, SOLUTION INTRAMUSCULAR; INTRAVENOUS; SUBCUTANEOUS at 21:28

## 2023-08-21 RX ADMIN — VANCOMYCIN HYDROCHLORIDE 1250 MG: 10 INJECTION, POWDER, LYOPHILIZED, FOR SOLUTION INTRAVENOUS at 00:31

## 2023-08-21 RX ADMIN — ENOXAPARIN SODIUM 40 MG: 100 INJECTION SUBCUTANEOUS at 10:17

## 2023-08-21 RX ADMIN — CEFEPIME 2000 MG: 2 INJECTION, POWDER, FOR SOLUTION INTRAVENOUS at 10:25

## 2023-08-21 RX ADMIN — SODIUM CHLORIDE, PRESERVATIVE FREE 10 ML: 5 INJECTION INTRAVENOUS at 10:12

## 2023-08-21 RX ADMIN — VANCOMYCIN HYDROCHLORIDE 1500 MG: 10 INJECTION, POWDER, LYOPHILIZED, FOR SOLUTION INTRAVENOUS at 18:04

## 2023-08-21 RX ADMIN — SODIUM CHLORIDE: 9 INJECTION, SOLUTION INTRAVENOUS at 17:59

## 2023-08-21 RX ADMIN — ENOXAPARIN SODIUM 40 MG: 100 INJECTION SUBCUTANEOUS at 21:30

## 2023-08-21 RX ADMIN — CEFEPIME 2000 MG: 2 INJECTION, POWDER, FOR SOLUTION INTRAVENOUS at 03:33

## 2023-08-21 ASSESSMENT — PAIN DESCRIPTION - ORIENTATION
ORIENTATION: LEFT

## 2023-08-21 ASSESSMENT — PAIN DESCRIPTION - DESCRIPTORS
DESCRIPTORS: TENDER;SORE
DESCRIPTORS: DISCOMFORT
DESCRIPTORS: TENDER;SORE

## 2023-08-21 ASSESSMENT — PAIN DESCRIPTION - FREQUENCY
FREQUENCY: INTERMITTENT
FREQUENCY: INTERMITTENT

## 2023-08-21 ASSESSMENT — PAIN SCALES - GENERAL
PAINLEVEL_OUTOF10: 10
PAINLEVEL_OUTOF10: 10
PAINLEVEL_OUTOF10: 0
PAINLEVEL_OUTOF10: 0
PAINLEVEL_OUTOF10: 10
PAINLEVEL_OUTOF10: 0
PAINLEVEL_OUTOF10: 10

## 2023-08-21 ASSESSMENT — PAIN DESCRIPTION - LOCATION
LOCATION: LEG

## 2023-08-21 ASSESSMENT — PAIN DESCRIPTION - ONSET
ONSET: GRADUAL
ONSET: GRADUAL

## 2023-08-21 ASSESSMENT — PAIN - FUNCTIONAL ASSESSMENT
PAIN_FUNCTIONAL_ASSESSMENT: ACTIVITIES ARE NOT PREVENTED

## 2023-08-21 ASSESSMENT — PAIN DESCRIPTION - PAIN TYPE
TYPE: ACUTE PAIN
TYPE: ACUTE PAIN
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
## 2023-08-21 NOTE — PROGRESS NOTES
Clinical Pharmacy Progress Note    Vancomycin - Management by Pharmacy    Consult Date(s): 08/20/23  Consulting Provider(s): Dewey Wallace MD    Assessment / Plan  Sepsis 2/2 LE cellulitis - Vancomycin  Concurrent Antimicrobials: Cefepime  Day of Vanc Therapy / Ordered Duration: Day #2 of 5  Current Dosing Method: Bayesian-Guided AUC Dosing  Therapeutic Goal: -600 mg/L*hr  Current Dose / Plan:   Renal function stable and at baseline. Vancomycin 1250 mg IV q12hr ordered. Predicted AUC = 386 mg/L*hr and trough = 8.8 mcg/mL. This is below goal range. Will increase to 1500 mg q12h. Predicted AUC with new regimen = 463 mg/L*hr with associated trough of 11.1 mcg/mL. Per hospitalist, would like 5 days of vancomycin. Order adjusted. Will order random level with AM labs to assess kinetics of dose change. Will continue to monitor clinical condition and make adjustments to regimen as appropriate. Please call with any questions. Fabi Chavez, PharmD, BCPS  Wireless: [id number]  Main pharmacy: [id number]  8/21/2023 11:19 AM      Interval update:  Leukocytosis improved today; Tmax 101F in past 24h. Subjective/Objective:   Luann Hanley is a 38 y.o. female with a PMHx significant for migraine, essential hypertension, hyperlipidemia, prediabetes, diastolic CHF, obstructive sleep apnea and morbid obesity who is admitted with sepsis secondary to LLE Cellulitis. Pharmacy is consulted to dose Vancomycin.     Ht Readings from Last 1 Encounters:   08/19/23 5' 3\" (1.6 m)     Wt Readings from Last 1 Encounters:   08/19/23 (!) 342 lb 4.8 oz (155.3 kg)     Current & Prior Antimicrobial Regimen(s):  Cefepime (08/19 - Current)  Vancomycin (08/19 - Current)  2500 mg IV x1 (08/20)  1250 mg IV q12h (8/20-8/21)  1500 mg IV q12h (8/21-current)    Vancomycin Level(s) / Doses:    Date Time Dose Type of Level / Level Interpretation   8/22 0600 1500 mg q12h Random = ordered           Note: Serum levels collected for

## 2023-08-21 NOTE — PLAN OF CARE
Problem: Discharge Planning  Goal: Discharge to home or other facility with appropriate resources  8/21/2023 0223 by Jarrod Bob RN  Outcome: Progressing  8/20/2023 1624 by Rey Hyde RN  Outcome: Progressing     Problem: Pain  Goal: Verbalizes/displays adequate comfort level or baseline comfort level  8/21/2023 0223 by Jarrod Bob RN  Outcome: Progressing  8/20/2023 1624 by Rey Hyde RN  Outcome: Progressing     Problem: Skin/Tissue Integrity  Goal: Absence of new skin breakdown  Description: 1. Monitor for areas of redness and/or skin breakdown  2. Assess vascular access sites hourly  3. Every 4-6 hours minimum:  Change oxygen saturation probe site  4. Every 4-6 hours:  If on nasal continuous positive airway pressure, respiratory therapy assess nares and determine need for appliance change or resting period.   8/21/2023 0223 by Jarrod Bob RN  Outcome: Progressing  8/20/2023 1624 by Rey Hyde RN  Outcome: Progressing     Problem: Safety - Adult  Goal: Free from fall injury  8/21/2023 0223 by Jarrod Bob RN  Outcome: Progressing  8/20/2023 1624 by Rey Hyde RN  Outcome: Progressing     Problem: ABCDS Injury Assessment  Goal: Absence of physical injury  8/20/2023 1624 by Rey Hyde RN  Outcome: Progressing

## 2023-08-21 NOTE — PROCEDURES
PICC order noted, Pt with PIV at present. Blood culture pending. PICC was ordered for access. Order canceled, perfectserve sent to Dr Ari Rey, will follow.

## 2023-08-21 NOTE — PROCEDURES
PowerGlide Midline Extended Dwell Catheter Procedure Note:  Chart reviewed for allergies, diagnosis, labs, known contraindications, reason for line placement and planned length of treatment. Insertion procedure discussed with patient/family/staff member. Informed consent not required for PowerGlide Midline Extended Dwell Catheter. Midline placement. Three patient identifiers - Patient name,   and MRN -  completed &  confirmed verbally. Hat, mask and eye shield donned. Site scrubbed with Chloraprep  One-Step applicator  for 30 seconds x 1. Hand Hygiene  performed with 3% Chlorhexidine surgical scrub x1 min prior to  Sterile gloves. Patient draped. Site scrubbed a 2nd time with Chloraprep One-Step applicator x 30 sec. Vein located by [employer]. Extended Dwell Catheter inserted. Positive brisk blood return obtained. Valve applied to lumen and flushed with 10 mls  0.9% Sterile Sodium Chloride. Catheter flushes easily with no resistance. Skin prep applied to site. Catheter secured with non-sutured locking device per hospital protocol. Bio-patch/CHG impregnated sterile tegaderm dressing applied. Alcohol Swab Cap applied to valve. Sterile field maintained during procedure. Guide wire accounted for post procedure. Pt. Response to procedure, tolerated well. Appearance of site: Clean dry and intact without bleeding or edema. All edges of Tegaderm occlusive. Site marked with date and initials of RN placing line. Top 2 side rails in up position, call button in reach, RN notified of all of the above.

## 2023-08-21 NOTE — DISCHARGE INSTRUCTIONS
Extra Heart Failure sites:     https://Getit InfoServices. com/publication/?v=644831   --- this is American Heart Association interactive Healthier Living with Heart Failure guidebook. Please click hyperlink or copy / paste link into search bar. Use your mouse to scroll through the pages. Lots of information about weight monitoring, diet tips, activity, meds, etc    HF [place] dane  -- this is a free smart phone dane available for iPhone and Android download. Use your phone to track sodium / fluid intake, zone tool symptom tracking, weights, medications, etc. Click on this hyperlink  HF [place] Dane   for QR code for easy download. DASH (Dietary Approach to Stop Hypertension) diet --  SeekAlumni.no -- this diet is a flexible eating plan that promotes heart healthy eating style. Click on hyperlink or copy / paste link into search bar. Lots of low sodium recipes and tips.     CigarRepair.ca  -- more free recipes

## 2023-08-21 NOTE — PROGRESS NOTES
V2.0    [facility] Progress Note      Name:  Lina Diaz /Age/Sex: 1980  (38 y.o. female)   MRN & CSN:  [id number] & [id number] Encounter Date/Time: 2023 12:27 PM EDT   Location:  5305/5305-01 PCP: Oren White MD       Hospital Day: 3    Assessment and Recommendations   78-year-old Afro-American female with significant past medical history of morbid obesity with a BMI of 60.64 kg/m2, hypertension, obstructive sleep apnea, hyperlipidemia, diastolic CHF presented with left lower extremity pain and found to have cellulitis with sepsis    Severe sepsis secondary to left lower extremity cellulitis lactic acid elevation of 2.5, resolved  Left lower extremity cellulitis, improving  Morbid obesity  Hypertension  Hyperlipidemia  Prediabetes  Morbid obesity with a BMI of 60.60/kg/m2  Anemia        Plan:     Continue with IV cefepime and vancomycin D2 and closely monitor renal function and platelets level and left lower extremity erythema swelling and warmth at least improved by 30% since yesterday  Despite ordering Ace wrap to left lower extremity not yet applied  Leukocytosis mildly trended down  Follow blood cultures  Labs in a.m. Comment: Please note this report has been produced using speech recognition software and may contain errors related to that system including errors in grammar, punctuation, and spelling, as well as words and phrases that may be inappropriate. If there are any questions or concerns please feel free to contact the dictating provider for clarification. Diet ADULT DIET;  Regular; Low Fat/Low Chol/High Fiber/2 gm Na   DVT Prophylaxis [x] Lovenox, []  Heparin, [x] SCDs, [] Ambulation,  [] Eliquis, [] Xarelto   Code Status Full Code   Disposition From: Home  Expected Disposition:  Home  Estimated Date of Discharge:      Surrogate Decision Maker/ POA       Personally reviewed Lab Studies and Imaging     Discussed management of the case with

## 2023-08-22 ENCOUNTER — TELEPHONE (OUTPATIENT)
Dept: PULMONOLOGY | Age: 43
End: 2023-08-22

## 2023-08-22 VITALS
TEMPERATURE: 97.6 F | BODY MASS INDEX: 51.91 KG/M2 | RESPIRATION RATE: 16 BRPM | DIASTOLIC BLOOD PRESSURE: 82 MMHG | WEIGHT: 293 LBS | SYSTOLIC BLOOD PRESSURE: 123 MMHG | HEART RATE: 71 BPM | OXYGEN SATURATION: 96 % | HEIGHT: 63 IN

## 2023-08-22 LAB
ANION GAP SERPL CALCULATED.3IONS-SCNC: 10 MMOL/L (ref 3–16)
BUN SERPL-MCNC: 11 MG/DL (ref 7–20)
CALCIUM SERPL-MCNC: 8.9 MG/DL (ref 8.3–10.6)
CHLORIDE SERPL-SCNC: 104 MMOL/L (ref 99–110)
CO2 SERPL-SCNC: 24 MMOL/L (ref 21–32)
CREAT SERPL-MCNC: 0.6 MG/DL (ref 0.6–1.1)
GFR SERPLBLD CREATININE-BSD FMLA CKD-EPI: >60 ML/MIN/{1.73_M2}
GLUCOSE SERPL-MCNC: 115 MG/DL (ref 70–99)
POTASSIUM SERPL-SCNC: 4 MMOL/L (ref 3.5–5.1)
SODIUM SERPL-SCNC: 138 MMOL/L (ref 136–145)
VANCOMYCIN SERPL-MCNC: 4.2 UG/ML

## 2023-08-22 PROCEDURE — 80048 BASIC METABOLIC PNL TOTAL CA: CPT

## 2023-08-22 PROCEDURE — 6360000002 HC RX W HCPCS: Performed by: INTERNAL MEDICINE

## 2023-08-22 PROCEDURE — 2580000003 HC RX 258: Performed by: INTERNAL MEDICINE

## 2023-08-22 PROCEDURE — 80202 ASSAY OF VANCOMYCIN: CPT

## 2023-08-22 PROCEDURE — 2700000000 HC OXYGEN THERAPY PER DAY

## 2023-08-22 RX ORDER — CEPHALEXIN 500 MG/1
500 CAPSULE ORAL 4 TIMES DAILY
Qty: 28 CAPSULE | Refills: 0 | Status: SHIPPED | OUTPATIENT
Start: 2023-08-22 | End: 2023-08-29

## 2023-08-22 RX ADMIN — CEFEPIME 2000 MG: 2 INJECTION, POWDER, FOR SOLUTION INTRAVENOUS at 02:28

## 2023-08-22 RX ADMIN — VANCOMYCIN HYDROCHLORIDE 1500 MG: 10 INJECTION, POWDER, LYOPHILIZED, FOR SOLUTION INTRAVENOUS at 06:52

## 2023-08-22 ASSESSMENT — PAIN DESCRIPTION - FREQUENCY: FREQUENCY: INTERMITTENT

## 2023-08-22 ASSESSMENT — PAIN DESCRIPTION - LOCATION: LOCATION: LEG

## 2023-08-22 ASSESSMENT — PAIN DESCRIPTION - ORIENTATION: ORIENTATION: LEFT

## 2023-08-22 ASSESSMENT — PAIN DESCRIPTION - PAIN TYPE: TYPE: ACUTE PAIN

## 2023-08-22 ASSESSMENT — PAIN SCALES - GENERAL: PAINLEVEL_OUTOF10: 10

## 2023-08-22 ASSESSMENT — PAIN - FUNCTIONAL ASSESSMENT: PAIN_FUNCTIONAL_ASSESSMENT: ACTIVITIES ARE NOT PREVENTED

## 2023-08-22 ASSESSMENT — PAIN DESCRIPTION - ONSET: ONSET: GRADUAL

## 2023-08-22 ASSESSMENT — PAIN DESCRIPTION - DESCRIPTORS: DESCRIPTORS: TENDER

## 2023-08-22 NOTE — DISCHARGE INSTR - COC
Continuity of Care Form    Patient Name: China Maurer   :  1980  MRN:  [id number]    Admit date:  2023  Discharge date:  ***    Code Status Order: Full Code   Advance Directives:     Admitting Physician:  Corean Cockayne, MD  PCP: Karina Ann DO    Discharging Nurse: [facility] Unit/Room#: 1084/1576-17  Discharging Unit Phone Number: ***    Emergency Contact:   Extended Emergency Contact Information  Primary Emergency Contact: Debbieorranshu [place] [address] [address] Phone: [phone]  Relation: Parent  Secondary Emergency Contact: Iban Mckay Phone: [phone]  Relation: Child    Past Surgical History:  Past Surgical History:   Procedure Laterality Date    COLONOSCOPY N/A 2021    COLONOSCOPY performed by Jordy Chan MD at [facility] N/A 2023    CYSTOSCOPY performed by Ernesto Weinstein MD at [facility]      x2    HYSTERECTOMY ( [facility])      full-ovaries gone    MUSCLE BIOPSY      left thigh    OVARIAN CYST REMOVAL      PARTIAL HYSTERECTOMY (CERVIX NOT REMOVED)      SKIN BIOPSY      birth merritt removal left arm    VAGINA SURGERY N/A 2023    COLPORRAPHY, POSTERIOR REPAIR performed by Ernesto Weinstein MD at [address]      inserted in  and removed in        Immunization History:   Immunization History   Administered Date(s) Administered    COVID-19, MODERNA BLUE border, Primary or Immunocompromised, (age 12y+), IM, 100 mcg/0.5mL 2021, 2021    Hep A, HAVRIX, VAQTA, (age 19y+), IM, 1mL 2019, 2020    Hep B, ENGERIX-B, (age 20y+), IM, 1mL 2020    Hepatitis B 2019, 2020    Influenza Virus Vaccine 2020    Influenza, FLUARIX, FLULAVAL, FLUZONE (age 10 mo+) AND AFLURIA, (age 1 y+), PF, 0.5mL 2016, 2018, 2020    Meningococcal ACWY, MENACTRA (MenACWY-D), (age 10m-48y), IM, 0.5mL

## 2023-08-22 NOTE — PROGRESS NOTES
Pt complaining of home CPAP unit (nasal pillows) causing her nostril to hurt and giving her a headache. Pt placed on 3 L nc. She kept her chin strap on from her CPAP unit on due to being a mouth breather. SPO2 97% at this time.

## 2023-08-22 NOTE — PROGRESS NOTES
Patient was asking about the status of her specialty bed. Reyes Hernandez, the  service delivery leader, was called to check on bed status with assurance the bed will be delivered. Patient requesting RT come up and help to set up her CPAP.  Jose Blank, RT was called and informed of patient request.     Electronically signed by Annette Brown RN on 8/21/2023 at 10:10 PM

## 2023-08-22 NOTE — PROGRESS NOTES
Pt complained of 10/10 pain. PRN dilaudid given per MAR. Upon reassessment pt still states pain of 10/10. MD notified and one time dose of dilaudid ordered. Dilaudid held at 0109 due to pt sleeping.      Electronically signed by Alvin Josue RN on 8/22/2023 at 2:37 AM

## 2023-08-22 NOTE — PROGRESS NOTES
Pt A+Ox4. VSS. Pt had 500 mL output per purewick. Pt requested purewick at nighttime to reduce the need to get up to the bathroom. Bed wheels locked, in lowest position, call light and bedside table within reach. All needs metg at this time. Pt aware to call if help is needed.      Electronically signed by Polly Chinchilla RN on 8/22/2023 at 6:29 AM no concerns

## 2023-08-22 NOTE — PROGRESS NOTES
Bariatric bed and bariatric bedside commode ordered on 8/21/23. Voicemail left for a delivery estimate at 1015. Patient updated on delivery status.

## 2023-08-22 NOTE — PROGRESS NOTES
Pt requested RT come up and look at her CPAP. Pt stated \"its making my left nostral hurt and my head\". RT came and put pt on 2L 02 per nasal cannula instead of CPAP.

## 2023-08-22 NOTE — PROGRESS NOTES
Clinical Pharmacy Progress Note    Vancomycin - Management by Pharmacy    Consult Date(s): 08/20/23  Consulting Provider(s):  Radha Mena & Jono    Assessment / Plan  1)  Sepsis 2/2 LE cellulitis - Vancomycin  Concurrent Antimicrobials: Cefepime  Day of Vanc Therapy / Ordered Duration: Day #3 of 5  Current Dosing Method: Bayesian-Guided AUC Dosing  Therapeutic Goal: -600 mg/L*hr  Current Dose / Plan:   Currently on 1500mg IV q12h. Trough this AM = 4.2 mcg/mL. Calculated AUC subtherapeutic at 372 mg/L*hr.   Renal function stable at baseline. Will increase dose to 2000mg IV q12h. Regimen predicts an AUC = 496. Pt is at risk for accumulation due to obesity - will monitor closely. Will determine need for / timing of next level based on renal function and further plan for duration of IV abx therapy. Will continue to monitor clinical condition and make adjustments to regimen as appropriate. Please call with questions--  Thanks--  Rosana Daniel, PharmD, BCPS, BCGP  [id number] ([facility])   8/22/2023 7:41 AM      Interval update:  Tmax 99.8 last 24 hrs. Midline placed yesterday afternoon. Cellulitis improving per notes. Blood cultures with no growth thus far. Subjective/Objective:   Jennifer Hanson is a 38 y.o. female with a PMHx significant for migraine, essential hypertension, hyperlipidemia, prediabetes, diastolic CHF, obstructive sleep apnea and morbid obesity who is admitted with sepsis secondary to LLE Cellulitis. Pharmacy is consulted to dose Vancomycin.     Ht Readings from Last 1 Encounters:   08/19/23 5' 3\" (1.6 m)     Wt Readings from Last 1 Encounters:   08/19/23 (!) 342 lb 4.8 oz (155.3 kg)     Current & Prior Antimicrobial Regimen(s):  Cefepime (08/19 - Current)  Vancomycin (08/19 - Current)  2500 mg IV x1 (08/20)  1250 mg IV q12h (8/20-8/21)  1500 mg IV q12h (8/21-8/22)  2000mg IV q12h (8/22-current)    Vancomycin Level(s) / Doses:    Date Time Dose Type of Level / Level
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
## 2023-08-22 NOTE — TELEPHONE ENCOUNTER
Patient is calling requesting CPAP supplies from [address]. She was last here in May. She needs:  A nasal pillow small; tubing & filters.   Please issue an order

## 2023-08-23 ENCOUNTER — CARE COORDINATION (OUTPATIENT)
Dept: CARE COORDINATION | Age: 43
End: 2023-08-23

## 2023-08-23 LAB — BACTERIA BLD CULT: NORMAL
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
## 2023-08-24 ENCOUNTER — CARE COORDINATION (OUTPATIENT)
Dept: CASE MANAGEMENT | Age: 43
End: 2023-08-24

## 2023-08-24 LAB — BACTERIA BLD CULT ORG #2: NORMAL

## 2023-08-24 NOTE — CARE COORDINATION
[address] [facility],[address] Floor Transitions Initial Follow Up Call    Call within 2 business days of discharge: yes     Patient:  Reina Flores Patient :  1980  MRN:  [id number]   Reason for Admission:   LLE cellultis / Covid 19 Monitoring Discharge Date:  23  RARS: 13      Transitions of Care Initial Call    Was this an external facility discharge? no    Discharge Facility: [facility]      Challenges to be reviewed by the provider   Additional needs identified to be addressed with provider:    yes - hfu needed     AMB CC Provider Discharge Needs: none            CTC attempt to reach Pt regarding recent hospital discharge. CTC left voice recording with call back number requesting a call back. Follow up appointments:    Future Appointments   Date Time Provider [address]   2023 10:15 AM Lisa Corbin MD W ORTHO [facility]   2023 11:30 AM FERNANDO Whitman-S HEALTHY WT [facility]   2023 10:15 AM Chaim Chavez DO [facility] [place] [facility]   2024 11:40 AM Nhung Dockery MD [facility]       Franco VJACOB Ricardo, RN  Care Transition Coordinator  Contact Number:  [phone]

## 2023-08-25 ENCOUNTER — OFFICE VISIT (OUTPATIENT)
Dept: BARIATRICS/WEIGHT MGMT | Age: 43
End: 2023-08-25
Payer: COMMERCIAL

## 2023-08-25 DIAGNOSIS — E66.01 MORBID OBESITY WITH BMI OF 60.0-69.9, ADULT (HCC): Primary | ICD-10-CM

## 2023-08-25 PROCEDURE — 96156 HLTH BHV ASSMT/REASSESSMENT: CPT | Performed by: SOCIAL WORKER

## 2023-08-25 NOTE — PROGRESS NOTES
Jennifer Hanson is a 37 y.o. female who presents today for individual counseling encounter / psychosocial assessment related to behavioral health. Jennifer Hanson is presented oriented and engaged throughout assessment. Patient appeared with appropriate insight and cognition. Patient is referred to therapist by Dr. Nanci Abarca. Patient meets criteria for Morbid Obesity with BMI 60.0-69.9    Presenting Problem:   Per patient \"I have a lot of health things going on. I have a lot of pain, use a c-pap machine. I have to go to a lot of doctor's for my issues. I was going to do this 5 or 6 years ago, but I got scared because someone told me a horror story. Now I regret that. I feel like I will have a better quality of life if I do this. I will be more active with my 6year old son. A lot of different things are motivating me to lose weight. I want to be able to be more active and involved. \"    Home life / Family relationships / Supports:   Patient is  with an 6year old son and two adult children. Patient reports her  is supportive of her decision to pursue weight loss surgery. \"He wants me to get better and be healthy. \"    Hobbies/Positives:   Patient enjoys going to [employer] with her mother. Employment hx  Patient works full time in a warehouse environment. She reports her job is physically demanding. Psychiatric hx  None reported    Hx of emotional/stress/bored eating:   Patient agrees to emotional eating. \"I'm an emotional eater, whether it's happy or sad. That's why I asked to come see you. I'm a sugar addict. I like pop. I prefer snacks over a meal.\"    Daily Health Concerns/Limitations  Fibromyalgia, sleep apnea, high blood pressure, pre-diabetic, allergies, arthritis in knee    Substance Use:  None reported    Current needs / Limitations   None reported    Additional Questions/Concerns per patient  None at this time.     Behaviorist overview:   Therapist utilized active listening and motivational

## 2023-08-30 ENCOUNTER — OFFICE VISIT (OUTPATIENT)
Dept: ORTHOPEDIC SURGERY | Age: 43
End: 2023-08-30

## 2023-08-30 VITALS — HEIGHT: 63 IN | BODY MASS INDEX: 51.91 KG/M2 | WEIGHT: 293 LBS

## 2023-08-30 DIAGNOSIS — S80.02XA CONTUSION OF LEFT KNEE, INITIAL ENCOUNTER: ICD-10-CM

## 2023-08-30 DIAGNOSIS — M17.12 PRIMARY OSTEOARTHRITIS OF LEFT KNEE: Primary | ICD-10-CM

## 2023-08-30 PROBLEM — Z01.810 PREOP CARDIOVASCULAR EXAM: Status: RESOLVED | Noted: 2023-07-31 | Resolved: 2023-08-30

## 2023-08-30 RX ORDER — BUPIVACAINE HYDROCHLORIDE 2.5 MG/ML
3 INJECTION, SOLUTION INFILTRATION; PERINEURAL ONCE
Status: COMPLETED | OUTPATIENT
Start: 2023-08-30 | End: 2023-08-30

## 2023-08-30 RX ORDER — TRIAMCINOLONE ACETONIDE 40 MG/ML
80 INJECTION, SUSPENSION INTRA-ARTICULAR; INTRAMUSCULAR ONCE
Status: COMPLETED | OUTPATIENT
Start: 2023-08-30 | End: 2023-08-30

## 2023-08-30 RX ADMIN — TRIAMCINOLONE ACETONIDE 80 MG: 40 INJECTION, SUSPENSION INTRA-ARTICULAR; INTRAMUSCULAR at 10:35

## 2023-08-30 RX ADMIN — BUPIVACAINE HYDROCHLORIDE 7.5 MG: 2.5 INJECTION, SOLUTION INFILTRATION; PERINEURAL at 10:35

## 2023-09-08 NOTE — PROGRESS NOTES
ENDOSCOPY PREOP INSTRUCTIONS      Please at arrival time given to you from your doctor's office. Report to the MAIN entrance on [address] and register at the surgery center on the left-hand side of the lobby  You will need your insurance card and photo id and a list of all medications taken on a regular basis. Please include the dose/frequency. For your procedure:     PLEASE FOLLOW ALL INSTRUCTIONS & PREPS GIVEN TO YOU BY YOUR DOCTOR'S OFFICE. If you have not received these instructions yet, please call the office immediately. Make sure to read them as soon as received. If you are taking blood thinners, Aspirin or diabetic medication, make sure to call your doctor as soon as possible for instructions prior to your procedure. Please dress comfortably and do not wear any lotion, powders or jewelry  If you use oxygen at home, please bring your oxygen tank with you to hospital.  Arrange for someone to be with you and sign you out & drive you home after your procedure. THIS PERSON MUST WAIT AT HOSPITAL THE ENTIRE TIME. We allow 2 adult visitors with you in the hospital & masks are strongly recommended.     WOMEN ONLY OF CHILDBEARING AGE: Please make sure to be able to give a urine sample on arrival      If you have further questions, you may contact your Endoscopist's office or Pre Admission Testing staff at [phone]

## 2023-09-11 ENCOUNTER — HOSPITAL ENCOUNTER (OUTPATIENT)
Age: 43
Setting detail: OUTPATIENT SURGERY
Discharge: HOME | End: 2023-09-11
Attending: SURGERY | Admitting: SURGERY
Payer: COMMERCIAL

## 2023-09-11 ENCOUNTER — ANESTHESIA (OUTPATIENT)
Dept: ENDOSCOPY | Age: 43
End: 2023-09-11
Payer: COMMERCIAL

## 2023-09-11 ENCOUNTER — ANESTHESIA EVENT (OUTPATIENT)
Dept: ENDOSCOPY | Age: 43
End: 2023-09-11
Payer: COMMERCIAL

## 2023-09-11 VITALS
SYSTOLIC BLOOD PRESSURE: 123 MMHG | RESPIRATION RATE: 18 BRPM | HEIGHT: 63 IN | WEIGHT: 293 LBS | OXYGEN SATURATION: 100 % | DIASTOLIC BLOOD PRESSURE: 69 MMHG | BODY MASS INDEX: 51.91 KG/M2 | HEART RATE: 60 BPM | TEMPERATURE: 97.2 F

## 2023-09-11 DIAGNOSIS — K21.9 GASTROESOPHAGEAL REFLUX DISEASE, UNSPECIFIED WHETHER ESOPHAGITIS PRESENT: ICD-10-CM

## 2023-09-11 PROCEDURE — 43239 EGD BIOPSY SINGLE/MULTIPLE: CPT | Performed by: SURGERY

## 2023-09-11 PROCEDURE — 2709999900 HC NON-CHARGEABLE SUPPLY: Performed by: SURGERY

## 2023-09-11 PROCEDURE — 2580000003 HC RX 258: Performed by: FAMILY MEDICINE

## 2023-09-11 PROCEDURE — 88305 TISSUE EXAM BY PATHOLOGIST: CPT

## 2023-09-11 PROCEDURE — 6360000002 HC RX W HCPCS: Performed by: NURSE ANESTHETIST, CERTIFIED REGISTERED

## 2023-09-11 PROCEDURE — 7100000010 HC PHASE II RECOVERY - FIRST 15 MIN: Performed by: SURGERY

## 2023-09-11 PROCEDURE — 2500000003 HC RX 250 WO HCPCS: Performed by: NURSE ANESTHETIST, CERTIFIED REGISTERED

## 2023-09-11 PROCEDURE — 3609012400 HC EGD TRANSORAL BIOPSY SINGLE/MULTIPLE: Performed by: SURGERY

## 2023-09-11 PROCEDURE — 88342 IMHCHEM/IMCYTCHM 1ST ANTB: CPT

## 2023-09-11 PROCEDURE — 3700000000 HC ANESTHESIA ATTENDED CARE: Performed by: SURGERY

## 2023-09-11 PROCEDURE — 7100000011 HC PHASE II RECOVERY - ADDTL 15 MIN: Performed by: SURGERY

## 2023-09-11 PROCEDURE — 3700000001 HC ADD 15 MINUTES (ANESTHESIA): Performed by: SURGERY

## 2023-09-11 RX ORDER — SODIUM CHLORIDE, SODIUM LACTATE, POTASSIUM CHLORIDE, CALCIUM CHLORIDE 600; 310; 30; 20 MG/100ML; MG/100ML; MG/100ML; MG/100ML
INJECTION, SOLUTION INTRAVENOUS CONTINUOUS
Status: DISCONTINUED | OUTPATIENT
Start: 2023-09-11 | End: 2023-09-11 | Stop reason: HOSPADM

## 2023-09-11 RX ORDER — LIDOCAINE HYDROCHLORIDE 20 MG/ML
INJECTION, SOLUTION EPIDURAL; INFILTRATION; INTRACAUDAL; PERINEURAL PRN
Status: DISCONTINUED | OUTPATIENT
Start: 2023-09-11 | End: 2023-09-11 | Stop reason: SDUPTHER

## 2023-09-11 RX ORDER — PROPOFOL 10 MG/ML
INJECTION, EMULSION INTRAVENOUS PRN
Status: DISCONTINUED | OUTPATIENT
Start: 2023-09-11 | End: 2023-09-11 | Stop reason: SDUPTHER

## 2023-09-11 RX ADMIN — LIDOCAINE HYDROCHLORIDE 100 MG: 20 INJECTION, SOLUTION EPIDURAL; INFILTRATION; INTRACAUDAL; PERINEURAL at 10:11

## 2023-09-11 RX ADMIN — SODIUM CHLORIDE, POTASSIUM CHLORIDE, SODIUM LACTATE AND CALCIUM CHLORIDE: 600; 310; 30; 20 INJECTION, SOLUTION INTRAVENOUS at 10:02

## 2023-09-11 RX ADMIN — PROPOFOL 50 MG: 10 INJECTION, EMULSION INTRAVENOUS at 10:14

## 2023-09-11 RX ADMIN — PROPOFOL 150 MG: 10 INJECTION, EMULSION INTRAVENOUS at 10:11

## 2023-09-11 ASSESSMENT — PAIN - FUNCTIONAL ASSESSMENT: PAIN_FUNCTIONAL_ASSESSMENT: 0-10

## 2023-09-11 NOTE — ANESTHESIA PRE PROCEDURE
Department of Anesthesiology  Preprocedure Note       Name:  Susannah Collins   Age:  37 y.o.  :  1980                                          MRN:  [id number]         Date:  2023      Surgeon: Jocelyn Moore): Nidhi Sim DO    Procedure: Procedure(s):  ESOPHAGOGASTRODUODENOSCOPY    Medications prior to admission:   Prior to Admission medications    Medication Sig Start Date End Date Taking?  Authorizing Provider   ondansetron (ZOFRAN-ODT) 4 MG disintegrating tablet Take 1 tablet by mouth 3 times daily as needed for Nausea or Vomiting 23   Albert Randall MD   Diclofenac Sodium 100 MG TB24 Take by mouth    Historical Provider, MD   docusate sodium (COLACE) 100 MG capsule Take 1 capsule by mouth 2 times daily    Historical Provider, MD   meloxicam (MOBIC) 7.5 MG tablet Take 1 tablet by mouth daily as needed for Pain  Patient not taking: Reported on 2023   Radha Burns, DO   amLODIPine (NORVASC) 10 MG tablet Take 1 tablet by mouth once daily 23   Radha Burns, DO   valsartan (DIOVAN) 80 MG tablet Take 1 tablet by mouth once daily 23   Radha Burns, DO   vitamin D (ERGOCALCIFEROL) 1.25 MG (64694 UT) CAPS capsule Take 1 capsule by mouth once a week 23   Radha Burns, DO   lansoprazole (PREVACID) 30 MG delayed release capsule Take 1 capsule by mouth daily 23   Radha Burns, DO   montelukast (SINGULAIR) 10 MG tablet Take 1 tablet by mouth nightly 23   Radha Burns, DO   levocetirizine (XYZAL) 5 MG tablet Take 1 tablet daily 23   Radha Burns, DO   ammonium lactate (AMLACTIN) 12 % cream SMARTSI Gram(s) Topical Daily 22   Historical Provider, MD   rosuvastatin (CRESTOR) 5 MG tablet Take 1 tablet by mouth daily 23   Radha Burns, DO   DULoxetine (CYMBALTA) 60 MG extended release capsule Take 1 capsule by mouth once daily 3/15/23   Radha Burns, DO   torsemide (DEMADEX) 5 MG tablet Take 1 tablet by mouth daily 3/15/23

## 2023-09-11 NOTE — DISCHARGE INSTRUCTIONS
-ENDOSCOPY DISCHARGE INSTRUCTIONS:    Call the physician that did your procedure for any questions or concerns:     DR. BARRAZA:  [phone]       ACTIVITY:    There are potential side effects from the medications used for sedation and anesthesia during your procedure. These include:  Dizziness or light-headedness, confusion or memory loss, delayed reaction times, loss of coordination, nausea and vomiting. Because of your increased risk for injury, we ask that you observe the following precautions: For the next 24 hours,  DO NOT operate an automobile, bicycle, motorcycle, , power tools or large equipment of any kind. Do not drink alcohol, sign any legal documents or make any legal decisions for 24 hours. Do not bend your head over lower than your heart. DO sit on the side of bed/couch awhile before getting up. Plan on bedrest or quiet relaxation today. You may resume normal activities in 24 hours. DIET:    Your first meal today should be light, avoiding spicy and fatty foods. If you tolerate this first meal, then you may advance to your regular diet unless otherwise advised by your physician. NOTIFY YOUR PHYSICIAN IF THESE SYMPTOMS OCCUR:  1. Fever (greater than 100)  5. Increased abdominal bloating  2. Severe pain    6. Excessive bleeding  3. Nausea and vomiting  7. Chest pain                                                                    4. Chills    8. Shortness of breath    NORMAL SYMPTOMS:  1. Mild sore throat  2. Gaseous discomfort                 ADDITIONAL INSTRUCTIONS:  If you are on aspirin and stopped prior to procedure, you may resume aspirin in 24 hours, unless otherwise instructed. Biopsy results: TO BE DISCUSSED AT YOUR NEXT APPOINTMENT TIME    Educational Information:     Follow up appointment:  Meagan Evangelista WITH DR. PAT                       Please review these discharge instructions this evening or tomorrow for   information you may have

## 2023-09-11 NOTE — ANESTHESIA POSTPROCEDURE EVALUATION
Department of Anesthesiology  Postprocedure Note    Patient: Sidney Morrison  MRN: [id number]  YOB: 1980  Date of evaluation: 9/11/2023      Procedure Summary     Date: 09/11/23 Room / Location: [address] [place] [address]    Anesthesia Start: 1008 Anesthesia Stop: 9474    Procedure: EGD BIOPSY Diagnosis:       Gastroesophageal reflux disease, unspecified whether esophagitis present      (Gastroesophageal reflux disease, unspecified whether esophagitis present [K21.9])    Surgeons: Kevin Paz DO Responsible Provider: Manuel Graham MD    Anesthesia Type: MAC ASA Status: 3          Anesthesia Type: No value filed.     Yessenia Phase I: Yessenia Score: 10    Yessenia Phase II: Yessenia Score: 10      Anesthesia Post Evaluation    Patient location during evaluation: PACU  Patient participation: complete - patient participated  Level of consciousness: awake  Pain score: 0  Airway patency: patent  Nausea & Vomiting: no nausea  Complications: no  Cardiovascular status: hemodynamically stable  Respiratory status: acceptable  Hydration status: stable  Pain management: satisfactory to patient

## 2023-09-11 NOTE — H&P
Update History & Physical    The patient's History and Physical of August 17, 2023 was reviewed with the patient and I examined the patient. There was no change. The surgical site was confirmed by the patient and me. Plan: The risks, benefits, expected outcome, and alternative to the recommended procedure have been discussed with the patient. Patient understands and wants to proceed with the procedure.      Electronically signed by Rocío Holguin DO on 9/11/2023 at 9:22 AM

## 2023-09-13 ENCOUNTER — OFFICE VISIT (OUTPATIENT)
Dept: PRIMARY CARE CLINIC | Age: 43
End: 2023-09-13

## 2023-09-13 VITALS
TEMPERATURE: 97.3 F | DIASTOLIC BLOOD PRESSURE: 87 MMHG | SYSTOLIC BLOOD PRESSURE: 132 MMHG | HEART RATE: 62 BPM | OXYGEN SATURATION: 97 % | BODY MASS INDEX: 59.84 KG/M2 | WEIGHT: 293 LBS

## 2023-09-13 DIAGNOSIS — E78.2 MIXED HYPERLIPIDEMIA: ICD-10-CM

## 2023-09-13 DIAGNOSIS — R60.0 BILATERAL LEG EDEMA: ICD-10-CM

## 2023-09-13 DIAGNOSIS — E66.01 MORBID OBESITY WITH BMI OF 60.0-69.9, ADULT (HCC): ICD-10-CM

## 2023-09-13 DIAGNOSIS — Z09 HOSPITAL DISCHARGE FOLLOW-UP: ICD-10-CM

## 2023-09-13 DIAGNOSIS — K21.9 GASTROESOPHAGEAL REFLUX DISEASE WITHOUT ESOPHAGITIS: ICD-10-CM

## 2023-09-13 DIAGNOSIS — E78.5 HYPERLIPIDEMIA, UNSPECIFIED HYPERLIPIDEMIA TYPE: Chronic | ICD-10-CM

## 2023-09-13 DIAGNOSIS — E55.9 VITAMIN D DEFICIENCY: ICD-10-CM

## 2023-09-13 DIAGNOSIS — E66.1 CLASS 3 DRUG-INDUCED OBESITY WITH SERIOUS COMORBIDITY AND BODY MASS INDEX (BMI) OF 60.0 TO 69.9 IN ADULT (HCC): ICD-10-CM

## 2023-09-13 DIAGNOSIS — K21.9 GASTROESOPHAGEAL REFLUX DISEASE, UNSPECIFIED WHETHER ESOPHAGITIS PRESENT: ICD-10-CM

## 2023-09-13 DIAGNOSIS — I87.2 STASIS DERMATITIS OF BOTH LEGS: Primary | ICD-10-CM

## 2023-09-13 DIAGNOSIS — I10 PRIMARY HYPERTENSION: Chronic | ICD-10-CM

## 2023-09-13 DIAGNOSIS — I89.0 LYMPHEDEMA: ICD-10-CM

## 2023-09-13 DIAGNOSIS — I10 ESSENTIAL HYPERTENSION: ICD-10-CM

## 2023-09-13 LAB
25(OH)D3 SERPL-MCNC: 38 NG/ML
ALBUMIN SERPL-MCNC: 4.2 G/DL (ref 3.4–5)
ALBUMIN/GLOB SERPL: 1.3 {RATIO} (ref 1.1–2.2)
ALP SERPL-CCNC: 107 U/L (ref 40–129)
ALT SERPL-CCNC: 19 U/L (ref 10–40)
AMPHETAMINES UR QL SCN>1000 NG/ML: NORMAL
ANION GAP SERPL CALCULATED.3IONS-SCNC: 11 MMOL/L (ref 3–16)
AST SERPL-CCNC: 10 U/L (ref 15–37)
BARBITURATES UR QL SCN>200 NG/ML: NORMAL
BASOPHILS # BLD: 0 K/UL (ref 0–0.2)
BASOPHILS NFR BLD: 0.3 %
BENZODIAZ UR QL SCN>200 NG/ML: NORMAL
BILIRUB SERPL-MCNC: 0.3 MG/DL (ref 0–1)
BUN SERPL-MCNC: 20 MG/DL (ref 7–20)
CALCIUM SERPL-MCNC: 9.4 MG/DL (ref 8.3–10.6)
CANNABINOIDS UR QL SCN>50 NG/ML: NORMAL
CHLORIDE SERPL-SCNC: 103 MMOL/L (ref 99–110)
CHOLEST SERPL-MCNC: 192 MG/DL (ref 0–199)
CO2 SERPL-SCNC: 26 MMOL/L (ref 21–32)
COCAINE UR QL SCN: NORMAL
CREAT SERPL-MCNC: 0.8 MG/DL (ref 0.6–1.1)
DEPRECATED RDW RBC AUTO: 18.6 % (ref 12.4–15.4)
DRUG SCREEN COMMENT UR-IMP: NORMAL
EOSINOPHIL # BLD: 0.1 K/UL (ref 0–0.6)
EOSINOPHIL NFR BLD: 1.3 %
ETHANOLAMINE SERPL-MCNC: NORMAL MG/DL (ref 0–0.08)
FENTANYL SCREEN, URINE: NORMAL
GFR SERPLBLD CREATININE-BSD FMLA CKD-EPI: >60 ML/MIN/{1.73_M2}
GLUCOSE SERPL-MCNC: 91 MG/DL (ref 70–99)
HCT VFR BLD AUTO: 35 % (ref 36–48)
HDLC SERPL-MCNC: 54 MG/DL (ref 40–60)
HGB BLD-MCNC: 10.9 G/DL (ref 12–16)
IRON SATN MFR SERPL: 10 % (ref 15–50)
IRON SERPL-MCNC: 37 UG/DL (ref 37–145)
LDLC SERPL CALC-MCNC: 122 MG/DL
LYMPHOCYTES # BLD: 2.2 K/UL (ref 1–5.1)
LYMPHOCYTES NFR BLD: 26.4 %
MCH RBC QN AUTO: 24.7 PG (ref 26–34)
MCHC RBC AUTO-ENTMCNC: 31.2 G/DL (ref 31–36)
MCV RBC AUTO: 79.3 FL (ref 80–100)
METHADONE UR QL SCN>300 NG/ML: NORMAL
MONOCYTES # BLD: 0.5 K/UL (ref 0–1.3)
MONOCYTES NFR BLD: 6.2 %
NEUTROPHILS # BLD: 5.6 K/UL (ref 1.7–7.7)
NEUTROPHILS NFR BLD: 65.8 %
OPIATES UR QL SCN>300 NG/ML: NORMAL
OXYCODONE UR QL SCN: NORMAL
PCP UR QL SCN>25 NG/ML: NORMAL
PH UR STRIP: 6 [PH]
PLATELET # BLD AUTO: 303 K/UL (ref 135–450)
PMV BLD AUTO: 8 FL (ref 5–10.5)
POTASSIUM SERPL-SCNC: 4.6 MMOL/L (ref 3.5–5.1)
PROT SERPL-MCNC: 7.5 G/DL (ref 6.4–8.2)
RBC # BLD AUTO: 4.41 M/UL (ref 4–5.2)
SODIUM SERPL-SCNC: 140 MMOL/L (ref 136–145)
TIBC SERPL-MCNC: 364 UG/DL (ref 260–445)
TRIGL SERPL-MCNC: 78 MG/DL (ref 0–150)
TSH SERPL DL<=0.005 MIU/L-ACNC: 1.59 UIU/ML (ref 0.27–4.2)
VIT B12 SERPL-MCNC: 354 PG/ML (ref 211–911)
VLDLC SERPL CALC-MCNC: 16 MG/DL
WBC # BLD AUTO: 8.5 K/UL (ref 4–11)

## 2023-09-13 RX ORDER — VALSARTAN 80 MG/1
TABLET ORAL
Qty: 90 TABLET | Refills: 5 | Status: SHIPPED | OUTPATIENT
Start: 2023-09-13

## 2023-09-13 RX ORDER — LANSOPRAZOLE 30 MG/1
30 CAPSULE, DELAYED RELEASE ORAL DAILY
Qty: 90 CAPSULE | Refills: 1 | Status: SHIPPED | OUTPATIENT
Start: 2023-09-13

## 2023-09-13 RX ORDER — AMLODIPINE BESYLATE 10 MG/1
TABLET ORAL
Qty: 90 TABLET | Refills: 1 | Status: SHIPPED | OUTPATIENT
Start: 2023-09-13

## 2023-09-13 RX ORDER — TORSEMIDE 5 MG/1
5 TABLET ORAL DAILY
Qty: 90 TABLET | Refills: 1 | Status: SHIPPED | OUTPATIENT
Start: 2023-09-13

## 2023-09-13 RX ORDER — ERGOCALCIFEROL 1.25 MG/1
CAPSULE ORAL
Qty: 12 CAPSULE | Refills: 1 | Status: SHIPPED | OUTPATIENT
Start: 2023-09-13

## 2023-09-13 RX ORDER — ROSUVASTATIN CALCIUM 5 MG/1
5 TABLET, COATED ORAL DAILY
Qty: 90 TABLET | Refills: 1 | Status: SHIPPED | OUTPATIENT
Start: 2023-09-13

## 2023-09-13 ASSESSMENT — ENCOUNTER SYMPTOMS
NAUSEA: 0
VOMITING: 0
SHORTNESS OF BREATH: 0
CONSTIPATION: 0
DIARRHEA: 0
COUGH: 0
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
## 2023-09-14 ENCOUNTER — TELEPHONE (OUTPATIENT)
Dept: ORTHOPEDIC SURGERY | Age: 43
End: 2023-09-14

## 2023-09-14 LAB
EST. AVERAGE GLUCOSE BLD GHB EST-MCNC: 128.4 MG/DL
HBA1C MFR BLD: 6.1 %

## 2023-09-14 NOTE — TELEPHONE ENCOUNTER
General Question     Subject: NEW BRACE  Patient and /or Facility Request: Rex Driscoll  Contact Number: [phone]    PT REQ NEW BRACE FIRST ONE DOESN'T FIT ANYMORE SINCE THE SWELLING IN HER LEG HAS WENT DOWN  PLEASE CLL PT AT [facility] NUMBER LISTED ABOVE

## 2023-09-14 NOTE — TELEPHONE ENCOUNTER
Grace Reynoso [facility] to patient. Patient has MD appointment tomorrow with Dr. Thomas Josue. Patient will bring knee brace to appointment so we can evaluate size. If brace is too large, staff will switch out brace with correct size.

## 2023-09-15 ENCOUNTER — OFFICE VISIT (OUTPATIENT)
Dept: ORTHOPEDIC SURGERY | Age: 43
End: 2023-09-15
Payer: COMMERCIAL

## 2023-09-15 VITALS — WEIGHT: 293 LBS | HEIGHT: 63 IN | BODY MASS INDEX: 51.91 KG/M2

## 2023-09-15 DIAGNOSIS — M70.62 GREATER TROCHANTERIC BURSITIS OF BOTH HIPS: Primary | ICD-10-CM

## 2023-09-15 DIAGNOSIS — M70.61 GREATER TROCHANTERIC BURSITIS OF BOTH HIPS: Primary | ICD-10-CM

## 2023-09-15 PROCEDURE — 1111F DSCHRG MED/CURRENT MED MERGE: CPT | Performed by: ORTHOPAEDIC SURGERY

## 2023-09-15 PROCEDURE — 99214 OFFICE O/P EST MOD 30 MIN: CPT | Performed by: ORTHOPAEDIC SURGERY

## 2023-09-15 PROCEDURE — 1036F TOBACCO NON-USER: CPT | Performed by: ORTHOPAEDIC SURGERY

## 2023-09-15 PROCEDURE — G8417 CALC BMI ABV UP PARAM F/U: HCPCS | Performed by: ORTHOPAEDIC SURGERY

## 2023-09-15 PROCEDURE — G8427 DOCREV CUR MEDS BY ELIG CLIN: HCPCS | Performed by: ORTHOPAEDIC SURGERY

## 2023-09-15 RX ORDER — CELECOXIB 200 MG/1
200 CAPSULE ORAL DAILY
Qty: 30 CAPSULE | Refills: 0 | Status: CANCELLED | OUTPATIENT
Start: 2023-09-15

## 2023-09-15 RX ORDER — METHYLPREDNISOLONE 4 MG/1
TABLET ORAL
Qty: 1 KIT | Refills: 0 | Status: SHIPPED | OUTPATIENT
Start: 2023-09-15 | End: 2023-09-21

## 2023-09-17 LAB
COTININE SERPL-MCNC: <5 NG/ML
NICOTINE SERPL-MCNC: <5 NG/ML

## 2023-09-17 NOTE — OP NOTE
Esophagogastroduodenoscopy with biopsy    Indications: Pre-op gastric Surgery, rule out Gastroesophageal reflux disease. Pre-operative Diagnosis: Obesity/pre-op bariatric surgery . Post-operative Diagnosis: Superficial Gastritis    Surgeon: Sneha Lewis    Anesthesia: MAC      Procedure Details   The patient was seen in the Holding Room. The risks, benefits, complications, treatment options, and expected outcomes were discussed with the patient. Pre-op Endoscopy recommended to rule any intragastric pathology that would interfere with proposed procedure /  And or due to current conditions. The possibilities of reaction to medication, pulmonary aspiration, perforation of viscus, bleeding, recurrent infection, the need for additional procedures, failure to diagnose a condition, and creating a complication requiring transfusion or operation were discussed with the patient. The patient concurred with the proposed plan, giving informed consent. The site of surgery properly noted/marked. The patient was taken to Endoscopy Suite, identified and the procedure verified as  Esophagogastroduodenoscopy  A Time Out was held and the above information confirmed. Upper Endoscopy: An endoscope was inserted through the oropharynx into the upper esophagus. The endoscope was passed into the stomach to the level of the pylorus and passed to the duodenum where the ampulla was visualized and duodenum was normal. Then the scope was retracted back to the stomach and it was normal except for gastritis, biopsy of the antrum obtained for H. Pylori, then retroflexed and the gastroesophageal junction was inspected.  There was no hiatal hernia and no evidence of Miguel's     Findings:  Esophageal findings include:  Upper: normal Esophageal Mucosa  Lower:normal Esophageal Mucosa  Distal: normal Esophageal Mucosa      Gastric findings include: abnormal Gastric Mucosa    Duodenal Findings: normal Duodenal Mucosa    Estimated Blood Loss:

## 2023-09-20 ENCOUNTER — CLINICAL DOCUMENTATION (OUTPATIENT)
Dept: BARIATRICS/WEIGHT MGMT | Age: 43
End: 2023-09-20
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
## 2023-09-21 ENCOUNTER — OFFICE VISIT (OUTPATIENT)
Dept: BARIATRICS/WEIGHT MGMT | Age: 43
End: 2023-09-21
Payer: COMMERCIAL

## 2023-09-21 VITALS — BODY MASS INDEX: 51.91 KG/M2 | HEIGHT: 63 IN | WEIGHT: 293 LBS

## 2023-09-21 DIAGNOSIS — E66.01 MORBID OBESITY WITH BMI OF 60.0-69.9, ADULT (HCC): ICD-10-CM

## 2023-09-21 DIAGNOSIS — I10 ESSENTIAL HYPERTENSION: ICD-10-CM

## 2023-09-21 DIAGNOSIS — E66.9 DIABETES MELLITUS TYPE 2 IN OBESE (HCC): ICD-10-CM

## 2023-09-21 DIAGNOSIS — E78.2 MIXED HYPERLIPIDEMIA: ICD-10-CM

## 2023-09-21 DIAGNOSIS — K21.9 CHRONIC GERD: ICD-10-CM

## 2023-09-21 DIAGNOSIS — E11.69 DIABETES MELLITUS TYPE 2 IN OBESE (HCC): ICD-10-CM

## 2023-09-21 DIAGNOSIS — E88.810 METABOLIC SYNDROME: Primary | ICD-10-CM

## 2023-09-21 DIAGNOSIS — G47.33 OBSTRUCTIVE SLEEP APNEA: ICD-10-CM

## 2023-09-21 PROCEDURE — 1111F DSCHRG MED/CURRENT MED MERGE: CPT | Performed by: SURGERY

## 2023-09-21 PROCEDURE — 1036F TOBACCO NON-USER: CPT | Performed by: SURGERY

## 2023-09-21 PROCEDURE — 99214 OFFICE O/P EST MOD 30 MIN: CPT | Performed by: SURGERY

## 2023-09-21 PROCEDURE — 3044F HG A1C LEVEL LT 7.0%: CPT | Performed by: SURGERY

## 2023-09-21 PROCEDURE — G8417 CALC BMI ABV UP PARAM F/U: HCPCS | Performed by: SURGERY

## 2023-09-21 PROCEDURE — G8427 DOCREV CUR MEDS BY ELIG CLIN: HCPCS | Performed by: SURGERY

## 2023-09-21 PROCEDURE — 2022F DILAT RTA XM EVC RTNOPTHY: CPT | Performed by: SURGERY

## 2023-09-22 ENCOUNTER — TELEPHONE (OUTPATIENT)
Dept: PRIMARY CARE CLINIC | Age: 43
End: 2023-09-22

## 2023-09-22 NOTE — TELEPHONE ENCOUNTER
----- Message from [facility] sent at 9/21/2023 11:32 AM EDT -----  Subject: Message to Provider    QUESTIONS  Information for Provider? Patient called in to let her provider know that   she will be faxing over a document that shows a template of how the   patient's provider could write her up a letter for medical necessity for   her to have surgery. Patient stated that she needs her provider to type   her up a letter with a letterhead for a medical necessity for her to have   surgery. Please contact patient to further assist.   ---------------------------------------------------------------------------  --------------  Wendy Marker INFO  [phone]; OK to leave message on voicemail  ---------------------------------------------------------------------------  --------------  SCRIPT ANSWERS  Relationship to Patient?  Self

## 2023-09-22 NOTE — TELEPHONE ENCOUNTER
----- Message from [facility] sent at 9/21/2023 11:35 AM EDT -----  Subject: Message to Provider    QUESTIONS  Information for Provider? Patient called in to also let her provider know   that she forgot to tell her at her appointment that she had she is having   a burning up her nostril and it is giving her a headache. Patient thinks   it may be sinus related and is requesting a sinus medication. Patient   would like the medication to be sent to [address] [place] [address]. Please contact patient to further assist.   ---------------------------------------------------------------------------  --------------  Todd CRENSHAW  [phone]; OK to leave message on voicemail  ---------------------------------------------------------------------------  --------------  SCRIPT ANSWERS  Relationship to Patient?  Self

## 2023-10-24 ENCOUNTER — PATIENT MESSAGE (OUTPATIENT)
Dept: PRIMARY CARE CLINIC | Age: 43
End: 2023-10-24

## 2023-10-24 NOTE — PROGRESS NOTES
Management. [x] Weight loss Recommended. RTC in 4 weeks  Obtain rest of pre-op work up / clearances  Diet and Exercise      Patient advised that its their responsibility to follow up for studies and/or labs ordered today. Anila Townsend MA am scribing for and in the presence of Dr. Azra Lorenz DO on this date of 10/26/23 at 11:33 AM    I, Vivienne Velasquez DO personally performed the services described in this documentation as scribed by the Medical Assistant Romelia Garza in my presence and it is both accurate and complete.  11/05/23

## 2023-10-25 NOTE — TELEPHONE ENCOUNTER
From: Reina Flores  To: Dr. Jerry Sorto: 10/24/2023 2:29 PM EDT  Subject: Jaimee Quintanilla ([facility] note)    Hello Doctor Anand Samaniego I tried to see if the clinic had urgent care appointments because I had a stomach virus and missed work 10/10 thru 10/13 I do know that stomach viruses can't be helped and they have to work their way out the system. I wanted to know could you please write a Doctor's excuse for those days I missed for work please. And if so can you please send it to my email [email or web address] and I can print it out can it please be on the clinic's letter head thank you!

## 2023-10-26 ENCOUNTER — OFFICE VISIT (OUTPATIENT)
Dept: BARIATRICS/WEIGHT MGMT | Age: 43
End: 2023-10-26
Payer: COMMERCIAL

## 2023-10-26 VITALS — WEIGHT: 293 LBS | BODY MASS INDEX: 51.91 KG/M2 | HEIGHT: 63 IN

## 2023-10-26 DIAGNOSIS — E78.2 MIXED HYPERLIPIDEMIA: ICD-10-CM

## 2023-10-26 DIAGNOSIS — G47.33 OBSTRUCTIVE SLEEP APNEA: ICD-10-CM

## 2023-10-26 DIAGNOSIS — K21.9 CHRONIC GERD: ICD-10-CM

## 2023-10-26 DIAGNOSIS — I10 ESSENTIAL HYPERTENSION: ICD-10-CM

## 2023-10-26 DIAGNOSIS — M15.9 PRIMARY OSTEOARTHRITIS INVOLVING MULTIPLE JOINTS: ICD-10-CM

## 2023-10-26 DIAGNOSIS — E88.810 METABOLIC SYNDROME: Primary | ICD-10-CM

## 2023-10-26 DIAGNOSIS — E66.01 MORBID OBESITY WITH BMI OF 60.0-69.9, ADULT (HCC): ICD-10-CM

## 2023-10-26 PROCEDURE — G8427 DOCREV CUR MEDS BY ELIG CLIN: HCPCS | Performed by: SURGERY

## 2023-10-26 PROCEDURE — G8417 CALC BMI ABV UP PARAM F/U: HCPCS | Performed by: SURGERY

## 2023-10-26 PROCEDURE — 99214 OFFICE O/P EST MOD 30 MIN: CPT | Performed by: SURGERY

## 2023-10-26 PROCEDURE — 1036F TOBACCO NON-USER: CPT | Performed by: SURGERY

## 2023-10-26 PROCEDURE — G8484 FLU IMMUNIZE NO ADMIN: HCPCS | Performed by: SURGERY

## 2023-11-15 NOTE — TELEPHONE ENCOUNTER
Medication:   Requested Prescriptions     Pending Prescriptions Disp Refills    DULoxetine (CYMBALTA) 60 MG extended release capsule [Pharmacy Med Name: DULoxetine HCl 60 MG Oral Capsule Delayed Release Particles] 90 capsule 0     Sig: Take 1 capsule by mouth once daily        Last Filled:      Patient Phone Number: 21  (home) [phone] (work)    Last appt: 9/13/2023   Next appt: 12/13/2023    Last OARRS:        No data to display

## 2023-11-16 ENCOUNTER — OFFICE VISIT (OUTPATIENT)
Dept: BARIATRICS/WEIGHT MGMT | Age: 43
End: 2023-11-16
Payer: COMMERCIAL

## 2023-11-16 VITALS — HEIGHT: 63 IN | WEIGHT: 293 LBS | BODY MASS INDEX: 51.91 KG/M2

## 2023-11-16 DIAGNOSIS — G47.33 OBSTRUCTIVE SLEEP APNEA: ICD-10-CM

## 2023-11-16 DIAGNOSIS — E88.810 METABOLIC SYNDROME: Primary | ICD-10-CM

## 2023-11-16 DIAGNOSIS — E66.9 DIABETES MELLITUS TYPE 2 IN OBESE (HCC): ICD-10-CM

## 2023-11-16 DIAGNOSIS — I10 ESSENTIAL HYPERTENSION: ICD-10-CM

## 2023-11-16 DIAGNOSIS — E78.2 MIXED HYPERLIPIDEMIA: ICD-10-CM

## 2023-11-16 DIAGNOSIS — E11.69 DIABETES MELLITUS TYPE 2 IN OBESE (HCC): ICD-10-CM

## 2023-11-16 DIAGNOSIS — K21.9 CHRONIC GERD: ICD-10-CM

## 2023-11-16 DIAGNOSIS — E66.01 MORBID OBESITY WITH BMI OF 50.0-59.9, ADULT (HCC): ICD-10-CM

## 2023-11-16 PROCEDURE — 3044F HG A1C LEVEL LT 7.0%: CPT | Performed by: SURGERY

## 2023-11-16 PROCEDURE — 2022F DILAT RTA XM EVC RTNOPTHY: CPT | Performed by: SURGERY

## 2023-11-16 PROCEDURE — G8417 CALC BMI ABV UP PARAM F/U: HCPCS | Performed by: SURGERY

## 2023-11-16 PROCEDURE — G8427 DOCREV CUR MEDS BY ELIG CLIN: HCPCS | Performed by: SURGERY

## 2023-11-16 PROCEDURE — 99214 OFFICE O/P EST MOD 30 MIN: CPT | Performed by: SURGERY

## 2023-11-16 PROCEDURE — 1036F TOBACCO NON-USER: CPT | Performed by: SURGERY

## 2023-11-16 PROCEDURE — G8484 FLU IMMUNIZE NO ADMIN: HCPCS | Performed by: SURGERY

## 2023-11-17 RX ORDER — DULOXETIN HYDROCHLORIDE 60 MG/1
CAPSULE, DELAYED RELEASE ORAL
Qty: 90 CAPSULE | Refills: 0 | Status: SHIPPED | OUTPATIENT
Start: 2023-11-17

## 2023-11-28 ENCOUNTER — TELEPHONE (OUTPATIENT)
Dept: CARDIOLOGY CLINIC | Age: 43
End: 2023-11-28

## 2023-11-28 NOTE — TELEPHONE ENCOUNTER
Attempted to call patient to schedule a new patient appointment, referred by Dr. Conchita Deng for evaluation of venous reflux vs lymphedema. LVM to return call.    Can offer patient 12/18/23 @ 1030 am with Dr. Tonya Dee at [facility].

## 2023-12-01 ENCOUNTER — TELEPHONE (OUTPATIENT)
Dept: ORTHOPEDIC SURGERY | Age: 43
End: 2023-12-01

## 2023-12-03 DIAGNOSIS — K21.9 GASTROESOPHAGEAL REFLUX DISEASE WITHOUT ESOPHAGITIS: ICD-10-CM

## 2023-12-04 RX ORDER — LANSOPRAZOLE 30 MG/1
30 CAPSULE, DELAYED RELEASE ORAL DAILY
Qty: 90 CAPSULE | Refills: 0 | Status: SHIPPED | OUTPATIENT
Start: 2023-12-04

## 2023-12-04 NOTE — TELEPHONE ENCOUNTER
Medication:   Requested Prescriptions     Pending Prescriptions Disp Refills    lansoprazole (PREVACID) 30 MG delayed release capsule [Pharmacy Med Name: Lansoprazole 30 MG Oral Capsule Delayed Release] 90 capsule 0     Sig: Take 1 capsule by mouth once daily        Last Filled:      Patient Phone Number: 21  (home) [phone] (work)    Last appt: 9/13/2023   Next appt: 12/13/2023    Last OARRS:        No data to display

## 2023-12-06 ENCOUNTER — TELEPHONE (OUTPATIENT)
Dept: ORTHOPEDIC SURGERY | Age: 43
End: 2023-12-06

## 2023-12-12 NOTE — TELEPHONE ENCOUNTER
Auth: # [id number]     Date Range: 12/19/2023 - 3/19/2024  Type of SX:  OUTPATIENT  Location: [facility]  CPT: 77648   DX Code: G56.02  SX area: [facility]  Insurance: Graeme Sterling

## 2023-12-13 ENCOUNTER — OFFICE VISIT (OUTPATIENT)
Dept: PRIMARY CARE CLINIC | Age: 43
End: 2023-12-13
Payer: COMMERCIAL

## 2023-12-13 VITALS
WEIGHT: 293 LBS | HEART RATE: 73 BPM | OXYGEN SATURATION: 95 % | DIASTOLIC BLOOD PRESSURE: 78 MMHG | SYSTOLIC BLOOD PRESSURE: 124 MMHG | BODY MASS INDEX: 60.58 KG/M2

## 2023-12-13 DIAGNOSIS — E66.1 CLASS 3 DRUG-INDUCED OBESITY WITH SERIOUS COMORBIDITY AND BODY MASS INDEX (BMI) OF 60.0 TO 69.9 IN ADULT (HCC): ICD-10-CM

## 2023-12-13 DIAGNOSIS — I10 PRIMARY HYPERTENSION: ICD-10-CM

## 2023-12-13 DIAGNOSIS — Z02.1 PHYSICAL EXAM, PRE-EMPLOYMENT: ICD-10-CM

## 2023-12-13 DIAGNOSIS — G56.03 CARPAL TUNNEL SYNDROME, BILATERAL: Primary | ICD-10-CM

## 2023-12-13 DIAGNOSIS — I50.32 CHRONIC DIASTOLIC CHF (CONGESTIVE HEART FAILURE) (HCC): ICD-10-CM

## 2023-12-13 DIAGNOSIS — K21.9 GASTROESOPHAGEAL REFLUX DISEASE WITHOUT ESOPHAGITIS: ICD-10-CM

## 2023-12-13 DIAGNOSIS — Z01.818 PRE-OP EVALUATION: ICD-10-CM

## 2023-12-13 DIAGNOSIS — A15.9 TB (TUBERCULOSIS): ICD-10-CM

## 2023-12-13 PROBLEM — A41.9 SEPSIS (HCC): Status: RESOLVED | Noted: 2023-08-19 | Resolved: 2023-12-13

## 2023-12-13 PROCEDURE — 1036F TOBACCO NON-USER: CPT | Performed by: FAMILY MEDICINE

## 2023-12-13 PROCEDURE — 99214 OFFICE O/P EST MOD 30 MIN: CPT | Performed by: FAMILY MEDICINE

## 2023-12-13 PROCEDURE — G8417 CALC BMI ABV UP PARAM F/U: HCPCS | Performed by: FAMILY MEDICINE

## 2023-12-13 PROCEDURE — G8427 DOCREV CUR MEDS BY ELIG CLIN: HCPCS | Performed by: FAMILY MEDICINE

## 2023-12-13 PROCEDURE — G8482 FLU IMMUNIZE ORDER/ADMIN: HCPCS | Performed by: FAMILY MEDICINE

## 2023-12-13 PROCEDURE — 3074F SYST BP LT 130 MM HG: CPT | Performed by: FAMILY MEDICINE

## 2023-12-13 PROCEDURE — 3078F DIAST BP <80 MM HG: CPT | Performed by: FAMILY MEDICINE

## 2023-12-13 NOTE — PROGRESS NOTES
Preoperative Consultation      Brisa Lundy  YOB: 1980    Date of Service:  12/13/2023    Vitals:    12/13/23 1320   BP: 124/78   Site: Left Upper Arm   Position: Sitting   Cuff Size: Thigh   Pulse: 73   SpO2: 95%   Weight: (!) 155.1 kg (342 lb)      Wt Readings from Last 2 Encounters:   12/13/23 (!) 155.1 kg (342 lb)   12/13/23 (!) 155.1 kg (342 lb)     BP Readings from Last 3 Encounters:   12/13/23 124/78   12/13/23 134/84   09/13/23 132/87        Chief Complaint   Patient presents with    Pre-op Exam     Monitor Anesthesia Care- 12/19/23-  [facility]   Left carpel tunnel release      Allergies   Allergen Reactions    Latex Itching and Rash    Black Edison Flavor Shortness Of Breath    [place]leveland-Containing Products Shortness Of Breath, Itching and Swelling    Imitrex [Sumatriptan] Other (See Comments)     Chest pain    Other Anaphylaxis     enviromental  allegies    Corn, wheat, soy bean, peanuts and walnuts    Peanut-Containing Drug Products Shortness Of Breath    Soy Allergy Shortness Of Breath and Swelling    Wheat Bran Hives, Shortness Of Breath, Itching and Swelling    Mixed Grasses Itching and Other (See Comments)     Molds--over 51 different environmental allergies  Sinus issues/redden eyes    Tramadol Other (See Comments)     Made her feel really bad, and head dizzy and chest pain    Toradol [Ketorolac Tromethamine]      Outpatient Medications Marked as Taking for the 12/13/23 encounter (Office Visit) with Karel Potter, DO   Medication Sig Dispense Refill    phenazopyridine (PYRIDIUM) 200 MG tablet Take 1 tablet by mouth 3 times daily as needed      nitrofurantoin, macrocrystal-monohydrate, (MACROBID) 100 MG capsule TAKE 1 CAPSULE BY MOUTH TWICE DAILY.  START THE DAY OF CYSTOSCOPY PROCEDURE (START TODAY)      DULoxetine (CYMBALTA) 60 MG extended release capsule Take 1 capsule by mouth daily 90 capsule 1    vitamin D (ERGOCALCIFEROL) 1.25 MG (54358 UT) CAPS capsule Take 1

## 2023-12-14 ENCOUNTER — TELEPHONE (OUTPATIENT)
Dept: ORTHOPEDIC SURGERY | Age: 43
End: 2023-12-14

## 2023-12-14 LAB
MEV IGG SER QL IA: NORMAL
MUV IGG SER QL IA: NORMAL
RUBV IGG SERPL QL IA: NORMAL
VZV IGG SER QL IA: NORMAL

## 2023-12-16 LAB
GAMMA INTERFERON BACKGROUND BLD IA-ACNC: 0.01 IU/ML
MITOGEN IGNF BCKGRD COR BLD-ACNC: >10 IU/ML
QUANTI TB GOLD PLUS: NEGATIVE
QUANTI TB1 MINUS NIL: 0 IU/ML (ref 0–0.34)
QUANTI TB2 MINUS NIL: 0 IU/ML (ref 0–0.34)

## 2023-12-21 ENCOUNTER — APPOINTMENT (OUTPATIENT)
Dept: CT IMAGING | Age: 43
End: 2023-12-21
Payer: COMMERCIAL

## 2023-12-21 ENCOUNTER — HOSPITAL ENCOUNTER (EMERGENCY)
Age: 43
Discharge: HOME | End: 2023-12-21
Payer: COMMERCIAL

## 2023-12-21 VITALS
DIASTOLIC BLOOD PRESSURE: 85 MMHG | WEIGHT: 293 LBS | TEMPERATURE: 97.7 F | BODY MASS INDEX: 51.91 KG/M2 | HEIGHT: 63 IN | RESPIRATION RATE: 17 BRPM | HEART RATE: 90 BPM | SYSTOLIC BLOOD PRESSURE: 145 MMHG | OXYGEN SATURATION: 98 %

## 2023-12-21 DIAGNOSIS — R09.81 HEAD CONGESTION: Primary | ICD-10-CM

## 2023-12-21 DIAGNOSIS — J06.9 UPPER RESPIRATORY TRACT INFECTION, UNSPECIFIED TYPE: ICD-10-CM

## 2023-12-21 LAB
FLUAV RNA UPPER RESP QL NAA+PROBE: NEGATIVE
FLUBV AG NPH QL: NEGATIVE

## 2023-12-21 PROCEDURE — 99284 EMERGENCY DEPT VISIT MOD MDM: CPT

## 2023-12-21 PROCEDURE — 87804 INFLUENZA ASSAY W/OPTIC: CPT

## 2023-12-21 PROCEDURE — 70450 CT HEAD/BRAIN W/O DYE: CPT

## 2023-12-21 RX ORDER — GUAIFENESIN/DEXTROMETHORPHAN 100-10MG/5
10 SYRUP ORAL 3 TIMES DAILY PRN
Qty: 200 ML | Refills: 0 | Status: SHIPPED | OUTPATIENT
Start: 2023-12-21 | End: 2023-12-31

## 2023-12-21 NOTE — ED PROVIDER NOTES
**ADVANCED PRACTICE PROVIDER, I HAVE EVALUATED THIS PATIENT**        [address] ENCOUNTER      Pt Name: Brisa Lundy  NQN:[id number]  [facility] [facility] 1980  Date of evaluation: 12/21/2023  Provider: Dolores Lopez PA-C  Note Started: 12:53 PM EST 12/21/23        Chief Complaint:    Chief Complaint   Patient presents with    Generalized Body Aches     Been feeling bad since Sunday after getting flu shot on Friday; over the days she has been feeling better each day but doesn't feel back to normal; having headaches; it hurts to take a good deep breath w/ a cough (small production w/ clear color); states she wears a c-pap and hasn't cleaned it over the past week w/ her being sick, states every time she uses it she is coughing;          Nursing Notes, Past Medical Hx, Past Surgical Hx, Social Hx, Allergies, and Family Hx were all reviewed and agreed with or any disagreements were addressed in the HPI.    HPI: (Location, Duration, Timing, Severity, Quality, Assoc Sx, Context, Modifying factors)    History From: ***  {Limitations to history (Optional):85693}    {Social Determinants Significantly Affecting Health (Optional):53796}    Chief Complaint of ***    This is a  37 y.o. female who presents  ***    PastMedical/Surgical History:      Diagnosis Date    Abscess 05/20/2022    Allergic reaction to food 05/27/2020    Allergic rhinitis     Anemia     Anxiety and depression     Bilateral hand numbness 12/04/2020    Bilateral hip pain 06/15/2021    chronic gerd     Dermatomyositis ([address]) 2008    Eczema     Fibromyalgia     Fibromyalgia     Headache(784.0)     History of blood transfusion 2009    also two in 2014     HTN (hypertension)     Hx of blood clots 2014    PE and DVT    Hx of blood clots     Irritable bowel syndrome     Lymphedema     Morbid obesity with BMI of 50.0-59.9, adult ([address])     obstructive sleep apnea     Osteoarthritis     PCOS (polycystic ovarian (06/15/2021), chronic gerd, Dermatomyositis ([address]) (2008), Eczema, Fibromyalgia, Fibromyalgia, Headache(784.0), History of blood transfusion (2009), HTN (hypertension), blood clots (2014), blood clots, Irritable bowel syndrome, Lymphedema, Morbid obesity with BMI of 50.0-59.9, adult ([address]), obstructive sleep apnea, Osteoarthritis, PCOS (polycystic ovarian syndrome), Pre-operative clearance, Prediabetes, Pulmonary embolism ([address]) (07/17/2014), Vaginal high risk HPV DNA test positive (05/2016), and Wears glasses. Records Reviewed (External and Source)   I personally reviewed patient medical record    CC/HPI Summary, DDx, ED Course, and Reassessment:   Emergency room course: See HPI physical exam above  Patient on exam throat is clear. Cardiovascular regular rhythm, lungs are clear. No wheeze rales or rhonchi noted. Neck was supple full range of motion without tenderness. No nuchal rigidity. No midline tenderness cervical, thoracic or lumbar spine. She has no chest wall tenderness with palpation. Abdomen obese but nontender. Normal bowel sounds all 4 quadrant. She has full range of motion all extremity alert oriented x 4. She does not appear to be in acute distress. Rapid influenza negative Phalen's and influenza B. CT of the without contrast showed no acute intracranial abnormality. I discussed with patient her CT results and chest result from today. Inform her that her symptoms mostly likely related to upper respiratory infection. Recommend she take OTC Motrin Tylenol as needed for body aches and headaches. I will put her on Robitussin DM for cough and congestion. She is okay with this plan. She will be discharged stable condition. Disposition Considerations (Tests not ordered but considered, Shared Decision Making, Pt Expectation of Test or Tx.):     See discussion above          The patient tolerated their visit well. I evaluated the patient.   The physician was available for

## 2023-12-21 NOTE — DISCHARGE INSTRUCTIONS
Make sure you use your Flonase nasal spray  Take prescribed medication as prescribed only  Follow-up with your primary care provider as needed or for any worsening  Return to emergency room as needed

## 2023-12-28 NOTE — PROGRESS NOTES
Patient Name:   Maury Valencia       Type of Surgery:  Sleeve         Date of Surgery:  1/30/24       Start Pre-Op Diet On:  1/17/24       Start Clear Liquids On:  1/29/24

## 2023-12-28 NOTE — PROGRESS NOTES
[facility]   General & Laparoscopic Surgery  [employer]       HPI:     Kerry Villela is a very pleasant 43 y.o. female with Body mass index is 60.41 kg/m².  , Pre-Surgery.   Pre-operative clearance and work up pending. Working hard to keep good dietary habits as well level of activity.  Patient denies any nausea, vomiting, fevers, chills, shortness of breath, chest pain, cough, constipation or difficulty urinating.    Just suffered loss of brother and very tearful    Having hand surgeries in Jan/Feb for carpal tunnel    Past Medical History:   Diagnosis Date    Abscess 05/20/2022    Allergic reaction to food 05/27/2020    Allergic rhinitis     Anemia     Anxiety and depression     Bilateral hand numbness 12/04/2020    Bilateral hip pain 06/15/2021    chronic gerd     Dermatomyositis (HCC) 2008    Eczema     Fibromyalgia     Fibromyalgia     Headache(784.0)     History of blood transfusion 2009    also two in 2014     HTN (hypertension)     Hx of blood clots 2014    PE and DVT    Hx of blood clots     Irritable bowel syndrome     Lymphedema     Morbid obesity with BMI of 50.0-59.9, adult (HCC)     obstructive sleep apnea     pt uses cpap machine    Osteoarthritis     PCOS (polycystic ovarian syndrome)     Pre-operative clearance     Prediabetes     Pulmonary embolism (HCC) 07/17/2014    Overview:  Continue anticoagulation.  Medicine managing Coumadin bridge.  Daily H/H, PTT, PT/INR.   Last Assessment & Plan:  HPI: Seen in hospital follow up.  The patient is a 33 year old y/o female who admitted to the hospital for pulmonary emboli. She began to experience left lower extremity swelling and pain for two days, and day of admission she began to experience chest pain and shortness of     Vaginal high risk HPV DNA test positive 05/2016    Wears glasses      Past Surgical History:   Procedure Laterality Date    COLONOSCOPY N/A 01/13/2021    COLONOSCOPY performed by Salvatore Carranza MD at [facility]

## 2023-12-29 NOTE — FLOWSHEET NOTE
Pt has history of sleep apnea      [facility] PRE-OPERATIVE INSTRUCTIONS    Day of Procedure: 1/11               Arrival time:   9        urgery time:1040    Take the following medications with a sip of water:  Follow your MD/Surgeons pre-procedure instructions regarding your medications     Do not eat or drink anything after 12:00 midnight prior to your surgery.  This includes water chewing gum, mints and ice chips.   You may brush your teeth and gargle the morning of your surgery, but do not swallow the water     Please see your family doctor/pediatrician for a history and physical and/or concerning medications.   Bring any test results/reports from your physicians office.   If you are under the care of a heart doctor or specialist doctor, please be aware that you may be asked to them for clearance    You may be asked to stop blood thinners such as Coumadin, Plavix, Fragmin, Lovenox, etc., or any anti-inflammatories such as:  Aspirin, Ibuprofen, Advil, Naproxen prior to your surgery.    We also ask that you stop any OTC medications such as fish oil, vitamin E, glucosamine, garlic, Multivitamins, COQ 10, etc.    We ask that you do not smoke 24 hours prior to surgery  We ask that you do not  drink any alcoholic beverages 24 hours prior to surgery     You must make arrangements for a responsible adult to take you home after your surgery.    For your safety you will not be allowed to leave alone or drive yourself home.  Your surgery will be cancelled if you do not have a ride home.     Also for your safety, it is strongly suggested that someone stay with you the first 24 hours after your surgery.     A parent or legal guardian must accompany a child scheduled for surgery and plan to stay at the hospital until the child is discharged.    Please do not bring other children with you.    For your comfort, please wear simple loose fitting clothing to the hospital.  Please do not bring valuables.    Do not wear any 
Universal Safety Interventions

## 2023-12-29 NOTE — PROGRESS NOTES
[facility] Pre-Admission Testing Electronic Communication Worksheet for OR/ENDO Procedures        Patient: Kerry Villela    DOS: 1/11    Arrival Time: 9    Surgery Time:1040    Meds to Bed:  [] YES    [x]  NO    Transportation Confirmed: [x] YES    []  NO    History and Physical:  [x] YES    []  NO  [] N/A  If yes, please list doctor or Urgent Care and date of H&P: see epic    Additional Clearance(Cardiac, Pulmonary, etc):  [] YES    [x]  NO    Pre-Admission Testing Visit:  [] YES    [x]  NO If no, do labs/testing need to be done DOS?  [] YES    [x]  NO    Medication Reconciliation Complete:  [x] YES    []  NO        Additional Notes:                Interview Complete: [x] YES    []  NO          Belinda Monique RN  12:04 PM

## 2024-01-04 ENCOUNTER — TELEPHONE (OUTPATIENT)
Dept: ORTHOPEDIC SURGERY | Age: 44
End: 2024-01-04

## 2024-01-04 ENCOUNTER — OFFICE VISIT (OUTPATIENT)
Dept: BARIATRICS/WEIGHT MGMT | Age: 44
End: 2024-01-04
Payer: COMMERCIAL

## 2024-01-04 VITALS — WEIGHT: 293 LBS | HEIGHT: 63 IN | BODY MASS INDEX: 51.91 KG/M2

## 2024-01-04 DIAGNOSIS — K21.9 CHRONIC GERD: ICD-10-CM

## 2024-01-04 DIAGNOSIS — I10 ESSENTIAL HYPERTENSION: ICD-10-CM

## 2024-01-04 DIAGNOSIS — G47.33 OBSTRUCTIVE SLEEP APNEA: ICD-10-CM

## 2024-01-04 DIAGNOSIS — E88.810 METABOLIC SYNDROME: Primary | ICD-10-CM

## 2024-01-04 DIAGNOSIS — E11.69 DIABETES MELLITUS TYPE 2 IN OBESE (HCC): ICD-10-CM

## 2024-01-04 DIAGNOSIS — E66.9 DIABETES MELLITUS TYPE 2 IN OBESE (HCC): ICD-10-CM

## 2024-01-04 DIAGNOSIS — E66.01 MORBID OBESITY WITH BMI OF 50.0-59.9, ADULT (HCC): ICD-10-CM

## 2024-01-04 PROCEDURE — 3046F HEMOGLOBIN A1C LEVEL >9.0%: CPT | Performed by: SURGERY

## 2024-01-04 PROCEDURE — 99214 OFFICE O/P EST MOD 30 MIN: CPT | Performed by: SURGERY

## 2024-01-04 PROCEDURE — G8417 CALC BMI ABV UP PARAM F/U: HCPCS | Performed by: SURGERY

## 2024-01-04 PROCEDURE — G8482 FLU IMMUNIZE ORDER/ADMIN: HCPCS | Performed by: SURGERY

## 2024-01-04 PROCEDURE — 1036F TOBACCO NON-USER: CPT | Performed by: SURGERY

## 2024-01-04 PROCEDURE — G8427 DOCREV CUR MEDS BY ELIG CLIN: HCPCS | Performed by: SURGERY

## 2024-01-04 PROCEDURE — 2022F DILAT RTA XM EVC RTNOPTHY: CPT | Performed by: SURGERY

## 2024-01-04 NOTE — PATIENT INSTRUCTIONS
Patient received dietary handouts and education.    Plan/Recommendations:   - Regroup with presurgical guidelines  - Eliminate soda / coffee w/ sugar

## 2024-01-04 NOTE — TELEPHONE ENCOUNTER
Left a vm for the patient.  She just needs to make a follow up appointment with Dr. Garcia to take a look at her thumb.

## 2024-01-04 NOTE — PROGRESS NOTES
Kerry Villela gained 2 lbs over past ~1.5 months.  Pt states she needs to push her surgery back d/t carpal tunnel surgery having 1 hand done 1/11 and 2nd hand done 2/1/24.  Pt states she really feels like she is addicted to sweets.  Pt is very tearful.  Brother was killed in October, marital problems.  Allergy- wheat / soy / corn / peanuts / walnuts    Is pt eating at least 4 times everyday?  Inconsistent sometimes only 1x/day, has a lot of life stressors plus struggles with emotional eating pt knows     Is pt eating a lean protein source with all meals and snacks?  yes    Has pt decreased their portions using the plate method?  mindful, eating less    Is pt choosing low fat/sugar free options?  inconsistent- sometimes pop OR sweet treat- cookie / cupcake    Is pt drinking at least 64 oz of clear liquids everyday? yes - water     Has pt stopped drinking carbonation, caffeinated, and sugar sweetened beverages?  soda / coffee  w/ cream & sugar    Has pt sampled Unjury and/or Nectar protein?  struggling d/t food allergies concerned w/ soy  / flavors are fine    Has pt attended a support group? Missed appointment    Plan/Recommendations:   - Regroup with presurgical guidelines  - Eliminate soda / coffee w/ sugar    Recommend f/u behaviorist / would likely benefit from grief counseling    Handouts: SG schedule    Rina Funez RD, LD

## 2024-01-04 NOTE — TELEPHONE ENCOUNTER
Other PATIENT HAS CONCERNS WITH SWELLING AND HAND AND KNOTS FORMED IN HER THUMB. PATIENT SAYS SHE CAN NOT MAKE A FIST. [phone]

## 2024-01-05 NOTE — TELEPHONE ENCOUNTER
Patient return the call but there are no available appointment for her to be seen before her  sx on 01/11/24 for her lt hand she stated on that thumb  has some knots on that finger and just wanted to be seem for that. She just need a call back.

## 2024-01-05 NOTE — TELEPHONE ENCOUNTER
Spoke with the patient.  I let her know that we do not have an earlier appointments at this time.  I gave her the options of proceeding with surgery or post pone surgery to have Dr. Garcia look at her hand and then reschedule.  Patient elected to proceed with her surgery.

## 2024-01-10 ENCOUNTER — ANESTHESIA EVENT (OUTPATIENT)
Dept: OPERATING ROOM | Age: 44
End: 2024-01-10
Payer: COMMERCIAL

## 2024-01-11 ENCOUNTER — ANESTHESIA (OUTPATIENT)
Dept: OPERATING ROOM | Age: 44
End: 2024-01-11
Payer: COMMERCIAL

## 2024-01-11 ENCOUNTER — HOSPITAL ENCOUNTER (OUTPATIENT)
Age: 44
Setting detail: OUTPATIENT SURGERY
Discharge: HOME | End: 2024-01-11
Attending: ORTHOPAEDIC SURGERY | Admitting: ORTHOPAEDIC SURGERY
Payer: COMMERCIAL

## 2024-01-11 VITALS
DIASTOLIC BLOOD PRESSURE: 59 MMHG | WEIGHT: 293 LBS | RESPIRATION RATE: 16 BRPM | HEIGHT: 63 IN | HEART RATE: 87 BPM | BODY MASS INDEX: 51.91 KG/M2 | OXYGEN SATURATION: 98 % | TEMPERATURE: 98.3 F | SYSTOLIC BLOOD PRESSURE: 124 MMHG

## 2024-01-11 DIAGNOSIS — R60.0 EDEMA OF HAND: Primary | ICD-10-CM

## 2024-01-11 PROBLEM — G56.02 CARPAL TUNNEL SYNDROME ON LEFT: Status: ACTIVE | Noted: 2024-01-11

## 2024-01-11 LAB
EKG ATRIAL RATE: 90 BPM
EKG DIAGNOSIS: NORMAL
EKG P AXIS: 55 DEGREES
EKG P-R INTERVAL: 174 MS
EKG Q-T INTERVAL: 374 MS
EKG QRS DURATION: 86 MS
EKG QTC CALCULATION (BAZETT): 457 MS
EKG R AXIS: 16 DEGREES
EKG T AXIS: 35 DEGREES
EKG VENTRICULAR RATE: 90 BPM

## 2024-01-11 PROCEDURE — 2500000003 HC RX 250 WO HCPCS: Performed by: ORTHOPAEDIC SURGERY

## 2024-01-11 PROCEDURE — 3600000005 HC SURGERY LEVEL 5 BASE: Performed by: ORTHOPAEDIC SURGERY

## 2024-01-11 PROCEDURE — 3600000015 HC SURGERY LEVEL 5 ADDTL 15MIN: Performed by: ORTHOPAEDIC SURGERY

## 2024-01-11 PROCEDURE — 94640 AIRWAY INHALATION TREATMENT: CPT

## 2024-01-11 PROCEDURE — 6360000002 HC RX W HCPCS: Performed by: ANESTHESIOLOGY

## 2024-01-11 PROCEDURE — 2580000003 HC RX 258: Performed by: ORTHOPAEDIC SURGERY

## 2024-01-11 PROCEDURE — 2500000003 HC RX 250 WO HCPCS

## 2024-01-11 PROCEDURE — 94761 N-INVAS EAR/PLS OXIMETRY MLT: CPT

## 2024-01-11 PROCEDURE — 3700000000 HC ANESTHESIA ATTENDED CARE: Performed by: ORTHOPAEDIC SURGERY

## 2024-01-11 PROCEDURE — 6370000000 HC RX 637 (ALT 250 FOR IP): Performed by: ANESTHESIOLOGY

## 2024-01-11 PROCEDURE — 3700000001 HC ADD 15 MINUTES (ANESTHESIA): Performed by: ORTHOPAEDIC SURGERY

## 2024-01-11 PROCEDURE — 7100000011 HC PHASE II RECOVERY - ADDTL 15 MIN: Performed by: ORTHOPAEDIC SURGERY

## 2024-01-11 PROCEDURE — 2580000003 HC RX 258: Performed by: ANESTHESIOLOGY

## 2024-01-11 PROCEDURE — 6360000002 HC RX W HCPCS

## 2024-01-11 PROCEDURE — 93005 ELECTROCARDIOGRAM TRACING: CPT | Performed by: ANESTHESIOLOGY

## 2024-01-11 PROCEDURE — 2709999900 HC NON-CHARGEABLE SUPPLY: Performed by: ORTHOPAEDIC SURGERY

## 2024-01-11 PROCEDURE — 7100000010 HC PHASE II RECOVERY - FIRST 15 MIN: Performed by: ORTHOPAEDIC SURGERY

## 2024-01-11 PROCEDURE — A4217 STERILE WATER/SALINE, 500 ML: HCPCS | Performed by: ORTHOPAEDIC SURGERY

## 2024-01-11 PROCEDURE — 6360000002 HC RX W HCPCS: Performed by: ORTHOPAEDIC SURGERY

## 2024-01-11 PROCEDURE — 7100000000 HC PACU RECOVERY - FIRST 15 MIN: Performed by: ORTHOPAEDIC SURGERY

## 2024-01-11 PROCEDURE — 7100000001 HC PACU RECOVERY - ADDTL 15 MIN: Performed by: ORTHOPAEDIC SURGERY

## 2024-01-11 RX ORDER — PROPOFOL 10 MG/ML
INJECTION, EMULSION INTRAVENOUS CONTINUOUS PRN
Status: DISCONTINUED | OUTPATIENT
Start: 2024-01-11 | End: 2024-01-11 | Stop reason: SDUPTHER

## 2024-01-11 RX ORDER — HALOPERIDOL 5 MG/ML
1 INJECTION INTRAMUSCULAR
Status: DISCONTINUED | OUTPATIENT
Start: 2024-01-11 | End: 2024-01-11 | Stop reason: HOSPADM

## 2024-01-11 RX ORDER — SODIUM CHLORIDE 0.9 % (FLUSH) 0.9 %
5-40 SYRINGE (ML) INJECTION PRN
Status: DISCONTINUED | OUTPATIENT
Start: 2024-01-11 | End: 2024-01-11 | Stop reason: HOSPADM

## 2024-01-11 RX ORDER — MEPERIDINE HYDROCHLORIDE 25 MG/ML
12.5 INJECTION INTRAMUSCULAR; INTRAVENOUS; SUBCUTANEOUS
Status: DISCONTINUED | OUTPATIENT
Start: 2024-01-11 | End: 2024-01-11 | Stop reason: HOSPADM

## 2024-01-11 RX ORDER — GLYCOPYRROLATE 0.2 MG/ML
INJECTION INTRAMUSCULAR; INTRAVENOUS PRN
Status: DISCONTINUED | OUTPATIENT
Start: 2024-01-11 | End: 2024-01-11 | Stop reason: SDUPTHER

## 2024-01-11 RX ORDER — MAGNESIUM HYDROXIDE 1200 MG/15ML
LIQUID ORAL CONTINUOUS PRN
Status: DISCONTINUED | OUTPATIENT
Start: 2024-01-11 | End: 2024-01-11 | Stop reason: HOSPADM

## 2024-01-11 RX ORDER — SODIUM CHLORIDE 9 MG/ML
INJECTION, SOLUTION INTRAVENOUS PRN
Status: DISCONTINUED | OUTPATIENT
Start: 2024-01-11 | End: 2024-01-11 | Stop reason: HOSPADM

## 2024-01-11 RX ORDER — FENTANYL CITRATE 0.05 MG/ML
50 INJECTION, SOLUTION INTRAMUSCULAR; INTRAVENOUS EVERY 5 MIN PRN
Status: DISCONTINUED | OUTPATIENT
Start: 2024-01-11 | End: 2024-01-11 | Stop reason: HOSPADM

## 2024-01-11 RX ORDER — LIDOCAINE HYDROCHLORIDE 20 MG/ML
INJECTION, SOLUTION EPIDURAL; INFILTRATION; INTRACAUDAL; PERINEURAL PRN
Status: DISCONTINUED | OUTPATIENT
Start: 2024-01-11 | End: 2024-01-11 | Stop reason: SDUPTHER

## 2024-01-11 RX ORDER — ACETAMINOPHEN 500 MG
500 TABLET ORAL
Status: COMPLETED | OUTPATIENT
Start: 2024-01-11 | End: 2024-01-11

## 2024-01-11 RX ORDER — SODIUM CHLORIDE 0.9 % (FLUSH) 0.9 %
5-40 SYRINGE (ML) INJECTION EVERY 12 HOURS SCHEDULED
Status: DISCONTINUED | OUTPATIENT
Start: 2024-01-11 | End: 2024-01-11 | Stop reason: HOSPADM

## 2024-01-11 RX ORDER — FENTANYL CITRATE 50 UG/ML
INJECTION, SOLUTION INTRAMUSCULAR; INTRAVENOUS PRN
Status: DISCONTINUED | OUTPATIENT
Start: 2024-01-11 | End: 2024-01-11 | Stop reason: SDUPTHER

## 2024-01-11 RX ORDER — ONDANSETRON 2 MG/ML
4 INJECTION INTRAMUSCULAR; INTRAVENOUS
Status: DISCONTINUED | OUTPATIENT
Start: 2024-01-11 | End: 2024-01-11 | Stop reason: HOSPADM

## 2024-01-11 RX ORDER — DIPHENHYDRAMINE HYDROCHLORIDE 50 MG/ML
12.5 INJECTION INTRAMUSCULAR; INTRAVENOUS
Status: DISCONTINUED | OUTPATIENT
Start: 2024-01-11 | End: 2024-01-11 | Stop reason: HOSPADM

## 2024-01-11 RX ORDER — LORAZEPAM 2 MG/ML
0.5 INJECTION INTRAMUSCULAR
Status: DISCONTINUED | OUTPATIENT
Start: 2024-01-11 | End: 2024-01-11 | Stop reason: HOSPADM

## 2024-01-11 RX ORDER — IPRATROPIUM BROMIDE AND ALBUTEROL SULFATE 2.5; .5 MG/3ML; MG/3ML
1 SOLUTION RESPIRATORY (INHALATION) ONCE
Status: COMPLETED | OUTPATIENT
Start: 2024-01-11 | End: 2024-01-11

## 2024-01-11 RX ADMIN — SODIUM CHLORIDE: 9 INJECTION, SOLUTION INTRAVENOUS at 09:38

## 2024-01-11 RX ADMIN — FENTANYL CITRATE 50 MCG: 50 INJECTION INTRAMUSCULAR; INTRAVENOUS at 11:10

## 2024-01-11 RX ADMIN — PROPOFOL 60 MG: 10 INJECTION, EMULSION INTRAVENOUS at 11:16

## 2024-01-11 RX ADMIN — IPRATROPIUM BROMIDE AND ALBUTEROL SULFATE 1 DOSE: .5; 3 SOLUTION RESPIRATORY (INHALATION) at 12:19

## 2024-01-11 RX ADMIN — FENTANYL CITRATE 25 MCG: 50 INJECTION INTRAMUSCULAR; INTRAVENOUS at 11:15

## 2024-01-11 RX ADMIN — FENTANYL CITRATE 50 MCG: 0.05 INJECTION, SOLUTION INTRAMUSCULAR; INTRAVENOUS at 12:02

## 2024-01-11 RX ADMIN — GLYCOPYRROLATE 0.2 MG: 0.2 INJECTION INTRAMUSCULAR; INTRAVENOUS at 11:15

## 2024-01-11 RX ADMIN — FENTANYL CITRATE 25 MCG: 50 INJECTION INTRAMUSCULAR; INTRAVENOUS at 11:19

## 2024-01-11 RX ADMIN — LIDOCAINE HYDROCHLORIDE 50 MG: 20 INJECTION, SOLUTION EPIDURAL; INFILTRATION; INTRACAUDAL; PERINEURAL at 11:19

## 2024-01-11 RX ADMIN — PROPOFOL 150 MCG/KG/MIN: 10 INJECTION, EMULSION INTRAVENOUS at 11:15

## 2024-01-11 RX ADMIN — LIDOCAINE HYDROCHLORIDE 100 MG: 20 INJECTION, SOLUTION EPIDURAL; INFILTRATION; INTRACAUDAL; PERINEURAL at 11:15

## 2024-01-11 RX ADMIN — ACETAMINOPHEN 500 MG: 500 TABLET ORAL at 13:54

## 2024-01-11 ASSESSMENT — PAIN - FUNCTIONAL ASSESSMENT
PAIN_FUNCTIONAL_ASSESSMENT: PREVENTS OR INTERFERES SOME ACTIVE ACTIVITIES AND ADLS
PAIN_FUNCTIONAL_ASSESSMENT: 0-10
PAIN_FUNCTIONAL_ASSESSMENT: PREVENTS OR INTERFERES SOME ACTIVE ACTIVITIES AND ADLS
PAIN_FUNCTIONAL_ASSESSMENT: NONE - DENIES PAIN
PAIN_FUNCTIONAL_ASSESSMENT: PREVENTS OR INTERFERES SOME ACTIVE ACTIVITIES AND ADLS
PAIN_FUNCTIONAL_ASSESSMENT: ACTIVITIES ARE NOT PREVENTED
PAIN_FUNCTIONAL_ASSESSMENT: PREVENTS OR INTERFERES SOME ACTIVE ACTIVITIES AND ADLS
PAIN_FUNCTIONAL_ASSESSMENT: PREVENTS OR INTERFERES SOME ACTIVE ACTIVITIES AND ADLS

## 2024-01-11 ASSESSMENT — PAIN DESCRIPTION - FREQUENCY
FREQUENCY: CONTINUOUS
FREQUENCY: CONTINUOUS

## 2024-01-11 ASSESSMENT — PAIN DESCRIPTION - ORIENTATION
ORIENTATION: RIGHT

## 2024-01-11 ASSESSMENT — PAIN DESCRIPTION - DESCRIPTORS
DESCRIPTORS: ACHING;DISCOMFORT
DESCRIPTORS: DISCOMFORT
DESCRIPTORS: DISCOMFORT;DULL
DESCRIPTORS: DISCOMFORT
DESCRIPTORS: DISCOMFORT;DULL
DESCRIPTORS: DISCOMFORT

## 2024-01-11 ASSESSMENT — PAIN DESCRIPTION - ONSET
ONSET: ON-GOING
ONSET: ON-GOING

## 2024-01-11 ASSESSMENT — PAIN SCALES - GENERAL
PAINLEVEL_OUTOF10: 7
PAINLEVEL_OUTOF10: 7
PAINLEVEL_OUTOF10: 6

## 2024-01-11 ASSESSMENT — PAIN DESCRIPTION - LOCATION
LOCATION: HAND
LOCATION: HAND
LOCATION: HAND;WRIST
LOCATION: HAND;WRIST

## 2024-01-11 ASSESSMENT — LIFESTYLE VARIABLES: SMOKING_STATUS: 0

## 2024-01-11 ASSESSMENT — PAIN DESCRIPTION - PAIN TYPE
TYPE: SURGICAL PAIN
TYPE: SURGICAL PAIN

## 2024-01-11 NOTE — PROGRESS NOTES
Pt has mild pressure in chest but is much better than in PACU. No jaw or arm pain and denies SOB. Dr. Tyson to see pt. Order written for Tylenol. Pt can take Tylenol.

## 2024-01-11 NOTE — ANESTHESIA PRE PROCEDURE
Applicable):  No results found for: \"HIV\", \"HEPCAB\"    COVID-19 Screening (If Applicable):   Lab Results   Component Value Date/Time    COVID19 NOT DETECTED 08/19/2023 09:54 PM    COVID19 NOT DETECTED 01/28/2021 08:09 PM           Anesthesia Evaluation  Patient summary reviewed and Nursing notes reviewed  Airway: Mallampati: III  TM distance: >3 FB   Neck ROM: full  Mouth opening: > = 3 FB   Dental: normal exam         Pulmonary:normal exam    (+)     sleep apnea: on CPAP,           (-) not a current smoker                           Cardiovascular:  Exercise tolerance: no interval change  (+) hypertension:, CHF:, hyperlipidemia    (-) past MI, CABG/stent and  angina             ROS comment: ECHO 2/23    Left ventricular cavity size is normal.  Normal left ventricular wall thickness.  Overall left ventricular systolic function appears hyperdynamic.  Ejection fraction is visually estimated to be 60--65%  Normal diastolic function.  The right ventricle is moderately enlarged.  Right ventricular systolic function is normal .  Normal function of all valves.  The right atrium is mildly dilated.  Signature  Electronically signed by Nelson Jorge     Neuro/Psych:   (+) neuromuscular disease:, headaches:, psychiatric history:            GI/Hepatic/Renal:   (+) GERD:, morbid obesity          Endo/Other:    (+) : arthritis (Fibromyalgia): no interval change..                 Abdominal:   (+) obese          Vascular: negative vascular ROS.         Other Findings:             Anesthesia Plan      MAC     ASA 3       Induction: intravenous.      Anesthetic plan and risks discussed with patient.      Plan discussed with CRNA.                  This pre-anesthesia assessment may be used as a history and physical.    DOS STAFF ADDENDUM:    Pt seen and examined, chart reviewed (including anesthesia, drug and allergy history).  No interval changes to history and physical examination.  Anesthetic plan, risks, benefits, alternatives,

## 2024-01-11 NOTE — ANESTHESIA POSTPROCEDURE EVALUATION
116 10 100 %   01/11/24 1224 -- -- -- 95 12 100 %   01/11/24 1223 -- -- -- 85 12 100 %   01/11/24 1222 -- -- -- 72 13 99 %   01/11/24 1221 -- -- -- 81 11 93 %   01/11/24 1220 -- -- -- 84 12 93 %   01/11/24 1219 -- -- -- 82 14 94 %   01/11/24 1218 -- -- -- 79 12 98 %   01/11/24 1217 -- -- -- 77 11 98 %   01/11/24 1216 -- -- -- 78 13 (!) 85 %   01/11/24 1215 139/73 -- -- 82 15 92 %   01/11/24 1214 -- -- -- 77 13 95 %   01/11/24 1213 -- -- -- 74 11 95 %   01/11/24 1212 -- -- -- 74 12 97 %   01/11/24 1211 -- -- -- 79 18 96 %   01/11/24 1210 -- -- -- 76 (!) 9 96 %   01/11/24 1209 -- -- -- 75 12 98 %   01/11/24 1208 -- -- -- 81 14 91 %   01/11/24 1207 -- -- -- 82 17 93 %   01/11/24 1206 -- -- -- 85 21 94 %   01/11/24 1205 -- -- -- 90 24 94 %   01/11/24 1204 -- -- -- 89 19 95 %   01/11/24 1203 -- -- -- 84 10 97 %   01/11/24 1202 -- -- -- 87 12 93 %   01/11/24 1201 -- -- -- 85 13 96 %   01/11/24 1200 137/80 -- -- 83 12 98 %   01/11/24 1159 -- -- -- 97 20 96 %   01/11/24 1158 -- -- -- 86 27 97 %   01/11/24 1157 -- -- -- 84 20 97 %   01/11/24 1156 -- -- -- 90 17 98 %   01/11/24 1155 -- -- -- 97 22 95 %   01/11/24 1154 -- -- -- 84 21 97 %   01/11/24 1153 -- -- -- 91 22 95 %   01/11/24 1152 -- -- -- 90 22 95 %   01/11/24 1151 -- -- -- 86 22 95 %   01/11/24 1150 (!) 148/81 -- -- 90 22 96 %   01/11/24 1149 -- -- -- 88 20 97 %   01/11/24 1148 -- -- -- 86 21 96 %   01/11/24 1147 -- -- -- 91 22 96 %   01/11/24 1146 -- -- -- 88 25 95 %   01/11/24 1145 (!) 143/77 -- -- 87 23 96 %   01/11/24 1144 -- -- -- 87 26 97 %   01/11/24 1143 -- -- -- 91 22 98 %   01/11/24 1142 -- -- -- 95 24 98 %   01/11/24 1141 -- -- -- 88 19 99 %   01/11/24 1140 135/73 -- -- 87 21 98 %   01/11/24 1139 -- -- -- 91 24 98 %   01/11/24 1138 -- -- -- 94 15 98 %   01/11/24 1137 -- -- -- 95 24 100 %   01/11/24 1136 -- -- -- 90 20 98 %   01/11/24 1135 139/79 97 °F (36.1 °C) Temporal 96 18 98 %   01/11/24 1134 -- -- -- 100 22 96 %   01/11/24 1133 -- -- -- -- -- 93 %

## 2024-01-11 NOTE — DISCHARGE INSTRUCTIONS
Post-Operative Instructions    Carpal Tunnel Release:    Keep hand elevated with fingers above eye-level to control swelling.  Keep hand and bandage clean and dry.  Do not change or unwrap bandage.  Please leave bandage in place until your follow-up appointment.  Maintain finger motion by fully straightening and fully bending fingers and thumb at least once an hour (while awake).  This may cause some discomfort, but will not damage surgery.  You may use your operated hand for lightweight tasks (e.g. writing, eating, dressing, etc.).  NO LIFTING, CARRYING OR HEAVY USE.    Most Patients do not have \"Serious Pain\" after this procedure and thus most patients do not require prescription pain medication.  You may take over the counter medication (Tylenol, Advil, Aleve, etc.) as needed.  If you are unable to tolerate the discomfort after your surgery and the OTC medications do not provide some relief, you may contact our office to discuss other options..    Please call the office at [phone]  in 24 - 48 hours to schedule a follow up appointment for approximately 7 - 10 days after surgery.  Please call the office at [phone]  if you have any questions or problems.           Shawn Garcia MD

## 2024-01-11 NOTE — PROGRESS NOTES
Pt awake on arrival to phase II. VSS. Pain 6-7/10 in right hand and wrist. Elevated right and left hand on pillows. Pt moves all fingers. Coban wrap in place to left hand. Urge to void. Walked pt to bathroom and voided. Back to room and sitting up in recliner. Given snack and call light. Called for .

## 2024-01-11 NOTE — H&P
Pre-operative Update of H&P:    I  have seen & examined Ms. Kerry Villela related solely to her hand and upper extremity conditions, prior to the scheduled procedure on the date of her surgery.  The indications for the planned surgical procedure & and her upper-extremity condition are unchanged.

## 2024-01-11 NOTE — PROGRESS NOTES
Patient awake and alert. Resp easy unlabored on room air O2 with SaO2 94%. Chest discomfort easing per patient. Patient states surgical discomfort tolerable. VSS.

## 2024-01-11 NOTE — PROGRESS NOTES
Patient admitted to PACU from OR. Patient opens eyes to name. Resp easy unlabored on 2LNC with SaO2 98%. Right hand surgical site with coban dressing dry and intact and elevated on pillows. Left hand with Coban wrap per Dr Garcia from OR and elevated on pillows. Patient denies C/O pain or nausea. IV patent to right AC. VSS.

## 2024-01-11 NOTE — PROGRESS NOTES
Tylenol 1 tablet PO given per order. Discharge instructions reviewed with pt and . Both express an understanding of instructions. Pt tolerates PO and sitting in recliner. Pt dressing with 's assistance. Hand therapy referral given to pt.

## 2024-01-12 DIAGNOSIS — G56.03 BILATERAL CARPAL TUNNEL SYNDROME: Primary | ICD-10-CM

## 2024-01-12 RX ORDER — HYDROCODONE BITARTRATE AND ACETAMINOPHEN 5; 325 MG/1; MG/1
1 TABLET ORAL EVERY 6 HOURS
Qty: 10 TABLET | Refills: 0 | Status: SHIPPED | OUTPATIENT
Start: 2024-01-12 | End: 2024-01-19

## 2024-01-12 NOTE — TELEPHONE ENCOUNTER
Prescription Refill     Medication Name:  PAIN MED  Pharmacy: [facility]  Patient Contact Number:  [phone]    PATIENT SAY SHE HAS NOT BEEN ABLE TO SLEEP DUE TO PAIN. SHE IS UNSURE IF SHE HAS SWELLING IN HAND OR IF ITS STIFFNES

## 2024-01-15 ENCOUNTER — TELEPHONE (OUTPATIENT)
Dept: ORTHOPEDIC SURGERY | Age: 44
End: 2024-01-15

## 2024-01-15 NOTE — TELEPHONE ENCOUNTER
Prescription Refill     Medication Name:  PAIN MED   Pharmacy: [facility] [address] [place] [address] DRIVE - P [phone] - F [phone]  Patient Contact Number:  [phone]     Pt ONLY GAVE 10 PILLS, DOESN'T MATCH THE DIRECTIONS TO TAKE EVERY 6 HOURS OVER 7 DAYS.     Pt ASKING FOR A REFILL

## 2024-01-15 NOTE — TELEPHONE ENCOUNTER
Spoke with the patient I let her know that Dr. Garcia only prescribes 10 tablets after a carpal tunnel surgery.  Patient was not very happy that she could not get anymore.  I also asked her how her other hand was.  She said it is still swollen and she has not gone to OT for the hand.  I let her know that if her hand is still swollen she will not be able to have surgery on it.  Patient understood this as well.

## 2024-01-19 ENCOUNTER — TELEPHONE (OUTPATIENT)
Dept: ORTHOPEDIC SURGERY | Age: 44
End: 2024-01-19

## 2024-01-19 NOTE — TELEPHONE ENCOUNTER
Appointment Request - CANCELLED [facility] TODAY DUE TO WEATHER     Patient requesting earlier appointment: No  Appointment offered to patient:   Patient Contact Number: [phone]     Pt CANCELLED WITH [facility] TODAY DUE TO WEATHER. PLEASE CALL TO RESCHEDULE THAT APPT.

## 2024-01-22 ENCOUNTER — OFFICE VISIT (OUTPATIENT)
Dept: ORTHOPEDIC SURGERY | Age: 44
End: 2024-01-22

## 2024-01-22 VITALS — WEIGHT: 293 LBS | RESPIRATION RATE: 16 BRPM | BODY MASS INDEX: 51.91 KG/M2 | HEIGHT: 63 IN

## 2024-01-22 DIAGNOSIS — G56.03 BILATERAL CARPAL TUNNEL SYNDROME: Primary | ICD-10-CM

## 2024-01-22 PROCEDURE — 99024 POSTOP FOLLOW-UP VISIT: CPT | Performed by: PHYSICIAN ASSISTANT

## 2024-01-22 NOTE — PROGRESS NOTES
Ms. Kerry Villela returns today in follow-up of her recent right Carpal Tunnel Release done approximately 1 week ago.  She has done well noting mild discomfort and no other reported complications.    She notes pre-operative symptoms to be significantly improved at this time.    Physical Exam:  Skin incision is healing well, without erythema, drainage or sign of infection.  Digital range of motion is  limited by moderate baseline swelling in addition to moderately swelling from the postoperative bandage. Fingers flex to 1.5cm from palm.  .  Wrist range of motion is without significant limitation.  Sensation is significantly improved from preoperatvely  Vascular examination reveals normal, good capillary refill, and good color.  Swelling is moderate.  Sensory and Motor Median Nerve function is intact.    Impression:  Ms. Kerry Villela is doing fairly well after recent right Carpal Tunnel Release.    Plan:  Ms. Kerry Villela is instructed in work on Active & Passive range of motion of the digits, wrist, & elbow.  These modalities were specifically demonstrated to her today.  We discussed the appropriateness of gradual resumption of use of the operated hand and the return to normal use as comfort allows.  She is given instructions regarding management of the fresh surgical incision and progressive use of desensitization and tissue massage techniques.  We discussed the appropriate expectations and timeline for symptom improvement.    She is provided a written patient instruction sheet titled: Postoperative Instructions After Carpal Tunnel Release.    I have asked Ms. Kerry Villela to follow-up with me or contact me by telephone over the next 2-4 weeks if her symptoms have not fully resolved or if she has not regained full & painless return of function.      She is also specifically instructed to return to the office or call for an appointment sooner if her symptoms are changing or worsening prior to that time.

## 2024-01-22 NOTE — PATIENT INSTRUCTIONS
Postoperative Instructions After Carpal Tunnel Release    Dr. Shawn Garcia        After bandages are removed one week from surgery, you may chose to wear a small bandage over the incision if you wish, though you do not need to.  Keep incision dry until sutures have fully dissolved  or it has been 12 - 14 days since your surgery. Thereafter, you may wash with mild soap and water and shower normally.    Work hard on motion of the fingers and wrist, straightening each finger fully and bending each finger fully, bending wrist forward and bending wrist backwards. Do not be concerned if you experience discomfort.  This will not damage the surgery.  You may begin using the hand as it feels comfortable beginning 12 - 14 days from the day of surgery. You may not feel entirely comfortable gripping or lifting heavy objects for several weeks.  You may expect to see some skin “peel” off around the incision.  You may be left with a small area of “pink baby skin”. This is quite normal.  6. Once your stiches have fully disappeared & skin appears normal, you should begin gently massaging the incision with Vitamin E (may use Vitamin E lotion or contents of Vitamin E capsule).      Thank you for choosing [facility] for your Hand and Upper Extremity needs.  If we can be of any further assistance to you, please do not hesitate to contact us.    Office Phone Number:  [phone]  or  [phone]

## 2024-01-25 DIAGNOSIS — M33.90 DERMATOMYOSITIS (HCC): Primary | ICD-10-CM

## 2024-02-05 ENCOUNTER — TELEMEDICINE (OUTPATIENT)
Dept: BARIATRICS/WEIGHT MGMT | Age: 44
End: 2024-02-05
Payer: COMMERCIAL

## 2024-02-05 DIAGNOSIS — E66.01 MORBID OBESITY WITH BMI OF 50.0-59.9, ADULT (HCC): Primary | ICD-10-CM

## 2024-02-05 PROCEDURE — 96158 HLTH BHV IVNTJ INDIV 1ST 30: CPT | Performed by: SOCIAL WORKER

## 2024-02-05 NOTE — PROGRESS NOTES
Kerry Villela is a 43 y.o. female who presents today for individual weight management counseling. Patient is referred to therapist by Dr. Prajapati. Patient meets criteria for Morbid Obesity with BMI of 50.0-59.9.  Per patient \"I've had a lot of things in my life going on. My brother was killed in October and we were very close. I'm struggling to keep up with what I'm supposed to be doing. I'm an emotional eater, whether I'm sad, happy angry. I'm addicted to sugar. I've gained most of my weight from sweets. I've gained some of my weight back.    New Goals  Patient will look for an grief therapist to address depression from her brother's murder.   Patient will engage in [facility] Support group for family members of murdered persons.  Therapist encouraged patient to have both of these supports in place prior to her next appointment with Dr. Prajapati.      Kerry Villela, was evaluated through a synchronous (real-time) audio-video encounter. The patient (or guardian if applicable) is aware that this is a billable service, which includes applicable co-pays. This Virtual Visit was conducted with patient's (and/or legal guardian's) consent. Patient identification was verified, and a caregiver was present when appropriate.   The patient was located at home  Provider was located at a facility office            13 minutes was spent in direct counseling with patient    THOMAS Sanchez

## 2024-02-14 DIAGNOSIS — M33.90 DERMATOMYOSITIS (HCC): Primary | ICD-10-CM

## 2024-02-21 ENCOUNTER — TELEPHONE (OUTPATIENT)
Dept: BARIATRICS/WEIGHT MGMT | Age: 44
End: 2024-02-21

## 2024-02-22 NOTE — TELEPHONE ENCOUNTER
RD noted in patients chart that she cancelled her 2/23/24 visit for tomorrow and it was rescheduled for 4/4/24 - no need to complete call ahead at this time.

## 2024-02-26 RX ORDER — MONTELUKAST SODIUM 10 MG/1
10 TABLET ORAL NIGHTLY
Qty: 90 TABLET | Refills: 0 | Status: SHIPPED | OUTPATIENT
Start: 2024-02-26

## 2024-02-26 NOTE — TELEPHONE ENCOUNTER
Medication:   Requested Prescriptions     Pending Prescriptions Disp Refills    montelukast (SINGULAIR) 10 MG tablet [Pharmacy Med Name: Montelukast Sodium 10 MG Oral Tablet] 90 tablet 0     Sig: Take 1 tablet by mouth nightly        Last Filled:      Patient Phone Number: [phone] (home) [phone] (work)    Last appt: 12/13/2023   Next appt: 5/23/2024    Last OARRS:        No data to display

## 2024-03-08 ENCOUNTER — TELEPHONE (OUTPATIENT)
Dept: ORTHOPEDIC SURGERY | Age: 44
End: 2024-03-08

## 2024-03-08 NOTE — TELEPHONE ENCOUNTER
Spoke with pt let her know that she would have to follow up with her PCP for a referral to a lymphedema specialist.

## 2024-03-11 NOTE — PROGRESS NOTES
Name_______________________________________Printed:____________________  Date and time of surgery_3/15/24 @ 1300_______________________Arrival Time:_1130 main hosp_______________   1. The instructions given regarding when and if a patient needs to stop oral intake prior to surgery varies.Follow the specific instructions you were given                  _x__Nothing to eat or to drink after Midnight the night before.                   ____Carbo loading or instructions will be given to select patients-if you have been given those instructions -please do the following                           The evening before your surgery after dinner before midnight drink 40 ounces of gatorade.If you are diabetic use sugar free.  The morning of surgery drink 40 ounces of water.This needs to be finished 3 hours prior to your surgery start time.    2. Take the following pills with a small sip of water on the morning of surgery__amlodipine, pepcid, prevacid_________________________________________________                  Do not take blood pressure medications ending in pril or sartan the sydnie prior to surgery or the morning of surgery. Dr Miranda's patient are not to take any medications the AM of surgery.         3. Aspirin, Ibuprofen, Advil, Naproxen, Vitamin E and other Anti-inflammatory products and supplements should be stopped for 5 -7days before surgery or as directed by your physician.   4. Check with your Doctor regarding stopping Plavix, Coumadin,Eliquis, Lovenox,Effient,Pradaxa,Xarelto, Fragmin or other blood thinners and follow their instructions.   5. Do not smoke, and do not drink any alcoholic beverages 24 hours prior to surgery.  This includes NA Beer.Refrain from the usage of any recreational drugs.   6. You may brush your teeth and gargle the morning of surgery.  DO NOT SWALLOW WATER   7. You MUST make arrangements for a responsible adult to stay on site while you are here and take you home after your surgery. You will

## 2024-03-12 ENCOUNTER — HOSPITAL ENCOUNTER (OUTPATIENT)
Age: 44
Discharge: HOME | End: 2024-03-12
Payer: COMMERCIAL

## 2024-03-12 DIAGNOSIS — Z01.818 PREOP TESTING: ICD-10-CM

## 2024-03-12 LAB
BASOPHILS # BLD: 0.1 K/UL (ref 0–0.2)
BASOPHILS NFR BLD: 0.8 %
DEPRECATED RDW RBC AUTO: 16.2 % (ref 12.4–15.4)
EOSINOPHIL # BLD: 0.2 K/UL (ref 0–0.6)
EOSINOPHIL NFR BLD: 2.8 %
HCT VFR BLD AUTO: 34.1 % (ref 36–48)
HGB BLD-MCNC: 11.1 G/DL (ref 12–16)
LYMPHOCYTES # BLD: 2.7 K/UL (ref 1–5.1)
LYMPHOCYTES NFR BLD: 39 %
MCH RBC QN AUTO: 25.1 PG (ref 26–34)
MCHC RBC AUTO-ENTMCNC: 32.5 G/DL (ref 31–36)
MCV RBC AUTO: 77.4 FL (ref 80–100)
MONOCYTES # BLD: 0.4 K/UL (ref 0–1.3)
MONOCYTES NFR BLD: 6.4 %
NEUTROPHILS # BLD: 3.5 K/UL (ref 1.7–7.7)
NEUTROPHILS NFR BLD: 51 %
PLATELET # BLD AUTO: 255 K/UL (ref 135–450)
PMV BLD AUTO: 8.2 FL (ref 5–10.5)
RBC # BLD AUTO: 4.41 M/UL (ref 4–5.2)
WBC # BLD AUTO: 6.9 K/UL (ref 4–11)

## 2024-03-12 PROCEDURE — 85025 COMPLETE CBC W/AUTO DIFF WBC: CPT

## 2024-03-12 PROCEDURE — 36415 COLL VENOUS BLD VENIPUNCTURE: CPT

## 2024-03-13 ENCOUNTER — OFFICE VISIT (OUTPATIENT)
Age: 44
End: 2024-03-13

## 2024-03-13 VITALS
OXYGEN SATURATION: 97 % | WEIGHT: 293 LBS | TEMPERATURE: 98.4 F | RESPIRATION RATE: 18 BRPM | HEART RATE: 87 BPM | SYSTOLIC BLOOD PRESSURE: 121 MMHG | BODY MASS INDEX: 51.91 KG/M2 | HEIGHT: 63 IN | DIASTOLIC BLOOD PRESSURE: 73 MMHG

## 2024-03-13 DIAGNOSIS — Z01.818 PREOP GENERAL PHYSICAL EXAM: Primary | ICD-10-CM

## 2024-03-13 ASSESSMENT — ENCOUNTER SYMPTOMS
TROUBLE SWALLOWING: 0
VOMITING: 0
SHORTNESS OF BREATH: 0
PHOTOPHOBIA: 0
NAUSEA: 0
ABDOMINAL PAIN: 0
DIARRHEA: 0
SORE THROAT: 0
COUGH: 0

## 2024-03-13 NOTE — PROGRESS NOTES
Kerry Villela (:  1980) is a 43 y.o. female, New patient, here for evaluation of the following chief complaint(s):  Physical Pre-Op (Pre-Op physical.)    ASSESSMENT/PLAN:    ICD-10-CM    1. Preop general physical exam  Z01.818         POC testing: Discussed result(s) with patient  No results found for this visit on 24.    Disposition: Pt is 44yo female here for preop physical. VSS. Physical Exam as below. Form completed, faxed and scanned into chart.   SUBJECTIVE/OBJECTIVE:  44yo female here for preop  physical. Pt reports she has a procedure scheduled on 3/15/2024 ( CYSTOSCOPY WITH CHEMODENERVATION OF BLADDER- BOTOX 100units) with Dr. Alvarado. Denies any fever, chills, cough, congestion, n/v, sob, cp or abdominal pain. Denies any anesthesia or bleeding problems. Hx of HTN controlled. Reports she gets this procedure every 3 months with no complications. EKG and CBC completed per pt.      History provided by:  Patient   used: No      Vitals:    24 1339   BP: 121/73   Pulse: 87   Resp: 18   Temp: 98.4 °F (36.9 °C)   SpO2: 97%   Weight: (!) 156.9 kg (346 lb)   Height: 1.6 m (5' 3\")     Review of Systems   Constitutional:  Negative for chills, fatigue and fever.   HENT:  Negative for congestion, sore throat and trouble swallowing.    Eyes:  Negative for photophobia and visual disturbance.   Respiratory:  Negative for cough and shortness of breath.    Cardiovascular:  Negative for chest pain.   Gastrointestinal:  Negative for abdominal pain, diarrhea, nausea and vomiting.   Genitourinary:  Negative for dysuria, flank pain and genital sores.   Neurological:  Negative for headaches.     Physical Exam  Constitutional:       General: She is not in acute distress.     Appearance: She is obese. She is not ill-appearing, toxic-appearing or diaphoretic.   HENT:      Right Ear: Tympanic membrane normal.      Left Ear: Tympanic membrane normal.      Nose: Nose normal.      Mouth/Throat:

## 2024-03-15 ENCOUNTER — HOSPITAL ENCOUNTER (OUTPATIENT)
Age: 44
Setting detail: OUTPATIENT SURGERY
Discharge: HOME | End: 2024-03-15
Attending: OBSTETRICS & GYNECOLOGY | Admitting: OBSTETRICS & GYNECOLOGY
Payer: COMMERCIAL

## 2024-03-15 ENCOUNTER — ANESTHESIA EVENT (OUTPATIENT)
Dept: OPERATING ROOM | Age: 44
End: 2024-03-15
Payer: COMMERCIAL

## 2024-03-15 ENCOUNTER — ANESTHESIA (OUTPATIENT)
Dept: OPERATING ROOM | Age: 44
End: 2024-03-15
Payer: COMMERCIAL

## 2024-03-15 VITALS
BODY MASS INDEX: 51.91 KG/M2 | TEMPERATURE: 98.2 F | HEART RATE: 88 BPM | RESPIRATION RATE: 26 BRPM | HEIGHT: 63 IN | SYSTOLIC BLOOD PRESSURE: 150 MMHG | OXYGEN SATURATION: 96 % | DIASTOLIC BLOOD PRESSURE: 89 MMHG | WEIGHT: 293 LBS

## 2024-03-15 DIAGNOSIS — Z01.818 PREOP TESTING: Primary | ICD-10-CM

## 2024-03-15 PROBLEM — R35.1 NOCTURIA: Status: ACTIVE | Noted: 2024-03-15

## 2024-03-15 PROBLEM — N39.44 NOCTURNAL ENURESIS: Status: ACTIVE | Noted: 2024-03-15

## 2024-03-15 PROBLEM — N32.81 OVERACTIVE BLADDER: Status: ACTIVE | Noted: 2024-03-15

## 2024-03-15 PROCEDURE — 3700000001 HC ADD 15 MINUTES (ANESTHESIA): Performed by: OBSTETRICS & GYNECOLOGY

## 2024-03-15 PROCEDURE — 6360000002 HC RX W HCPCS: Performed by: OBSTETRICS & GYNECOLOGY

## 2024-03-15 PROCEDURE — 7100000000 HC PACU RECOVERY - FIRST 15 MIN: Performed by: OBSTETRICS & GYNECOLOGY

## 2024-03-15 PROCEDURE — 2580000003 HC RX 258: Performed by: OBSTETRICS & GYNECOLOGY

## 2024-03-15 PROCEDURE — 3700000000 HC ANESTHESIA ATTENDED CARE: Performed by: OBSTETRICS & GYNECOLOGY

## 2024-03-15 PROCEDURE — 2709999900 HC NON-CHARGEABLE SUPPLY: Performed by: OBSTETRICS & GYNECOLOGY

## 2024-03-15 PROCEDURE — 3600000004 HC SURGERY LEVEL 4 BASE: Performed by: OBSTETRICS & GYNECOLOGY

## 2024-03-15 PROCEDURE — 6360000002 HC RX W HCPCS: Performed by: NURSE ANESTHETIST, CERTIFIED REGISTERED

## 2024-03-15 PROCEDURE — 7100000011 HC PHASE II RECOVERY - ADDTL 15 MIN: Performed by: OBSTETRICS & GYNECOLOGY

## 2024-03-15 PROCEDURE — 6370000000 HC RX 637 (ALT 250 FOR IP): Performed by: ANESTHESIOLOGY

## 2024-03-15 PROCEDURE — 6370000000 HC RX 637 (ALT 250 FOR IP): Performed by: OBSTETRICS & GYNECOLOGY

## 2024-03-15 PROCEDURE — 7100000001 HC PACU RECOVERY - ADDTL 15 MIN: Performed by: OBSTETRICS & GYNECOLOGY

## 2024-03-15 PROCEDURE — 3600000014 HC SURGERY LEVEL 4 ADDTL 15MIN: Performed by: OBSTETRICS & GYNECOLOGY

## 2024-03-15 PROCEDURE — 7100000010 HC PHASE II RECOVERY - FIRST 15 MIN: Performed by: OBSTETRICS & GYNECOLOGY

## 2024-03-15 PROCEDURE — 2500000003 HC RX 250 WO HCPCS: Performed by: NURSE ANESTHETIST, CERTIFIED REGISTERED

## 2024-03-15 RX ORDER — ONDANSETRON 2 MG/ML
4 INJECTION INTRAMUSCULAR; INTRAVENOUS
Status: DISCONTINUED | OUTPATIENT
Start: 2024-03-15 | End: 2024-03-15 | Stop reason: HOSPADM

## 2024-03-15 RX ORDER — SODIUM CHLORIDE 0.9 % (FLUSH) 0.9 %
5-40 SYRINGE (ML) INJECTION PRN
Status: DISCONTINUED | OUTPATIENT
Start: 2024-03-15 | End: 2024-03-15 | Stop reason: HOSPADM

## 2024-03-15 RX ORDER — LIDOCAINE HYDROCHLORIDE 20 MG/ML
JELLY TOPICAL
Status: COMPLETED | OUTPATIENT
Start: 2024-03-15 | End: 2024-03-15

## 2024-03-15 RX ORDER — PHENAZOPYRIDINE HYDROCHLORIDE 200 MG/1
200 TABLET, FILM COATED ORAL 3 TIMES DAILY PRN
Qty: 10 TABLET | Refills: 0 | Status: SHIPPED | OUTPATIENT
Start: 2024-03-15 | End: 2024-03-18

## 2024-03-15 RX ORDER — ACETAMINOPHEN 325 MG/1
TABLET ORAL
Status: DISCONTINUED
Start: 2024-03-15 | End: 2024-03-15 | Stop reason: HOSPADM

## 2024-03-15 RX ORDER — DEXMEDETOMIDINE HYDROCHLORIDE 100 UG/ML
INJECTION, SOLUTION INTRAVENOUS PRN
Status: DISCONTINUED | OUTPATIENT
Start: 2024-03-15 | End: 2024-03-15 | Stop reason: SDUPTHER

## 2024-03-15 RX ORDER — LIDOCAINE HYDROCHLORIDE 10 MG/ML
0.5 INJECTION, SOLUTION EPIDURAL; INFILTRATION; INTRACAUDAL; PERINEURAL ONCE
Status: DISCONTINUED | OUTPATIENT
Start: 2024-03-15 | End: 2024-03-15 | Stop reason: HOSPADM

## 2024-03-15 RX ORDER — SODIUM CHLORIDE 9 MG/ML
INJECTION, SOLUTION INTRAVENOUS CONTINUOUS
Status: DISCONTINUED | OUTPATIENT
Start: 2024-03-15 | End: 2024-03-15 | Stop reason: HOSPADM

## 2024-03-15 RX ORDER — FENTANYL CITRATE 50 UG/ML
50 INJECTION, SOLUTION INTRAMUSCULAR; INTRAVENOUS EVERY 5 MIN PRN
Status: DISCONTINUED | OUTPATIENT
Start: 2024-03-15 | End: 2024-03-15 | Stop reason: HOSPADM

## 2024-03-15 RX ORDER — OXYCODONE HYDROCHLORIDE 5 MG/1
TABLET ORAL
Status: DISCONTINUED
Start: 2024-03-15 | End: 2024-03-15 | Stop reason: WASHOUT

## 2024-03-15 RX ORDER — NALOXONE HYDROCHLORIDE 0.4 MG/ML
INJECTION, SOLUTION INTRAMUSCULAR; INTRAVENOUS; SUBCUTANEOUS PRN
Status: DISCONTINUED | OUTPATIENT
Start: 2024-03-15 | End: 2024-03-15 | Stop reason: HOSPADM

## 2024-03-15 RX ORDER — SODIUM CHLORIDE 9 MG/ML
INJECTION, SOLUTION INTRAVENOUS PRN
Status: DISCONTINUED | OUTPATIENT
Start: 2024-03-15 | End: 2024-03-15 | Stop reason: HOSPADM

## 2024-03-15 RX ORDER — ACETAMINOPHEN 325 MG/1
650 TABLET ORAL ONCE
Status: COMPLETED | OUTPATIENT
Start: 2024-03-15 | End: 2024-03-15

## 2024-03-15 RX ORDER — ONDANSETRON 2 MG/ML
INJECTION INTRAMUSCULAR; INTRAVENOUS PRN
Status: DISCONTINUED | OUTPATIENT
Start: 2024-03-15 | End: 2024-03-15 | Stop reason: SDUPTHER

## 2024-03-15 RX ORDER — SODIUM CHLORIDE 0.9 % (FLUSH) 0.9 %
5-40 SYRINGE (ML) INJECTION EVERY 12 HOURS SCHEDULED
Status: DISCONTINUED | OUTPATIENT
Start: 2024-03-15 | End: 2024-03-15 | Stop reason: HOSPADM

## 2024-03-15 RX ORDER — SODIUM CHLORIDE, SODIUM LACTATE, POTASSIUM CHLORIDE, CALCIUM CHLORIDE 600; 310; 30; 20 MG/100ML; MG/100ML; MG/100ML; MG/100ML
INJECTION, SOLUTION INTRAVENOUS CONTINUOUS
Status: DISCONTINUED | OUTPATIENT
Start: 2024-03-15 | End: 2024-03-15 | Stop reason: HOSPADM

## 2024-03-15 RX ORDER — PROPOFOL 10 MG/ML
INJECTION, EMULSION INTRAVENOUS PRN
Status: DISCONTINUED | OUTPATIENT
Start: 2024-03-15 | End: 2024-03-15 | Stop reason: SDUPTHER

## 2024-03-15 RX ORDER — LIDOCAINE HYDROCHLORIDE 20 MG/ML
INJECTION, SOLUTION EPIDURAL; INFILTRATION; INTRACAUDAL; PERINEURAL PRN
Status: DISCONTINUED | OUTPATIENT
Start: 2024-03-15 | End: 2024-03-15 | Stop reason: SDUPTHER

## 2024-03-15 RX ORDER — MAGNESIUM HYDROXIDE 1200 MG/15ML
LIQUID ORAL
Status: COMPLETED | OUTPATIENT
Start: 2024-03-15 | End: 2024-03-15

## 2024-03-15 RX ORDER — MIDAZOLAM HYDROCHLORIDE 1 MG/ML
INJECTION INTRAMUSCULAR; INTRAVENOUS PRN
Status: DISCONTINUED | OUTPATIENT
Start: 2024-03-15 | End: 2024-03-15 | Stop reason: SDUPTHER

## 2024-03-15 RX ORDER — HYDROMORPHONE HYDROCHLORIDE 2 MG/ML
0.5 INJECTION, SOLUTION INTRAMUSCULAR; INTRAVENOUS; SUBCUTANEOUS EVERY 5 MIN PRN
Status: DISCONTINUED | OUTPATIENT
Start: 2024-03-15 | End: 2024-03-15 | Stop reason: HOSPADM

## 2024-03-15 RX ADMIN — MIDAZOLAM 2 MG: 1 INJECTION INTRAMUSCULAR; INTRAVENOUS at 12:17

## 2024-03-15 RX ADMIN — PROPOFOL 50 MG: 10 INJECTION, EMULSION INTRAVENOUS at 12:28

## 2024-03-15 RX ADMIN — DEXMEDETOMIDINE HYDROCHLORIDE 10 MCG: 100 INJECTION, SOLUTION INTRAVENOUS at 12:27

## 2024-03-15 RX ADMIN — PROPOFOL 50 MG: 10 INJECTION, EMULSION INTRAVENOUS at 12:34

## 2024-03-15 RX ADMIN — ACETAMINOPHEN 650 MG: 325 TABLET ORAL at 13:58

## 2024-03-15 RX ADMIN — SODIUM CHLORIDE 3000 MG: 900 INJECTION INTRAVENOUS at 12:16

## 2024-03-15 RX ADMIN — ONDANSETRON 4 MG: 2 INJECTION INTRAMUSCULAR; INTRAVENOUS at 12:30

## 2024-03-15 RX ADMIN — SODIUM CHLORIDE: 9 INJECTION, SOLUTION INTRAVENOUS at 12:16

## 2024-03-15 RX ADMIN — PROPOFOL 50 MG: 10 INJECTION, EMULSION INTRAVENOUS at 12:27

## 2024-03-15 RX ADMIN — LIDOCAINE HYDROCHLORIDE 100 MG: 20 INJECTION, SOLUTION EPIDURAL; INFILTRATION; INTRACAUDAL; PERINEURAL at 12:27

## 2024-03-15 RX ADMIN — PROPOFOL 50 MG: 10 INJECTION, EMULSION INTRAVENOUS at 12:30

## 2024-03-15 ASSESSMENT — PAIN SCALES - GENERAL: PAINLEVEL_OUTOF10: 7

## 2024-03-15 ASSESSMENT — LIFESTYLE VARIABLES: SMOKING_STATUS: 0

## 2024-03-15 ASSESSMENT — PAIN - FUNCTIONAL ASSESSMENT
PAIN_FUNCTIONAL_ASSESSMENT: 0-10
PAIN_FUNCTIONAL_ASSESSMENT: ACTIVITIES ARE NOT PREVENTED

## 2024-03-15 ASSESSMENT — PAIN DESCRIPTION - LOCATION: LOCATION: VAGINA

## 2024-03-15 ASSESSMENT — PAIN DESCRIPTION - DESCRIPTORS: DESCRIPTORS: DISCOMFORT

## 2024-03-15 NOTE — FLOWSHEET NOTE
Discharge note:  Discharge order obtained, and discharge instructions reviewed. Patient educated, using the teach back method, about follow up instructions and discharge instructions. A completed copy of the AVS instructions given to patient and all questions answered. IV catheter removed without complaints, catheter intact, site WNL. Discharged to lobby via wheel chair per transportation.

## 2024-03-15 NOTE — ANESTHESIA PRE PROCEDURE
Department of Anesthesiology  Preprocedure Note       Name:  Kerry Villela   Age:  43 y.o.  :  1980                                          MRN:  [id number]         Date:  3/15/2024      Surgeon: Surgeon(s):  Mihir Alvarado MD    Procedure: Procedure(s):  CYSTOSCOPY WITH CHEMODENERVATION OF BLADDER- BOTOX 100units    Medications prior to admission:   Prior to Admission medications    Medication Sig Start Date End Date Taking? Authorizing Provider   montelukast (SINGULAIR) 10 MG tablet Take 1 tablet by mouth nightly 24   Tricia Howe MD   DULoxetine (CYMBALTA) 60 MG extended release capsule Take 1 capsule by mouth daily 23   Zahira Dominguez DO   vitamin D (ERGOCALCIFEROL) 1.25 MG (51568 UT) CAPS capsule Take 1 capsule by mouth once a week 23   Zahira Dominguez DO   famotidine (PEPCID) 40 MG tablet Take once daily qhs as needed 23   Zahira Dominguez DO   lansoprazole (PREVACID) 30 MG delayed release capsule Take 1 capsule by mouth daily 23   Zahira Dominguez DO   amLODIPine (NORVASC) 10 MG tablet Take 1 tablet by mouth once daily 23   Zahira Dominguez DO   rosuvastatin (CRESTOR) 5 MG tablet Take 1 tablet by mouth daily 23   Zahira Dominguez DO   YUVAFEM 10 MCG TABS vaginal tablet INSERT TABLET VAGINALLY AT BEDTIME TWICE WEEKLY. 23   Chelsy Gomez MD   ketoconazole (NIZORAL) 2 % cream APPLY CREAM TOPICALLY AS DIRECTED ONCE DAILY FOR 45 DAYS    Chelsy Gomez MD   valsartan (DIOVAN) 80 MG tablet Take 1 tablet by mouth once daily 23   Zahira Dominguez DO   torsemide (DEMADEX) 5 MG tablet Take 1 tablet by mouth daily 23   Zahira Dominguez DO   docusate sodium (COLACE) 100 MG capsule Take 1 capsule by mouth 2 times daily    Chelsy Gomez MD   levocetirizine (XYZAL) 5 MG tablet Take 1 tablet daily 23   Zahira Dominguez DO   ammonium lactate (AMLACTIN) 12 % cream as needed 22   Patricia

## 2024-03-15 NOTE — H&P
Date of Surgery Update:  Kerry Villela was seen, history and physical examination reviewed, and patient examined by me today. There have been no significant clinical changes since the completion of the previous history and physical.    The risk, benefits, and alternatives of the proposed procedure have been explained to the patient (or appropriate guardian) and understanding verbalized. All questions answered. Patient wishes to proceed.    Electronically signed by: Mihir Alvarado MD,3/15/2024,11:59 AM

## 2024-03-15 NOTE — PROGRESS NOTES
Pt arrived to PACU from OR, VSS, pt arouses to voice. No urethral drainage noted. Will continue to monitor.

## 2024-03-15 NOTE — PROGRESS NOTES
Phase 1 complete, pt seen by anesthesiologist. VSS, pt resting comfortably. No urethral drainage noted.  Will transition to phase 2 for d/c, family updated.

## 2024-03-15 NOTE — DISCHARGE INSTRUCTIONS
MARAH Alvarado  Urogynecology & Pelvic Reconstructive Surgery  Postoperative Instructions    [address] , [address]  [place], [place] [address] [phone]    CATHETERS     Most patients require catheter drainage after bladder and prolapse surgery. The purpose of the catheter is to prevent urinary retention which would otherwise occur due to tissue swelling. Proper drainage allows the bladder to recover and start to function normally. The amount of time the catheter is needed varies on each patient. In general, 3-5 days is sufficient but a week or more is not an unusual length of time for patients to need a catheter. You may find you have one of the following after surgery:    URETHRAL - Most people are familiar with a urethral catheter. The boogie catheter used to drain the bladder has a small fluid filled balloon at the tip, which is inflated to keep it in the bladder. During the daylight hours and with activity, you may place the green cork in the end of the catheter and tuck the end into your undergarments. We have found this is the most convenient way to provide bladder drainage and frees you from the necessity of a leg bag. When you feel your bladder is full, simply uncork the catheter, lower the tip below the level of the bladder to drain. It is recommended that you drain your bladder in this fashion at least every 3 - 4 hours during the daytime. In the evening continue to cork and drain, or connect the boogie to the bedside bag, and tape the catheter to your thigh. This will prevent you from inadvertently pulling out the catheter in your sleep and will continually drain your bladder during the night. Prophylactic antibiotics will be given to prevent a bladder infection.    INCISION CARE   If you had vaginal surgery your incision has been closed with suture that will dissolve on their own in several weeks. You may have vaginal discharge for up to 4-5 weeks after surgery, which may initially be

## 2024-03-15 NOTE — OP NOTE
Operative Note      Patient: Kerry Villela  YOB: 1980  MRN: [id number]    Date of Procedure: 3/15/2024    Pre-Op Diagnosis Codes:     * Overactive bladder [N32.81]  Urge incontinence  Nocturnal enuresis  Nocturia    Post-Op Diagnosis: Same       Procedure(s):  CYSTOSCOPY WITH CHEMODENERVATION OF BLADDER- BOTOX 100units    Surgeon(s):  Mihir Alvarado MD    Assistant:   * No surgical staff found *    Anesthesia: Monitor Anesthesia Care    Estimated Blood Loss (mL): Minimal    Complications: None    Specimens:   * No specimens in log *    Implants:  * No implants in log *      Drains: * No LDAs found *    Findings: Diffuse trabeculation on cystoscopy    Patient History: Patient is a 43-year-old with a several year history of hyperactive bladder urge incontinence nocturia and nocturnal enuresis.  This was refractory to oral medications.  She was treated with intravesical Botox 100 units on 7.26.23 and again on 12.11.23.  She gets relief from these injections lasting approximately 4 months.  She presents to the hospital for chemodenervation secondary to persistent discomfort with office procedure.      Detailed Description of Procedure:   The patient was placed in dorsal lithotomy then IV sedation anesthesia was obtained.  She was   preeped and draped per routine.  Lidocaine jelly was placed into the urethra.    100 units of Onabotulinum toxin A were reconstituted in 10 ml injectable saline.  The cystoscope with camera and 30 degree lens was atraumatically introduced into the bladder. Diffuse trabeculation was noted.  The InjeTak needle depth was set at 3 mm.    The Botox was injected 1.0 ml per injection  into the posterior wall of the bladder medial to the ureters without complication.    The remainder of the lidocaine jelly ws placed into the urethra and the pt revived from anesthesia and taken to PACU.    Electronically signed by Mihir Alvarado MD on 3/15/2024 at 1:01 PM

## 2024-03-15 NOTE — ANESTHESIA POSTPROCEDURE EVALUATION
Department of Anesthesiology  Postprocedure Note    Patient: Kerry Villela  MRN: [id number]  YOB: 1980  Date of evaluation: 3/15/2024    Procedure Summary       Date: 03/15/24 Room / Location: [address]    Anesthesia Start: 1219 Anesthesia Stop: 1253    Procedure: CYSTOSCOPY WITH CHEMODENERVATION OF BLADDER- BOTOX 100units (Bladder) Diagnosis:       Overactive bladder      (Overactive bladder [N32.81])    Surgeons: Mihir Alvarado MD Responsible Provider: Bonnie Prabhakar MD    Anesthesia Type: MAC ASA Status: 3            Anesthesia Type: No value filed.    Yessenia Phase I: Yessenia Score: 10    Yessenia Phase II:      Anesthesia Post Evaluation    Patient location during evaluation: PACU  Patient participation: complete - patient participated  Level of consciousness: awake  Airway patency: patent  Nausea & Vomiting: no vomiting and no nausea  Cardiovascular status: hemodynamically stable  Respiratory status: acceptable  Hydration status: stable  Multimodal analgesia pain management approach  Pain management: adequate    No notable events documented.

## 2024-03-28 ENCOUNTER — TELEPHONE (OUTPATIENT)
Dept: ADMINISTRATIVE | Age: 44
End: 2024-03-28

## 2024-03-28 NOTE — TELEPHONE ENCOUNTER
There was a PA received VIA FERROUS SULFATE, but there is not a current RX on file.    If you want PA please submit new RX, and resend this PA request back to the pool    If this requires a response please respond to the pool ( P MHCX PSC MEDICATION PRE-AUTH).      Thank you

## 2024-04-04 ENCOUNTER — OFFICE VISIT (OUTPATIENT)
Dept: BARIATRICS/WEIGHT MGMT | Age: 44
End: 2024-04-04
Payer: COMMERCIAL

## 2024-04-04 VITALS
SYSTOLIC BLOOD PRESSURE: 113 MMHG | DIASTOLIC BLOOD PRESSURE: 74 MMHG | WEIGHT: 293 LBS | BODY MASS INDEX: 51.91 KG/M2 | HEART RATE: 98 BPM | HEIGHT: 63 IN

## 2024-04-04 DIAGNOSIS — E78.2 MIXED HYPERLIPIDEMIA: ICD-10-CM

## 2024-04-04 DIAGNOSIS — I10 ESSENTIAL HYPERTENSION: ICD-10-CM

## 2024-04-04 DIAGNOSIS — E66.01 MORBID OBESITY WITH BMI OF 60.0-69.9, ADULT (HCC): ICD-10-CM

## 2024-04-04 DIAGNOSIS — G47.33 OBSTRUCTIVE SLEEP APNEA: ICD-10-CM

## 2024-04-04 DIAGNOSIS — K21.9 CHRONIC GERD: ICD-10-CM

## 2024-04-04 DIAGNOSIS — E88.810 METABOLIC SYNDROME: Primary | ICD-10-CM

## 2024-04-04 PROCEDURE — G8427 DOCREV CUR MEDS BY ELIG CLIN: HCPCS | Performed by: SURGERY

## 2024-04-04 PROCEDURE — 3074F SYST BP LT 130 MM HG: CPT | Performed by: SURGERY

## 2024-04-04 PROCEDURE — 1036F TOBACCO NON-USER: CPT | Performed by: SURGERY

## 2024-04-04 PROCEDURE — G8417 CALC BMI ABV UP PARAM F/U: HCPCS | Performed by: SURGERY

## 2024-04-04 PROCEDURE — 99214 OFFICE O/P EST MOD 30 MIN: CPT | Performed by: SURGERY

## 2024-04-04 PROCEDURE — 3078F DIAST BP <80 MM HG: CPT | Performed by: SURGERY

## 2024-04-04 NOTE — PROGRESS NOTES
[facility]   General & Laparoscopic Surgery  [employer] [facility] [employer]       HPI:     Kerry Villela is a very pleasant 43 y.o. female with Body mass index is 60.41 kg/m².  , Pre-Surgery.   Pre-operative clearance and work up pending. Working hard to keep good dietary habits as well level of activity.  Patient denies any nausea, vomiting, fevers, chills, shortness of breath, chest pain, cough, constipation or difficulty urinating.      Past Medical History:   Diagnosis Date    Abscess 05/20/2022    Allergic reaction to food 05/27/2020    Allergic rhinitis     Anemia     Anxiety and depression     Bilateral hand numbness 12/04/2020    Bilateral hip pain 06/15/2021    chronic gerd     Dermatomyositis (HCC) 2008    Eczema     Fibromyalgia     Fibromyalgia     Headache(784.0)     History of blood transfusion 2009    also two in 2014     HTN (hypertension)     Hx of blood clots 2014    PE and DVT    Hx of blood clots     Irritable bowel syndrome     Lymphedema     Morbid obesity with BMI of 50.0-59.9, adult (HCC)     obstructive sleep apnea     pt uses cpap machine    Osteoarthritis     PCOS (polycystic ovarian syndrome)     Pre-operative clearance     Prediabetes     Pulmonary embolism (HCC) 07/17/2014    Overview:  Continue anticoagulation.  Medicine managing Coumadin bridge.  Daily H/H, PTT, PT/INR.   Last Assessment & Plan:  HPI: Seen in hospital follow up.  The patient is a 33 year old y/o female who admitted to the hospital for pulmonary emboli. She began to experience left lower extremity swelling and pain for two days, and day of admission she began to experience chest pain and shortness of     Vaginal high risk HPV DNA test positive 05/2016    Wears glasses      Past Surgical History:   Procedure Laterality Date    CARPAL TUNNEL RELEASE Right 1/11/2024    RIGHT CARPAL TUNNEL RELEASE performed by Shawn Garcia MD at [facility] OR    COLONOSCOPY N/A 01/13/2021    COLONOSCOPY performed by Salvatore Carranza,

## 2024-04-04 NOTE — PROGRESS NOTES
Kerry Villela stable / unchanged. Allergy, wheat, corn, soy, peanut, walnut    Is pt eating at least 4 times everyday? yes    Breakfast: none OR braun + grits/eggs OR cereal OR yogurt  Snack: none  Lunch: leftovers OR takeout   Snack: string cheese   Dinner: meat + veg - difficult at meal times   Snack: sweets, watching portions     Is pt eating a lean protein source with all meals and snacks?  mostly    Has pt decreased their portions using the plate method? yes    when dining out, mindful to eat less; has smaller meals     Is pt choosing low fat/sugar free options? yes      Is pt drinking at least 64 oz of clear liquids everyday? Yes water + CL    Has pt stopped drinking carbonation, caffeinated, and sugar sweetened beverages?  Mostly  cut out soda and tea on occasion    Has pt sampled Unjury and/or Nectar protein? yes liked 2 flavors     Has pt attended a support group? Scheduled 4/8    Participating in intentional exercise? yes just started walking more with nicer weather     Plan/Recommendations:   - Focus on protein at all meals and snacks  - Avoid tea - keep pack of CL in car   - Attend SG  - Continue exercise as able    Handouts: protein snack ideas    GREGORIO AU, MS, RD, LD

## 2024-04-24 ENCOUNTER — HOSPITAL ENCOUNTER (OUTPATIENT)
Dept: WOMENS IMAGING | Age: 44
Discharge: HOME | End: 2024-04-24

## 2024-04-24 DIAGNOSIS — Z12.31 SCREENING MAMMOGRAM FOR BREAST CANCER: ICD-10-CM

## 2024-05-07 ENCOUNTER — OFFICE VISIT (OUTPATIENT)
Dept: CARDIOLOGY CLINIC | Age: 44
End: 2024-05-07
Payer: COMMERCIAL

## 2024-05-07 VITALS
BODY MASS INDEX: 60.58 KG/M2 | OXYGEN SATURATION: 96 % | SYSTOLIC BLOOD PRESSURE: 124 MMHG | DIASTOLIC BLOOD PRESSURE: 68 MMHG | HEART RATE: 86 BPM | WEIGHT: 293 LBS

## 2024-05-07 DIAGNOSIS — I89.0 LYMPHEDEMA: ICD-10-CM

## 2024-05-07 DIAGNOSIS — I87.2 VENOUS INSUFFICIENCY OF BOTH LOWER EXTREMITIES: Primary | ICD-10-CM

## 2024-05-07 PROCEDURE — 3078F DIAST BP <80 MM HG: CPT | Performed by: INTERNAL MEDICINE

## 2024-05-07 PROCEDURE — G8417 CALC BMI ABV UP PARAM F/U: HCPCS | Performed by: INTERNAL MEDICINE

## 2024-05-07 PROCEDURE — 99214 OFFICE O/P EST MOD 30 MIN: CPT | Performed by: INTERNAL MEDICINE

## 2024-05-07 PROCEDURE — 3074F SYST BP LT 130 MM HG: CPT | Performed by: INTERNAL MEDICINE

## 2024-05-07 PROCEDURE — G8427 DOCREV CUR MEDS BY ELIG CLIN: HCPCS | Performed by: INTERNAL MEDICINE

## 2024-05-07 PROCEDURE — 1036F TOBACCO NON-USER: CPT | Performed by: INTERNAL MEDICINE

## 2024-05-07 RX ORDER — SPIRONOLACTONE 25 MG/1
25 TABLET ORAL DAILY
Qty: 90 TABLET | Refills: 3 | Status: SHIPPED | OUTPATIENT
Start: 2024-05-07

## 2024-05-07 NOTE — PROGRESS NOTES
heart sounds. Exam reveals no gallop and no friction rub. No murmur heard.  Pulmonary/Chest: Effort normal and breath sounds normal. No respiratory distress. She has no wheezes, rhonchi or rales.   Abdominal: Soft, non-tender. Bowel sounds are normal. She exhibits no organomegaly, mass or bruit.   Extremities: 2+ edema. No cyanosis or clubbing. Pulses are 2+ radial/carotid bilaterally.  Neurological: No gross cranial nerve deficit. Coordination normal.   Skin: Skin is warm and dry. There is no rash or diaphoresis.   Psychiatric: She has a normal mood and affect. Her speech is normal and behavior is normal.     Vascular: extremities warm and well perfused, no signs of cyanosis or ischemia, bilateral upper and lower extremity motorsensory intact, capillary refill < 3 seconds    Wounds: none     DIAGNOSIS ASSESSMENT:  Lymphedema is caused by:  Lymphedema, not elsewhere m classified [I89.0] (includes CVI-related lymphedema) [x] Yes [] No   Hereditary lymphedema [Q82.0] [] Yes [] No   Chronic Venous Stasis ulcers [I87.2] (non-healing despite 6 months of ongoing treatment)  [] Yes [] No      SWELLING SEVERITY:   Patient exhibits the following swelling severity (International Society of Lymphology clinical classification):  [] Stage 0: Latent or subclinical condition where swelling is not yet evident despite impaired lymph transport, subtle alterations in tissue fluid/composition and changes in subjective symptoms.  It may exist months or years before overt edema occurs (Stages I - III).   [] Stage I: Early accumulation of fluid relatively high in protein content (e.g., in comparison with ?enous” edema) which subsides with limb elevation.  Pitting may occur.  An increase in various types of proliferating cells may also be seen.  [x] Stage II: Limb elevation alone rarely reduces the tissue swelling and pitting is manifest.  Later in Stage II, the limb may not pit as excess subcutaneous fat and fibrosis develop.  [] Stage

## 2024-05-07 NOTE — PATIENT INSTRUCTIONS
Recommend 15-20 mmHg for compression strength, [employer] has options for different compression stockings including Velcro, zipper, and hand options

## 2024-05-08 RX ORDER — MONTELUKAST SODIUM 10 MG/1
10 TABLET ORAL NIGHTLY
Qty: 90 TABLET | Refills: 0 | Status: SHIPPED | OUTPATIENT
Start: 2024-05-08

## 2024-05-08 NOTE — TELEPHONE ENCOUNTER
Medication:   Requested Prescriptions     Pending Prescriptions Disp Refills    montelukast (SINGULAIR) 10 MG tablet [Pharmacy Med Name: Montelukast Sodium 10 MG Oral Tablet] 90 tablet 0     Sig: Take 1 tablet by mouth nightly        Last Filled:      Patient Phone Number: [phone] (home) [phone] (work)    Last appt: 12/13/2023   Next appt: 5/23/2024    Last OARRS:        No data to display

## 2024-05-15 ENCOUNTER — OFFICE VISIT (OUTPATIENT)
Dept: GYNECOLOGY | Age: 44
End: 2024-05-15
Payer: COMMERCIAL

## 2024-05-15 VITALS
SYSTOLIC BLOOD PRESSURE: 122 MMHG | BODY MASS INDEX: 51.91 KG/M2 | OXYGEN SATURATION: 97 % | RESPIRATION RATE: 17 BRPM | HEIGHT: 63 IN | WEIGHT: 293 LBS | HEART RATE: 89 BPM | DIASTOLIC BLOOD PRESSURE: 74 MMHG

## 2024-05-15 DIAGNOSIS — L68.0 HIRSUTISM: ICD-10-CM

## 2024-05-15 DIAGNOSIS — R10.9 ABDOMINAL PRESSURE: ICD-10-CM

## 2024-05-15 DIAGNOSIS — R22.1 NODULE OF NECK: ICD-10-CM

## 2024-05-15 DIAGNOSIS — N60.81 SEBACEOUS CYST OF SKIN OF RIGHT BREAST: ICD-10-CM

## 2024-05-15 DIAGNOSIS — Z01.419 WELL WOMAN EXAM WITH ROUTINE GYNECOLOGICAL EXAM: Primary | ICD-10-CM

## 2024-05-15 PROCEDURE — 3074F SYST BP LT 130 MM HG: CPT | Performed by: OBSTETRICS & GYNECOLOGY

## 2024-05-15 PROCEDURE — 99396 PREV VISIT EST AGE 40-64: CPT | Performed by: OBSTETRICS & GYNECOLOGY

## 2024-05-15 PROCEDURE — 3078F DIAST BP <80 MM HG: CPT | Performed by: OBSTETRICS & GYNECOLOGY

## 2024-05-17 ASSESSMENT — ENCOUNTER SYMPTOMS
EYES NEGATIVE: 1
ABDOMINAL DISTENTION: 0
ALLERGIC/IMMUNOLOGIC NEGATIVE: 1
ABDOMINAL PAIN: 1
RESPIRATORY NEGATIVE: 1

## 2024-05-17 NOTE — PROGRESS NOTES
Subjective   Patient ID: Kerry Villela is a 43 y.o. female.    Patient is here for annual. Patient with some abdominal discomfort/something moving? Patient with ? Swelling in neck. Patient wants facial hair removed-referral to Derm.    Gynecologic Exam  Associated symptoms include abdominal pain.       Review of Systems   Constitutional: Negative.    HENT: Negative.     Eyes: Negative.    Respiratory: Negative.     Cardiovascular: Negative.    Gastrointestinal:  Positive for abdominal pain. Negative for abdominal distention.   Endocrine: Negative.    Genitourinary: Negative.    Musculoskeletal: Negative.    Skin: Negative.    Allergic/Immunologic: Negative.    Neurological: Negative.    Hematological: Negative.    Psychiatric/Behavioral: Negative.          Date of Birth 1980  Past Medical History:   Diagnosis Date    Abscess 05/20/2022    Allergic reaction to food 05/27/2020    Allergic rhinitis     Anemia     Anxiety and depression     Bilateral hand numbness 12/04/2020    Bilateral hip pain 06/15/2021    chronic gerd     Dermatomyositis (HCC) 2008    Eczema     Fibromyalgia     Fibromyalgia     Headache(784.0)     History of blood transfusion 2009    also two in 2014     HTN (hypertension)     Hx of blood clots 2014    PE and DVT    Hx of blood clots     Irritable bowel syndrome     Lymphedema     Morbid obesity with BMI of 50.0-59.9, adult (HCC)     obstructive sleep apnea     pt uses cpap machine    Osteoarthritis     PCOS (polycystic ovarian syndrome)     Pre-operative clearance     Prediabetes     Pulmonary embolism (HCC) 07/17/2014    Overview:  Continue anticoagulation.  Medicine managing Coumadin bridge.  Daily H/H, PTT, PT/INR.   Last Assessment & Plan:  HPI: Seen in hospital follow up.  The patient is a 33 year old y/o female who admitted to the hospital for pulmonary emboli. She began to experience left lower extremity swelling and pain for two days, and day of admission she began to experience  (2) assistive person

## 2024-05-20 RX ORDER — LEVOCETIRIZINE DIHYDROCHLORIDE 5 MG/1
TABLET, FILM COATED ORAL
Qty: 30 TABLET | Refills: 0 | Status: SHIPPED | OUTPATIENT
Start: 2024-05-20 | End: 2024-05-23 | Stop reason: SDUPTHER

## 2024-05-20 NOTE — TELEPHONE ENCOUNTER
Medication:   Requested Prescriptions     Pending Prescriptions Disp Refills    levocetirizine (XYZAL) 5 MG tablet [Pharmacy Med Name: Levocetirizine Dihydrochloride 5 MG Oral Tablet] 30 tablet 0     Sig: Take 1 tablet by mouth once daily        Last Filled:      Patient Phone Number: [phone] (home) [phone] (work)    Last appt: 12/13/2023   Next appt: 5/23/2024    Last OARRS:        No data to display

## 2024-05-23 ENCOUNTER — TELEPHONE (OUTPATIENT)
Dept: PRIMARY CARE CLINIC | Age: 44
End: 2024-05-23

## 2024-05-23 ENCOUNTER — OFFICE VISIT (OUTPATIENT)
Dept: PRIMARY CARE CLINIC | Age: 44
End: 2024-05-23
Payer: COMMERCIAL

## 2024-05-23 VITALS
HEIGHT: 63 IN | WEIGHT: 293 LBS | BODY MASS INDEX: 51.91 KG/M2 | TEMPERATURE: 97.3 F | HEART RATE: 98 BPM | RESPIRATION RATE: 16 BRPM | OXYGEN SATURATION: 98 % | DIASTOLIC BLOOD PRESSURE: 84 MMHG | SYSTOLIC BLOOD PRESSURE: 120 MMHG

## 2024-05-23 DIAGNOSIS — G89.29 CHRONIC HIP PAIN, BILATERAL: ICD-10-CM

## 2024-05-23 DIAGNOSIS — K21.9 GASTROESOPHAGEAL REFLUX DISEASE WITHOUT ESOPHAGITIS: ICD-10-CM

## 2024-05-23 DIAGNOSIS — R60.0 BILATERAL LEG EDEMA: ICD-10-CM

## 2024-05-23 DIAGNOSIS — M25.551 CHRONIC HIP PAIN, BILATERAL: ICD-10-CM

## 2024-05-23 DIAGNOSIS — M25.552 CHRONIC HIP PAIN, BILATERAL: ICD-10-CM

## 2024-05-23 DIAGNOSIS — E66.01 MORBID OBESITY WITH BMI OF 50.0-59.9, ADULT (HCC): Chronic | ICD-10-CM

## 2024-05-23 DIAGNOSIS — J30.9 ALLERGIC RHINITIS, UNSPECIFIED SEASONALITY, UNSPECIFIED TRIGGER: ICD-10-CM

## 2024-05-23 DIAGNOSIS — M33.13 DERMATOMYOSITIS (HCC): ICD-10-CM

## 2024-05-23 DIAGNOSIS — I89.0 LYMPHEDEMA: ICD-10-CM

## 2024-05-23 DIAGNOSIS — E78.2 MIXED HYPERLIPIDEMIA: ICD-10-CM

## 2024-05-23 DIAGNOSIS — H93.A1 SUBJECTIVE PULSATILE TINNITUS OF RIGHT EAR: Primary | ICD-10-CM

## 2024-05-23 DIAGNOSIS — E55.9 VITAMIN D DEFICIENCY: ICD-10-CM

## 2024-05-23 DIAGNOSIS — I50.32 CHRONIC DIASTOLIC CHF (CONGESTIVE HEART FAILURE) (HCC): ICD-10-CM

## 2024-05-23 DIAGNOSIS — E78.5 HYPERLIPIDEMIA, UNSPECIFIED HYPERLIPIDEMIA TYPE: Chronic | ICD-10-CM

## 2024-05-23 PROCEDURE — G8427 DOCREV CUR MEDS BY ELIG CLIN: HCPCS | Performed by: FAMILY MEDICINE

## 2024-05-23 PROCEDURE — 99214 OFFICE O/P EST MOD 30 MIN: CPT | Performed by: FAMILY MEDICINE

## 2024-05-23 PROCEDURE — G8417 CALC BMI ABV UP PARAM F/U: HCPCS | Performed by: FAMILY MEDICINE

## 2024-05-23 PROCEDURE — 3079F DIAST BP 80-89 MM HG: CPT | Performed by: FAMILY MEDICINE

## 2024-05-23 PROCEDURE — 1036F TOBACCO NON-USER: CPT | Performed by: FAMILY MEDICINE

## 2024-05-23 PROCEDURE — 3074F SYST BP LT 130 MM HG: CPT | Performed by: FAMILY MEDICINE

## 2024-05-23 RX ORDER — MONTELUKAST SODIUM 10 MG/1
10 TABLET ORAL NIGHTLY
Qty: 90 TABLET | Refills: 0 | Status: SHIPPED | OUTPATIENT
Start: 2024-05-23

## 2024-05-23 RX ORDER — ROSUVASTATIN CALCIUM 5 MG/1
5 TABLET, COATED ORAL DAILY
Qty: 90 TABLET | Refills: 1 | Status: SHIPPED | OUTPATIENT
Start: 2024-05-23

## 2024-05-23 RX ORDER — DULOXETIN HYDROCHLORIDE 60 MG/1
60 CAPSULE, DELAYED RELEASE ORAL DAILY
Qty: 90 CAPSULE | Refills: 1 | Status: SHIPPED | OUTPATIENT
Start: 2024-05-23

## 2024-05-23 RX ORDER — TORSEMIDE 5 MG/1
5 TABLET ORAL DAILY
Qty: 90 TABLET | Refills: 1 | Status: SHIPPED | OUTPATIENT
Start: 2024-05-23

## 2024-05-23 RX ORDER — LEVOCETIRIZINE DIHYDROCHLORIDE 5 MG/1
TABLET, FILM COATED ORAL
Qty: 30 TABLET | Refills: 0 | Status: SHIPPED | OUTPATIENT
Start: 2024-05-23

## 2024-05-23 RX ORDER — ERGOCALCIFEROL 1.25 MG/1
CAPSULE ORAL
Qty: 12 CAPSULE | Refills: 1 | Status: SHIPPED | OUTPATIENT
Start: 2024-05-23

## 2024-05-23 RX ORDER — LANSOPRAZOLE 30 MG/1
30 CAPSULE, DELAYED RELEASE ORAL DAILY
Qty: 90 CAPSULE | Refills: 1 | Status: SHIPPED | OUTPATIENT
Start: 2024-05-23

## 2024-05-23 SDOH — ECONOMIC STABILITY: FOOD INSECURITY: WITHIN THE PAST 12 MONTHS, YOU WORRIED THAT YOUR FOOD WOULD RUN OUT BEFORE YOU GOT MONEY TO BUY MORE.: NEVER TRUE

## 2024-05-23 SDOH — ECONOMIC STABILITY: FOOD INSECURITY: WITHIN THE PAST 12 MONTHS, THE FOOD YOU BOUGHT JUST DIDN'T LAST AND YOU DIDN'T HAVE MONEY TO GET MORE.: NEVER TRUE

## 2024-05-23 SDOH — ECONOMIC STABILITY: INCOME INSECURITY: HOW HARD IS IT FOR YOU TO PAY FOR THE VERY BASICS LIKE FOOD, HOUSING, MEDICAL CARE, AND HEATING?: NOT HARD AT ALL

## 2024-05-23 SDOH — ECONOMIC STABILITY: HOUSING INSECURITY
IN THE LAST 12 MONTHS, WAS THERE A TIME WHEN YOU DID NOT HAVE A STEADY PLACE TO SLEEP OR SLEPT IN A SHELTER (INCLUDING NOW)?: NO

## 2024-05-23 ASSESSMENT — PATIENT HEALTH QUESTIONNAIRE - PHQ9
SUM OF ALL RESPONSES TO PHQ9 QUESTIONS 1 & 2: 0
SUM OF ALL RESPONSES TO PHQ QUESTIONS 1-9: 0
1. LITTLE INTEREST OR PLEASURE IN DOING THINGS: NOT AT ALL
2. FEELING DOWN, DEPRESSED OR HOPELESS: NOT AT ALL
SUM OF ALL RESPONSES TO PHQ QUESTIONS 1-9: 0

## 2024-05-23 NOTE — TELEPHONE ENCOUNTER
Called [facility] with Dr. Bar office staff per provider would like patient to be seen urgent due to symptoms ( pulsatile tinnitus of right ear) appt were in late June early July. Called and spoke with neena at [place] states they had urgent appt for 5/24 @ 840am informed patient she denied appt stating she had things to do informed her of different date 6/4 @ 250pm patient states she can't make appt she started school on 6/3. Informed patient this is urgent matter and needs to be evaluated patient states she will keep appt and try to make it. Informed patient if any new or symptoms needs to be seen at ED patient expressed understanding

## 2024-05-23 NOTE — PROGRESS NOTES
Good Friday.    We ask that you only contact the office one time per issue or question, and please allow one full business day for a call back. Calling us back multiple times keeps us from being able to complete the work efficiently for you and our other patients.    For medication renewals, please call your pharmacist to contact us, and be sure to allow at least 3 business days for processing before you need to  your medication.     If you are sick or need an appointment that hasn't been planned, same day appointments are available every day the office is open: Monday, Tuesday, Wednesday, Thursday, and Friday.  Call during office hours to schedule, even if it may not be with your regular physician. You may also call the office after 8 am on office days if you need to be seen from an issue the night before.    During hours when the office is not normally open, your call will go to the messaging service which cannot provide any service other than paging the doctor. No prescriptions or other nonurgent matters will be handled and no voicemail is available, so please call back during office hours for these matters.       Electronically signed by Zahira Dominguez DO on 5/23/2024 at 8:56 AM.

## 2024-05-23 NOTE — PATIENT INSTRUCTIONS
[email or web address]   Phone Number: 1-517.862.4173    [place] Department of Insurance  What they offer: Provide consumer protection through education and fair but vigilant regulation while promoting a stable and competitive environment for insurers  Website:  https://insurance.[place].gov/   Phone Number: 988.553.5531    Contact your Area Agency on Aging for information regarding waiver services and Medicaid based assistance for seniors and those with complex medical conditions.        Area Agencies on Aging (AAA)  [place] on Aging:   Counties Served: [place], [place], [place]  Address: [address], [facility], [address] / Phone: [phone]  Supports Offered  [facility] waiver  Elderly Services Program (ARON)  [facility]   Specialized Recovery Services   Assisted Living Waiver    Area on Aging District 7 (AAA7)  Counties Covered: [place], [place], [place], [place], [place], [place], [place], [place], and [place]  Address: [address], [facility] [address] / Phone: [phone]  Supports Offered:   [facility]  Assisted Living Waiver  Consumer Directed Option Providers

## 2024-05-28 ENCOUNTER — OFFICE VISIT (OUTPATIENT)
Dept: SLEEP MEDICINE | Age: 44
End: 2024-05-28
Payer: COMMERCIAL

## 2024-05-28 VITALS
HEART RATE: 90 BPM | WEIGHT: 293 LBS | DIASTOLIC BLOOD PRESSURE: 60 MMHG | RESPIRATION RATE: 18 BRPM | SYSTOLIC BLOOD PRESSURE: 100 MMHG | HEIGHT: 63 IN | OXYGEN SATURATION: 98 % | BODY MASS INDEX: 51.91 KG/M2 | TEMPERATURE: 97.9 F

## 2024-05-28 DIAGNOSIS — G47.33 OSA ON CPAP: Primary | ICD-10-CM

## 2024-05-28 DIAGNOSIS — I10 PRIMARY HYPERTENSION: ICD-10-CM

## 2024-05-28 DIAGNOSIS — E66.01 CLASS 3 SEVERE OBESITY DUE TO EXCESS CALORIES WITH SERIOUS COMORBIDITY AND BODY MASS INDEX (BMI) OF 60.0 TO 69.9 IN ADULT (HCC): ICD-10-CM

## 2024-05-28 DIAGNOSIS — Z99.89 DEPENDENCE ON OTHER ENABLING MACHINES AND DEVICES: ICD-10-CM

## 2024-05-28 PROCEDURE — G8427 DOCREV CUR MEDS BY ELIG CLIN: HCPCS | Performed by: PSYCHIATRY & NEUROLOGY

## 2024-05-28 PROCEDURE — 1036F TOBACCO NON-USER: CPT | Performed by: PSYCHIATRY & NEUROLOGY

## 2024-05-28 PROCEDURE — G8417 CALC BMI ABV UP PARAM F/U: HCPCS | Performed by: PSYCHIATRY & NEUROLOGY

## 2024-05-28 PROCEDURE — 99214 OFFICE O/P EST MOD 30 MIN: CPT | Performed by: PSYCHIATRY & NEUROLOGY

## 2024-05-28 PROCEDURE — 3078F DIAST BP <80 MM HG: CPT | Performed by: PSYCHIATRY & NEUROLOGY

## 2024-05-28 PROCEDURE — 3074F SYST BP LT 130 MM HG: CPT | Performed by: PSYCHIATRY & NEUROLOGY

## 2024-05-28 ASSESSMENT — SLEEP AND FATIGUE QUESTIONNAIRES
HOW LIKELY ARE YOU TO NOD OFF OR FALL ASLEEP WHILE SITTING AND TALKING TO SOMEONE: WOULD NEVER DOZE
HOW LIKELY ARE YOU TO NOD OFF OR FALL ASLEEP WHILE SITTING QUIETLY AFTER LUNCH WITHOUT ALCOHOL: MODERATE CHANCE OF DOZING
HOW LIKELY ARE YOU TO NOD OFF OR FALL ASLEEP IN A CAR, WHILE STOPPED FOR A FEW MINUTES IN TRAFFIC: WOULD NEVER DOZE
HOW LIKELY ARE YOU TO NOD OFF OR FALL ASLEEP WHEN YOU ARE A PASSENGER IN A CAR FOR AN HOUR WITHOUT A BREAK: WOULD NEVER DOZE
HOW LIKELY ARE YOU TO NOD OFF OR FALL ASLEEP WHILE WATCHING TV: MODERATE CHANCE OF DOZING
HOW LIKELY ARE YOU TO NOD OFF OR FALL ASLEEP WHILE SITTING INACTIVE IN A PUBLIC PLACE: WOULD NEVER DOZE
HOW LIKELY ARE YOU TO NOD OFF OR FALL ASLEEP WHILE LYING DOWN TO REST IN THE AFTERNOON WHEN CIRCUMSTANCES PERMIT: WOULD NEVER DOZE
ESS TOTAL SCORE: 4
HOW LIKELY ARE YOU TO NOD OFF OR FALL ASLEEP WHILE SITTING AND READING: WOULD NEVER DOZE

## 2024-05-28 NOTE — PROGRESS NOTES
MD ADRIANNA Ventura Board Certified in Sleep Medicine  Certified in Behavioral Sleep Medicine  Board Certified in Neurology [facility]  [address]   [address]  [place]  [address]  [phone]   [facility]  [address]  [address]  [place], [place] [address]                      [facility] [place] [facility]  [address] [address] [phone]    Subjective:     Patient ID: Kerry Villela is a 43 y.o. female.    Chief Complaint   Patient presents with    Follow-up    Sleep Apnea       HPI:        Kerry Villela is a 43 y.o. female was seen today as a follow for severe obstructive sleep apnea with apnea hypopnea index of 56.8 with lowest O2 saturation of 44%, patient spent about 42 minutes below 90% (weight was 311 pounds 03/23/2020).    Patient is using the PAP machine about 100% of the time, more than 4 hours a nightabout  100 %, in total average of 9:38 hours a night in last 90 days.  Currently on PAP at 15 cm (), the AHI is only 1.0 events per hour at this pressure.  Patient improved regarding daytime sleepiness and fatigue, wakes up refreshed in the morning.  The Patient scored Vincent Sleepiness Score: 4 on Vincent Sleepiness Scale ( more than 10 is indicative of daytime sleepiness)   uses banegas FX mask, she felt more rested with FFM in the past.   Wakes up in the morning with dry mouth, the setting of the heated humidifier.    BP, and CHF are stable. Has lost 11 pounds since last visit. 349  DOT/CDL - N/A      Previous Report(s)Reviewed: historical medical records         Social History     Socioeconomic History    Marital status: Single     Spouse name: Not on file    Number of children: 3    Years of education: Not on file    Highest education level: Not on file   Occupational History    Occupation: unemployed   Tobacco Use    Smoking status: Never     Passive

## 2024-05-28 NOTE — PROGRESS NOTES
Kerry Villela         : 1980  [x] [facility]     [] [facility]      [] [facility]     [][facility]    [] [facility]  [] [facility]  [] [facility]   [] Retail Medical services [] Other:  Diagnosis: [x] SHYLA (G47.33) [] CSA (G47.31) [] Apnea (G47.30)   Length of Need: [] 12 Months [x] 99 Months [] Other:    Machine (IAN!):  [x] ResMed AirSense     Auto [] Other:         Humidifier: [x] Heated ()        [x] Water chamber replacement ()/ 1 per 6 months        Mask:  Please always start with the mask the patient used during the titraion    [x] Full Face () /1 per 3 months    [x] Patient Choice - Size and fit mask    [] Dispense:     [x] Headgear () / 1 per 3 months    [x] Interface Replacement ()/1 per month            Tubing: [x] Heated ()/1 per 3 months    [] Standard ()/1 per 3 months [] Other:           Filters: [x] Non-disposable ()/1 per 6 months     [x] Ultra-Fine, Disposable ()/2 per month        Miscellaneous: [x] Chin Strap ()/ 1 per 6 months [] O2 bleed-in:       LPM   [] Oximetry on CPAP/Bilevel []  Other:          Start Order Date: 24    MEDICAL JUSTIFICATION:  I, the undersigned, certify that the above prescribed supplies are medically necessary for this patient’s wellbeing.  In my opinion, the supplies are both reasonable and necessary in reference to accepted standards of medicalpractice in treatment of this patient’s condition.    Josey Ventura MD      NPI: [id number]       Order Signed Date: 24    Electronically signed by Josey Ventura MD on 2024 at 11:34 AM

## 2024-05-31 PROBLEM — M25.551 CHRONIC HIP PAIN, BILATERAL: Status: ACTIVE | Noted: 2024-05-31

## 2024-05-31 PROBLEM — J30.9 ALLERGIC RHINITIS: Chronic | Status: ACTIVE | Noted: 2024-05-31

## 2024-05-31 PROBLEM — J30.9 ALLERGIC RHINITIS: Status: ACTIVE | Noted: 2024-05-31

## 2024-05-31 PROBLEM — M25.552 CHRONIC HIP PAIN, BILATERAL: Status: ACTIVE | Noted: 2024-05-31

## 2024-05-31 PROBLEM — G89.29 CHRONIC HIP PAIN, BILATERAL: Status: ACTIVE | Noted: 2024-05-31

## 2024-06-06 ENCOUNTER — TELEPHONE (OUTPATIENT)
Dept: BARIATRICS/WEIGHT MGMT | Age: 44
End: 2024-06-06

## 2024-06-17 ENCOUNTER — TELEPHONE (OUTPATIENT)
Dept: ADMINISTRATIVE | Age: 44
End: 2024-06-17

## 2024-06-17 NOTE — TELEPHONE ENCOUNTER
Submitted PA for Levocetirizine Dihydrochloride 5MG tablets  Via CM (Key: [id number])  STATUS: PENDING.    Follow up done daily; if no decision with in three days we will refax.  If another three days goes by with no decision will call the insurance for status.

## 2024-06-20 NOTE — TELEPHONE ENCOUNTER
The medication is APPROVED thru 06/15/2025    If this requires a response please respond to the pool ( P MHCX PSC MEDICATION PRE-AUTH).      Thank you please advise patient.

## 2024-07-14 ENCOUNTER — HOSPITAL ENCOUNTER (EMERGENCY)
Age: 44
Discharge: HOME | End: 2024-07-14
Payer: COMMERCIAL

## 2024-07-14 ENCOUNTER — APPOINTMENT (OUTPATIENT)
Dept: CT IMAGING | Age: 44
End: 2024-07-14
Payer: COMMERCIAL

## 2024-07-14 VITALS
TEMPERATURE: 97.9 F | SYSTOLIC BLOOD PRESSURE: 136 MMHG | BODY MASS INDEX: 58.93 KG/M2 | DIASTOLIC BLOOD PRESSURE: 86 MMHG | OXYGEN SATURATION: 96 % | RESPIRATION RATE: 16 BRPM | WEIGHT: 293 LBS | HEART RATE: 89 BPM

## 2024-07-14 DIAGNOSIS — M54.2 NECK PAIN: ICD-10-CM

## 2024-07-14 DIAGNOSIS — R30.0 DYSURIA: Primary | ICD-10-CM

## 2024-07-14 LAB
ANION GAP SERPL CALCULATED.3IONS-SCNC: 12 MMOL/L (ref 3–16)
BASOPHILS # BLD: 0 K/UL (ref 0–0.2)
BASOPHILS NFR BLD: 0.6 %
BILIRUB UR QL STRIP.AUTO: NEGATIVE
BUN SERPL-MCNC: 10 MG/DL (ref 7–20)
CALCIUM SERPL-MCNC: 9.3 MG/DL (ref 8.3–10.6)
CHLORIDE SERPL-SCNC: 103 MMOL/L (ref 99–110)
CLARITY UR: CLEAR
CO2 SERPL-SCNC: 24 MMOL/L (ref 21–32)
COLOR UR: YELLOW
CREAT SERPL-MCNC: 0.5 MG/DL (ref 0.6–1.1)
DEPRECATED RDW RBC AUTO: 16.8 % (ref 12.4–15.4)
EOSINOPHIL # BLD: 0.2 K/UL (ref 0–0.6)
EOSINOPHIL NFR BLD: 3.1 %
GFR SERPLBLD CREATININE-BSD FMLA CKD-EPI: >90 ML/MIN/{1.73_M2}
GLUCOSE SERPL-MCNC: 115 MG/DL (ref 70–99)
GLUCOSE UR STRIP.AUTO-MCNC: NEGATIVE MG/DL
HCT VFR BLD AUTO: 34.5 % (ref 36–48)
HGB BLD-MCNC: 11.1 G/DL (ref 12–16)
HGB UR QL STRIP.AUTO: NEGATIVE
KETONES UR STRIP.AUTO-MCNC: NEGATIVE MG/DL
LEUKOCYTE ESTERASE UR QL STRIP.AUTO: NEGATIVE
LYMPHOCYTES # BLD: 2.3 K/UL (ref 1–5.1)
LYMPHOCYTES NFR BLD: 40.4 %
MCH RBC QN AUTO: 25.5 PG (ref 26–34)
MCHC RBC AUTO-ENTMCNC: 32.3 G/DL (ref 31–36)
MCV RBC AUTO: 78.9 FL (ref 80–100)
MONOCYTES # BLD: 0.3 K/UL (ref 0–1.3)
MONOCYTES NFR BLD: 5.2 %
NEUTROPHILS # BLD: 2.8 K/UL (ref 1.7–7.7)
NEUTROPHILS NFR BLD: 50.7 %
NITRITE UR QL STRIP.AUTO: NEGATIVE
PH UR STRIP.AUTO: 5.5 [PH] (ref 5–8)
PLATELET # BLD AUTO: 285 K/UL (ref 135–450)
PMV BLD AUTO: 8.2 FL (ref 5–10.5)
POTASSIUM SERPL-SCNC: 3.8 MMOL/L (ref 3.5–5.1)
PROT UR STRIP.AUTO-MCNC: NEGATIVE MG/DL
RBC # BLD AUTO: 4.37 M/UL (ref 4–5.2)
SODIUM SERPL-SCNC: 139 MMOL/L (ref 136–145)
SP GR UR STRIP.AUTO: 1.02 (ref 1–1.03)
UA COMPLETE W REFLEX CULTURE PNL UR: NORMAL
UA DIPSTICK W REFLEX MICRO PNL UR: NORMAL
URN SPEC COLLECT METH UR: NORMAL
UROBILINOGEN UR STRIP-ACNC: 0.2 E.U./DL
WBC # BLD AUTO: 5.6 K/UL (ref 4–11)

## 2024-07-14 PROCEDURE — 80048 BASIC METABOLIC PNL TOTAL CA: CPT

## 2024-07-14 PROCEDURE — 36415 COLL VENOUS BLD VENIPUNCTURE: CPT

## 2024-07-14 PROCEDURE — 99285 EMERGENCY DEPT VISIT HI MDM: CPT

## 2024-07-14 PROCEDURE — 81003 URINALYSIS AUTO W/O SCOPE: CPT

## 2024-07-14 PROCEDURE — 85025 COMPLETE CBC W/AUTO DIFF WBC: CPT

## 2024-07-14 PROCEDURE — 6360000004 HC RX CONTRAST MEDICATION: Performed by: PHYSICIAN ASSISTANT

## 2024-07-14 PROCEDURE — 70491 CT SOFT TISSUE NECK W/DYE: CPT

## 2024-07-14 RX ADMIN — IOPAMIDOL 75 ML: 755 INJECTION, SOLUTION INTRAVENOUS at 14:04

## 2024-07-14 ASSESSMENT — PAIN - FUNCTIONAL ASSESSMENT
PAIN_FUNCTIONAL_ASSESSMENT: 0-10
PAIN_FUNCTIONAL_ASSESSMENT: ACTIVITIES ARE NOT PREVENTED

## 2024-07-14 ASSESSMENT — PAIN DESCRIPTION - LOCATION: LOCATION: NECK

## 2024-07-14 ASSESSMENT — PAIN DESCRIPTION - DESCRIPTORS: DESCRIPTORS: OTHER (COMMENT)

## 2024-07-14 ASSESSMENT — PAIN DESCRIPTION - PAIN TYPE: TYPE: ACUTE PAIN

## 2024-07-14 ASSESSMENT — PAIN SCALES - GENERAL: PAINLEVEL_OUTOF10: 3

## 2024-07-14 ASSESSMENT — PAIN DESCRIPTION - ORIENTATION: ORIENTATION: MID

## 2024-07-14 NOTE — ED PROVIDER NOTES
once a week 12 capsule 1    torsemide (DEMADEX) 5 MG tablet Take 1 tablet by mouth daily 90 tablet 1    lansoprazole (PREVACID) 30 MG delayed release capsule Take 1 capsule by mouth daily 90 capsule 1    levocetirizine (XYZAL) 5 MG tablet Take 1 tablet by mouth once daily 30 tablet 0    spironolactone (ALDACTONE) 25 MG tablet Take 1 tablet by mouth daily 90 tablet 3    famotidine (PEPCID) 40 MG tablet Take once daily qhs as needed 90 tablet 1    YUVAFEM 10 MCG TABS vaginal tablet INSERT TABLET VAGINALLY AT BEDTIME TWICE WEEKLY.      ketoconazole (NIZORAL) 2 % cream APPLY CREAM TOPICALLY AS DIRECTED ONCE DAILY FOR 45 DAYS      valsartan (DIOVAN) 80 MG tablet Take 1 tablet by mouth once daily 90 tablet 5    docusate sodium (COLACE) 100 MG capsule Take 1 capsule by mouth 2 times daily      ammonium lactate (AMLACTIN) 12 % cream as needed      pregabalin (LYRICA) 100 MG capsule Take 1 capsule by mouth 3 times daily.      fluticasone (FLONASE) 50 MCG/ACT nasal spray Use 2 spray(s) in each nostril once daily 16 g 3       ALLERGIES    Allergies   Allergen Reactions    Latex Itching and Rash    Black [place]ba Flavor Shortness Of Breath    [place]-Containing Products Shortness Of Breath, Itching and Swelling    Imitrex [Sumatriptan] Other (See Comments)     Chest pain    Other Anaphylaxis     enviromental  allegies    Corn, wheat, soy bean, peanuts and walnuts    Peanut-Containing Drug Products Shortness Of Breath    Soy Allergy Shortness Of Breath and Swelling    Wheat Hives, Shortness Of Breath, Itching and Swelling    Mixed Grasses Itching and Other (See Comments)     Molds--over 51 different environmental allergies  Sinus issues/redden eyes    Tramadol Other (See Comments)     Made her feel really bad, and head dizzy and chest pain    Toradol [Ketorolac Tromethamine]        SOCIAL HISTORY    Social History     Socioeconomic History    Marital status: Single    Number of children: 3   Occupational History    Occupation:

## 2024-07-29 ENCOUNTER — TELEPHONE (OUTPATIENT)
Dept: PULMONOLOGY | Age: 44
End: 2024-07-29

## 2024-07-29 NOTE — TELEPHONE ENCOUNTER
Pt needs to know what the very first mask was that she was prescribed. She said [facility] did not know. Please return her call around 1pm 07/30/24 (that is when she will be available )

## 2024-07-30 NOTE — TELEPHONE ENCOUNTER
Orders we have say patient choice.  She would have to check with DME, if they don't know then we have no way of knowing.    Is she having problems?  She can contact DME and try different masks

## 2024-08-05 ENCOUNTER — OFFICE VISIT (OUTPATIENT)
Dept: PRIMARY CARE CLINIC | Age: 44
End: 2024-08-05
Payer: COMMERCIAL

## 2024-08-05 VITALS
HEART RATE: 76 BPM | DIASTOLIC BLOOD PRESSURE: 78 MMHG | OXYGEN SATURATION: 97 % | TEMPERATURE: 97.7 F | BODY MASS INDEX: 51.91 KG/M2 | SYSTOLIC BLOOD PRESSURE: 120 MMHG | HEIGHT: 63 IN | WEIGHT: 293 LBS

## 2024-08-05 DIAGNOSIS — N32.81 OVERACTIVE BLADDER: Chronic | ICD-10-CM

## 2024-08-05 DIAGNOSIS — D52.9 ANEMIA DUE TO FOLIC ACID DEFICIENCY, UNSPECIFIED DEFICIENCY TYPE: ICD-10-CM

## 2024-08-05 DIAGNOSIS — J30.9 ALLERGIC RHINITIS, UNSPECIFIED SEASONALITY, UNSPECIFIED TRIGGER: Chronic | ICD-10-CM

## 2024-08-05 DIAGNOSIS — I10 PRIMARY HYPERTENSION: Chronic | ICD-10-CM

## 2024-08-05 DIAGNOSIS — R73.03 PREDIABETES: Chronic | ICD-10-CM

## 2024-08-05 DIAGNOSIS — E78.5 HYPERLIPIDEMIA, UNSPECIFIED HYPERLIPIDEMIA TYPE: ICD-10-CM

## 2024-08-05 DIAGNOSIS — R73.03 PREDIABETES: Primary | Chronic | ICD-10-CM

## 2024-08-05 LAB
CHOLEST SERPL-MCNC: 162 MG/DL (ref 0–199)
CREAT UR-MCNC: 244.2 MG/DL (ref 28–259)
EST. AVERAGE GLUCOSE BLD GHB EST-MCNC: 148.5 MG/DL
FOLATE SERPL-MCNC: 5.63 NG/ML (ref 4.78–24.2)
HBA1C MFR BLD: 6.8 %
HDLC SERPL-MCNC: 37 MG/DL (ref 40–60)
LDLC SERPL CALC-MCNC: 105 MG/DL
MICROALBUMIN UR DL<=1MG/L-MCNC: <1.2 MG/DL
MICROALBUMIN/CREAT UR: NORMAL MG/G (ref 0–30)
TRIGL SERPL-MCNC: 102 MG/DL (ref 0–150)
VIT B12 SERPL-MCNC: 714 PG/ML (ref 211–911)
VLDLC SERPL CALC-MCNC: 20 MG/DL

## 2024-08-05 PROCEDURE — 3078F DIAST BP <80 MM HG: CPT | Performed by: FAMILY MEDICINE

## 2024-08-05 PROCEDURE — G8427 DOCREV CUR MEDS BY ELIG CLIN: HCPCS | Performed by: FAMILY MEDICINE

## 2024-08-05 PROCEDURE — G8417 CALC BMI ABV UP PARAM F/U: HCPCS | Performed by: FAMILY MEDICINE

## 2024-08-05 PROCEDURE — 1036F TOBACCO NON-USER: CPT | Performed by: FAMILY MEDICINE

## 2024-08-05 PROCEDURE — 99214 OFFICE O/P EST MOD 30 MIN: CPT | Performed by: FAMILY MEDICINE

## 2024-08-05 PROCEDURE — 3074F SYST BP LT 130 MM HG: CPT | Performed by: FAMILY MEDICINE

## 2024-08-05 RX ORDER — VALSARTAN 80 MG/1
TABLET ORAL
Qty: 90 TABLET | Refills: 1 | Status: SHIPPED | OUTPATIENT
Start: 2024-08-05

## 2024-08-05 RX ORDER — MONTELUKAST SODIUM 10 MG/1
10 TABLET ORAL NIGHTLY
Qty: 90 TABLET | Refills: 1 | Status: SHIPPED | OUTPATIENT
Start: 2024-08-05

## 2024-08-05 RX ORDER — CYCLOBENZAPRINE HCL 10 MG
20 TABLET ORAL 2 TIMES DAILY
COMMUNITY
Start: 2024-07-22

## 2024-08-05 RX ORDER — LEVOCETIRIZINE DIHYDROCHLORIDE 5 MG/1
TABLET, FILM COATED ORAL
Qty: 90 TABLET | Refills: 1 | Status: SHIPPED | OUTPATIENT
Start: 2024-08-05

## 2024-08-05 ASSESSMENT — ENCOUNTER SYMPTOMS
NAUSEA: 0
CONSTIPATION: 0
VOMITING: 0
DIARRHEA: 0
COUGH: 0
SHORTNESS OF BREATH: 0

## 2024-08-05 ASSESSMENT — PATIENT HEALTH QUESTIONNAIRE - PHQ9
1. LITTLE INTEREST OR PLEASURE IN DOING THINGS: NOT AT ALL
2. FEELING DOWN, DEPRESSED OR HOPELESS: NOT AT ALL
SUM OF ALL RESPONSES TO PHQ QUESTIONS 1-9: 0
SUM OF ALL RESPONSES TO PHQ QUESTIONS 1-9: 0
SUM OF ALL RESPONSES TO PHQ9 QUESTIONS 1 & 2: 0
SUM OF ALL RESPONSES TO PHQ QUESTIONS 1-9: 0
SUM OF ALL RESPONSES TO PHQ QUESTIONS 1-9: 0

## 2024-08-05 NOTE — PROGRESS NOTES
you only contact the office one time per issue or question, and please allow one full business day for a call back. Calling us back multiple times keeps us from being able to complete the work efficiently for you and our other patients.    For medication renewals, please call your pharmacist to contact us, and be sure to allow at least 3 business days for processing before you need to  your medication.     If you are sick or need an appointment that hasn't been planned, same day appointments are available every day the office is open: Monday, Tuesday, Wednesday, Thursday, and Friday.  Call during office hours to schedule, even if it may not be with your regular physician. You may also call the office after 8 am on office days if you need to be seen from an issue the night before.    During hours when the office is not normally open, your call will go to the messaging service which cannot provide any service other than paging the doctor. No prescriptions or other nonurgent matters will be handled and no voicemail is available, so please call back during office hours for these matters.       Electronically signed by Zahira Dominguez DO on 8/5/2024 at 10:38 AM.

## 2024-08-07 ENCOUNTER — CARE COORDINATION (OUTPATIENT)
Dept: PRIMARY CARE CLINIC | Age: 44
End: 2024-08-07

## 2024-08-07 DIAGNOSIS — I15.2 OBESITY, DIABETES, AND HYPERTENSION SYNDROME (HCC): ICD-10-CM

## 2024-08-07 DIAGNOSIS — I10 DIABETES MELLITUS WITH COINCIDENT HYPERTENSION (HCC): ICD-10-CM

## 2024-08-07 DIAGNOSIS — E11.9 DIABETES MELLITUS WITH COINCIDENT HYPERTENSION (HCC): ICD-10-CM

## 2024-08-07 DIAGNOSIS — E11.69 OBESITY, DIABETES, AND HYPERTENSION SYNDROME (HCC): ICD-10-CM

## 2024-08-07 DIAGNOSIS — E66.9 OBESITY, DIABETES, AND HYPERTENSION SYNDROME (HCC): ICD-10-CM

## 2024-08-07 DIAGNOSIS — E11.69 TYPE 2 DIABETES MELLITUS WITH HYPERLIPIDEMIA (HCC): ICD-10-CM

## 2024-08-07 DIAGNOSIS — E11.65 TYPE 2 DIABETES MELLITUS WITH HYPERGLYCEMIA, WITHOUT LONG-TERM CURRENT USE OF INSULIN (HCC): Primary | ICD-10-CM

## 2024-08-07 DIAGNOSIS — E78.5 TYPE 2 DIABETES MELLITUS WITH HYPERLIPIDEMIA (HCC): ICD-10-CM

## 2024-08-07 DIAGNOSIS — E11.59 OBESITY, DIABETES, AND HYPERTENSION SYNDROME (HCC): ICD-10-CM

## 2024-08-07 RX ORDER — METFORMIN HYDROCHLORIDE 500 MG/1
500 TABLET, EXTENDED RELEASE ORAL 2 TIMES DAILY WITH MEALS
Qty: 180 TABLET | Refills: 1 | Status: SHIPPED | OUTPATIENT
Start: 2024-08-07 | End: 2025-02-03

## 2024-08-13 ENCOUNTER — CARE COORDINATION (OUTPATIENT)
Dept: PRIMARY CARE CLINIC | Age: 44
End: 2024-08-13

## 2024-08-13 ENCOUNTER — OFFICE VISIT (OUTPATIENT)
Dept: UROGYNECOLOGY | Age: 44
End: 2024-08-13
Payer: COMMERCIAL

## 2024-08-13 ENCOUNTER — ENROLLMENT (OUTPATIENT)
Dept: CARE COORDINATION | Age: 44
End: 2024-08-13

## 2024-08-13 VITALS
HEART RATE: 82 BPM | TEMPERATURE: 97.9 F | OXYGEN SATURATION: 98 % | RESPIRATION RATE: 16 BRPM | DIASTOLIC BLOOD PRESSURE: 73 MMHG | SYSTOLIC BLOOD PRESSURE: 109 MMHG

## 2024-08-13 DIAGNOSIS — R35.1 NOCTURIA: ICD-10-CM

## 2024-08-13 DIAGNOSIS — N32.81 OVERACTIVE BLADDER: Primary | Chronic | ICD-10-CM

## 2024-08-13 DIAGNOSIS — N39.41 URGE INCONTINENCE: ICD-10-CM

## 2024-08-13 DIAGNOSIS — I10 DIABETES MELLITUS WITH COINCIDENT HYPERTENSION (HCC): ICD-10-CM

## 2024-08-13 DIAGNOSIS — E11.9 DIABETES MELLITUS WITH COINCIDENT HYPERTENSION (HCC): ICD-10-CM

## 2024-08-13 PROCEDURE — G8427 DOCREV CUR MEDS BY ELIG CLIN: HCPCS | Performed by: NURSE PRACTITIONER

## 2024-08-13 PROCEDURE — 3044F HG A1C LEVEL LT 7.0%: CPT | Performed by: NURSE PRACTITIONER

## 2024-08-13 PROCEDURE — G8417 CALC BMI ABV UP PARAM F/U: HCPCS | Performed by: NURSE PRACTITIONER

## 2024-08-13 PROCEDURE — 2022F DILAT RTA XM EVC RTNOPTHY: CPT | Performed by: NURSE PRACTITIONER

## 2024-08-13 PROCEDURE — 1036F TOBACCO NON-USER: CPT | Performed by: NURSE PRACTITIONER

## 2024-08-13 PROCEDURE — 3078F DIAST BP <80 MM HG: CPT | Performed by: NURSE PRACTITIONER

## 2024-08-13 PROCEDURE — 99203 OFFICE O/P NEW LOW 30 MIN: CPT | Performed by: NURSE PRACTITIONER

## 2024-08-13 PROCEDURE — 3074F SYST BP LT 130 MM HG: CPT | Performed by: NURSE PRACTITIONER

## 2024-08-13 NOTE — PROGRESS NOTES
8/13/2024      HPI:     Name: Kerry Villela  YOB: 1980    CC: Patient is a 43 y.o. female who is seen in consultation from Zahira Dominguez DO for evaluation of  overactive bladder . Patient was being seen by Doctor Geo Alvarado and he had been administering Botox injections. Patient would like to come here for her procedures in the future as he moved his practice to another city.     HPI: Patinet presents to transfer care for OAB  Previous patient of Dr. Geo Alvarado who has done prolapse repairs and bladder botox  She remains pleased with Bladder Botox which she receives every 3-4 months.     Prior to Botox she was having urgency and severe urge incontinence  With Botox she wears NO pads.     100 UNITS BLADDER BOTOX WITH CYSTOSCOPY 3/15/24 by eGo Alvarado MD  COLPORRAPHY, POSTERIOR REPAIR performed by Mihir Alvarado MD     PMH:  reviewed.  Newly dx diabetic, has not yet started Metformin, HTN, HLD, Fibromyalgia    She has had prior vaginal hysterectomy, had infection which required Vertical incision and ovaries removed at that time    Bladder control problem: Yes  How many months have you had a bladder problem? 7 months  Do you use pads to absorb lost urine? Yes. If yes how many pads do you wear a day?  2-3    How many trips do you make to the bathroom during the day? 10-12  How many times do you wake at night to go to the bathroom?   Do you ever wet the bed while asleep? Yes, when attempting to get out of bed  Are there times when you cannot make it to the bathroom on time? Yes  Does sound, sight, or feel of running water cause you to lose urine? No  How many ounces of liquid do you consume daily? 48-64 oz  How many drinks containing caffeine do you consume daily? 4-6 drinks of tea and soda  Which best describes urine loss: (Check all that apply)  [x] I lose urine during coughing, sneezing, running, lifting  [x] I lose urine with changes in posture, standing, walking  [] I lose urine

## 2024-08-14 DIAGNOSIS — I50.32 CHRONIC DIASTOLIC CHF (CONGESTIVE HEART FAILURE) (HCC): Primary | ICD-10-CM

## 2024-08-14 DIAGNOSIS — I10 PRIMARY HYPERTENSION: Chronic | ICD-10-CM

## 2024-08-14 NOTE — PROGRESS NOTES
Remote Patient Monitoring Treatment Plan    Received request from AC/Veronica Fang RN   to order remote patient monitoring for in home monitoring of CHF; Condition managed by PCP.  HTN; Condition managed by PCP.  and order completed.     Patient will be monitoring blood pressure   pulse ox   weight.      Patient will engage in Remote Patient Monitoring each day to develop the skills necessary for self management.       RPM Care Team Responsibilities:   Alerts will be reviewed daily and addressed within 2-4 hours during operational hours (Monday -Friday 9 am-4 pm)  Alert response and intervention documented in patient medical record  Alert response escalated to PCP per protocol and documented in patient medical record  Patient monitored over approximately  days  Discharge from program based on self-management readiness    See care coordination encounters for additional details.

## 2024-08-14 NOTE — CARE COORDINATION
Ambulatory Care Coordination Note     2024 10:50 AM     Patient Current Location:  Home: [facility] [address]  [place] [address]     This patient was received as a referral from Ambulatory Care Manager .    ACM contacted the patient by telephone. Verified name and  with patient as identifiers. Provided introduction to self, and explanation of the ACM role.   Patient accepted care management services at this time.          ACM: Veronica Gomez RN     Challenges to be reviewed by the provider   Additional needs identified to be addressed with provider Yes  none               Method of communication with provider: none.    Care Summary Note:     Acm out reach call placed to pt , introduced self , reason for call and care coordination services. Pt agreeable to future calls.  Discussed pt medical dx, and rpm pt agreeable to enrolling for CHF, and HTN but not DM pt reports she is not Diabetic and is unsure how that got in her chart, states she will discuss with PCP regarding removal   Pt was actually in her car at time of call and heading to a MD appt. Address verified for equipment delivery   Pt informed she will receive a call regarding delivery and writer will follow up with pt next week    Remote Patient Monitoring Enrollment Note      Date/Time:  2024 10:57 AM    Offered patient enrollment in the [facility] Remote Patient Monitoring (RPM) program for in home monitoring for CHF; condition managed by PCP. HTN; condition managed by PCP..  Patient accepted RPM services.    Patient will be monitoring the following daily:  blood pressure reading, pulse ox reading, and weight      ACM reviewed the information below with patient:    Emergency Contact (name and contact number):   INDU FLORES Child [phone]       [x] A member from the care coordination team will reach out to notify the patient once the RPM kit is ordered.   [x] Once the kit is delivered, the HRS team will contact the patient after UPS

## 2024-08-15 ENCOUNTER — CARE COORDINATION (OUTPATIENT)
Facility: CLINIC | Age: 44
End: 2024-08-15

## 2024-08-15 ENCOUNTER — OFFICE VISIT (OUTPATIENT)
Dept: UROGYNECOLOGY | Age: 44
End: 2024-08-15

## 2024-08-15 VITALS
SYSTOLIC BLOOD PRESSURE: 115 MMHG | TEMPERATURE: 97.8 F | DIASTOLIC BLOOD PRESSURE: 74 MMHG | OXYGEN SATURATION: 99 % | RESPIRATION RATE: 16 BRPM | HEART RATE: 94 BPM

## 2024-08-15 DIAGNOSIS — N32.81 OVERACTIVE BLADDER: Primary | ICD-10-CM

## 2024-08-15 DIAGNOSIS — N39.41 URGE INCONTINENCE: ICD-10-CM

## 2024-08-15 DIAGNOSIS — R35.1 NOCTURIA: ICD-10-CM

## 2024-08-15 LAB
BILIRUBIN, POC: NORMAL
BLOOD URINE, POC: NORMAL
CLARITY, POC: CLEAR
COLOR, POC: YELLOW
GLUCOSE URINE, POC: NORMAL
KETONES, POC: NORMAL
LEUKOCYTE EST, POC: NORMAL
NITRITE, POC: NORMAL
PH, POC: 6.5
PROTEIN, POC: NORMAL
SPECIFIC GRAVITY, POC: 1.01
UROBILINOGEN, POC: NORMAL

## 2024-08-15 NOTE — CARE COORDINATION
Remote Patient Kit Ordering Note      Date/Time:  8/15/2024 11:51 AM      [facility]family today to notify of RPM kit order; patient/family was unavailable; Left HIPAA compliant voicemail regarding RPM kit.    [] [facility] address  [x] Patient will receive package over the next 1-3 business days. Someone 21 years or older must be present to sign for UPS delivery.  [x] HRS will contact patient within 24 hours, an HRS  will call the patient directly: If the patient does not answer, HRS will follow up with the clinical team notifying them about the unsuccessful attempt to contact the patient. HRS will make three call attempts to the patient.Provide patient with [facility] Virtual install number is: [phone].  [] ACM will contact patient once equipment is active to welcome them to the program.                                                         [] Hours of RPM monitoring - Monday-Friday [id number]; encourage patient to get vitals entered by Noon each day to have the alert addressed same day.  [x][facility] mailed RPM Patient flyer to patient.                      ACM made aware the RPM kit has been ordered.

## 2024-08-15 NOTE — PROGRESS NOTES
Take 1 capsule by mouth 3 times daily.      fluticasone (FLONASE) 50 MCG/ACT nasal spray Use 2 spray(s) in each nostril once daily 16 g 3     No current facility-administered medications for this visit.     Social History:   Social History     Socioeconomic History    Marital status: Single     Spouse name: Not on file    Number of children: 3    Years of education: Not on file    Highest education level: Not on file   Occupational History    Occupation: unemployed   Tobacco Use    Smoking status: Never     Passive exposure: Past    Smokeless tobacco: Never   Vaping Use    Vaping status: Never Used   Substance and Sexual Activity    Alcohol use: Not Currently     Comment: occasionally    Drug use: Never    Sexual activity: Not Currently     Partners: Male   Other Topics Concern    Not on file   Social History Narrative    Lives with 2 children     Social Determinants of Health     Financial Resource Strain: Low Risk  (5/23/2024)    Overall Financial Resource Strain (CARDIA)     Difficulty of Paying Living Expenses: Not hard at all   Food Insecurity: No Food Insecurity (5/23/2024)    Hunger Vital Sign     Worried About Running Out of Food in the Last Year: Never true     Ran Out of Food in the Last Year: Never true   Transportation Needs: Unknown (5/23/2024)    PRAPARE - Transportation     Lack of Transportation (Medical): Not on file     Lack of Transportation (Non-Medical): No   Physical Activity: Not on file   Stress: Not on file   Social Connections: Not on file   Intimate Partner Violence: Unknown (1/21/2024)    Received from [employer] [facility] [employer]     Interpersonal Safety     Feel physically or emotionally unsafe where currently live: Not on file     Harm by anyone: Not on file     Emotionally Harmed: Not on file   Housing Stability: Unknown (5/23/2024)    Housing Stability Vital Sign     Unable to Pay for Housing in the Last Year: Not on file     Number of Places Lived in the Last Year: Not on file     Unstable

## 2024-08-16 LAB — BACTERIA UR CULT: NORMAL

## 2024-08-21 ENCOUNTER — OFFICE VISIT (OUTPATIENT)
Dept: UROGYNECOLOGY | Age: 44
End: 2024-08-21
Payer: COMMERCIAL

## 2024-08-21 ENCOUNTER — TELEPHONE (OUTPATIENT)
Dept: CARDIOLOGY CLINIC | Age: 44
End: 2024-08-21

## 2024-08-21 VITALS
DIASTOLIC BLOOD PRESSURE: 68 MMHG | RESPIRATION RATE: 16 BRPM | SYSTOLIC BLOOD PRESSURE: 114 MMHG | HEART RATE: 64 BPM | TEMPERATURE: 97.5 F | OXYGEN SATURATION: 95 %

## 2024-08-21 DIAGNOSIS — N39.41 URGE INCONTINENCE: ICD-10-CM

## 2024-08-21 DIAGNOSIS — R35.1 NOCTURIA: ICD-10-CM

## 2024-08-21 DIAGNOSIS — N32.81 OVERACTIVE BLADDER: Primary | ICD-10-CM

## 2024-08-21 PROCEDURE — G8428 CUR MEDS NOT DOCUMENT: HCPCS | Performed by: OBSTETRICS & GYNECOLOGY

## 2024-08-21 PROCEDURE — 3078F DIAST BP <80 MM HG: CPT | Performed by: OBSTETRICS & GYNECOLOGY

## 2024-08-21 PROCEDURE — G8417 CALC BMI ABV UP PARAM F/U: HCPCS | Performed by: OBSTETRICS & GYNECOLOGY

## 2024-08-21 PROCEDURE — 1036F TOBACCO NON-USER: CPT | Performed by: OBSTETRICS & GYNECOLOGY

## 2024-08-21 PROCEDURE — 99214 OFFICE O/P EST MOD 30 MIN: CPT | Performed by: OBSTETRICS & GYNECOLOGY

## 2024-08-21 PROCEDURE — 3074F SYST BP LT 130 MM HG: CPT | Performed by: OBSTETRICS & GYNECOLOGY

## 2024-08-21 RX ORDER — SODIUM CHLORIDE 9 MG/ML
INJECTION, SOLUTION INTRAVENOUS PRN
OUTPATIENT
Start: 2024-08-21

## 2024-08-21 RX ORDER — SODIUM CHLORIDE, SODIUM LACTATE, POTASSIUM CHLORIDE, CALCIUM CHLORIDE 600; 310; 30; 20 MG/100ML; MG/100ML; MG/100ML; MG/100ML
INJECTION, SOLUTION INTRAVENOUS CONTINUOUS
OUTPATIENT
Start: 2024-08-21

## 2024-08-21 RX ORDER — SODIUM CHLORIDE 0.9 % (FLUSH) 0.9 %
5-40 SYRINGE (ML) INJECTION PRN
OUTPATIENT
Start: 2024-08-21

## 2024-08-21 RX ORDER — SODIUM CHLORIDE 0.9 % (FLUSH) 0.9 %
5-40 SYRINGE (ML) INJECTION EVERY 12 HOURS SCHEDULED
OUTPATIENT
Start: 2024-08-21

## 2024-08-21 NOTE — PROGRESS NOTES
questions were answered to the patients satisfaction.  The patient elected to undergo cystourethroscopy and botox 100 units .  The risk and benefits were explained to the patient and all questions were answered.     Orders Placed This Encounter   Procedures    Initiate PAT Protocol     Standing Status:   Future     Standing Expiration Date:   10/20/2024     No orders of the defined types were placed in this encounter.      NAYELI CROSS MD

## 2024-08-22 ENCOUNTER — PREP FOR PROCEDURE (OUTPATIENT)
Dept: UROGYNECOLOGY | Age: 44
End: 2024-08-22

## 2024-08-22 DIAGNOSIS — N32.81 OAB (OVERACTIVE BLADDER): ICD-10-CM

## 2024-08-27 ENCOUNTER — CARE COORDINATION (OUTPATIENT)
Dept: PRIMARY CARE CLINIC | Age: 44
End: 2024-08-27

## 2024-08-27 NOTE — CARE COORDINATION
Ambulatory Care Coordination Note     8/27/2024 11:50 AM     ACM outreach attempt by this ACM today to perform care management follow up . ACM was unable to reach the patient by telephone today; left voice message requesting a return phone call to this ACM.     ACM: Veronica Gomez RN     Care Summary Note:   Acm out reach call to check status, complete [place] and welcome call. No answer to phone, will attempt again at later date    PCP/Specialist follow up:   Future Appointments         Provider Specialty Dept Phone    9/24/2024 10:00 AM Meka Chatman APRN - CNP Urogynecology [phone]    11/5/2024 10:45 AM Gustabo Jefferson MD Cardiology [phone]    2/5/2025 7:30 AM Zahira Dominguez DO Primary Care [phone]    5/28/2025 11:40 AM Josey Ventura MD Sleep Medicine [phone]            Follow Up:   Plan for next ACM outreach in approximately 1 week to complete:  - CC Protocol assessments  - RPM.

## 2024-08-28 ENCOUNTER — CARE COORDINATION (OUTPATIENT)
Dept: CARE COORDINATION | Age: 44
End: 2024-08-28

## 2024-08-28 NOTE — CARE COORDINATION
Date/Time:  8/28/2024 11:28 AM  LPN attempted to reach patient by telephone regarding red alert in remote patient monitoring program. Left HIPPA compliant message requesting a return call. Will attempt to reach patient again.

## 2024-08-28 NOTE — CARE COORDINATION
Ambulatory Care Coordination Note     2024 3:04 PM     Patient Current Location:  Home: [facility] [address]  [facility] [address]     Interim ACM  contacted the patient by telephone s/p RPM alert & RPM nurse unable to reach pt or secondary contact for jorge. Verified name and  with patient as identifiers.         ACM: Milvia Chatman RN     Challenges to be reviewed by the provider   Additional needs identified to be addressed with provider No  none               Method of communication with provider: none.    Care Summary Note: ACC outreach s/p RPM alert for noted weight gain >3 lbs in 24 hrs.  Pt denies any SOB, or noted swelling beyond baseline usual for her.  Review of methods for pt monitoring daily weights finds pt admits now getting her son on the school bus - yesterday's weight was obtained approx 3 hr earlier in am, wearing lighter-weight/pajamas vs today's weight obtained fully dressed with pump/heel shoes on - several hours later in morning.  Reviewed rationale, encouraging pt in wearing about same amount of clothing and weighing in at same time each morning - preferably earliest opportunity in the day.  In absence of any s/s - quickly reviewed Zone Mgmt guidance and options for access to care, including but not limited to options as r/t fast approaching holiday weekend.  Pt verbalized understanding of above and agrees she will strive to monitor daily weights more consistently early in the day, wearing about same amount of clothing and verbalizes comprehensive understanding of s/s to report, when to notify physician, and options for access to care.    Offered patient enrollment in the Remote Patient Monitoring (RPM) program for in-home monitoring: Yes, patient enrolled; current status is activated and monitoring.     Assessments Completed:   Congestive Heart Failure Assessment    Are you currently restricting fluids?: No Restriction  Do you understand a low sodium diet?: No  Do you understand how to  read food labels?: No  How many restaurant meals do you eat per week?: 1-2  Do you salt your food before tasting it?: No     Increase in weights (more than 3lbs overnight or 5lbs in one week), No patient-reported symptoms (Comment: denies associated symptoms; attributes likeliness weight variance d/t dfferent times of weigh in between 2 mornings, wearing different amounts of clothing)      Symptoms:  None: Yes      Symptom course: stable  Patient-reported weight (lb):  (Comment: exception of RPM metrics capturing variance in weights in 24 hrs)  Weight trend: stable  Salt intake watch compared to last visit: stable        Diabetes Assessment    Medic Alert ID: No  Meal Planning: Avoidance of concentrated sweets   Do you have barriers with adherence to non-pharmacologic self-management interventions? (Nutrition/Exercise/Self-Monitoring): No   Have you ever had to go to the ED for symptoms of low blood sugar?: No       No patient-reported symptoms         and   Hypertension - Encounter Level    Symptom course: stable          Medications Reviewed:   Patient denies any changes with medications and reports taking all medications as prescribed.    Advance Care Planning:   Not reviewed during this call     Care Planning:   Education Documentation  Influenza Vaccine, taught by Milvia Chatman, RN at 8/28/2024  3:02 PM.  Learner: Patient  Readiness: Acceptance  Method: Explanation, Handout, Teachback  Response: Verbalizes Understanding, Needs Reinforcement    Pneumovax Recommendation, taught by Milvia Chatman, RN at 8/28/2024  3:02 PM.  Learner: Patient  Readiness: Acceptance  Method: Explanation, Handout, Teachback  Response: Verbalizes Understanding, Needs Reinforcement    heart failure self-management, taught by Milvia Chatman, RN at 8/28/2024  3:02 PM.  Learner: Patient  Readiness: Acceptance  Method: Explanation, Handout, Teachback  Response: Verbalizes Understanding, Needs Reinforcement    Handout: My Heart

## 2024-08-28 NOTE — CARE COORDINATION
2nd outreach, no answer, left VM.  Attempted EC, no answer, will notify ACM of a 5.3lb weight gain overnight in setting of CHF and unable to triage per protocol

## 2024-08-29 ENCOUNTER — TELEPHONE (OUTPATIENT)
Dept: PRIMARY CARE CLINIC | Age: 44
End: 2024-08-29

## 2024-08-29 DIAGNOSIS — E78.5 HYPERLIPIDEMIA, UNSPECIFIED HYPERLIPIDEMIA TYPE: Chronic | ICD-10-CM

## 2024-08-29 DIAGNOSIS — E55.9 VITAMIN D DEFICIENCY: ICD-10-CM

## 2024-08-29 NOTE — TELEPHONE ENCOUNTER
On 09/09/2024 she is having a Cystoscopy with botox and mac anesthesia. Would you change her OV from 08/05/2024 to a pre op or do you want her to make an appointment for pre op? Please call DR Krishnan office back so they can contact her. [phone]

## 2024-08-29 NOTE — TELEPHONE ENCOUNTER
Patient is requesting refills on    montelukast   vitamin D   rosuvastatin   valsartan       [facility] [place] - [address] - P [phone] - F [phone] [address] ([place]) [place] [address]

## 2024-08-29 NOTE — TELEPHONE ENCOUNTER
Medication:   Requested Prescriptions     Pending Prescriptions Disp Refills    vitamin D (ERGOCALCIFEROL) 1.25 MG (26822 UT) CAPS capsule 12 capsule 1     Sig: Take 1 capsule by mouth once a week    rosuvastatin (CRESTOR) 5 MG tablet 90 tablet 1     Sig: Take 1 tablet by mouth daily        Last Filled:      Patient Phone Number: [phone] (home) [phone] (work)    Last appt: 8/5/2024   Next appt: 9/6/2024    Last OARRS:        No data to display

## 2024-08-29 NOTE — TELEPHONE ENCOUNTER
Patient came into office requesting to have paperwork for her procedure to be filled out. Needed to scheduled appt. Pre op appt is 9/6

## 2024-08-30 ENCOUNTER — TELEPHONE (OUTPATIENT)
Dept: UROGYNECOLOGY | Age: 44
End: 2024-08-30

## 2024-08-30 VITALS
DIASTOLIC BLOOD PRESSURE: 59 MMHG | HEART RATE: 71 BPM | OXYGEN SATURATION: 95 % | SYSTOLIC BLOOD PRESSURE: 98 MMHG | WEIGHT: 293 LBS | BODY MASS INDEX: 58.07 KG/M2

## 2024-08-30 RX ORDER — ROSUVASTATIN CALCIUM 5 MG/1
5 TABLET, COATED ORAL DAILY
Qty: 90 TABLET | Refills: 1 | Status: SHIPPED | OUTPATIENT
Start: 2024-08-30

## 2024-08-30 RX ORDER — ERGOCALCIFEROL 1.25 MG/1
CAPSULE, LIQUID FILLED ORAL
Qty: 12 CAPSULE | Refills: 1 | Status: SHIPPED | OUTPATIENT
Start: 2024-08-30

## 2024-09-03 RX ORDER — NITROFURANTOIN 25; 75 MG/1; MG/1
CAPSULE ORAL
Qty: 14 CAPSULE | Refills: 0 | Status: SHIPPED | OUTPATIENT
Start: 2024-09-03

## 2024-09-04 ENCOUNTER — TELEPHONE (OUTPATIENT)
Dept: UROGYNECOLOGY | Age: 44
End: 2024-09-04

## 2024-09-04 NOTE — TELEPHONE ENCOUNTER
Spoke with [facility] in regard to CPT code 57205 and J code  for Botox authorization. Informed her of service date 9/9/24 in the OR. Representative sent over a provider administered drug prior authorization form to fill out and fax back with clinical notes attached. The request will be reviewed and decided upon with 24 business hours per representative. The reference number for the call is [id number]. Will have Dr. Krishnan fill out form and fax back to [facility]. Electronically signed by Aurora Walton RN on 9/4/2024 at 10:28 AM

## 2024-09-06 ENCOUNTER — CARE COORDINATION (OUTPATIENT)
Dept: PRIMARY CARE CLINIC | Age: 44
End: 2024-09-06

## 2024-09-06 ENCOUNTER — ANESTHESIA EVENT (OUTPATIENT)
Dept: OPERATING ROOM | Age: 44
End: 2024-09-06
Payer: COMMERCIAL

## 2024-09-06 ENCOUNTER — TELEPHONE (OUTPATIENT)
Dept: UROGYNECOLOGY | Age: 44
End: 2024-09-06

## 2024-09-06 ENCOUNTER — OFFICE VISIT (OUTPATIENT)
Dept: PRIMARY CARE CLINIC | Age: 44
End: 2024-09-06
Payer: COMMERCIAL

## 2024-09-06 VITALS
TEMPERATURE: 97.7 F | HEIGHT: 63 IN | OXYGEN SATURATION: 98 % | HEART RATE: 81 BPM | WEIGHT: 293 LBS | RESPIRATION RATE: 16 BRPM | BODY MASS INDEX: 51.91 KG/M2 | SYSTOLIC BLOOD PRESSURE: 128 MMHG | DIASTOLIC BLOOD PRESSURE: 84 MMHG

## 2024-09-06 DIAGNOSIS — Z01.811 PRE-OP CHEST EXAM: Primary | ICD-10-CM

## 2024-09-06 DIAGNOSIS — G47.33 OBSTRUCTIVE SLEEP APNEA SYNDROME: Chronic | ICD-10-CM

## 2024-09-06 DIAGNOSIS — N31.2 ATONIC URINARY BLADDER: ICD-10-CM

## 2024-09-06 DIAGNOSIS — N32.81 OVERACTIVE BLADDER: ICD-10-CM

## 2024-09-06 DIAGNOSIS — I10 PRIMARY HYPERTENSION: Chronic | ICD-10-CM

## 2024-09-06 PROCEDURE — 3074F SYST BP LT 130 MM HG: CPT | Performed by: FAMILY MEDICINE

## 2024-09-06 PROCEDURE — 1036F TOBACCO NON-USER: CPT | Performed by: FAMILY MEDICINE

## 2024-09-06 PROCEDURE — 3079F DIAST BP 80-89 MM HG: CPT | Performed by: FAMILY MEDICINE

## 2024-09-06 PROCEDURE — 99214 OFFICE O/P EST MOD 30 MIN: CPT | Performed by: FAMILY MEDICINE

## 2024-09-06 PROCEDURE — G8427 DOCREV CUR MEDS BY ELIG CLIN: HCPCS | Performed by: FAMILY MEDICINE

## 2024-09-06 PROCEDURE — G8417 CALC BMI ABV UP PARAM F/U: HCPCS | Performed by: FAMILY MEDICINE

## 2024-09-06 PROCEDURE — 93000 ELECTROCARDIOGRAM COMPLETE: CPT | Performed by: FAMILY MEDICINE

## 2024-09-06 SDOH — ECONOMIC STABILITY: FOOD INSECURITY: WITHIN THE PAST 12 MONTHS, THE FOOD YOU BOUGHT JUST DIDN'T LAST AND YOU DIDN'T HAVE MONEY TO GET MORE.: NEVER TRUE

## 2024-09-06 SDOH — ECONOMIC STABILITY: INCOME INSECURITY: HOW HARD IS IT FOR YOU TO PAY FOR THE VERY BASICS LIKE FOOD, HOUSING, MEDICAL CARE, AND HEATING?: SOMEWHAT HARD

## 2024-09-06 SDOH — ECONOMIC STABILITY: FOOD INSECURITY: WITHIN THE PAST 12 MONTHS, YOU WORRIED THAT YOUR FOOD WOULD RUN OUT BEFORE YOU GOT MONEY TO BUY MORE.: NEVER TRUE

## 2024-09-06 ASSESSMENT — PATIENT HEALTH QUESTIONNAIRE - PHQ9
SUM OF ALL RESPONSES TO PHQ9 QUESTIONS 1 & 2: 0
SUM OF ALL RESPONSES TO PHQ QUESTIONS 1-9: 0
2. FEELING DOWN, DEPRESSED OR HOPELESS: NOT AT ALL
SUM OF ALL RESPONSES TO PHQ QUESTIONS 1-9: 0
1. LITTLE INTEREST OR PLEASURE IN DOING THINGS: NOT AT ALL

## 2024-09-06 NOTE — TELEPHONE ENCOUNTER
Attempted to contact patient to update her in regards to the new authorization for Botox procedure and it's pending status. Requested her to call the office to further discuss. Office phone number provided at this time.

## 2024-09-06 NOTE — TELEPHONE ENCOUNTER
Spoke with Alicia from [facility] again over the phone who stated that the prior auth for Botox will be pushed through since they were unable to reach Dr. Alvarado office. The date of service for request is 9/9/24. Call reference number is [id number]. Electronically signed by Aurora Walton RN on 9/6/2024 at 4:02 PM

## 2024-09-06 NOTE — CARE COORDINATION
Acm chart review performed prior to out reach call, pt has PCP appointment at this time will reach out again at later date

## 2024-09-06 NOTE — PROGRESS NOTES
Preoperative Consultation      Kerry Villela  YOB: 1980    Date of Service:  9/6/2024    Vitals:    09/06/24 1156   BP: 128/84   Pulse: 81   Resp: 16   Temp: 97.7 °F (36.5 °C)   TempSrc: Tympanic   SpO2: 98%   Weight: (!) 148.8 kg (328 lb)   Height: 1.6 m (5' 3\")      Wt Readings from Last 2 Encounters:   09/06/24 (!) 148.8 kg (328 lb)   09/06/24 (!) 147.8 kg (325 lb 12.8 oz)     BP Readings from Last 3 Encounters:   09/06/24 128/84   09/06/24 (!) 96/59   08/21/24 114/68        Chief Complaint   Patient presents with    Pre-op Exam     BOTOX INJECTIONS 100 UNITS with Giuliano Krishnan MD on 9/9/2024  at [facility]      Allergies   Allergen Reactions    Latex Itching and Rash    Black [place] Flavor Shortness Of Breath    Dowling-Containing Products Anaphylaxis, Shortness Of Breath, Itching and Swelling    Imitrex [Sumatriptan] Other (See Comments)     Chest pain    Other Anaphylaxis     enviromental  allegies    Corn, wheat, soy bean, peanuts and walnuts    Peanut-Containing Drug Products Anaphylaxis and Shortness Of Breath    Soy Allergy Anaphylaxis and Swelling    Wheat Anaphylaxis, Hives, Shortness Of Breath and Swelling    Mixed Grasses Itching and Other (See Comments)     Molds--over 51 different environmental allergies  Sinus issues/redden eyes    Tramadol Other (See Comments)     Made her feel really bad, and head dizzy and chest pain    Toradol [Ketorolac Tromethamine] Dizziness or Vertigo     Chest pain     Outpatient Medications Marked as Taking for the 9/6/24 encounter (Office Visit) with Zahira Dominguez,    Medication Sig Dispense Refill    nitrofurantoin, macrocrystal-monohydrate, (MACROBID) 100 MG capsule Take 1 capsule by mouth twice daily for 7 days beginning 3 days prior to procedure 14 capsule 0    vitamin D (ERGOCALCIFEROL) 1.25 MG (34261 UT) CAPS capsule Take 1 capsule by mouth once a week 12 capsule 1    rosuvastatin (CRESTOR) 5 MG tablet Take 1 tablet by mouth daily

## 2024-09-09 ENCOUNTER — HOSPITAL ENCOUNTER (OUTPATIENT)
Age: 44
Setting detail: OUTPATIENT SURGERY
Discharge: HOME | End: 2024-09-09
Attending: OBSTETRICS & GYNECOLOGY | Admitting: OBSTETRICS & GYNECOLOGY
Payer: COMMERCIAL

## 2024-09-09 ENCOUNTER — TELEPHONE (OUTPATIENT)
Dept: UROGYNECOLOGY | Age: 44
End: 2024-09-09

## 2024-09-09 ENCOUNTER — ANESTHESIA (OUTPATIENT)
Dept: OPERATING ROOM | Age: 44
End: 2024-09-09
Payer: COMMERCIAL

## 2024-09-09 ENCOUNTER — CARE COORDINATION (OUTPATIENT)
Dept: CASE MANAGEMENT | Age: 44
End: 2024-09-09

## 2024-09-09 VITALS
SYSTOLIC BLOOD PRESSURE: 154 MMHG | OXYGEN SATURATION: 96 % | HEART RATE: 60 BPM | WEIGHT: 293 LBS | HEIGHT: 63 IN | TEMPERATURE: 97.6 F | DIASTOLIC BLOOD PRESSURE: 81 MMHG | BODY MASS INDEX: 51.91 KG/M2 | RESPIRATION RATE: 16 BRPM

## 2024-09-09 LAB
GLUCOSE BLD-MCNC: 112 MG/DL (ref 70–99)
GLUCOSE BLD-MCNC: 73 MG/DL (ref 70–99)
PERFORMED ON: ABNORMAL
PERFORMED ON: NORMAL

## 2024-09-09 PROCEDURE — 3700000001 HC ADD 15 MINUTES (ANESTHESIA): Performed by: OBSTETRICS & GYNECOLOGY

## 2024-09-09 PROCEDURE — 3600000013 HC SURGERY LEVEL 3 ADDTL 15MIN: Performed by: OBSTETRICS & GYNECOLOGY

## 2024-09-09 PROCEDURE — 2500000003 HC RX 250 WO HCPCS: Performed by: NURSE ANESTHETIST, CERTIFIED REGISTERED

## 2024-09-09 PROCEDURE — 3700000000 HC ANESTHESIA ATTENDED CARE: Performed by: OBSTETRICS & GYNECOLOGY

## 2024-09-09 PROCEDURE — 6360000002 HC RX W HCPCS: Performed by: OBSTETRICS & GYNECOLOGY

## 2024-09-09 PROCEDURE — 52287 CYSTOSCOPY CHEMODENERVATION: CPT | Performed by: OBSTETRICS & GYNECOLOGY

## 2024-09-09 PROCEDURE — 2580000003 HC RX 258: Performed by: STUDENT IN AN ORGANIZED HEALTH CARE EDUCATION/TRAINING PROGRAM

## 2024-09-09 PROCEDURE — 7100000011 HC PHASE II RECOVERY - ADDTL 15 MIN: Performed by: OBSTETRICS & GYNECOLOGY

## 2024-09-09 PROCEDURE — 2580000003 HC RX 258: Performed by: OBSTETRICS & GYNECOLOGY

## 2024-09-09 PROCEDURE — 2709999900 HC NON-CHARGEABLE SUPPLY: Performed by: OBSTETRICS & GYNECOLOGY

## 2024-09-09 PROCEDURE — 7100000000 HC PACU RECOVERY - FIRST 15 MIN: Performed by: OBSTETRICS & GYNECOLOGY

## 2024-09-09 PROCEDURE — 6360000002 HC RX W HCPCS: Performed by: NURSE ANESTHETIST, CERTIFIED REGISTERED

## 2024-09-09 PROCEDURE — 3600000003 HC SURGERY LEVEL 3 BASE: Performed by: OBSTETRICS & GYNECOLOGY

## 2024-09-09 PROCEDURE — 7100000010 HC PHASE II RECOVERY - FIRST 15 MIN: Performed by: OBSTETRICS & GYNECOLOGY

## 2024-09-09 PROCEDURE — 7100000001 HC PACU RECOVERY - ADDTL 15 MIN: Performed by: OBSTETRICS & GYNECOLOGY

## 2024-09-09 RX ORDER — SODIUM CHLORIDE 9 MG/ML
INJECTION, SOLUTION INTRAVENOUS PRN
Status: DISCONTINUED | OUTPATIENT
Start: 2024-09-09 | End: 2024-09-09 | Stop reason: HOSPADM

## 2024-09-09 RX ORDER — SODIUM CHLORIDE 0.9 % (FLUSH) 0.9 %
5-40 SYRINGE (ML) INJECTION PRN
Status: DISCONTINUED | OUTPATIENT
Start: 2024-09-09 | End: 2024-09-09 | Stop reason: HOSPADM

## 2024-09-09 RX ORDER — ONDANSETRON 2 MG/ML
4 INJECTION INTRAMUSCULAR; INTRAVENOUS
Status: DISCONTINUED | OUTPATIENT
Start: 2024-09-09 | End: 2024-09-09 | Stop reason: HOSPADM

## 2024-09-09 RX ORDER — SODIUM CHLORIDE 0.9 % (FLUSH) 0.9 %
5-40 SYRINGE (ML) INJECTION EVERY 12 HOURS SCHEDULED
Status: DISCONTINUED | OUTPATIENT
Start: 2024-09-09 | End: 2024-09-09 | Stop reason: SDUPTHER

## 2024-09-09 RX ORDER — SODIUM CHLORIDE 0.9 % (FLUSH) 0.9 %
5-40 SYRINGE (ML) INJECTION EVERY 12 HOURS SCHEDULED
Status: DISCONTINUED | OUTPATIENT
Start: 2024-09-09 | End: 2024-09-09 | Stop reason: HOSPADM

## 2024-09-09 RX ORDER — SODIUM CHLORIDE, SODIUM LACTATE, POTASSIUM CHLORIDE, CALCIUM CHLORIDE 600; 310; 30; 20 MG/100ML; MG/100ML; MG/100ML; MG/100ML
INJECTION, SOLUTION INTRAVENOUS CONTINUOUS
Status: DISCONTINUED | OUTPATIENT
Start: 2024-09-09 | End: 2024-09-09 | Stop reason: HOSPADM

## 2024-09-09 RX ORDER — IPRATROPIUM BROMIDE AND ALBUTEROL SULFATE 2.5; .5 MG/3ML; MG/3ML
1 SOLUTION RESPIRATORY (INHALATION)
Status: DISCONTINUED | OUTPATIENT
Start: 2024-09-09 | End: 2024-09-09 | Stop reason: HOSPADM

## 2024-09-09 RX ORDER — PHENAZOPYRIDINE HYDROCHLORIDE 100 MG/1
100 TABLET, FILM COATED ORAL 3 TIMES DAILY PRN
Qty: 15 TABLET | Refills: 0 | Status: SHIPPED | OUTPATIENT
Start: 2024-09-09 | End: 2024-09-14

## 2024-09-09 RX ORDER — PROPOFOL 10 MG/ML
INJECTION, EMULSION INTRAVENOUS PRN
Status: DISCONTINUED | OUTPATIENT
Start: 2024-09-09 | End: 2024-09-09 | Stop reason: SDUPTHER

## 2024-09-09 RX ORDER — MEPERIDINE HYDROCHLORIDE 25 MG/ML
12.5 INJECTION INTRAMUSCULAR; INTRAVENOUS; SUBCUTANEOUS EVERY 5 MIN PRN
Status: DISCONTINUED | OUTPATIENT
Start: 2024-09-09 | End: 2024-09-09 | Stop reason: HOSPADM

## 2024-09-09 RX ORDER — FENTANYL CITRATE 0.05 MG/ML
50 INJECTION, SOLUTION INTRAMUSCULAR; INTRAVENOUS EVERY 5 MIN PRN
Status: DISCONTINUED | OUTPATIENT
Start: 2024-09-09 | End: 2024-09-09 | Stop reason: HOSPADM

## 2024-09-09 RX ORDER — LIDOCAINE HYDROCHLORIDE 20 MG/ML
INJECTION, SOLUTION EPIDURAL; INFILTRATION; INTRACAUDAL; PERINEURAL PRN
Status: DISCONTINUED | OUTPATIENT
Start: 2024-09-09 | End: 2024-09-09 | Stop reason: SDUPTHER

## 2024-09-09 RX ORDER — SODIUM CHLORIDE 9 MG/ML
INJECTION, SOLUTION INTRAVENOUS PRN
Status: DISCONTINUED | OUTPATIENT
Start: 2024-09-09 | End: 2024-09-09 | Stop reason: SDUPTHER

## 2024-09-09 RX ORDER — NALOXONE HYDROCHLORIDE 0.4 MG/ML
INJECTION, SOLUTION INTRAMUSCULAR; INTRAVENOUS; SUBCUTANEOUS PRN
Status: DISCONTINUED | OUTPATIENT
Start: 2024-09-09 | End: 2024-09-09 | Stop reason: HOSPADM

## 2024-09-09 RX ORDER — FENTANYL CITRATE 50 UG/ML
INJECTION, SOLUTION INTRAMUSCULAR; INTRAVENOUS PRN
Status: DISCONTINUED | OUTPATIENT
Start: 2024-09-09 | End: 2024-09-09 | Stop reason: SDUPTHER

## 2024-09-09 RX ORDER — SODIUM CHLORIDE 0.9 % (FLUSH) 0.9 %
5-40 SYRINGE (ML) INJECTION PRN
Status: DISCONTINUED | OUTPATIENT
Start: 2024-09-09 | End: 2024-09-09 | Stop reason: SDUPTHER

## 2024-09-09 RX ORDER — FENTANYL CITRATE 0.05 MG/ML
25 INJECTION, SOLUTION INTRAMUSCULAR; INTRAVENOUS EVERY 5 MIN PRN
Status: DISCONTINUED | OUTPATIENT
Start: 2024-09-09 | End: 2024-09-09 | Stop reason: HOSPADM

## 2024-09-09 RX ORDER — MIDAZOLAM HYDROCHLORIDE 1 MG/ML
INJECTION INTRAMUSCULAR; INTRAVENOUS PRN
Status: DISCONTINUED | OUTPATIENT
Start: 2024-09-09 | End: 2024-09-09 | Stop reason: SDUPTHER

## 2024-09-09 RX ADMIN — FENTANYL CITRATE 25 MCG: 50 INJECTION INTRAMUSCULAR; INTRAVENOUS at 08:20

## 2024-09-09 RX ADMIN — PROPOFOL 100 MG: 10 INJECTION, EMULSION INTRAVENOUS at 08:11

## 2024-09-09 RX ADMIN — FENTANYL CITRATE 50 MCG: 50 INJECTION INTRAMUSCULAR; INTRAVENOUS at 08:17

## 2024-09-09 RX ADMIN — LIDOCAINE HYDROCHLORIDE 50 MG: 20 INJECTION, SOLUTION EPIDURAL; INFILTRATION; INTRACAUDAL; PERINEURAL at 08:11

## 2024-09-09 RX ADMIN — PROPOFOL 150 MCG/KG/MIN: 10 INJECTION, EMULSION INTRAVENOUS at 08:12

## 2024-09-09 RX ADMIN — FENTANYL CITRATE 25 MCG: 50 INJECTION INTRAMUSCULAR; INTRAVENOUS at 08:19

## 2024-09-09 RX ADMIN — SODIUM CHLORIDE: 9 INJECTION, SOLUTION INTRAVENOUS at 06:42

## 2024-09-09 RX ADMIN — MIDAZOLAM 2 MG: 1 INJECTION INTRAMUSCULAR; INTRAVENOUS at 08:06

## 2024-09-09 SDOH — SOCIAL STABILITY: SOCIAL INSECURITY: WITHIN THE LAST YEAR, HAVE YOU BEEN AFRAID OF YOUR PARTNER OR EX-PARTNER?: NO

## 2024-09-09 SDOH — ECONOMIC STABILITY: FOOD INSECURITY: WITHIN THE PAST 12 MONTHS, THE FOOD YOU BOUGHT JUST DIDN'T LAST AND YOU DIDN'T HAVE MONEY TO GET MORE.: NEVER TRUE

## 2024-09-09 SDOH — SOCIAL STABILITY: SOCIAL INSECURITY: WITHIN THE LAST YEAR, HAVE YOU BEEN HUMILIATED OR EMOTIONALLY ABUSED IN OTHER WAYS BY YOUR PARTNER OR EX-PARTNER?: NO

## 2024-09-09 SDOH — HEALTH STABILITY: MENTAL HEALTH
STRESS IS WHEN SOMEONE FEELS TENSE, NERVOUS, ANXIOUS, OR CAN'T SLEEP AT NIGHT BECAUSE THEIR MIND IS TROUBLED. HOW STRESSED ARE YOU?: TO SOME EXTENT

## 2024-09-09 SDOH — HEALTH STABILITY: MENTAL HEALTH: HOW MANY STANDARD DRINKS CONTAINING ALCOHOL DO YOU HAVE ON A TYPICAL DAY?: 1 OR 2

## 2024-09-09 SDOH — ECONOMIC STABILITY: FOOD INSECURITY: WITHIN THE PAST 12 MONTHS, YOU WORRIED THAT YOUR FOOD WOULD RUN OUT BEFORE YOU GOT MONEY TO BUY MORE.: NEVER TRUE

## 2024-09-09 SDOH — ECONOMIC STABILITY: INCOME INSECURITY: IN THE LAST 12 MONTHS, WAS THERE A TIME WHEN YOU WERE NOT ABLE TO PAY THE MORTGAGE OR RENT ON TIME?: NO

## 2024-09-09 SDOH — HEALTH STABILITY: MENTAL HEALTH: HOW OFTEN DO YOU HAVE A DRINK CONTAINING ALCOHOL?: MONTHLY OR LESS

## 2024-09-09 SDOH — SOCIAL STABILITY: SOCIAL NETWORK: HOW OFTEN DO YOU ATTENT MEETINGS OF THE CLUB OR ORGANIZATION YOU BELONG TO?: NEVER

## 2024-09-09 SDOH — HEALTH STABILITY: PHYSICAL HEALTH: ON AVERAGE, HOW MANY DAYS PER WEEK DO YOU ENGAGE IN MODERATE TO STRENUOUS EXERCISE (LIKE A BRISK WALK)?: 2 DAYS

## 2024-09-09 SDOH — SOCIAL STABILITY: SOCIAL NETWORK
DO YOU BELONG TO ANY CLUBS OR ORGANIZATIONS SUCH AS CHURCH GROUPS UNIONS, FRATERNAL OR ATHLETIC GROUPS, OR SCHOOL GROUPS?: NO

## 2024-09-09 SDOH — SOCIAL STABILITY: SOCIAL NETWORK: HOW OFTEN DO YOU ATTEND CHURCH OR RELIGIOUS SERVICES?: NEVER

## 2024-09-09 SDOH — SOCIAL STABILITY: SOCIAL NETWORK: HOW OFTEN DO YOU GET TOGETHER WITH FRIENDS OR RELATIVES?: NEVER

## 2024-09-09 SDOH — SOCIAL STABILITY: SOCIAL NETWORK: IN A TYPICAL WEEK, HOW MANY TIMES DO YOU TALK ON THE PHONE WITH FAMILY, FRIENDS, OR NEIGHBORS?: ONCE A WEEK

## 2024-09-09 SDOH — SOCIAL STABILITY: SOCIAL NETWORK: ARE YOU MARRIED, WIDOWED, DIVORCED, SEPARATED, NEVER MARRIED, OR LIVING WITH A PARTNER?: NEVER MARRIED

## 2024-09-09 SDOH — ECONOMIC STABILITY: INCOME INSECURITY: HOW HARD IS IT FOR YOU TO PAY FOR THE VERY BASICS LIKE FOOD, HOUSING, MEDICAL CARE, AND HEATING?: SOMEWHAT HARD

## 2024-09-09 SDOH — HEALTH STABILITY: PHYSICAL HEALTH: ON AVERAGE, HOW MANY MINUTES DO YOU ENGAGE IN EXERCISE AT THIS LEVEL?: 60 MIN

## 2024-09-09 ASSESSMENT — PAIN SCALES - WONG BAKER
WONGBAKER_NUMERICALRESPONSE: NO HURT
WONGBAKER_NUMERICALRESPONSE: NO HURT

## 2024-09-09 ASSESSMENT — LIFESTYLE VARIABLES: SMOKING_STATUS: 0

## 2024-09-09 ASSESSMENT — PAIN - FUNCTIONAL ASSESSMENT: PAIN_FUNCTIONAL_ASSESSMENT: NONE - DENIES PAIN

## 2024-09-09 ASSESSMENT — PAIN SCALES - GENERAL
PAINLEVEL_OUTOF10: 0
PAINLEVEL_OUTOF10: 0

## 2024-09-09 ASSESSMENT — ENCOUNTER SYMPTOMS: SHORTNESS OF BREATH: 0

## 2024-09-11 ENCOUNTER — CARE COORDINATION (OUTPATIENT)
Dept: CARE COORDINATION | Age: 44
End: 2024-09-11

## 2024-09-11 ENCOUNTER — CARE COORDINATION (OUTPATIENT)
Dept: CASE MANAGEMENT | Age: 44
End: 2024-09-11

## 2024-09-12 ENCOUNTER — CARE COORDINATION (OUTPATIENT)
Dept: PRIMARY CARE CLINIC | Age: 44
End: 2024-09-12

## 2024-09-13 ENCOUNTER — APPOINTMENT (OUTPATIENT)
Dept: CT IMAGING | Age: 44
End: 2024-09-13
Payer: COMMERCIAL

## 2024-09-13 ENCOUNTER — HOSPITAL ENCOUNTER (EMERGENCY)
Age: 44
Discharge: HOME | End: 2024-09-13
Attending: EMERGENCY MEDICINE
Payer: COMMERCIAL

## 2024-09-13 VITALS
HEART RATE: 82 BPM | BODY MASS INDEX: 51.91 KG/M2 | RESPIRATION RATE: 16 BRPM | OXYGEN SATURATION: 93 % | WEIGHT: 293 LBS | HEIGHT: 63 IN | TEMPERATURE: 98.1 F | DIASTOLIC BLOOD PRESSURE: 79 MMHG | SYSTOLIC BLOOD PRESSURE: 124 MMHG

## 2024-09-13 DIAGNOSIS — R51.9 ACUTE NONINTRACTABLE HEADACHE, UNSPECIFIED HEADACHE TYPE: Primary | ICD-10-CM

## 2024-09-13 PROCEDURE — 70450 CT HEAD/BRAIN W/O DYE: CPT

## 2024-09-13 PROCEDURE — 6370000000 HC RX 637 (ALT 250 FOR IP): Performed by: EMERGENCY MEDICINE

## 2024-09-13 PROCEDURE — 99284 EMERGENCY DEPT VISIT MOD MDM: CPT

## 2024-09-13 RX ORDER — ACETAMINOPHEN 325 MG/1
650 TABLET ORAL ONCE
Status: COMPLETED | OUTPATIENT
Start: 2024-09-13 | End: 2024-09-13

## 2024-09-13 RX ADMIN — ACETAMINOPHEN 325MG 650 MG: 325 TABLET ORAL at 02:07

## 2024-09-13 ASSESSMENT — PAIN SCALES - GENERAL
PAINLEVEL_OUTOF10: 10
PAINLEVEL_OUTOF10: 10

## 2024-09-13 ASSESSMENT — PAIN DESCRIPTION - LOCATION
LOCATION: HEAD
LOCATION: HEAD

## 2024-09-13 ASSESSMENT — PAIN DESCRIPTION - DESCRIPTORS: DESCRIPTORS: OTHER (COMMENT)

## 2024-09-13 ASSESSMENT — LIFESTYLE VARIABLES
HOW OFTEN DO YOU HAVE A DRINK CONTAINING ALCOHOL: MONTHLY OR LESS
HOW MANY STANDARD DRINKS CONTAINING ALCOHOL DO YOU HAVE ON A TYPICAL DAY: 1 OR 2

## 2024-09-13 ASSESSMENT — PAIN - FUNCTIONAL ASSESSMENT: PAIN_FUNCTIONAL_ASSESSMENT: 0-10

## 2024-09-16 ENCOUNTER — CARE COORDINATION (OUTPATIENT)
Dept: CARE COORDINATION | Age: 44
End: 2024-09-16

## 2024-09-19 ENCOUNTER — CARE COORDINATION (OUTPATIENT)
Dept: PRIMARY CARE CLINIC | Age: 44
End: 2024-09-19

## 2024-09-24 ENCOUNTER — OFFICE VISIT (OUTPATIENT)
Dept: UROGYNECOLOGY | Age: 44
End: 2024-09-24
Payer: COMMERCIAL

## 2024-09-24 VITALS
HEART RATE: 91 BPM | OXYGEN SATURATION: 94 % | RESPIRATION RATE: 18 BRPM | TEMPERATURE: 97.8 F | SYSTOLIC BLOOD PRESSURE: 137 MMHG | DIASTOLIC BLOOD PRESSURE: 83 MMHG

## 2024-09-24 DIAGNOSIS — R35.0 FREQUENCY OF URINATION: ICD-10-CM

## 2024-09-24 DIAGNOSIS — N32.81 OAB (OVERACTIVE BLADDER): Primary | ICD-10-CM

## 2024-09-24 DIAGNOSIS — N39.41 URGE INCONTINENCE: ICD-10-CM

## 2024-09-24 LAB
BILIRUBIN, POC: NORMAL
BLOOD URINE, POC: NORMAL
CLARITY, POC: CLEAR
COLOR, POC: YELLOW
GLUCOSE URINE, POC: NORMAL MG/DL
KETONES, POC: NORMAL MG/DL
LEUKOCYTE EST, POC: NORMAL
NITRITE, POC: NORMAL
PH, POC: 7
PROTEIN, POC: NORMAL MG/DL
SPECIFIC GRAVITY, POC: 1.01
UROBILINOGEN, POC: NORMAL MG/DL

## 2024-09-24 PROCEDURE — 3075F SYST BP GE 130 - 139MM HG: CPT | Performed by: NURSE PRACTITIONER

## 2024-09-24 PROCEDURE — 3079F DIAST BP 80-89 MM HG: CPT | Performed by: NURSE PRACTITIONER

## 2024-09-24 PROCEDURE — 81002 URINALYSIS NONAUTO W/O SCOPE: CPT | Performed by: NURSE PRACTITIONER

## 2024-09-24 PROCEDURE — 99212 OFFICE O/P EST SF 10 MIN: CPT | Performed by: NURSE PRACTITIONER

## 2024-09-24 PROCEDURE — G8417 CALC BMI ABV UP PARAM F/U: HCPCS | Performed by: NURSE PRACTITIONER

## 2024-09-24 PROCEDURE — G8427 DOCREV CUR MEDS BY ELIG CLIN: HCPCS | Performed by: NURSE PRACTITIONER

## 2024-09-24 PROCEDURE — 1036F TOBACCO NON-USER: CPT | Performed by: NURSE PRACTITIONER

## 2024-09-25 LAB — BACTERIA UR CULT: NORMAL

## 2024-09-30 ENCOUNTER — CARE COORDINATION (OUTPATIENT)
Dept: PRIMARY CARE CLINIC | Age: 44
End: 2024-09-30

## 2024-10-18 ENCOUNTER — CARE COORDINATION (OUTPATIENT)
Dept: CARE COORDINATION | Age: 44
End: 2024-10-18

## 2024-10-18 NOTE — CARE COORDINATION
Date/Time:  10/18/2024 8:57 AM  LPN attempted to reach patient by telephone regarding red alert in remote patient monitoring program. Left HIPPA compliant message requesting a return call. Will attempt to reach patient again.

## 2024-10-31 NOTE — PROGRESS NOTES
ejection  fraction of 65% by Fitzgerald's method.  -Normal right ventricular size and systolic function with a calculated ejection  fraction of 63% by Fitzgerald's method.  -Mildly dilated pulmonary artery. 2.9 cm  -No myocardial edema by T2w imaging/mapping. (Normal myocardium/skeletal ratio -  normal < 1.9).  -No significant intracardiac shunt. Calculated Qp:Qs:1.04 (Phase contrast  velocity encoding Ao/Pa)  -Normal myocardial resting perfusion imaging.  -On delayed enhancement imaging, there is no abnormal hyperenhancement to  suggest myocardial scar/inflammation/infiltration.    JOSE     All above diagnostic testing and laboratory data was independently visualized and reviewed by me (not simply review of report)       Assessment and Plan   1) venous insufficiency/lymphedema  -CEAP 3  -BLE and BL hand edema, discomfort  -encouraged compression stockings, elevation, and ambulation  -will obtain reflux study  -referral for lymph pumps   -d/c norvasc and start aldactone 25 mg     2) palpitations  -echo and cardiac MRI showed normal heart structure 2023  -2 week cardiac event monitor     Follow up 6 months     Thank you very much for allowing me to participate in the care of your patient. Please do not hesitate to contact me if you have any questions.      Gustabo Jefferson MD [facility]  General, Interventional Cardiology, and Peripheral Vascular Disease   [facility]   Ph: [phone]  Fax: [phone]    This note was scribed in the presence of Dr. Gustabo Jefferson MD by Julianne Mancuso, BHAVESH    Physician Attestation:  The scribes documentation has been prepared under my direction and personally reviewed by me in its entirety.     I confirm the note above accurately reflects all work, treatment, procedures, and medical decision making performed by me.    Electronically signed by Gustabo Jefferson MD on 11/9/2024 at 2:51 PM

## 2024-11-01 ENCOUNTER — CARE COORDINATION (OUTPATIENT)
Dept: PRIMARY CARE CLINIC | Age: 44
End: 2024-11-01

## 2024-11-01 NOTE — CARE COORDINATION
Ambulatory Care Coordination Note     11/1/2024 9:23 AM     ACM outreach attempt by this AC today to perform care management follow up . AC was unable to reach the patient by telephone today; left voice message requesting a return phone call to this ACM.     ACM:      Care Summary Note:   Unable to reach pt again for care coordination , several attempts made unsuccessfully, will dicuss with RPM team regarding ending episodes of care due to inability to reach  Will send notice to [place] rpm due to non compliance    PCP/Specialist follow up:   Future Appointments         Provider Specialty Dept Phone    11/5/2024 10:45 AM Gustabo Jefferson MD Cardiology [phone]    2/5/2025 7:30 AM Zahira Dominguez DO Primary Care [phone]    2/6/2025 11:00 AM Giuliano Krishnan MD Urogynecology [phone]    5/28/2025 11:40 AM Josey Ventura MD Sleep Medicine [phone]            Follow Up:   Plan for next AC outreach in approximately 1 week to complete:  - disease specific assessments.

## 2024-11-04 ENCOUNTER — CARE COORDINATION (OUTPATIENT)
Dept: CASE MANAGEMENT | Age: 44
End: 2024-11-04

## 2024-11-04 NOTE — CARE COORDINATION
11/4/2024 12:31 PM              RPM day 4      Writer advised ACM of no metrics x 4 days.

## 2024-11-04 NOTE — CARE COORDINATION
Ambulatory Care Coordination Note     11/4/2024 1:28 PM     ACM outreach attempt by this ACM today to perform care management follow up . ACM was unable to reach the patient by telephone today; left voice message requesting a return phone call to this ACM.     ACM: Veronica Gomez RN     Care Summary Note:   Remote Patient Monitoring Disenrollment      Date/Time:  11/4/2024 1:30 PM  Patient Current Location: Home: [address] [place] [id number]  Patient has been dis-enrolled from Remote Patient Monitoring program on 11/4/2024 due to disengagement. Patient has been provided instruction on process to return RPM equipment and RPM has been deactivated.     Patient has ACM's contact information for any further questions, concerns, or needs.     PCP/Specialist follow up:   Future Appointments         Provider Specialty Dept Phone    11/5/2024 10:45 AM Gustabo Jefferson MD Cardiology [phone]    2/5/2025 7:30 AM Zahira Dominguez DO Primary Care [phone]    2/6/2025 11:00 AM Giuliano Krishnan MD Urogynecology [phone]    5/28/2025 11:40 AM Josey Ventura MD Sleep Medicine [phone]            Follow Up:   Plan for next ACM outreach in approximately 1 week to complete:  - education .

## 2024-11-05 ENCOUNTER — OFFICE VISIT (OUTPATIENT)
Dept: CARDIOLOGY CLINIC | Age: 44
End: 2024-11-05

## 2024-11-05 ENCOUNTER — CARE COORDINATION (OUTPATIENT)
Dept: PRIMARY CARE CLINIC | Age: 44
End: 2024-11-05

## 2024-11-05 VITALS
OXYGEN SATURATION: 98 % | SYSTOLIC BLOOD PRESSURE: 110 MMHG | WEIGHT: 293 LBS | HEIGHT: 63 IN | DIASTOLIC BLOOD PRESSURE: 68 MMHG | BODY MASS INDEX: 51.91 KG/M2 | HEART RATE: 79 BPM

## 2024-11-05 DIAGNOSIS — I10 PRIMARY HYPERTENSION: Chronic | ICD-10-CM

## 2024-11-05 DIAGNOSIS — R00.2 PALPITATION: Primary | ICD-10-CM

## 2024-11-05 DIAGNOSIS — I50.32 CHRONIC DIASTOLIC CHF (CONGESTIVE HEART FAILURE) (HCC): Primary | ICD-10-CM

## 2024-11-05 NOTE — CARE COORDINATION
Patient Kerry Villela  11/05/24     Care Coordination  placed call to patient to arrange RPM kit  through [employer]. Left HIPAA Compliant Message     provided return and how to pack equipment in original packing via the patients voicemail if available and provided call back number should patient have questions.    Patient made aware [employer] will  equipment in 2-4 days.

## 2024-11-05 NOTE — PROGRESS NOTES
Remote Patient Order Discontinued    Received request from Veronica Gomez, RN   to discontinue order for remote patient monitoring of CHF and HTN and order completed.

## 2024-11-08 ENCOUNTER — CARE COORDINATION (OUTPATIENT)
Dept: PRIMARY CARE CLINIC | Age: 44
End: 2024-11-08

## 2024-11-08 NOTE — CARE COORDINATION
Ambulatory Care Coordination Note     11/8/2024 2:08 PM     patient outreach attempt by this ACM today to perform care management follow up . ACM was unable to reach the patient by telephone today; left voice message requesting a return phone call to this ACM.     Patient closed (unable to reach patient) from the High Risk Care Management program on 11/8/2024.  Patient has the ability to self manage at this time..  Care management goals have been completed. No further Ambulatory Care Manager follow up scheduled.

## 2024-11-12 ENCOUNTER — TELEPHONE (OUTPATIENT)
Dept: PRIMARY CARE CLINIC | Age: 44
End: 2024-11-12

## 2024-11-12 DIAGNOSIS — G89.29 CHRONIC HIP PAIN, BILATERAL: ICD-10-CM

## 2024-11-12 DIAGNOSIS — M25.551 CHRONIC HIP PAIN, BILATERAL: ICD-10-CM

## 2024-11-12 DIAGNOSIS — M25.552 CHRONIC HIP PAIN, BILATERAL: ICD-10-CM

## 2024-11-12 RX ORDER — DULOXETIN HYDROCHLORIDE 60 MG/1
60 CAPSULE, DELAYED RELEASE ORAL DAILY
Qty: 90 CAPSULE | Refills: 1 | Status: SHIPPED | OUTPATIENT
Start: 2024-11-12

## 2024-11-15 ENCOUNTER — CARE COORDINATION (OUTPATIENT)
Dept: PRIMARY CARE CLINIC | Age: 44
End: 2024-11-15

## 2025-01-02 ENCOUNTER — TELEPHONE (OUTPATIENT)
Dept: BARIATRICS/WEIGHT MGMT | Age: 45
End: 2025-01-02

## 2025-01-02 NOTE — TELEPHONE ENCOUNTER
Patient calling the office in regards to resuming program, patient was last seen 6/2024. She would like to know if she reschedules appointment will she have to restart 6 mo pre surgical diet requirement or if she would have to start over? Please advise when available.

## 2025-01-08 ENCOUNTER — TELEPHONE (OUTPATIENT)
Dept: CARDIOLOGY CLINIC | Age: 45
End: 2025-01-08

## 2025-01-08 NOTE — TELEPHONE ENCOUNTER
Relayed monitor results to patient who verbalized and confirmed understanding.  Please see results under cardiology tab.

## 2025-01-10 DIAGNOSIS — K21.9 GASTROESOPHAGEAL REFLUX DISEASE WITHOUT ESOPHAGITIS: ICD-10-CM

## 2025-01-10 RX ORDER — LANSOPRAZOLE 30 MG/1
30 CAPSULE, DELAYED RELEASE ORAL DAILY
Qty: 90 CAPSULE | Refills: 0 | Status: SHIPPED | OUTPATIENT
Start: 2025-01-10

## 2025-01-10 NOTE — TELEPHONE ENCOUNTER
Medication:   Requested Prescriptions     Pending Prescriptions Disp Refills    lansoprazole (PREVACID) 30 MG delayed release capsule [Pharmacy Med Name: Lansoprazole 30 MG Oral Capsule Delayed Release] 90 capsule 0     Sig: Take 1 capsule by mouth once daily        Last Filled:      Patient Phone Number: [phone] (home)     Last appt: 9/6/2024   Next appt: 2/5/2025    Last OARRS:        No data to display

## 2025-01-23 ENCOUNTER — OFFICE VISIT (OUTPATIENT)
Dept: GYNECOLOGY | Age: 45
End: 2025-01-23
Payer: COMMERCIAL

## 2025-01-23 VITALS
BODY MASS INDEX: 58.93 KG/M2 | TEMPERATURE: 97.3 F | DIASTOLIC BLOOD PRESSURE: 78 MMHG | WEIGHT: 293 LBS | HEART RATE: 105 BPM | OXYGEN SATURATION: 98 % | SYSTOLIC BLOOD PRESSURE: 128 MMHG

## 2025-01-23 DIAGNOSIS — R23.4 CHANGE OF SKIN OF BREAST: Primary | ICD-10-CM

## 2025-01-23 PROCEDURE — 3074F SYST BP LT 130 MM HG: CPT | Performed by: OBSTETRICS & GYNECOLOGY

## 2025-01-23 PROCEDURE — 99213 OFFICE O/P EST LOW 20 MIN: CPT | Performed by: OBSTETRICS & GYNECOLOGY

## 2025-01-23 PROCEDURE — 3078F DIAST BP <80 MM HG: CPT | Performed by: OBSTETRICS & GYNECOLOGY

## 2025-01-23 PROCEDURE — G8417 CALC BMI ABV UP PARAM F/U: HCPCS | Performed by: OBSTETRICS & GYNECOLOGY

## 2025-01-23 PROCEDURE — 1036F TOBACCO NON-USER: CPT | Performed by: OBSTETRICS & GYNECOLOGY

## 2025-01-23 PROCEDURE — G8427 DOCREV CUR MEDS BY ELIG CLIN: HCPCS | Performed by: OBSTETRICS & GYNECOLOGY

## 2025-01-23 RX ORDER — CLOBETASOL PROPIONATE 0.5 MG/G
CREAM TOPICAL 2 TIMES DAILY
Qty: 30 G | Refills: 2 | Status: SHIPPED | OUTPATIENT
Start: 2025-01-23 | End: 2025-02-22

## 2025-01-23 RX ORDER — DOCUSATE SODIUM 100 MG/1
100 CAPSULE, LIQUID FILLED ORAL 2 TIMES DAILY
Qty: 180 CAPSULE | Refills: 0 | Status: SHIPPED | OUTPATIENT
Start: 2025-01-23 | End: 2025-04-23

## 2025-01-23 NOTE — TELEPHONE ENCOUNTER
Pt called asking for a refill on   The provider that prescribed the medication is no longer in [place] he is located in [facility]   Pt had rectal surgery last year  Last visit 5/23/24  Next visit 2/13/25  docusate sodium (COLACE) 100 MG capsule     [facility] [address] [place] [address] ([facility] [place] - [address] - P [phone] - F [phone] [[id number]]

## 2025-01-29 ENCOUNTER — HOSPITAL ENCOUNTER (OUTPATIENT)
Dept: ULTRASOUND IMAGING | Age: 45
Discharge: HOME | End: 2025-01-29
Payer: COMMERCIAL

## 2025-01-29 ENCOUNTER — HOSPITAL ENCOUNTER (OUTPATIENT)
Dept: WOMENS IMAGING | Age: 45
Discharge: HOME | End: 2025-01-29
Payer: COMMERCIAL

## 2025-01-29 ENCOUNTER — OFFICE VISIT (OUTPATIENT)
Dept: UROGYNECOLOGY | Age: 45
End: 2025-01-29
Payer: COMMERCIAL

## 2025-01-29 ENCOUNTER — PREP FOR PROCEDURE (OUTPATIENT)
Dept: UROGYNECOLOGY | Age: 45
End: 2025-01-29

## 2025-01-29 VITALS
TEMPERATURE: 98.2 F | SYSTOLIC BLOOD PRESSURE: 137 MMHG | DIASTOLIC BLOOD PRESSURE: 74 MMHG | RESPIRATION RATE: 16 BRPM | HEART RATE: 86 BPM | OXYGEN SATURATION: 95 %

## 2025-01-29 VITALS — WEIGHT: 293 LBS | BODY MASS INDEX: 51.91 KG/M2 | HEIGHT: 63 IN

## 2025-01-29 DIAGNOSIS — R23.4 CHANGE OF SKIN OF BREAST: ICD-10-CM

## 2025-01-29 DIAGNOSIS — R35.0 FREQUENCY OF URINATION: ICD-10-CM

## 2025-01-29 DIAGNOSIS — N32.81 OAB (OVERACTIVE BLADDER): Primary | ICD-10-CM

## 2025-01-29 DIAGNOSIS — N39.41 URGE INCONTINENCE: ICD-10-CM

## 2025-01-29 DIAGNOSIS — N32.81 OVERACTIVE BLADDER: ICD-10-CM

## 2025-01-29 PROCEDURE — 99214 OFFICE O/P EST MOD 30 MIN: CPT | Performed by: OBSTETRICS & GYNECOLOGY

## 2025-01-29 PROCEDURE — 76642 ULTRASOUND BREAST LIMITED: CPT

## 2025-01-29 PROCEDURE — G8417 CALC BMI ABV UP PARAM F/U: HCPCS | Performed by: OBSTETRICS & GYNECOLOGY

## 2025-01-29 PROCEDURE — 3078F DIAST BP <80 MM HG: CPT | Performed by: OBSTETRICS & GYNECOLOGY

## 2025-01-29 PROCEDURE — G0279 TOMOSYNTHESIS, MAMMO: HCPCS

## 2025-01-29 PROCEDURE — G8427 DOCREV CUR MEDS BY ELIG CLIN: HCPCS | Performed by: OBSTETRICS & GYNECOLOGY

## 2025-01-29 PROCEDURE — 1036F TOBACCO NON-USER: CPT | Performed by: OBSTETRICS & GYNECOLOGY

## 2025-01-29 PROCEDURE — 3075F SYST BP GE 130 - 139MM HG: CPT | Performed by: OBSTETRICS & GYNECOLOGY

## 2025-01-29 NOTE — PROGRESS NOTES
2025       HPI:     Name: Kerry Villela  YOB: 1980    CC: Patient is a 44 y.o. presenting for evaluation of OAB.       HPI: How long have you had this problem?  YEARS  Please rate the severity of your problem: moderate      Ob/Gyn History:    OB History    Para Term  AB Living   4 4 4     4   SAB IAB Ectopic Molar Multiple Live Births             3      # Outcome Date GA Lbr Maco/2nd Weight Sex Type Anes PTL Lv   4 Term 12    M Vag-Spont   JUSTIN   3 Term 03   3.289 kg (7 lb 4 oz) F Vag-Spont  N JUSTIN   2 Term 10/04/00   2.991 kg (6 lb 9.5 oz) M Vag-Spont  N JUSTIN      Birth Comments: PRE ECLAMPSIA   1 Term         JUSTIN     Past Medical History:   Past Medical History:   Diagnosis Date    Abscess 2022    Allergic reaction to food 2020    Allergic rhinitis     Anemia     Anesthesia complication     last same surgery at [facility] states she had trouble breathing afterwards    Bilateral hand numbness 2020    Bilateral hip pain 06/15/2021    CHF (congestive heart failure) (HCC)     no meds-only saw cardiologist x1 and discharged    chronic gerd     Dermatomyositis (HCC)     Eczema     Fibromyalgia     Headache(784.0)     History of blood transfusion 2009    also two in      HTN (hypertension)     Hx of blood clots     PE and DVT    Hyperlipidemia     Irritable bowel syndrome     Lymphedema     Morbid obesity with BMI of 50.0-59.9, adult     obstructive sleep apnea     pt uses cpap machine    Osteoarthritis     Overactive bladder     PCOS (polycystic ovarian syndrome)     Pre-diabetes     Pre-operative clearance     Prediabetes     Prolonged emergence from general anesthesia     x1 at age 16 after ovarian cyst removal    Pulmonary embolism (HCC) 2014    Overview:  Continue anticoagulation.  Medicine managing Coumadin bridge.  Daily H/H, PTT, PT/INR.   Last Assessment & Plan:  HPI: Seen in hospital follow up.  The patient is a 33 year

## 2025-02-03 ENCOUNTER — TELEPHONE (OUTPATIENT)
Dept: PULMONOLOGY | Age: 45
End: 2025-02-03

## 2025-02-03 DIAGNOSIS — N95.2 VAGINAL ATROPHY: Primary | ICD-10-CM

## 2025-02-03 RX ORDER — ESTRADIOL 10 UG/1
10 INSERT VAGINAL DAILY
Qty: 24 TABLET | Refills: 3 | Status: SHIPPED | OUTPATIENT
Start: 2025-02-03

## 2025-02-03 NOTE — TELEPHONE ENCOUNTER
Orders for new prescription to [facility] and they need chart notes with demographics.   Same insurance next appt is 5/28/25  Last Appt 5/28/24

## 2025-02-04 NOTE — PROGRESS NOTES
Kerry Villela         : 1980  [x] [facility]     [] [facility]      [] [facility]     [][facility]    [] [facility]  [] [facility]  [] [facility]   [] [facility] [] Other:  Diagnosis: [x] SHYLA (G47.33) [] CSA (G47.31) [] Apnea (G47.30)   Length of Need: [] 12 Months [x] 99 Months [] Other:    Machine (IAN!):  [x] [employer]Med AirSense     Auto [] Other:       Humidifier: [x] Heated ()        [x] Water chamber replacement ()/ 1 per 6 months        Mask:  Please always start with the mask the patient used during the titraion   [x] Nasal () /1 per 3 months [x] Full Face () /1 per 3 months    [x] Patient Choice - Size and fit mask    [] Dispense:     [x] Headgear () / 1 per 3 months    [x] Interface Replacement ()/1 per month            Tubing: [x] Heated ()/1 per 3 months    [] Standard ()/1 per 3 months [] Other:           Filters: [x] Non-disposable ()/1 per 6 months     [x] Ultra-Fine, Disposable ()/2 per month        Miscellaneous: [x] Chin Strap ()/ 1 per 6 months [] O2 bleed-in:       LPM   [] Oximetry on CPAP/Bilevel []  Other:          Start Order Date: 25    MEDICAL JUSTIFICATION:  I, the undersigned, certify that the above prescribed supplies are medically necessary for this patient’s wellbeing.  In my opinion, the supplies are both reasonable and necessary in reference to accepted standards of medicalpractice in treatment of this patient’s condition.    Josey Ventura MD      NPI: [id number]       Order Signed Date: 25    Electronically signed by Josey Ventura MD on 2025 at 7:57 AM

## 2025-02-06 ENCOUNTER — OFFICE VISIT (OUTPATIENT)
Dept: BARIATRICS/WEIGHT MGMT | Age: 45
End: 2025-02-06

## 2025-02-06 VITALS
WEIGHT: 293 LBS | HEART RATE: 93 BPM | HEIGHT: 63 IN | SYSTOLIC BLOOD PRESSURE: 130 MMHG | BODY MASS INDEX: 51.91 KG/M2 | DIASTOLIC BLOOD PRESSURE: 85 MMHG

## 2025-02-06 DIAGNOSIS — E88.810 METABOLIC SYNDROME: Primary | ICD-10-CM

## 2025-02-06 DIAGNOSIS — G47.33 OBSTRUCTIVE SLEEP APNEA: ICD-10-CM

## 2025-02-06 DIAGNOSIS — E66.01 MORBID OBESITY WITH BMI OF 50.0-59.9, ADULT: ICD-10-CM

## 2025-02-06 DIAGNOSIS — I10 ESSENTIAL HYPERTENSION: ICD-10-CM

## 2025-02-06 DIAGNOSIS — E78.2 MIXED HYPERLIPIDEMIA: ICD-10-CM

## 2025-02-06 DIAGNOSIS — K21.9 CHRONIC GERD: ICD-10-CM

## 2025-02-06 NOTE — PROGRESS NOTES
Kerry Villela lost 13 lbs over 10 months. Pt has allergies to wheat, corn, soy, peanut, walnut.    Breakfast: skips OR HB egg + yogurt + english muffin with butter/jelly OR 2 braun and egg  Snack: none OR veggie/cheese/turkey stick packet (1/2 to whole)  Lunch: leftovers (meat + veggies + rice)  Snack: none OR leftover AM snack  Dinner: popcorn chicken with salad, ranch   Snack: none OR turkey stick OR cheese stick OR yogurt    Is pt consuming smaller portions? yes      Is pt consuming at least 64 oz of fluids per day? yes water and CL    Is pt consuming carbonated, caffeinated, or sugary beverages? no    Has pt sampled Unjury and/or Nectar protein? Tried and tolerated - vanilla, chocolate, strawberry, cherry cola, moon mist, chicken/beef broth     Has patient attended a support group? Scheduled 3/19 zoom and 3/20 west    Exercise: walking the apartment complex (8x around 2x per day most days)    Plan/Recommendations:   - continue eating 4-5 x per day with emphasis on protein  - continue current fluids  - continue current exercise    Handouts: SG 2025     Liz Sheffield, RD, LD

## 2025-02-06 NOTE — PROGRESS NOTES
[facility]   General & Laparoscopic Surgery  [employer]       HPI:     Kerry Villela is a very pleasant 44 y.o. female with Body mass index is 58.1 kg/m².  , Pre-Surgery.   Pre-operative clearance and work up pending. Working hard to keep good dietary habits as well level of activity.  Patient denies any nausea, vomiting, fevers, chills, shortness of breath, chest pain, cough, constipation or difficulty urinating.      Past Medical History:   Diagnosis Date    Abscess 05/20/2022    Allergic reaction to food 05/27/2020    Allergic rhinitis     Anemia     Anesthesia complication     last same surgery at [facility] states she had trouble breathing afterwards    Bilateral hand numbness 12/04/2020    Bilateral hip pain 06/15/2021    CHF (congestive heart failure) ([facility])     no meds-only saw cardiologist x1 and discharged    chronic gerd     Dermatomyositis (HCC) 2008    Eczema     Fibromyalgia     Headache(784.0)     History of blood transfusion 2009    also two in 2014     HTN (hypertension)     Hx of blood clots 2014    PE and DVT    Hyperlipidemia     Irritable bowel syndrome     Lymphedema     Morbid obesity with BMI of 50.0-59.9, adult     obstructive sleep apnea     pt uses cpap machine    Osteoarthritis     Overactive bladder     PCOS (polycystic ovarian syndrome)     Pre-diabetes     Pre-operative clearance     Prediabetes     Prolonged emergence from general anesthesia     x1 at age 16 after ovarian cyst removal    Pulmonary embolism (HCC) 07/17/2014    Overview:  Continue anticoagulation.  Medicine managing Coumadin bridge.  Daily H/H, PTT, PT/INR.   Last Assessment & Plan:  HPI: Seen in hospital follow up.  The patient is a 33 year old y/o female who admitted to the hospital for pulmonary emboli. She began to experience left lower extremity swelling and pain for two days, and day of admission she began to experience chest pain and shortness of     Vaginal high risk

## 2025-02-13 DIAGNOSIS — I10 PRIMARY HYPERTENSION: Chronic | ICD-10-CM

## 2025-02-13 RX ORDER — VALSARTAN 80 MG/1
TABLET ORAL
Qty: 90 TABLET | Refills: 0 | Status: SHIPPED | OUTPATIENT
Start: 2025-02-13

## 2025-02-14 ENCOUNTER — OFFICE VISIT (OUTPATIENT)
Dept: PRIMARY CARE CLINIC | Age: 45
End: 2025-02-14

## 2025-02-14 ENCOUNTER — ANESTHESIA EVENT (OUTPATIENT)
Dept: OPERATING ROOM | Age: 45
End: 2025-02-14
Payer: COMMERCIAL

## 2025-02-14 VITALS
SYSTOLIC BLOOD PRESSURE: 120 MMHG | HEART RATE: 80 BPM | HEIGHT: 63 IN | DIASTOLIC BLOOD PRESSURE: 78 MMHG | OXYGEN SATURATION: 98 % | TEMPERATURE: 97.4 F | BODY MASS INDEX: 51.91 KG/M2 | WEIGHT: 293 LBS

## 2025-02-14 DIAGNOSIS — N31.2 ATONIC URINARY BLADDER: ICD-10-CM

## 2025-02-14 DIAGNOSIS — I10 PRIMARY HYPERTENSION: Primary | ICD-10-CM

## 2025-02-14 DIAGNOSIS — Z01.818 PRE-OP EVALUATION: ICD-10-CM

## 2025-02-14 ASSESSMENT — PATIENT HEALTH QUESTIONNAIRE - PHQ9
2. FEELING DOWN, DEPRESSED OR HOPELESS: NOT AT ALL
SUM OF ALL RESPONSES TO PHQ QUESTIONS 1-9: 0
SUM OF ALL RESPONSES TO PHQ QUESTIONS 1-9: 0
SUM OF ALL RESPONSES TO PHQ9 QUESTIONS 1 & 2: 0
SUM OF ALL RESPONSES TO PHQ QUESTIONS 1-9: 0
SUM OF ALL RESPONSES TO PHQ QUESTIONS 1-9: 0
1. LITTLE INTEREST OR PLEASURE IN DOING THINGS: NOT AT ALL

## 2025-02-14 NOTE — PROGRESS NOTES
of DVT/PE: Yes - with prev surgery    Patient objection to receiving blood products: No    Patient Active Problem List   Diagnosis    Primary hypertension    Dermatomyositis ([facility])    Migraine    GERD (gastroesophageal reflux disease)    Morbid obesity with BMI of 50.0-59.9, adult    S/P MARY-BSO (total abdominal hysterectomy and bilateral salpingo-oophorectomy)    Obstructive sleep apnea syndrome    Prediabetes    PCOS (polycystic ovarian syndrome)    Primary osteoarthritis involving multiple joints    Chronic pain    Chronic diastolic CHF (congestive heart failure) ([facility])    Lymphedema    Irritable bowel syndrome with diarrhea    Stasis dermatitis of both legs    Abnormal facial hair    Rash of both hands    Vitamin D deficiency    Class 3 drug-induced obesity with serious comorbidity and body mass index (BMI) of 60.0 to 69.9 in adult    Mixed hyperlipidemia    Deep dyspareunia    Rectocele    Urge incontinence    Respiratory abnormalities    Enlarged RV (right ventricle)    Carpal tunnel syndrome on left    Overactive bladder    Nocturia    Nocturnal enuresis    Venous insufficiency of both lower extremities    Allergic rhinitis    Chronic hip pain, bilateral    Type 2 diabetes mellitus with hyperglycemia, without long-term current use of insulin ([facility])    Type 2 diabetes mellitus with hyperlipidemia ([facility])    Obesity, diabetes, and hypertension syndrome ([facility])    Diabetes mellitus with coincident hypertension ([facility])    OAB (overactive bladder)       Past Medical History:   Diagnosis Date    Abscess 05/20/2022    Allergic reaction to food 05/27/2020    Allergic rhinitis     Anemia     Anesthesia complication     last same surgery at [facility] states she had trouble breathing afterwards    Bilateral hand numbness 12/04/2020    Bilateral hip pain 06/15/2021    CHF (congestive heart failure) ([facility])     no meds-only saw cardiologist x1 and discharged    chronic gerd     Dermatomyositis ([facility]) 2008    Eczema

## 2025-02-17 ENCOUNTER — HOSPITAL ENCOUNTER (OUTPATIENT)
Age: 45
Setting detail: OUTPATIENT SURGERY
Discharge: HOME | End: 2025-02-17
Attending: OBSTETRICS & GYNECOLOGY | Admitting: OBSTETRICS & GYNECOLOGY
Payer: COMMERCIAL

## 2025-02-17 ENCOUNTER — ANESTHESIA (OUTPATIENT)
Dept: OPERATING ROOM | Age: 45
End: 2025-02-17
Payer: COMMERCIAL

## 2025-02-17 VITALS
BODY MASS INDEX: 51.91 KG/M2 | WEIGHT: 293 LBS | TEMPERATURE: 97 F | DIASTOLIC BLOOD PRESSURE: 78 MMHG | RESPIRATION RATE: 16 BRPM | HEIGHT: 63 IN | SYSTOLIC BLOOD PRESSURE: 140 MMHG | OXYGEN SATURATION: 97 % | HEART RATE: 65 BPM

## 2025-02-17 LAB
GLUCOSE BLD-MCNC: 89 MG/DL (ref 70–99)
GLUCOSE BLD-MCNC: 99 MG/DL (ref 70–99)
PERFORMED ON: NORMAL
PERFORMED ON: NORMAL

## 2025-02-17 PROCEDURE — 7100000010 HC PHASE II RECOVERY - FIRST 15 MIN: Performed by: OBSTETRICS & GYNECOLOGY

## 2025-02-17 PROCEDURE — 2580000003 HC RX 258: Performed by: ANESTHESIOLOGY

## 2025-02-17 PROCEDURE — 3600000002 HC SURGERY LEVEL 2 BASE: Performed by: OBSTETRICS & GYNECOLOGY

## 2025-02-17 PROCEDURE — 6370000000 HC RX 637 (ALT 250 FOR IP): Performed by: OBSTETRICS & GYNECOLOGY

## 2025-02-17 PROCEDURE — 3700000001 HC ADD 15 MINUTES (ANESTHESIA): Performed by: OBSTETRICS & GYNECOLOGY

## 2025-02-17 PROCEDURE — 6360000002 HC RX W HCPCS: Performed by: OBSTETRICS & GYNECOLOGY

## 2025-02-17 PROCEDURE — 52287 CYSTOSCOPY CHEMODENERVATION: CPT | Performed by: OBSTETRICS & GYNECOLOGY

## 2025-02-17 PROCEDURE — 7100000000 HC PACU RECOVERY - FIRST 15 MIN: Performed by: OBSTETRICS & GYNECOLOGY

## 2025-02-17 PROCEDURE — 6360000002 HC RX W HCPCS: Performed by: NURSE ANESTHETIST, CERTIFIED REGISTERED

## 2025-02-17 PROCEDURE — 3700000000 HC ANESTHESIA ATTENDED CARE: Performed by: OBSTETRICS & GYNECOLOGY

## 2025-02-17 PROCEDURE — 7100000011 HC PHASE II RECOVERY - ADDTL 15 MIN: Performed by: OBSTETRICS & GYNECOLOGY

## 2025-02-17 PROCEDURE — 7100000001 HC PACU RECOVERY - ADDTL 15 MIN: Performed by: OBSTETRICS & GYNECOLOGY

## 2025-02-17 PROCEDURE — 2709999900 HC NON-CHARGEABLE SUPPLY: Performed by: OBSTETRICS & GYNECOLOGY

## 2025-02-17 PROCEDURE — 2500000003 HC RX 250 WO HCPCS: Performed by: OBSTETRICS & GYNECOLOGY

## 2025-02-17 PROCEDURE — 3600000012 HC SURGERY LEVEL 2 ADDTL 15MIN: Performed by: OBSTETRICS & GYNECOLOGY

## 2025-02-17 RX ORDER — FENTANYL CITRATE 0.05 MG/ML
50 INJECTION, SOLUTION INTRAMUSCULAR; INTRAVENOUS EVERY 5 MIN PRN
Status: DISCONTINUED | OUTPATIENT
Start: 2025-02-17 | End: 2025-02-17 | Stop reason: HOSPADM

## 2025-02-17 RX ORDER — SODIUM CHLORIDE 0.9 % (FLUSH) 0.9 %
5-40 SYRINGE (ML) INJECTION EVERY 12 HOURS SCHEDULED
Status: DISCONTINUED | OUTPATIENT
Start: 2025-02-17 | End: 2025-02-17 | Stop reason: HOSPADM

## 2025-02-17 RX ORDER — LORAZEPAM 2 MG/ML
0.5 INJECTION INTRAMUSCULAR
Status: DISCONTINUED | OUTPATIENT
Start: 2025-02-17 | End: 2025-02-17 | Stop reason: HOSPADM

## 2025-02-17 RX ORDER — ONDANSETRON 2 MG/ML
4 INJECTION INTRAMUSCULAR; INTRAVENOUS
Status: DISCONTINUED | OUTPATIENT
Start: 2025-02-17 | End: 2025-02-17 | Stop reason: HOSPADM

## 2025-02-17 RX ORDER — SODIUM CHLORIDE 9 MG/ML
INJECTION, SOLUTION INTRAVENOUS PRN
Status: DISCONTINUED | OUTPATIENT
Start: 2025-02-17 | End: 2025-02-17 | Stop reason: HOSPADM

## 2025-02-17 RX ORDER — FENTANYL CITRATE 50 UG/ML
INJECTION, SOLUTION INTRAMUSCULAR; INTRAVENOUS
Status: DISCONTINUED | OUTPATIENT
Start: 2025-02-17 | End: 2025-02-17 | Stop reason: SDUPTHER

## 2025-02-17 RX ORDER — SODIUM CHLORIDE 0.9 % (FLUSH) 0.9 %
5-40 SYRINGE (ML) INJECTION PRN
Status: DISCONTINUED | OUTPATIENT
Start: 2025-02-17 | End: 2025-02-17 | Stop reason: HOSPADM

## 2025-02-17 RX ORDER — MEPERIDINE HYDROCHLORIDE 25 MG/ML
12.5 INJECTION INTRAMUSCULAR; INTRAVENOUS; SUBCUTANEOUS
Status: DISCONTINUED | OUTPATIENT
Start: 2025-02-17 | End: 2025-02-17 | Stop reason: HOSPADM

## 2025-02-17 RX ORDER — DIPHENHYDRAMINE HYDROCHLORIDE 50 MG/ML
12.5 INJECTION INTRAMUSCULAR; INTRAVENOUS
Status: DISCONTINUED | OUTPATIENT
Start: 2025-02-17 | End: 2025-02-17 | Stop reason: HOSPADM

## 2025-02-17 RX ORDER — LIDOCAINE HYDROCHLORIDE 20 MG/ML
INJECTION, SOLUTION EPIDURAL; INFILTRATION; INTRACAUDAL; PERINEURAL
Status: DISCONTINUED | OUTPATIENT
Start: 2025-02-17 | End: 2025-02-17 | Stop reason: SDUPTHER

## 2025-02-17 RX ORDER — PROPOFOL 10 MG/ML
INJECTION, EMULSION INTRAVENOUS
Status: DISCONTINUED | OUTPATIENT
Start: 2025-02-17 | End: 2025-02-17 | Stop reason: SDUPTHER

## 2025-02-17 RX ORDER — NALOXONE HYDROCHLORIDE 0.4 MG/ML
INJECTION, SOLUTION INTRAMUSCULAR; INTRAVENOUS; SUBCUTANEOUS PRN
Status: DISCONTINUED | OUTPATIENT
Start: 2025-02-17 | End: 2025-02-17 | Stop reason: HOSPADM

## 2025-02-17 RX ORDER — MIDAZOLAM HYDROCHLORIDE 1 MG/ML
INJECTION, SOLUTION INTRAMUSCULAR; INTRAVENOUS
Status: DISCONTINUED | OUTPATIENT
Start: 2025-02-17 | End: 2025-02-17 | Stop reason: SDUPTHER

## 2025-02-17 RX ORDER — PHENAZOPYRIDINE HYDROCHLORIDE 100 MG/1
100 TABLET, FILM COATED ORAL 3 TIMES DAILY PRN
Qty: 21 TABLET | Refills: 0 | Status: SHIPPED | OUTPATIENT
Start: 2025-02-17 | End: 2025-02-24

## 2025-02-17 RX ORDER — MAGNESIUM HYDROXIDE 1200 MG/15ML
LIQUID ORAL CONTINUOUS PRN
Status: COMPLETED | OUTPATIENT
Start: 2025-02-17 | End: 2025-02-17

## 2025-02-17 RX ORDER — CEFAZOLIN SODIUM 1 G/3ML
INJECTION, POWDER, FOR SOLUTION INTRAMUSCULAR; INTRAVENOUS
Status: DISCONTINUED | OUTPATIENT
Start: 2025-02-17 | End: 2025-02-17 | Stop reason: SDUPTHER

## 2025-02-17 RX ORDER — HALOPERIDOL 5 MG/ML
1 INJECTION INTRAMUSCULAR
Status: DISCONTINUED | OUTPATIENT
Start: 2025-02-17 | End: 2025-02-17 | Stop reason: HOSPADM

## 2025-02-17 RX ADMIN — PROPOFOL 100 MG: 10 INJECTION, EMULSION INTRAVENOUS at 08:13

## 2025-02-17 RX ADMIN — CEFAZOLIN 3 G: 1 INJECTION, POWDER, FOR SOLUTION INTRAMUSCULAR; INTRAVENOUS at 08:30

## 2025-02-17 RX ADMIN — MIDAZOLAM 2 MG: 1 INJECTION INTRAMUSCULAR; INTRAVENOUS at 08:09

## 2025-02-17 RX ADMIN — FENTANYL CITRATE 25 MCG: 50 INJECTION INTRAMUSCULAR; INTRAVENOUS at 08:19

## 2025-02-17 RX ADMIN — FENTANYL CITRATE 50 MCG: 50 INJECTION INTRAMUSCULAR; INTRAVENOUS at 08:16

## 2025-02-17 RX ADMIN — SODIUM CHLORIDE: 9 INJECTION, SOLUTION INTRAVENOUS at 08:09

## 2025-02-17 RX ADMIN — FENTANYL CITRATE 25 MCG: 50 INJECTION INTRAMUSCULAR; INTRAVENOUS at 08:24

## 2025-02-17 RX ADMIN — PROPOFOL 140 MCG/KG/MIN: 10 INJECTION, EMULSION INTRAVENOUS at 08:14

## 2025-02-17 RX ADMIN — LIDOCAINE HYDROCHLORIDE 50 MG: 20 INJECTION, SOLUTION EPIDURAL; INFILTRATION; INTRACAUDAL; PERINEURAL at 08:13

## 2025-02-17 ASSESSMENT — PAIN - FUNCTIONAL ASSESSMENT
PAIN_FUNCTIONAL_ASSESSMENT: NONE - DENIES PAIN

## 2025-02-17 ASSESSMENT — ENCOUNTER SYMPTOMS: SHORTNESS OF BREATH: 0

## 2025-02-17 ASSESSMENT — LIFESTYLE VARIABLES: SMOKING_STATUS: 0

## 2025-02-17 NOTE — ANESTHESIA PRE PROCEDURE
Wheat Anaphylaxis, Hives, Shortness Of Breath and Swelling    Mixed Grasses Itching and Other (See Comments)     Molds--over 51 different environmental allergies  Sinus issues/redden eyes    Tramadol Other (See Comments)     Made her feel really bad, and head dizzy and chest pain    Toradol [Ketorolac Tromethamine] Dizziness or Vertigo     Chest pain       Problem List:    Patient Active Problem List   Diagnosis Code    Primary hypertension I10    Dermatomyositis ([facility]) M33.13    Migraine G43.909    GERD (gastroesophageal reflux disease) K21.9    Morbid obesity with BMI of 50.0-59.9, adult E66.01, Z68.43    S/P MARY-BSO (total abdominal hysterectomy and bilateral salpingo-oophorectomy) Z90.710, Z90.722, Z90.79    Obstructive sleep apnea syndrome G47.33    Prediabetes R73.03    PCOS (polycystic ovarian syndrome) E28.2    Primary osteoarthritis involving multiple joints M15.0    Chronic pain G89.29    Chronic diastolic CHF (congestive heart failure) ([facility]) I50.32    Lymphedema I89.0    Irritable bowel syndrome with diarrhea K58.0    Stasis dermatitis of both legs I87.2    Abnormal facial hair L67.8    Rash of both hands R21    Vitamin D deficiency E55.9    Class 3 drug-induced obesity with serious comorbidity and body mass index (BMI) of 60.0 to 69.9 in adult E66.813, E66.1, Z68.44    Mixed hyperlipidemia E78.2    Deep dyspareunia N94.12    Rectocele N81.6    Urge incontinence N39.41    Respiratory abnormalities R06.9    Enlarged RV (right ventricle) I51.7    Carpal tunnel syndrome on left G56.02    Overactive bladder N32.81    Nocturia R35.1    Nocturnal enuresis N39.44    Venous insufficiency of both lower extremities I87.2    Allergic rhinitis J30.9    Chronic hip pain, bilateral M25.551, M25.552, G89.29    Type 2 diabetes mellitus with hyperglycemia, without long-term current use of insulin ([facility]) E11.65    Type 2 diabetes mellitus with hyperlipidemia ([facility]) E11.69, E78.5    Obesity, diabetes, and hypertension syndrome

## 2025-02-17 NOTE — PROGRESS NOTES
0839 pt arrived from OR, arouses to voice, denies pain, vitals stable on 3L NC, quickly weaned to room air. Pt resting comfortably.  
Pt states ready to go home. Pt discharged in stable condition. Pt offers no c/o. Pt up to BR to void. Pt had juice and cookies. Dr Krishnan into talk to pt about antbx which she was not going home on. Pt knows to  rx fm here pharmacy.  
surgery.    C-Difficile admission screening and protocol:       * Admitted with diarrhea?                         [] YES    [x]  NO     *Prior history of C-Diff. In last 3 months? [] YES    [x]  NO     *Antibiotic use in the past 6-8 weeks?      [x]  NO    []  YES                 If yes, which ANTIBIOTIC AND REASON______     *Prior hospitalization or nursing home in the last month? []  YES    [x]  NO        SAFETY FIRST...call before you fall!!!

## 2025-02-17 NOTE — H&P
Date of Surgery Update:  Krery Villela was seen, history and physical examination reviewed, and patient examined by me today. There have been no significant clinical changes since the completion of the previous history and physical. The surgical site was confirmed by the patient and me.     I have presented reasonable alternatives to the patient's proposed care, treatment, and services. The discussion I have done encompassed risks, benefits, and side effects related to the alternatives and the risks related to not receiving the proposed care, treatment, and services.     All questions answered. Patient wishes to proceed.     Electronically signed by: NAYELI CROSS MD,2/17/2025,7:26 AM

## 2025-02-17 NOTE — OP NOTE
Kerry Villela 1980    DATE OF OPERATION: 02/17/25    PREOPERATIVE DIAGNOSIS:   Urinary Urgency/frequency (OAB)  Urge incontinence      POSTOPERATIVE DIAGNOSIS:   Urinary Urgency/frequency (OAB)  Urge incontinence      PROCEDURE:   Botox injections into the bladder wall  Cystourethroscopy     ESTIMATED BLOOD LOSS: none mL     UOP: NA mL     IV FLUIDS: 500 mL     ANESTHESIA: MAC    COMPLICATIONS: None     FINDINGS: Normal bladder mucosa and urethra on cystoscopy.      SURGEON: NAYELI CROSS MD      INDICATIONS: This is a 44-year-old who presented with a long history of urinary urgency, frequency, urge incontinence. The patient was counseled on the risks, benefits and alternatives for a Botox.  She was counseled on the risks including but not limited to bleeding, infection, damage to the bowel/bladder/Ureters/nerves/blood vessels, risks of postoperative voiding and need for possible intermittent self catheterization, temporary relief and the anesthesia risks. The patient understood these risks and desired to proceed with the procedure.     OPERATIVE NOTE: The patient was taken to the operating room and MAC anesthesia was found to be adequate. She was prepped and draped in the normal sterile fashion and placed in Davis stirrups. Preoperative antibiotics were administered in the patient holding area. A lai catheter was then placed in the bladder.     The bladder was completely drained using the Lai catheter.  A cystourethroscopy was performed using a 0-degree cystoscope to evaluate the bladder.  20 sites injected with 100 units of reconstituted Botox into bladder without difficulty.     The patient tolerated the procedure well. Sponge, lap and needle counts were correct x2. The patient was then taken to recovery room in stable condition.     NAYELI CROSS MD

## 2025-02-17 NOTE — BRIEF OP NOTE
Brief Postoperative Note      Patient: Kerry Villela  YOB: 1980  MRN: [id number]    Date of Procedure: 2/17/2025    Pre-Op Diagnosis Codes:      * OAB (overactive bladder) [N32.81]    Post-Op Diagnosis: Same       Procedure(s):  CYSTOSCOPY WITH BOTOX INJECTIONS IN THE BLADDER 100 UNITS    Surgeon(s):  Nayeli Krishnan MD    Assistant:  * No surgical staff found *    Anesthesia: Monitor Anesthesia Care    Estimated Blood Loss (mL): none    Complications: None    Specimens:   * No specimens in log *    Implants:  * No implants in log *      Drains: * No LDAs found *    Findings:  Infection Present At Time Of Surgery (PATOS) (choose all levels that have infection present):  No infection present  Other Findings: urge incontinence    Electronically signed by NAYELI KRISHNAN MD on 2/17/2025 at 8:34 AM

## 2025-02-17 NOTE — DISCHARGE INSTRUCTIONS
[facility]  [address] [place] [address]   [phone]    Dr. Giuliano Krishnan MD  [address]  [address]  [place]  [address]  Phone [phone]  Fax: [phone]      PATIENT NAME: Kerry Villela  MEDICAL RECORD NUMBER:  [id number]  TODAY'S DATE: 2/17/2025       Discharge Instructions Minor: Procedure(s):  CYSTOSCOPY WITH BOTOX INJECTIONS IN THE BLADDER 100 UNITS: [address]241 (CPT®),       Post-operative instructions  WHAT TO EXPECT AFTER THE PROCEDURE:   Diet You may return to your normal diet after surgery. Mild nausea and possibly vomiting may occur in the first 6-8 hours following surgery. This is usually due to side effects of anesthesia and will resolve. We suggest clear liquids and a light meal the evening following surgery.     Activity You will be limited to light activity for 2 weeks. You may not engage in sexual intercourse, use tampons, lift  greater than 10 lbs., jump, squat, or ride straddle (bike, motorcycle, etc.) for 4-6 weeks.    Voiding You may notice some mild burning with urination after surgery due to the catheter that is placed during surgery. This should resolve in 1-2 days. You also may notice a slower stream or mild difficulty voiding. This can be secondary to the swelling and usually improves within a week. If at any point you cannot void for 6 straight hours, contact our office.  You may come home with a urinary catheter (should not be attached to a bag) you will come into the office 1 week later to attempt a voiding trial and see if its ready to be removed.     Please contact our office if you have any of the following symptoms:  Discharge that has a foul odor or is green in color.   Soaking a pad every 2 hours, continuing to bleed longer than 1 week or have a sudden increase in your bleeding.  Develop a fever of 100.4 or higher.     Hygiene You may resume normal showering immediately after surgery. Avoid baths and swimming for 4

## 2025-02-17 NOTE — ANESTHESIA POSTPROCEDURE EVALUATION
Department of Anesthesiology  Postprocedure Note    Patient: Kerry Villela  MRN: [id number]  YOB: 1980  Date of evaluation: 2/17/2025    Procedure Summary       Date: 02/17/25 Room / Location: [address]    Anesthesia Start: 0809 Anesthesia Stop: 0835    Procedure: CYSTOSCOPY WITH BOTOX INJECTIONS IN THE BLADDER 100 UNITS (Bladder) Diagnosis:       OAB (overactive bladder)      (OAB (overactive bladder) [N32.81])    Surgeons: Giuliano Krishnan MD Responsible Provider: Lincoln Salvador MD    Anesthesia Type: MAC ASA Status: 3            Anesthesia Type: No value filed.    Yessenia Phase I: Yessenia Score: 10    Yessenia Phase II:      Anesthesia Post Evaluation    Patient location during evaluation: bedside  Patient participation: complete - patient participated  Level of consciousness: awake and alert  Pain score: 0  Nausea & Vomiting: no nausea  Cardiovascular status: hemodynamically stable  Respiratory status: acceptable  Hydration status: stable  Pain management: adequate    No notable events documented.

## 2025-02-19 ENCOUNTER — TELEPHONE (OUTPATIENT)
Dept: PULMONOLOGY | Age: 45
End: 2025-02-19

## 2025-02-19 NOTE — TELEPHONE ENCOUNTER
Pt called and is stating that she is due for a new machine on Friday. Wants us to send the order over for a new machine, along with OV notes to [facility]. She also stated she needs a new plug ASAP.

## 2025-02-20 NOTE — PROGRESS NOTES
Kerry Villela         : 1980  [x] [facility]     [] [facility]      [] [facility]     [][facility]    [] [facility]  [] [facility]  [] [facility]   [] [facility] [] Other:  Diagnosis: [x] SHYLA (G47.33) [] CSA (G47.31) [] Apnea (G47.30)   Length of Need: [] 12 Months [x] 99 Months [] Other:    Machine (IAN!):  [x] ResMed AirSense     Auto [] Other:     [x]  CPAP () [] Bilevel ()   Mode: [x] Auto [] Spontaneous    Mode: [] Auto [] Spontaneous         New machine    15 cm                 Comfort Settings:     If the order for CPAP  - Ramp Pressure: 5 cmH2O                                        - Ramp time: 15 min                                     -  Flex/EPR - 3 full time       Humidifier: [x] Heated ()        [x] Water chamber replacement ()/ 1 per 6 months        Mask:  Please always start with the mask the patient used during the titraion    [x] Full Face () /1 per 3 months    [x] Patient Choice - Size and fit mask    [x] Dispense:     [x] Headgear () / 1 per 3 months    [x] Interface Replacement ()/1 per month            Tubing: [x] Heated ()/1 per 3 months    [] Standard ()/1 per 3 months [] Other:           Filters: [x] Non-disposable ()/1 per 6 months     [x] Ultra-Fine, Disposable ()/2 per month        Miscellaneous: [x] Chin Strap ()/ 1 per 6 months [] O2 bleed-in:       LPM   [] Oximetry on CPAP/Bilevel []  Other:          Start Order Date: 25    MEDICAL JUSTIFICATION:  I, the undersigned, certify that the above prescribed supplies are medically necessary for this patient’s wellbeing.  In my opinion, the supplies are both reasonable and necessary in reference to accepted standards of medicalpractice in treatment of this patient’s condition.    Josey Ventura MD      NPI: [id number]       Order Signed Date: 25    Electronically signed by Josey Ventura MD on 2025 at 10:36 AM

## 2025-02-21 NOTE — TELEPHONE ENCOUNTER
Patient called in asking to  her CPAP here in our office. Advised her she has to contact [facility]. She says she has been without her machine since Monday and has an enlarged heart and needs to pick it up today. Advised her to contact [facility]. We do not have CPAPs on site. When we do, it's machines that [facility] brings.

## 2025-02-24 ENCOUNTER — TELEPHONE (OUTPATIENT)
Dept: PULMONOLOGY | Age: 45
End: 2025-02-24

## 2025-02-25 ENCOUNTER — TELEPHONE (OUTPATIENT)
Dept: PULMONOLOGY | Age: 45
End: 2025-02-25

## 2025-02-25 NOTE — TELEPHONE ENCOUNTER
We ordered a new machine at pt request.  Now [facility] wants last ov note which has been awile.  Will see if Hailee can reach out to [facility]inta to see if she will need a F2F for new machine or really need last ov note. thx

## 2025-02-25 NOTE — TELEPHONE ENCOUNTER
Per Nataliya with [facility]     We are needing use and benefit notes for this patient but does not look like there is anything in epic. Could you assist?   Kerry Villela 1980    I messaged Nataliya with your message. I will let you know what they need as soon as I hear from her.

## 2025-03-04 ENCOUNTER — OFFICE VISIT (OUTPATIENT)
Dept: UROGYNECOLOGY | Age: 45
End: 2025-03-04
Payer: COMMERCIAL

## 2025-03-04 VITALS — HEART RATE: 78 BPM | TEMPERATURE: 97.5 F | RESPIRATION RATE: 16 BRPM | OXYGEN SATURATION: 95 %

## 2025-03-04 DIAGNOSIS — N32.81 OAB (OVERACTIVE BLADDER): Primary | ICD-10-CM

## 2025-03-04 DIAGNOSIS — N39.41 URGE INCONTINENCE: ICD-10-CM

## 2025-03-04 DIAGNOSIS — R35.0 FREQUENCY OF URINATION: ICD-10-CM

## 2025-03-04 DIAGNOSIS — R35.1 NOCTURIA: ICD-10-CM

## 2025-03-04 PROCEDURE — G8427 DOCREV CUR MEDS BY ELIG CLIN: HCPCS | Performed by: NURSE PRACTITIONER

## 2025-03-04 PROCEDURE — G8417 CALC BMI ABV UP PARAM F/U: HCPCS | Performed by: NURSE PRACTITIONER

## 2025-03-04 PROCEDURE — 1036F TOBACCO NON-USER: CPT | Performed by: NURSE PRACTITIONER

## 2025-03-04 PROCEDURE — 99212 OFFICE O/P EST SF 10 MIN: CPT | Performed by: NURSE PRACTITIONER

## 2025-03-04 NOTE — PROGRESS NOTES
with PVR of 300ml  Patient reports urinary frequency and nocturia    She will return in 2 weeks to repeat PVR.  Meka Chatman, ZELDA - CNP    No orders of the defined types were placed in this encounter.    No orders of the defined types were placed in this encounter.      Meka Chatman, APRN - CNP

## 2025-03-06 ENCOUNTER — OFFICE VISIT (OUTPATIENT)
Dept: BARIATRICS/WEIGHT MGMT | Age: 45
End: 2025-03-06

## 2025-03-06 VITALS
WEIGHT: 293 LBS | HEART RATE: 90 BPM | BODY MASS INDEX: 51.91 KG/M2 | SYSTOLIC BLOOD PRESSURE: 128 MMHG | HEIGHT: 63 IN | DIASTOLIC BLOOD PRESSURE: 81 MMHG

## 2025-03-06 DIAGNOSIS — I10 ESSENTIAL HYPERTENSION: ICD-10-CM

## 2025-03-06 DIAGNOSIS — E66.01 MORBID OBESITY WITH BMI OF 50.0-59.9, ADULT: ICD-10-CM

## 2025-03-06 DIAGNOSIS — E88.810 METABOLIC SYNDROME: Primary | ICD-10-CM

## 2025-03-06 DIAGNOSIS — G47.33 OBSTRUCTIVE SLEEP APNEA: ICD-10-CM

## 2025-03-06 DIAGNOSIS — E78.2 MIXED HYPERLIPIDEMIA: ICD-10-CM

## 2025-03-06 DIAGNOSIS — E88.810 METABOLIC SYNDROME: ICD-10-CM

## 2025-03-06 DIAGNOSIS — K21.9 CHRONIC GERD: ICD-10-CM

## 2025-03-06 NOTE — PATIENT INSTRUCTIONS
Patient received dietary handouts and education.    Plan/Recommendations:   -  Avoid tea   - Continue eating 4x/day focusing on protein and avoid high fat/sugar foods  - Attend [facility]   - Continue exercise as able

## 2025-03-06 NOTE — PROGRESS NOTES
Kerry Villela stable / unchanged. Pt has allergies to wheat, corn, soy, peanut, walnut.     Breakfast: skip or 1 scrambled egg+ 1 turkey sausage + toast w/ butter OR English Muffin w/ butter   Snack: yogurt w/ granola (discussed)  Lunch: meat or popcorn chix + premade salad w/ light ranch (discussed)  Snack: none or corey + carrots w/ light ranch   Dinner: chix + rice or noodles + veg   Snack: none    Is pt consuming smaller portions? yes working on. Discussed in detail.     Is pt consuming at least 64 oz of fluids per day? yes 4 bottles of water w/ SF CL      Is pt consuming carbonated, caffeinated, or sugary beverages? yes    green tea     Has pt sampled Unjury and/or Nectar protein? Tried and tolerated - vanilla, strawberry, cherry cola, moon mist, chicken/beef broth     Has patient attended a support group? Scheduled 3/19 zoom and 3/20 west     Exercise: yes working out 1 hr/day - walking outside or 30 min workout videos     Plan/Recommendations:   -  Avoid tea   - Continue eating 4x/day focusing on protein and avoiding high fat/sugar foods  - Attend [facility]   - Continue exercise as able     Handouts: none    Ilda Baker RD  
capsule, Rfl: 1    rosuvastatin (CRESTOR) 5 MG tablet, Take 1 tablet by mouth daily, Disp: 90 tablet, Rfl: 1    cyclobenzaprine (FLEXERIL) 10 MG tablet, Take 2 tablets by mouth 2 times daily, Disp: , Rfl:     montelukast (SINGULAIR) 10 MG tablet, Take 1 tablet by mouth nightly, Disp: 90 tablet, Rfl: 1    levocetirizine (XYZAL) 5 MG tablet, Take 1 tablet by mouth once daily, Disp: 90 tablet, Rfl: 1    torsemide (DEMADEX) 5 MG tablet, Take 1 tablet by mouth daily (Patient taking differently: Take 1 tablet by mouth as needed), Disp: 90 tablet, Rfl: 1    spironolactone (ALDACTONE) 25 MG tablet, Take 1 tablet by mouth daily, Disp: 90 tablet, Rfl: 3    ketoconazole (NIZORAL) 2 % cream, APPLY CREAM TOPICALLY AS DIRECTED ONCE DAILY FOR 45 DAYS, Disp: , Rfl:     ammonium lactate (AMLACTIN) 12 % cream, Apply topically as needed for Dry Skin, Disp: , Rfl:     pregabalin (LYRICA) 100 MG capsule, Take 1 capsule by mouth 3 times daily., Disp: , Rfl:     fluticasone (FLONASE) 50 MCG/ACT nasal spray, Use 2 spray(s) in each nostril once daily (Patient taking differently: 2 sprays by Nasal route 2 times daily at 0800 and 1400), Disp: 16 g, Rfl: 3    metFORMIN (GLUCOPHAGE-XR) 500 MG extended release tablet, Take 1 tablet by mouth 2 times daily (with meals), Disp: 180 tablet, Rfl: 1      Review of Systems - History obtained from the patient  General ROS: negative  Psychological ROS: negative  Endocrine ROS: negative  Respiratory ROS: negative  Cardiovascular ROS: negative  Gastrointestinal ROS:negative  Genito-Urinary ROS: negative  Musculoskeletal ROS: negative   Skin ROS: negative    Physical Exam   Vitals Reviewed   Constitutional: Patient is oriented to person, place, and time. Patient appears well-developed and well-nourished. Patient is active and cooperative.  Non-toxic appearance. No distress.   Neck: Trachea normal and normal range of motion. No JVD present.   Pulmonary/Chest: Effort normal. No accessory muscle usage or

## 2025-03-07 LAB
25(OH)D3 SERPL-MCNC: 36.9 NG/ML
ALBUMIN SERPL-MCNC: 4.3 G/DL (ref 3.4–5)
ALBUMIN/GLOB SERPL: 1.3 {RATIO} (ref 1.1–2.2)
ALP SERPL-CCNC: 100 U/L (ref 40–129)
ALT SERPL-CCNC: 36 U/L (ref 10–40)
AMPHETAMINES UR QL SCN>1000 NG/ML: NORMAL
ANION GAP SERPL CALCULATED.3IONS-SCNC: 10 MMOL/L (ref 3–16)
AST SERPL-CCNC: 35 U/L (ref 15–37)
BARBITURATES UR QL SCN>200 NG/ML: NORMAL
BASOPHILS # BLD: 0.1 K/UL (ref 0–0.2)
BASOPHILS NFR BLD: 1.3 %
BENZODIAZ UR QL SCN>200 NG/ML: NORMAL
BILIRUB SERPL-MCNC: <0.2 MG/DL (ref 0–1)
BUN SERPL-MCNC: 9 MG/DL (ref 7–20)
CALCIUM SERPL-MCNC: 9.4 MG/DL (ref 8.3–10.6)
CANNABINOIDS UR QL SCN>50 NG/ML: NORMAL
CHLORIDE SERPL-SCNC: 103 MMOL/L (ref 99–110)
CHOLEST SERPL-MCNC: 164 MG/DL (ref 0–199)
CO2 SERPL-SCNC: 27 MMOL/L (ref 21–32)
COCAINE UR QL SCN: NORMAL
CREAT SERPL-MCNC: 0.6 MG/DL (ref 0.6–1.1)
DEPRECATED RDW RBC AUTO: 15.8 % (ref 12.4–15.4)
DRUG SCREEN COMMENT UR-IMP: NORMAL
EOSINOPHIL # BLD: 0.1 K/UL (ref 0–0.6)
EOSINOPHIL NFR BLD: 2.7 %
EST. AVERAGE GLUCOSE BLD GHB EST-MCNC: 134.1 MG/DL
ETHANOLAMINE SERPL-MCNC: NORMAL MG/DL (ref 0–0.08)
FENTANYL SCREEN, URINE: NORMAL
GFR SERPLBLD CREATININE-BSD FMLA CKD-EPI: >90 ML/MIN/{1.73_M2}
GLUCOSE SERPL-MCNC: 86 MG/DL (ref 70–99)
HBA1C MFR BLD: 6.3 %
HCT VFR BLD AUTO: 36.5 % (ref 36–48)
HDLC SERPL-MCNC: 40 MG/DL (ref 40–60)
HGB BLD-MCNC: 11.4 G/DL (ref 12–16)
IRON SATN MFR SERPL: 11 % (ref 15–50)
IRON SERPL-MCNC: 37 UG/DL (ref 37–145)
LDLC SERPL CALC-MCNC: 104 MG/DL
LYMPHOCYTES # BLD: 2.5 K/UL (ref 1–5.1)
LYMPHOCYTES NFR BLD: 47.6 %
MCH RBC QN AUTO: 25 PG (ref 26–34)
MCHC RBC AUTO-ENTMCNC: 31.2 G/DL (ref 31–36)
MCV RBC AUTO: 80 FL (ref 80–100)
METHADONE UR QL SCN>300 NG/ML: NORMAL
MONOCYTES # BLD: 0.4 K/UL (ref 0–1.3)
MONOCYTES NFR BLD: 6.8 %
NEUTROPHILS # BLD: 2.2 K/UL (ref 1.7–7.7)
NEUTROPHILS NFR BLD: 41.6 %
OPIATES UR QL SCN>300 NG/ML: NORMAL
OXYCODONE UR QL SCN: NORMAL
PCP UR QL SCN>25 NG/ML: NORMAL
PH UR STRIP: 5.5 [PH]
PLATELET # BLD AUTO: 267 K/UL (ref 135–450)
PMV BLD AUTO: 8.6 FL (ref 5–10.5)
POTASSIUM SERPL-SCNC: 4.5 MMOL/L (ref 3.5–5.1)
PROT SERPL-MCNC: 7.6 G/DL (ref 6.4–8.2)
RBC # BLD AUTO: 4.56 M/UL (ref 4–5.2)
SODIUM SERPL-SCNC: 140 MMOL/L (ref 136–145)
TIBC SERPL-MCNC: 346 UG/DL (ref 260–445)
TRIGL SERPL-MCNC: 102 MG/DL (ref 0–150)
TSH SERPL DL<=0.005 MIU/L-ACNC: 0.75 UIU/ML (ref 0.27–4.2)
VIT B12 SERPL-MCNC: 457 PG/ML (ref 211–911)
VLDLC SERPL CALC-MCNC: 20 MG/DL
WBC # BLD AUTO: 5.2 K/UL (ref 4–11)

## 2025-03-11 LAB
COTININE SERPL-MCNC: <5 NG/ML
NICOTINE SERPL-MCNC: <5 NG/ML

## 2025-03-18 ENCOUNTER — OFFICE VISIT (OUTPATIENT)
Dept: UROGYNECOLOGY | Age: 45
End: 2025-03-18
Payer: COMMERCIAL

## 2025-03-18 VITALS
DIASTOLIC BLOOD PRESSURE: 85 MMHG | TEMPERATURE: 98 F | HEART RATE: 86 BPM | RESPIRATION RATE: 18 BRPM | OXYGEN SATURATION: 96 % | SYSTOLIC BLOOD PRESSURE: 142 MMHG

## 2025-03-18 DIAGNOSIS — N32.81 OAB (OVERACTIVE BLADDER): ICD-10-CM

## 2025-03-18 DIAGNOSIS — N39.41 URGE INCONTINENCE: ICD-10-CM

## 2025-03-18 DIAGNOSIS — N39.0 ACUTE UTI: ICD-10-CM

## 2025-03-18 DIAGNOSIS — R35.0 URINARY FREQUENCY: Primary | ICD-10-CM

## 2025-03-18 LAB
BILIRUBIN, POC: NORMAL
BLOOD URINE, POC: NORMAL
CLARITY, POC: NORMAL
COLOR, POC: YELLOW
GLUCOSE URINE, POC: NORMAL MG/DL
KETONES, POC: NORMAL MG/DL
LEUKOCYTE EST, POC: NORMAL
NITRITE, POC: NORMAL
PH, POC: 6
PROTEIN, POC: NORMAL MG/DL
SPECIFIC GRAVITY, POC: 1.01
UROBILINOGEN, POC: NORMAL MG/DL

## 2025-03-18 PROCEDURE — G8417 CALC BMI ABV UP PARAM F/U: HCPCS | Performed by: NURSE PRACTITIONER

## 2025-03-18 PROCEDURE — 81002 URINALYSIS NONAUTO W/O SCOPE: CPT | Performed by: NURSE PRACTITIONER

## 2025-03-18 PROCEDURE — 51701 INSERT BLADDER CATHETER: CPT | Performed by: NURSE PRACTITIONER

## 2025-03-18 PROCEDURE — 3077F SYST BP >= 140 MM HG: CPT | Performed by: NURSE PRACTITIONER

## 2025-03-18 PROCEDURE — 1036F TOBACCO NON-USER: CPT | Performed by: NURSE PRACTITIONER

## 2025-03-18 PROCEDURE — 99213 OFFICE O/P EST LOW 20 MIN: CPT | Performed by: NURSE PRACTITIONER

## 2025-03-18 PROCEDURE — G8427 DOCREV CUR MEDS BY ELIG CLIN: HCPCS | Performed by: NURSE PRACTITIONER

## 2025-03-18 PROCEDURE — 3079F DIAST BP 80-89 MM HG: CPT | Performed by: NURSE PRACTITIONER

## 2025-03-18 RX ORDER — NITROFURANTOIN 25; 75 MG/1; MG/1
100 CAPSULE ORAL 2 TIMES DAILY
Qty: 10 CAPSULE | Refills: 0 | Status: SHIPPED | OUTPATIENT
Start: 2025-03-18 | End: 2025-03-23

## 2025-03-18 NOTE — PROGRESS NOTES
1026   BP: (!) 142/85   Pulse: 86   Resp: 18   Temp: 98 °F (36.7 °C)   SpO2: 96%     Physical Exam  Physical Exam  Vitals and nursing note reviewed.   Constitutional:       Appearance: Normal appearance.   HENT:      Head: Normocephalic.   Eyes:      Conjunctiva/sclera: Conjunctivae normal.   Cardiovascular:      Rate and Rhythm: Normal rate.   Pulmonary:      Effort: Pulmonary effort is normal.   Musculoskeletal:         General: Normal range of motion.      Cervical back: Normal range of motion.   Skin:     General: Skin is warm and dry.   Neurological:      General: No focal deficit present.      Mental Status: She is alert.   Psychiatric:         Mood and Affect: Mood normal.         Behavior: Behavior normal.         Results for orders placed or performed in visit on 03/18/25   POCT Urinalysis no Micro   Result Value Ref Range    Color, UA yellow     Clarity, UA cloudy     Glucose, UA POC neg mg/dL    Bilirubin, UA neg     Ketones, UA neg mg/dL    Spec Grav, UA 1.015     Blood, UA POC neg     pH, UA 6.0     Protein, UA POC neg mg/dL    Urobilinogen, UA neg mg/dL    Leukocytes, UA pos, small     Nitrite, UA pos       Using sterile technique a catheter was placed. A post void residual (PVR) was noted to be 125 ml. Meka Chatman NP made aware at this time.     Assessment/Plan:     Kerry Villela is a 44 y.o. female with   1. Urinary frequency    2. OAB (overactive bladder)    3. Urge incontinence    4. Acute UTI    PVR:  WNL.  Increased at 2 week post botox  Acute UTI:  UA suspicious for UTI. Patient symptomatic with frequency   Urine sent for culture   Begin Macrobid BID for 5 days  F/U 3 months for consent  Meka Chatman, APRN - CNP      Orders Placed This Encounter   Procedures    INSERT,NON-INDWELLING BLADDER CATHETER    Culture, Urine     Specify (ex-cath, midstream, cysto, etc)?:   straight catheter    POCT Urinalysis no Micro     Orders Placed This Encounter   Medications    nitrofurantoin,

## 2025-03-20 ENCOUNTER — RESULTS FOLLOW-UP (OUTPATIENT)
Dept: UROGYNECOLOGY | Age: 45
End: 2025-03-20

## 2025-03-20 LAB
BACTERIA UR CULT: ABNORMAL
BACTERIA UR CULT: ABNORMAL
ORGANISM: ABNORMAL

## 2025-03-20 NOTE — RESULT ENCOUNTER NOTE
Attempted to reach patient regarding result, no answer - unable to leave voicemail, mailbox not set up yet.

## 2025-03-21 ENCOUNTER — TELEPHONE (OUTPATIENT)
Dept: GYNECOLOGY | Age: 45
End: 2025-03-21

## 2025-03-21 DIAGNOSIS — I89.0 LYMPHEDEMA: ICD-10-CM

## 2025-03-21 DIAGNOSIS — R60.0 BILATERAL LEG EDEMA: ICD-10-CM

## 2025-03-21 NOTE — RESULT ENCOUNTER NOTE
Spoke with Kerry and informed her urine culture came back positive for a UTI. She was treated with Macrobid, informed her that organism is sensitive to the antibiotic prescribed, and she does not need to change medications at this time. She is thankful and verbalized understanding. Electronically signed by Aurora Walton RN on 3/21/2025 at 1:26 PM

## 2025-03-21 NOTE — RESULT ENCOUNTER NOTE
Attempted to reach patient regarding result, no answer - unable to leave voicemail, mailbox not set up.

## 2025-03-21 NOTE — TELEPHONE ENCOUNTER
Medication:   Requested Prescriptions     Pending Prescriptions Disp Refills    torsemide (DEMADEX) 5 MG tablet [Pharmacy Med Name: Torsemide 5 MG Oral Tablet] 90 tablet 0     Sig: Take 1 tablet by mouth once daily        Last Filled:      Patient Phone Number: [phone] (home)     Last appt: 2/14/2025   Next appt: Visit date not found    Last OARRS:        No data to display

## 2025-03-24 RX ORDER — TORSEMIDE 5 MG/1
5 TABLET ORAL PRN
Qty: 90 TABLET | Refills: 1 | Status: SHIPPED | OUTPATIENT
Start: 2025-03-24

## 2025-03-27 ENCOUNTER — CLINICAL DOCUMENTATION (OUTPATIENT)
Dept: BARIATRICS/WEIGHT MGMT | Age: 45
End: 2025-03-27

## 2025-03-27 NOTE — PROGRESS NOTES
This eval was conducted via phone on 3/27/25 in preparation or their pre-surgical visit on 4/3/25 with Dr. Prajapati.     Kerry Villela lost 4 lbs over 2 mos. Self reported wt: 324 lbs    Is pt eating at least 4 times everyday?   Breakfast:HB egg + yogurt + english muffin with butter/jelly OR 2 turkey braun/sausage and egg OR cheerio  Snack: premier protein shake OR brocc/carrots + lf ranch  Lunch: leftovers (meat + veggies + rice) OR gchz + tomato soup  Snack: none OR leftover AM snack  Dinner: baked chx/ beef with  veggies + 1/4 cup starch   Snack: none OR turkey stick OR cheese stick OR yogurt    Is pt eating a lean protein source with all meals and snacks? yes      Has pt decreased their portions using the plate method? yes measuring out starches, feeling a bit hungry    Is pt choosing low fat/sugar free options? yes      Is pt drinking at least 64 oz of clear liquids everyday? yes water, CL    Has pt stopped drinking carbonation, caffeinated, and sugar sweetened beverages? yes      Has pt sampled Unjury and/or Nectar protein? yes strawberry and vanilla    Has pt attended a support group? Completed 3/20    Participating in intentional exercise? yes Workboard videos daily; right leg pain occasionally limits occasionally      Plan/Recommendations:   - Continue diet plan, focus on protein   - Continue exercise plan as able    Handouts: none    GREGORIO AU, MS, RD, LD

## 2025-03-28 RX ORDER — ESTRADIOL 10 UG/1
10 TABLET, FILM COATED VAGINAL
Qty: 24 TABLET | Refills: 3 | Status: SHIPPED | OUTPATIENT
Start: 2025-03-31

## 2025-03-28 NOTE — PROGRESS NOTES
[facility]   General & Laparoscopic Surgery  [employer]       HPI:     Kerry Villela is a very pleasant 44 y.o. female with Body mass index is 57.22 kg/m².  , Pre-Surgery.   Pre-operative clearance and work up pending. Working hard to keep good dietary habits as well level of activity.  Patient denies any nausea, vomiting, fevers, chills, shortness of breath, chest pain, cough, constipation or difficulty urinating.      Past Medical History:   Diagnosis Date    Abscess 05/20/2022    Allergic reaction to food 05/27/2020    Allergic rhinitis     Anemia     Anesthesia complication     last same surgery at [facility] states she had trouble breathing afterwards    Bilateral hand numbness 12/04/2020    Bilateral hip pain 06/15/2021    CHF (congestive heart failure) ([facility])     no meds-only saw cardiologist x1 and discharged    chronic gerd     Dermatomyositis ([facility]) 2008    Eczema     Fibromyalgia     Headache(784.0)     History of blood transfusion 2009    also two in 2014     HTN (hypertension)     Hx of blood clots 2014    PE and DVT    Hyperlipidemia     Irritable bowel syndrome     Lymphedema     Morbid obesity with BMI of 50.0-59.9, adult     obstructive sleep apnea     pt uses cpap machine    Osteoarthritis     Overactive bladder     PCOS (polycystic ovarian syndrome)     Pre-diabetes     Pre-operative clearance     Prediabetes     Prolonged emergence from general anesthesia     x1 at age 16 after ovarian cyst removal    Pulmonary embolism 07/17/2014    Overview:  Continue anticoagulation.  Medicine managing Coumadin bridge.  Daily H/H, PTT, PT/INR.   Last Assessment & Plan:  HPI: Seen in hospital follow up.  The patient is a 33 year old y/o female who admitted to the hospital for pulmonary emboli. She began to experience left lower extremity swelling and pain for two days, and day of admission she began to experience chest pain and shortness of     Vaginal high risk HPV DNA

## 2025-04-03 ENCOUNTER — OFFICE VISIT (OUTPATIENT)
Dept: BARIATRICS/WEIGHT MGMT | Age: 45
End: 2025-04-03
Payer: COMMERCIAL

## 2025-04-03 VITALS
DIASTOLIC BLOOD PRESSURE: 80 MMHG | HEIGHT: 63 IN | WEIGHT: 293 LBS | BODY MASS INDEX: 51.91 KG/M2 | HEART RATE: 114 BPM | SYSTOLIC BLOOD PRESSURE: 140 MMHG

## 2025-04-03 DIAGNOSIS — E78.2 MIXED HYPERLIPIDEMIA: ICD-10-CM

## 2025-04-03 DIAGNOSIS — E66.01 MORBID OBESITY WITH BMI OF 50.0-59.9, ADULT: ICD-10-CM

## 2025-04-03 DIAGNOSIS — G47.33 OBSTRUCTIVE SLEEP APNEA: ICD-10-CM

## 2025-04-03 DIAGNOSIS — K21.9 CHRONIC GERD: ICD-10-CM

## 2025-04-03 DIAGNOSIS — E88.810 METABOLIC SYNDROME: Primary | ICD-10-CM

## 2025-04-03 DIAGNOSIS — N89.8 VAGINAL DRYNESS: Primary | ICD-10-CM

## 2025-04-03 DIAGNOSIS — I10 ESSENTIAL HYPERTENSION: ICD-10-CM

## 2025-04-03 PROCEDURE — G2211 COMPLEX E/M VISIT ADD ON: HCPCS | Performed by: SURGERY

## 2025-04-03 PROCEDURE — 3079F DIAST BP 80-89 MM HG: CPT | Performed by: SURGERY

## 2025-04-03 PROCEDURE — G8417 CALC BMI ABV UP PARAM F/U: HCPCS | Performed by: SURGERY

## 2025-04-03 PROCEDURE — 1036F TOBACCO NON-USER: CPT | Performed by: SURGERY

## 2025-04-03 PROCEDURE — 99214 OFFICE O/P EST MOD 30 MIN: CPT | Performed by: SURGERY

## 2025-04-03 PROCEDURE — 3077F SYST BP >= 140 MM HG: CPT | Performed by: SURGERY

## 2025-04-03 PROCEDURE — G8427 DOCREV CUR MEDS BY ELIG CLIN: HCPCS | Performed by: SURGERY

## 2025-04-03 RX ORDER — CONJUGATED ESTROGENS 0.62 MG/G
0.5 CREAM VAGINAL
Qty: 30 G | Refills: 3 | Status: SHIPPED | OUTPATIENT
Start: 2025-04-03

## 2025-04-04 ENCOUNTER — TELEPHONE (OUTPATIENT)
Dept: GYNECOLOGY | Age: 45
End: 2025-04-04

## 2025-04-04 NOTE — TELEPHONE ENCOUNTER
Closing this encounter its another encounter of this 3/21/25 . I spoke with the patient about this. Closing..

## 2025-04-08 ENCOUNTER — OFFICE VISIT (OUTPATIENT)
Dept: ORTHOPEDIC SURGERY | Age: 45
End: 2025-04-08

## 2025-04-08 VITALS — WEIGHT: 293 LBS | BODY MASS INDEX: 51.91 KG/M2 | HEIGHT: 63 IN

## 2025-04-08 DIAGNOSIS — M17.12 PRIMARY OSTEOARTHRITIS OF LEFT KNEE: ICD-10-CM

## 2025-04-08 DIAGNOSIS — M17.11 PRIMARY OSTEOARTHRITIS OF RIGHT KNEE: Primary | ICD-10-CM

## 2025-04-08 RX ORDER — BUPIVACAINE HYDROCHLORIDE 2.5 MG/ML
3 INJECTION, SOLUTION INFILTRATION; PERINEURAL ONCE
Status: COMPLETED | OUTPATIENT
Start: 2025-04-08 | End: 2025-04-08

## 2025-04-08 RX ORDER — MELOXICAM 15 MG/1
15 TABLET ORAL DAILY PRN
Qty: 30 TABLET | Refills: 0 | Status: CANCELLED | OUTPATIENT
Start: 2025-04-08

## 2025-04-08 RX ORDER — TRIAMCINOLONE ACETONIDE 40 MG/ML
80 INJECTION, SUSPENSION INTRA-ARTICULAR; INTRAMUSCULAR ONCE
Status: COMPLETED | OUTPATIENT
Start: 2025-04-08 | End: 2025-04-08

## 2025-04-08 RX ADMIN — TRIAMCINOLONE ACETONIDE 80 MG: 40 INJECTION, SUSPENSION INTRA-ARTICULAR; INTRAMUSCULAR at 11:12

## 2025-04-08 RX ADMIN — BUPIVACAINE HYDROCHLORIDE 7.5 MG: 2.5 INJECTION, SOLUTION INFILTRATION; PERINEURAL at 11:12

## 2025-04-08 NOTE — PROGRESS NOTES
Kerry Villela  [id number]  April 8, 2025    Chief Complaint   Patient presents with    Knee Pain     Right       History: The patient is a 44-year-old female who is here for evaluation of her right knee.  She was seen back in August 2023 and received a left knee injection.  She has been having increased right knee pain for the past few months.  She cannot recall a specific injury.  She rates the right knee pain as 10/10.  She has difficulty getting up from a seated position.  She has pain at nighttime.  She has difficulty with stairs.  She does report seeing the [facility] weight loss team recently and she does plan on pursuing gastric sleeve procedure in August.  She does feel weak with both knees.  The patient does report that the left knee injection did not provide much relief in the past.    The patient's  past medical history, medications, allergies,  family history, social history, and have been reviewed, and dated and are recorded in the chart.  Pertinent items are noted in HPI.  Review of systems reviewed from Pertinent History Form dated on 4/8 and available in the patient's chart under the Media tab.     Vitals:  Ht 1.6 m (5' 3\")   Wt (!) 146.5 kg (323 lb)   LMP 05/26/2014   BMI 57.22 kg/m²     Physical: Ms. Kerry Villela appears well, she is in no apparent distress, she demonstrates appropriate mood & affect. She is alert and oriented to person, place and time. Examination of the right lower extremity reveals no pain with range of motion of the hip. She has generalized tenderness to palpation about the joint line of the right knee. Range of motion is from -5 degrees to 110 degrees. Strength is 4+ to 5/5 for all muscle groups about the right knee. There is patellofemoral crepitus with range of motion of the right knee. Varus and valgus stressing of the knees reveals no evidence of instability. There is a small effusion in the right knee. Anterior drawer and Lachman are negative bilaterally.   Examination

## 2025-04-09 DIAGNOSIS — E11.69 OBESITY, DIABETES, AND HYPERTENSION SYNDROME (HCC): ICD-10-CM

## 2025-04-09 DIAGNOSIS — E11.59 OBESITY, DIABETES, AND HYPERTENSION SYNDROME (HCC): ICD-10-CM

## 2025-04-09 DIAGNOSIS — E66.9 OBESITY, DIABETES, AND HYPERTENSION SYNDROME (HCC): ICD-10-CM

## 2025-04-09 DIAGNOSIS — J30.9 ALLERGIC RHINITIS, UNSPECIFIED SEASONALITY, UNSPECIFIED TRIGGER: Chronic | ICD-10-CM

## 2025-04-09 DIAGNOSIS — E11.65 TYPE 2 DIABETES MELLITUS WITH HYPERGLYCEMIA, WITHOUT LONG-TERM CURRENT USE OF INSULIN (HCC): ICD-10-CM

## 2025-04-09 DIAGNOSIS — I15.2 OBESITY, DIABETES, AND HYPERTENSION SYNDROME (HCC): ICD-10-CM

## 2025-04-09 NOTE — TELEPHONE ENCOUNTER
Medication:   Requested Prescriptions     Pending Prescriptions Disp Refills    levocetirizine (XYZAL) 5 MG tablet 90 tablet 1     Sig: Take 1 tablet by mouth once daily    metFORMIN (GLUCOPHAGE-XR) 500 MG extended release tablet 180 tablet 1     Sig: Take 1 tablet by mouth 2 times daily (with meals)       Last Filled:      Patient Phone Number: [phone] (home)     Last appt: 2/14/2025   Next appt: 4/16/2025    Last Labs DM:   Lab Results   Component Value Date/Time    LABA1C 6.3 03/06/2025 01:46 PM

## 2025-04-10 ENCOUNTER — HOSPITAL ENCOUNTER (OUTPATIENT)
Dept: PHYSICAL THERAPY | Age: 45
Setting detail: THERAPIES SERIES
Discharge: HOME | End: 2025-04-10
Attending: ORTHOPAEDIC SURGERY
Payer: COMMERCIAL

## 2025-04-10 ENCOUNTER — OFFICE VISIT (OUTPATIENT)
Dept: UROGYNECOLOGY | Age: 45
End: 2025-04-10

## 2025-04-10 VITALS
HEART RATE: 96 BPM | OXYGEN SATURATION: 98 % | DIASTOLIC BLOOD PRESSURE: 84 MMHG | SYSTOLIC BLOOD PRESSURE: 144 MMHG | RESPIRATION RATE: 16 BRPM | TEMPERATURE: 97.4 F

## 2025-04-10 DIAGNOSIS — M25.561 PAIN IN BOTH KNEES, UNSPECIFIED CHRONICITY: ICD-10-CM

## 2025-04-10 DIAGNOSIS — N39.0 ACUTE UTI: Primary | ICD-10-CM

## 2025-04-10 DIAGNOSIS — R53.1 FUNCTIONAL WEAKNESS: ICD-10-CM

## 2025-04-10 DIAGNOSIS — R35.1 NOCTURIA: ICD-10-CM

## 2025-04-10 DIAGNOSIS — R52 PAIN AGGRAVATED BY STANDING: ICD-10-CM

## 2025-04-10 DIAGNOSIS — Z78.9 NEED FOR HOME EXERCISE PROGRAM: ICD-10-CM

## 2025-04-10 DIAGNOSIS — R68.89 DECREASED ACTIVITY TOLERANCE: ICD-10-CM

## 2025-04-10 DIAGNOSIS — M25.562 PAIN IN BOTH KNEES, UNSPECIFIED CHRONICITY: ICD-10-CM

## 2025-04-10 DIAGNOSIS — R26.89 DECREASED FUNCTIONAL MOBILITY: Primary | ICD-10-CM

## 2025-04-10 LAB
BILIRUBIN, POC: NORMAL
BLOOD URINE, POC: NORMAL
CLARITY, POC: CLEAR
COLOR, POC: YELLOW
GLUCOSE URINE, POC: NORMAL MG/DL
KETONES, POC: NORMAL MG/DL
LEUKOCYTE EST, POC: NORMAL
NITRITE, POC: NORMAL
PH, POC: 6.5
PROTEIN, POC: NORMAL MG/DL
REASON FOR REJECTION: NORMAL
REJECTED TEST: NORMAL
SPECIFIC GRAVITY, POC: 1.01
UROBILINOGEN, POC: NORMAL MG/DL

## 2025-04-10 PROCEDURE — 97112 NEUROMUSCULAR REEDUCATION: CPT

## 2025-04-10 PROCEDURE — 97161 PT EVAL LOW COMPLEX 20 MIN: CPT

## 2025-04-10 RX ORDER — LEVOCETIRIZINE DIHYDROCHLORIDE 5 MG/1
TABLET, FILM COATED ORAL
Qty: 90 TABLET | Refills: 1 | Status: SHIPPED | OUTPATIENT
Start: 2025-04-10

## 2025-04-10 RX ORDER — METFORMIN HYDROCHLORIDE 500 MG/1
500 TABLET, EXTENDED RELEASE ORAL 2 TIMES DAILY WITH MEALS
Qty: 180 TABLET | Refills: 1 | Status: SHIPPED | OUTPATIENT
Start: 2025-04-10 | End: 2025-10-07

## 2025-04-10 RX ORDER — CEFDINIR 300 MG/1
300 CAPSULE ORAL 2 TIMES DAILY
Qty: 14 CAPSULE | Refills: 0 | Status: SHIPPED | OUTPATIENT
Start: 2025-04-10 | End: 2025-04-17

## 2025-04-10 NOTE — PROGRESS NOTES
Exam      Straight urinary catheterization performed by sterile procedure for urine specimen per Meka Chatman NP. 150 ml post void.  Patient tolerated well.  Meka Chatman NP made aware.      Results for orders placed or performed in visit on 04/10/25   POCT Urinalysis no Micro   Result Value Ref Range    Color, UA yellow     Clarity, UA clear     Glucose, UA POC neg mg/dL    Bilirubin, UA neg     Ketones, UA neg mg/dL    Spec Grav, UA 1.015     Blood, UA POC pos     pH, UA 6.5     Protein, UA POC neg mg/dL    Urobilinogen, UA neg mg/dL    Leukocytes, UA pos     Nitrite, UA pos        Assessment/Plan:     Kerry Villela is a 44 y.o. female with   1. Acute UTI    2. Nocturia    Acute UTI:  UA suspicious for UTI  Patient to begin Cefdinir today while culture pending  Advised to seek further care if fever, hematuria, worsening condition  ZELDA Muhammad CNP      Orders Placed This Encounter   Procedures    INSERT,NON-INDWELLING BLADDER CATHETER    Culture, Urine     Specify (ex-cath, midstream, cysto, etc)?:   straight cath    POCT Urinalysis no Micro     Orders Placed This Encounter   Medications    cefdinir (OMNICEF) 300 MG capsule     Sig: Take 1 capsule by mouth 2 times daily for 7 days     Dispense:  14 capsule     Refill:  0       ZELDA Muhammad CNP

## 2025-04-11 ENCOUNTER — OFFICE VISIT (OUTPATIENT)
Dept: ORTHOPEDIC SURGERY | Age: 45
End: 2025-04-11

## 2025-04-11 ENCOUNTER — RESULTS FOLLOW-UP (OUTPATIENT)
Dept: UROGYNECOLOGY | Age: 45
End: 2025-04-11

## 2025-04-11 VITALS — WEIGHT: 293 LBS | BODY MASS INDEX: 51.91 KG/M2 | HEIGHT: 63 IN

## 2025-04-11 DIAGNOSIS — M17.12 PRIMARY OSTEOARTHRITIS OF LEFT KNEE: Primary | ICD-10-CM

## 2025-04-11 RX ORDER — TRIAMCINOLONE ACETONIDE 40 MG/ML
80 INJECTION, SUSPENSION INTRA-ARTICULAR; INTRAMUSCULAR ONCE
Status: COMPLETED | OUTPATIENT
Start: 2025-04-11 | End: 2025-04-11

## 2025-04-11 RX ORDER — BUPIVACAINE HYDROCHLORIDE 2.5 MG/ML
3 INJECTION, SOLUTION INFILTRATION; PERINEURAL ONCE
Status: COMPLETED | OUTPATIENT
Start: 2025-04-11 | End: 2025-04-11

## 2025-04-11 RX ADMIN — TRIAMCINOLONE ACETONIDE 80 MG: 40 INJECTION, SUSPENSION INTRA-ARTICULAR; INTRAMUSCULAR at 11:10

## 2025-04-11 RX ADMIN — BUPIVACAINE HYDROCHLORIDE 7.5 MG: 2.5 INJECTION, SOLUTION INFILTRATION; PERINEURAL at 11:10

## 2025-04-11 NOTE — RESULT ENCOUNTER NOTE
SN spoke with Marcie and informed her that urine culture was rejected due to RN mislabeling. Apologized and advised if she is not better she can call us back on Monday so that we may place orders for urine collection. She is thankful. She mentioned that she might have gotten a partial fill of her prescription. RN called pharmacy, and they stated they might have miscounted due to ongoing construction at the pharmacy. They will give her any pills that she is missing. RN called patient and informed her to check her medication, and pharmacy will give her any she does not have. She verbalized understanding and is thankful. Electronically signed by Aurora Walton RN on 4/11/2025 at 12:36 PM

## 2025-04-11 NOTE — RESULT ENCOUNTER NOTE
Attempted to reach patient, no answer and unable to leave voicemail. Will attempt to call her later. Electronically signed by Aurora Walton RN on 4/11/2025 at 9:34 AM

## 2025-04-11 NOTE — PROGRESS NOTES
Kerry Villela  [id number]  April 11, 2025    Chief Complaint   Patient presents with    Follow-up     Left knee       History: The patient is a 44-year-old female who is here for evaluation of her left knee.  The patient did have a left knee injection back in August 2023.  She recently was seen for the right knee and received an injection.  She responded rather well with the left knee injection.  She is taking meloxicam.  She continues to struggle with weight loss.  She recently started a rehab program for her knees.          The patient's  past medical history, medications, allergies,  family history, social history, and have been reviewed, and dated and are recorded in the chart.  Pertinent items are noted in HPI.  Review of systems reviewed from Pertinent History Form dated on 8/30 and available in the patient's chart under the Media tab.     Vitals:  Ht 1.6 m (5' 3\")   Wt (!) 146.5 kg (323 lb)   LMP 05/26/2014   BMI 57.22 kg/m²     Physical: Ms. Kerry Villela appears well, she is in no apparent distress, she demonstrates appropriate mood & affect. She is alert and oriented to person, place and time. Examination of the left lower extremity reveals no pain with range of motion of the hip. She has generalized tenderness to palpation about the joint line of the left knee. Range of motion is from -5 degrees to 110 degrees. Strength is 4+ to 5/5 for all muscle groups about the left knee. There is patellofemoral crepitus with range of motion of the left knee. Varus and valgus stressing of the knees reveals no evidence of instability. There is a small effusion in the left knee. Anterior drawer and Lachman are negative bilaterally.   Examination of the skin reveals no rashes, ulceration, or lesion, bilaterally in the lower extremities. Sensation to both lower extremities is grossly intact. Exam of both feet reveals pedal pulses intact and brisk cap refill. Patient is able to dorsiflex and wiggle all toes. Deep

## 2025-04-16 ENCOUNTER — HOSPITAL ENCOUNTER (OUTPATIENT)
Dept: PHYSICAL THERAPY | Age: 45
Setting detail: THERAPIES SERIES
Discharge: HOME | End: 2025-04-16
Attending: ORTHOPAEDIC SURGERY
Payer: COMMERCIAL

## 2025-04-16 ENCOUNTER — OFFICE VISIT (OUTPATIENT)
Dept: PRIMARY CARE CLINIC | Age: 45
End: 2025-04-16
Payer: COMMERCIAL

## 2025-04-16 VITALS
HEIGHT: 63 IN | OXYGEN SATURATION: 100 % | TEMPERATURE: 97.2 F | DIASTOLIC BLOOD PRESSURE: 84 MMHG | HEART RATE: 73 BPM | BODY MASS INDEX: 51.91 KG/M2 | WEIGHT: 293 LBS | SYSTOLIC BLOOD PRESSURE: 130 MMHG

## 2025-04-16 DIAGNOSIS — M25.551 CHRONIC HIP PAIN, BILATERAL: ICD-10-CM

## 2025-04-16 DIAGNOSIS — E78.5 HYPERLIPIDEMIA, UNSPECIFIED HYPERLIPIDEMIA TYPE: Chronic | ICD-10-CM

## 2025-04-16 DIAGNOSIS — I10 PRIMARY HYPERTENSION: Chronic | ICD-10-CM

## 2025-04-16 DIAGNOSIS — M25.562 CHRONIC PAIN OF LEFT KNEE: Primary | ICD-10-CM

## 2025-04-16 DIAGNOSIS — M33.13 DERMATOMYOSITIS (HCC): ICD-10-CM

## 2025-04-16 DIAGNOSIS — E66.9 OBESITY, DIABETES, AND HYPERTENSION SYNDROME (HCC): ICD-10-CM

## 2025-04-16 DIAGNOSIS — E11.69 OBESITY, DIABETES, AND HYPERTENSION SYNDROME (HCC): ICD-10-CM

## 2025-04-16 DIAGNOSIS — J30.9 ALLERGIC RHINITIS, UNSPECIFIED SEASONALITY, UNSPECIFIED TRIGGER: ICD-10-CM

## 2025-04-16 DIAGNOSIS — M17.12 PRIMARY OSTEOARTHRITIS OF LEFT KNEE: ICD-10-CM

## 2025-04-16 DIAGNOSIS — M25.552 CHRONIC HIP PAIN, BILATERAL: ICD-10-CM

## 2025-04-16 DIAGNOSIS — G89.29 CHRONIC HIP PAIN, BILATERAL: ICD-10-CM

## 2025-04-16 DIAGNOSIS — I89.0 LYMPHEDEMA: ICD-10-CM

## 2025-04-16 DIAGNOSIS — R60.0 BILATERAL LEG EDEMA: ICD-10-CM

## 2025-04-16 DIAGNOSIS — G89.29 CHRONIC PAIN OF LEFT KNEE: Primary | ICD-10-CM

## 2025-04-16 DIAGNOSIS — N31.2 ATONIC URINARY BLADDER: ICD-10-CM

## 2025-04-16 DIAGNOSIS — E11.59 OBESITY, DIABETES, AND HYPERTENSION SYNDROME (HCC): ICD-10-CM

## 2025-04-16 DIAGNOSIS — E55.9 VITAMIN D DEFICIENCY: ICD-10-CM

## 2025-04-16 DIAGNOSIS — K21.9 GASTROESOPHAGEAL REFLUX DISEASE WITHOUT ESOPHAGITIS: ICD-10-CM

## 2025-04-16 DIAGNOSIS — E11.65 TYPE 2 DIABETES MELLITUS WITH HYPERGLYCEMIA, WITHOUT LONG-TERM CURRENT USE OF INSULIN (HCC): ICD-10-CM

## 2025-04-16 DIAGNOSIS — K64.5 EXTERNAL HEMORRHOID, THROMBOSED: ICD-10-CM

## 2025-04-16 DIAGNOSIS — E66.01 MORBID OBESITY WITH BMI OF 50.0-59.9, ADULT: ICD-10-CM

## 2025-04-16 DIAGNOSIS — I15.2 OBESITY, DIABETES, AND HYPERTENSION SYNDROME (HCC): ICD-10-CM

## 2025-04-16 PROCEDURE — 3044F HG A1C LEVEL LT 7.0%: CPT | Performed by: FAMILY MEDICINE

## 2025-04-16 PROCEDURE — G8417 CALC BMI ABV UP PARAM F/U: HCPCS | Performed by: FAMILY MEDICINE

## 2025-04-16 PROCEDURE — 97110 THERAPEUTIC EXERCISES: CPT

## 2025-04-16 PROCEDURE — 3075F SYST BP GE 130 - 139MM HG: CPT | Performed by: FAMILY MEDICINE

## 2025-04-16 PROCEDURE — 99214 OFFICE O/P EST MOD 30 MIN: CPT | Performed by: FAMILY MEDICINE

## 2025-04-16 PROCEDURE — 97112 NEUROMUSCULAR REEDUCATION: CPT

## 2025-04-16 PROCEDURE — 3079F DIAST BP 80-89 MM HG: CPT | Performed by: FAMILY MEDICINE

## 2025-04-16 PROCEDURE — 1036F TOBACCO NON-USER: CPT | Performed by: FAMILY MEDICINE

## 2025-04-16 PROCEDURE — G8427 DOCREV CUR MEDS BY ELIG CLIN: HCPCS | Performed by: FAMILY MEDICINE

## 2025-04-16 PROCEDURE — 2022F DILAT RTA XM EVC RTNOPTHY: CPT | Performed by: FAMILY MEDICINE

## 2025-04-16 RX ORDER — DULOXETIN HYDROCHLORIDE 60 MG/1
60 CAPSULE, DELAYED RELEASE ORAL DAILY
Qty: 90 CAPSULE | Refills: 1 | Status: SHIPPED | OUTPATIENT
Start: 2025-04-16

## 2025-04-16 RX ORDER — FEXOFENADINE HCL 180 MG/1
180 TABLET ORAL DAILY
Qty: 90 TABLET | Refills: 1 | Status: SHIPPED | OUTPATIENT
Start: 2025-04-16

## 2025-04-16 RX ORDER — TORSEMIDE 5 MG/1
5 TABLET ORAL PRN
Qty: 90 TABLET | Refills: 1 | Status: SHIPPED | OUTPATIENT
Start: 2025-04-16

## 2025-04-16 RX ORDER — CLINDAMYCIN PHOSPHATE 10 MG/G
GEL TOPICAL 2 TIMES DAILY
COMMUNITY
Start: 2025-04-15

## 2025-04-16 RX ORDER — DOCUSATE SODIUM 100 MG/1
200 CAPSULE, LIQUID FILLED ORAL 2 TIMES DAILY
Qty: 360 CAPSULE | Refills: 0 | Status: SHIPPED | OUTPATIENT
Start: 2025-04-16 | End: 2025-07-15

## 2025-04-16 RX ORDER — LANSOPRAZOLE 30 MG/1
30 CAPSULE, DELAYED RELEASE ORAL DAILY
Qty: 90 CAPSULE | Refills: 0 | Status: SHIPPED | OUTPATIENT
Start: 2025-04-16

## 2025-04-16 RX ORDER — FLUOCINONIDE 0.5 MG/G
CREAM TOPICAL
COMMUNITY

## 2025-04-16 RX ORDER — ROSUVASTATIN CALCIUM 5 MG/1
5 TABLET, COATED ORAL DAILY
Qty: 90 TABLET | Refills: 1 | Status: SHIPPED | OUTPATIENT
Start: 2025-04-16

## 2025-04-16 RX ORDER — MONTELUKAST SODIUM 10 MG/1
10 TABLET ORAL NIGHTLY
Qty: 90 TABLET | Refills: 1 | Status: SHIPPED | OUTPATIENT
Start: 2025-04-16

## 2025-04-16 RX ORDER — FLUTICASONE PROPIONATE 50 MCG
2 SPRAY, SUSPENSION (ML) NASAL DAILY
Qty: 16 G | Refills: 3 | Status: SHIPPED | OUTPATIENT
Start: 2025-04-16

## 2025-04-16 RX ORDER — VALSARTAN 80 MG/1
TABLET ORAL
Qty: 90 TABLET | Refills: 0 | Status: SHIPPED | OUTPATIENT
Start: 2025-04-16

## 2025-04-16 RX ORDER — ERGOCALCIFEROL 1.25 MG/1
CAPSULE, LIQUID FILLED ORAL
Qty: 12 CAPSULE | Refills: 1 | Status: SHIPPED | OUTPATIENT
Start: 2025-04-16

## 2025-04-16 RX ORDER — METFORMIN HYDROCHLORIDE 500 MG/1
500 TABLET, EXTENDED RELEASE ORAL 2 TIMES DAILY WITH MEALS
Qty: 180 TABLET | Refills: 1 | Status: SHIPPED | OUTPATIENT
Start: 2025-04-16 | End: 2025-10-13

## 2025-04-16 RX ORDER — HYDROCORTISONE 25 MG/G
OINTMENT TOPICAL
COMMUNITY
Start: 2025-03-17

## 2025-04-16 RX ORDER — BENZOCAINE/MENTHOL 6 MG-10 MG
LOZENGE MUCOUS MEMBRANE
Qty: 30 G | Refills: 1 | Status: SHIPPED | OUTPATIENT
Start: 2025-04-16 | End: 2025-04-23

## 2025-04-16 ASSESSMENT — ENCOUNTER SYMPTOMS
VOMITING: 0
CONSTIPATION: 0
SHORTNESS OF BREATH: 0
COUGH: 0
NAUSEA: 0
DIARRHEA: 0

## 2025-04-16 NOTE — FLOWSHEET NOTE
Complexity Justification  [x] A history of present problem and 1-2 personal factors and/or co-morbidities that impact the plan of care  [x] A total of 3 elements found upon examination of body systems using standardized tests and measures addressing any of the following: body structures, functions (impairments), activity limitations, and/or participation restrictions  [x] A clinical presentation with stable and/or uncomplicated characteristics   [x] Clinical decision making of LOW (06045 - Typically 20 minutes face-to-face) complexity using standardized patient assessment instrument and/or measurable assessment of functional outcome.    Today's Assessment: During therapy this date, patient required verbal cueing and tactile cueing for static and dynamic balance and improving postural awareness.Patient will continue to benefit from ongoing evaluation and advanced clinical decision from a Physical Therapist to address and improve muscle strength, neuromuscular control, and balance and proprioception to safely return to PLOF without symptoms or restrictions.    Medical Necessity Documentation:  I certify that this patient meets the below criteria necessary for medical necessity for care and/or justification of therapy services:  The patient has functional impairments and/or activity limitations and would benefit from continued outpatient therapy services to address the deficits outlined in the patients goals  The patient has a musculoskeletal condition(s) with a corresponding ICD-10 code that is of complexity and severity that require skilled therapeutic intervention. This has a direct and significant impact on the need for therapy and significantly impacts the rate of recovery.   The patient has associated co-morbidities along with primary diagnosis which significantly impact the rate of recovery and contribute to complexities that require skilled therapeutic intervention    Return to Play: NA    Prognosis for

## 2025-04-16 NOTE — PROGRESS NOTES
Kerry Villela (:  1980) is a 44 y.o. female,Established patient, here for evaluation of the following chief complaint(s):  Diabetes (2 month follow up ), Referral - General (Rheum and ENT ), Other (Pt has knot under chin and and abscess on right buttock pt states its real tender ad gets them so often ), and Medication Refill (All meds and discuss allergy meds )    Haivng PT twice per week x 8 weeks  Waiting for knee gel injection approval  Looking at August for the bariatric surgery with bariatric surgeon      SUBJECTIVE:  25:   DM follow up  Abscess/pain to right buttock  Knot under chin?  Recently prescribed clindamycin (CLINDAGEL) 1 % gel from dermatology, on 4.15.25      Saw Dr. Houston on 25 -- Impression: left knee osteoarthritis; had steroid injection done at his office    Subjective:  - Perianal abscess: reports recurrent abscess in right buttock area, tender when wiping, no drainage  - Previous history of massive infected perianal abscess after childbirth 24 years ago requiring I&D  - Hemorrhoids: external hemorrhoids since first childbirth, flare up occasionally, currently tender  - Burning sensation in nostrils: occurs variably day/night, alternating nostrils  - Allergies: allergic to 51 environmental allergens plus food allergies per ENT testing 10 years ago  - Current allergy medications ineffective  - Lump under chin: sometimes painful, causes throat pain in crease area  - Knee pain: right knee injection only lasted 1 week, left knee recently injected  - Doing physical therapy twice weekly for knees  - Taking Meloxicam for knee pain  - Weight loss program: 3 months completed, restarted due to missed appointments, targeting August start date    Past Medical History:  - Perianal abscess s/p I&D  - Rectal tear  - Knee arthritis  - Hemorrhoids  - Environmental and food allergies including corn (yellow corn worse than white)  - Hyperlipidemia  - Hypertension  - Vitamin D deficiency  -

## 2025-04-17 ENCOUNTER — OFFICE VISIT (OUTPATIENT)
Dept: UROGYNECOLOGY | Age: 45
End: 2025-04-17

## 2025-04-17 VITALS
HEART RATE: 85 BPM | SYSTOLIC BLOOD PRESSURE: 141 MMHG | DIASTOLIC BLOOD PRESSURE: 75 MMHG | TEMPERATURE: 97.6 F | OXYGEN SATURATION: 100 % | RESPIRATION RATE: 20 BRPM

## 2025-04-17 DIAGNOSIS — N39.0 ACUTE UTI: Primary | ICD-10-CM

## 2025-04-17 DIAGNOSIS — R30.0 DYSURIA: ICD-10-CM

## 2025-04-17 LAB
BILIRUBIN, POC: NORMAL
BLOOD URINE, POC: NORMAL
CLARITY, POC: CLEAR
COLOR, POC: YELLOW
GLUCOSE URINE, POC: NORMAL MG/DL
KETONES, POC: NORMAL MG/DL
LEUKOCYTE EST, POC: NORMAL
NITRITE, POC: NORMAL
PH, POC: 6.5
PROTEIN, POC: NORMAL MG/DL
SPECIFIC GRAVITY, POC: 1.01
UROBILINOGEN, POC: NORMAL MG/DL

## 2025-04-17 NOTE — PROGRESS NOTES
2025       HPI:     Name: Kerry Villela  YOB: 1980    CC: Patient is a 44 y.o. presenting for evaluation of UTI.       HPI: How long have you had this problem?    Please rate the severity of your problem: moderate  Anything make it better? Patient finished ABX this morning, reports continued urethral pain and burning.      Kerry was treated for positive urine dip on 4/10 but unfortunately due to office error, culture was not ran  She reports she feels better, just some mild burning so wanted to be sure she took the right medication  Present for CHERYL urine culture  Ob/Gyn History:    OB History    Para Term  AB Living   3 3 3   3   SAB IAB Ectopic Molar Multiple Live Births        2      # Outcome Date GA Lbr Maco/2nd Weight Sex Type Anes PTL Lv   3 Term 03   3.289 kg (7 lb 4 oz) F Vag-Spont  N JUSTIN   2 Term 10/04/00   2.991 kg (6 lb 9.5 oz) M Vag-Spont  N JUSTIN      Birth Comments: PRE ECLAMPSIA   1 Term         JUSTIN     Past Medical History:   Past Medical History:   Diagnosis Date    Abscess 2022    Allergic reaction to food 2020    Allergic rhinitis     Anemia     Anesthesia complication     last same surgery at [facility] states she had trouble breathing afterwards    Bilateral hand numbness 2020    Bilateral hip pain 06/15/2021    CHF (congestive heart failure) ([facility])     no meds-only saw cardiologist x1 and discharged    chronic gerd     Dermatomyositis ([facility])     Eczema     Fibromyalgia     Headache(784.0)     History of blood transfusion 2009    also two in      HTN (hypertension)     Hx of blood clots     PE and DVT    Hyperlipidemia     Irritable bowel syndrome     Lymphedema     Morbid obesity with BMI of 50.0-59.9, adult     obstructive sleep apnea     pt uses cpap machine    Osteoarthritis     Overactive bladder     PCOS (polycystic ovarian syndrome)     Pre-diabetes     Pre-operative clearance     Prediabetes     Prolonged

## 2025-04-18 ENCOUNTER — HOSPITAL ENCOUNTER (OUTPATIENT)
Dept: PHYSICAL THERAPY | Age: 45
Setting detail: THERAPIES SERIES
Discharge: HOME | End: 2025-04-18
Attending: ORTHOPAEDIC SURGERY
Payer: COMMERCIAL

## 2025-04-18 ENCOUNTER — TELEPHONE (OUTPATIENT)
Dept: PRIMARY CARE CLINIC | Age: 45
End: 2025-04-18

## 2025-04-18 DIAGNOSIS — M33.13 DERMATOMYOSITIS (HCC): Primary | ICD-10-CM

## 2025-04-18 NOTE — TELEPHONE ENCOUNTER
Busy signal was given for all faxe numbers informed pt office is closed early for the [place] informed will try again on Monday pt expressed understanding

## 2025-04-18 NOTE — TELEPHONE ENCOUNTER
The rheumatologist you sent her to yesterday doesn't accept her insurance. She called [facility] here are 3 doctors who will accept her insurance. They need a referral office notes and any labs and imaging. Which one do you recommend:    DR Suazo Fax [phone]  Dr Yusuf  Fax [phone]  Dr Iraheta  Fax [phone]    Please call Dank [phone] to let her know which one and that referral info was faxed.

## 2025-04-20 LAB — BACTERIA UR CULT: NORMAL

## 2025-04-21 ENCOUNTER — RESULTS FOLLOW-UP (OUTPATIENT)
Dept: UROGYNECOLOGY | Age: 45
End: 2025-04-21

## 2025-04-21 ENCOUNTER — OFFICE VISIT (OUTPATIENT)
Dept: ORTHOPEDIC SURGERY | Age: 45
End: 2025-04-21
Payer: COMMERCIAL

## 2025-04-21 ENCOUNTER — HOSPITAL ENCOUNTER (OUTPATIENT)
Dept: PHYSICAL THERAPY | Age: 45
Setting detail: THERAPIES SERIES
Discharge: HOME | End: 2025-04-21
Attending: ORTHOPAEDIC SURGERY
Payer: COMMERCIAL

## 2025-04-21 VITALS — HEIGHT: 63 IN | BODY MASS INDEX: 51.91 KG/M2 | WEIGHT: 293 LBS

## 2025-04-21 DIAGNOSIS — G89.29 CHRONIC MIDLINE LOW BACK PAIN, UNSPECIFIED WHETHER SCIATICA PRESENT: Primary | ICD-10-CM

## 2025-04-21 DIAGNOSIS — M54.50 CHRONIC MIDLINE LOW BACK PAIN, UNSPECIFIED WHETHER SCIATICA PRESENT: Primary | ICD-10-CM

## 2025-04-21 DIAGNOSIS — M47.27 LUMBOSACRAL SPONDYLOSIS WITH RADICULOPATHY: ICD-10-CM

## 2025-04-21 PROCEDURE — 99214 OFFICE O/P EST MOD 30 MIN: CPT | Performed by: PHYSICIAN ASSISTANT

## 2025-04-21 PROCEDURE — G8417 CALC BMI ABV UP PARAM F/U: HCPCS | Performed by: PHYSICIAN ASSISTANT

## 2025-04-21 PROCEDURE — 97110 THERAPEUTIC EXERCISES: CPT

## 2025-04-21 PROCEDURE — G8427 DOCREV CUR MEDS BY ELIG CLIN: HCPCS | Performed by: PHYSICIAN ASSISTANT

## 2025-04-21 PROCEDURE — 1036F TOBACCO NON-USER: CPT | Performed by: PHYSICIAN ASSISTANT

## 2025-04-21 PROCEDURE — 97112 NEUROMUSCULAR REEDUCATION: CPT

## 2025-04-21 NOTE — PROGRESS NOTES
verbal recognition program (DRAGON) and an attempt was made to check for errors.  It is possible that there are still dictated errors within this office note.  If so, please bring any significant errors to my attention for an addendum.  All efforts were made to ensure that this office note is accurate.

## 2025-04-21 NOTE — FLOWSHEET NOTE
[facility] - Outpatient Rehabilitation and Therapy: [address], [address], [place] [address] office: [phone] fax: [phone]       Physical Therapy: TREATMENT/PROGRESS NOTE   Patient: Kerry Villela (44 y.o. female)   Examination Date: 2025   :  1980 MRN: [id number]   Visit #: 3/AUTH AFTER 30  Insurance Allowable Auth Needed   CARESOURCE []Yes    [x]No    Insurance: Payor: CARESOURCE / Plan: [employer] MEDICAID / Product Type: *No Product type* /   Insurance ID: [id number] - (Medicaid Managed)  Secondary Insurance (if applicable):    Treatment Diagnosis:     ICD-10-CM    1. Decreased functional mobility  R26.89       2. Decreased activity tolerance  R68.89       3. Pain aggravated by standing  R52       4. Functional weakness  R53.1       5. Pain in both knees, unspecified chronicity  M25.561     M25.562       6. Need for home exercise program  Z78.9          Medical Diagnosis:  Primary osteoarthritis of right knee [M17.11]  Primary osteoarthritis of left knee [M17.12]   Referring Physician: Trevon Houston MD  PCP: Zahira Dominguez DO     Plan of care signed (Y/N): NO    Date of Patient follow up with Physician:      Progress Report/POC: NO  Progress note due: 2025 (OR 10 visits /OR AUTH LIMITS, whichever is less)  POC update due: 25                                            Medical History:  Comorbidities:  Hypertension  Osteoarthritis  Other Cardiovascular Conditions: congestive heart failure  Other: hyperlipidemia, hx of  blood transfusion, anemia, hx of blood clots, fibromyalgia, GERD, obesity, PCOS, pulmonary embolism                                         Precautions/ Contra-indications:           Latex allergy:  YES  Pacemaker:    NO  Contraindications for Manipulation: NA  Date of Surgery: NA  Other:    Red Flags:  None    Suicide Screening:   The patient did not verbalize a primary behavioral concern, suicidal ideation, suicidal intent, or

## 2025-04-21 NOTE — RESULT ENCOUNTER NOTE
Informed patient of normal urine culture results. Patient verbalizes understanding of all of the above, and has no further questions or concerns at this time. Encouraged to call back the office should any questions/concerns arise. 4/21/2025 at 9:23 AM.

## 2025-04-23 ENCOUNTER — HOSPITAL ENCOUNTER (OUTPATIENT)
Dept: PHYSICAL THERAPY | Age: 45
Setting detail: THERAPIES SERIES
Discharge: HOME | End: 2025-04-23
Attending: ORTHOPAEDIC SURGERY
Payer: COMMERCIAL

## 2025-04-23 PROCEDURE — 97110 THERAPEUTIC EXERCISES: CPT

## 2025-04-23 PROCEDURE — 97112 NEUROMUSCULAR REEDUCATION: CPT

## 2025-04-23 NOTE — FLOWSHEET NOTE
[facility] - Outpatient Rehabilitation and Therapy: [address], [address], [place] [address] office: [phone] fax: [phone]       Physical Therapy: TREATMENT/PROGRESS NOTE   Patient: Kerry Villela (44 y.o. female)   Examination Date: 2025   :  1980 MRN: [id number]   Visit #: 4/AUTH AFTER 30  Insurance Allowable Auth Needed   [employer]ESOURCE []Yes    [x]No    Insurance: Payor: CARESOURCE / Plan: [employer] MEDICAID / Product Type: *No Product type* /   Insurance ID: [id number] - (Medicaid Managed)  Secondary Insurance (if applicable):    Treatment Diagnosis:     ICD-10-CM    1. Decreased functional mobility  R26.89       2. Decreased activity tolerance  R68.89       3. Pain aggravated by standing  R52       4. Functional weakness  R53.1       5. Pain in both knees, unspecified chronicity  M25.561     M25.562       6. Need for home exercise program  Z78.9          Medical Diagnosis:  Primary osteoarthritis of right knee [M17.11]  Primary osteoarthritis of left knee [M17.12]   Referring Physician: Trevon Houston MD  PCP: Zahira Dominguez DO     Plan of care signed (Y/N): YES    Date of Patient follow up with Physician:      Progress Report/POC: NO  POC note due: 2025 (OR 10 visits /OR AUTH LIMITS, whichever is less)                                            Medical History:  Comorbidities:  Hypertension  Osteoarthritis  Other Cardiovascular Conditions: congestive heart failure  Other: hyperlipidemia, hx of  blood transfusion, anemia, hx of blood clots, fibromyalgia, GERD, obesity, PCOS, pulmonary embolism                                         Precautions/ Contra-indications:           Latex allergy:  YES  Pacemaker:    NO  Contraindications for Manipulation: NA  Date of Surgery: NA  Other:    Red Flags:  None    Suicide Screening:   The patient did not verbalize a primary behavioral concern, suicidal ideation, suicidal intent, or demonstrate suicidal

## 2025-04-24 ENCOUNTER — OFFICE VISIT (OUTPATIENT)
Dept: ORTHOPEDIC SURGERY | Age: 45
End: 2025-04-24
Payer: COMMERCIAL

## 2025-04-24 ENCOUNTER — TELEPHONE (OUTPATIENT)
Dept: UROGYNECOLOGY | Age: 45
End: 2025-04-24

## 2025-04-24 VITALS — HEIGHT: 63 IN | BODY MASS INDEX: 51.91 KG/M2 | WEIGHT: 293 LBS

## 2025-04-24 DIAGNOSIS — R30.0 BURNING WITH URINATION: Primary | ICD-10-CM

## 2025-04-24 DIAGNOSIS — K64.5 EXTERNAL HEMORRHOID, THROMBOSED: ICD-10-CM

## 2025-04-24 DIAGNOSIS — R35.0 URINARY FREQUENCY: ICD-10-CM

## 2025-04-24 DIAGNOSIS — M47.814 SPONDYLOSIS, THORACIC: ICD-10-CM

## 2025-04-24 DIAGNOSIS — M47.812 SPONDYLOSIS, CERVICAL: Primary | ICD-10-CM

## 2025-04-24 PROCEDURE — G8427 DOCREV CUR MEDS BY ELIG CLIN: HCPCS | Performed by: PHYSICIAN ASSISTANT

## 2025-04-24 PROCEDURE — G8417 CALC BMI ABV UP PARAM F/U: HCPCS | Performed by: PHYSICIAN ASSISTANT

## 2025-04-24 PROCEDURE — 99214 OFFICE O/P EST MOD 30 MIN: CPT | Performed by: PHYSICIAN ASSISTANT

## 2025-04-24 PROCEDURE — 1036F TOBACCO NON-USER: CPT | Performed by: PHYSICIAN ASSISTANT

## 2025-04-24 RX ORDER — PHENAZOPYRIDINE HYDROCHLORIDE 100 MG/1
100 TABLET, FILM COATED ORAL 3 TIMES DAILY PRN
Qty: 9 TABLET | Refills: 0 | Status: SHIPPED | OUTPATIENT
Start: 2025-04-24 | End: 2025-04-27

## 2025-04-24 NOTE — TELEPHONE ENCOUNTER
Advised patient to drop specimen at lab for outpatient urine culture, order placed at this time per Meka Chatman NP. Patient describes significant burning with urination and urinary frequency. Pyridium PRN TID x 3 days sent to patient's preferred pharmacy at this time per CD. Patient verbalized understanding, denies any further questions at this time.

## 2025-04-24 NOTE — PROGRESS NOTES
obstructive sleep apnea     pt uses cpap machine    Osteoarthritis     Overactive bladder     PCOS (polycystic ovarian syndrome)     Pre-diabetes     Pre-operative clearance     Prediabetes     Prolonged emergence from general anesthesia     x1 at age 16 after ovarian cyst removal    Pulmonary embolism ([facility]) 07/17/2014    Overview:  Continue anticoagulation.  Medicine managing Coumadin bridge.  Daily H/H, PTT, PT/INR.   Last Assessment & Plan:  HPI: Seen in hospital follow up.  The patient is a 33 year old y/o female who admitted to the hospital for pulmonary emboli. She began to experience left lower extremity swelling and pain for two days, and day of admission she began to experience chest pain and shortness of     Vaginal high risk HPV DNA test positive 05/2016    Wears glasses         Past Surgical History:     Past Surgical History:   Procedure Laterality Date    CARPAL TUNNEL RELEASE Right 01/11/2024    RIGHT CARPAL TUNNEL RELEASE performed by Shawn Garcia MD at [facility] OR    COLONOSCOPY N/A 01/13/2021    COLONOSCOPY performed by Salvatore Carranza MD at [facility] ENDOSCOPY    CYSTOSCOPY N/A 04/28/2023    CYSTOSCOPY performed by Mihir Alvarado MD at [facility] OR    CYSTOSCOPY N/A 03/15/2024    CYSTOSCOPY WITH CHEMODENERVATION OF BLADDER- BOTOX 100units performed by Mihir Alvarado MD at [facility] OR    CYSTOSCOPY N/A 09/09/2024    BOTOX INJECTIONS 100 UNITS performed by Giuliano Krishnan MD at [facility] OR    CYSTOSCOPY N/A 09/09/2024    CYSTOSCOPY performed by Giuliano Krishnan MD at [facility] OR    CYSTOSCOPY N/A 2/17/2025    CYSTOSCOPY WITH BOTOX INJECTIONS IN THE BLADDER 100 UNITS performed by Giuliano Krishnan MD at [facility] OR    EYE SURGERY      x2    HYSTERECTOMY (CERVIX STATUS UNKNOWN)  2014    full-ovaries gone    MUSCLE BIOPSY      left thigh    OVARIAN CYST REMOVAL  1998    PARTIAL HYSTERECTOMY (CERVIX NOT REMOVED)      RECTOCELE REPAIR  2023    SKIN BIOPSY  2021    birth merritt removal left arm    STRABISMUS

## 2025-04-25 RX ORDER — DOCUSATE SODIUM 100 MG/1
100 CAPSULE, LIQUID FILLED ORAL 2 TIMES DAILY
Qty: 180 CAPSULE | Refills: 0 | OUTPATIENT
Start: 2025-04-25

## 2025-04-29 ENCOUNTER — OFFICE VISIT (OUTPATIENT)
Dept: ORTHOPEDIC SURGERY | Age: 45
End: 2025-04-29

## 2025-04-29 ENCOUNTER — OFFICE VISIT (OUTPATIENT)
Dept: UROGYNECOLOGY | Age: 45
End: 2025-04-29
Payer: COMMERCIAL

## 2025-04-29 VITALS
OXYGEN SATURATION: 98 % | HEART RATE: 69 BPM | SYSTOLIC BLOOD PRESSURE: 141 MMHG | RESPIRATION RATE: 16 BRPM | TEMPERATURE: 97.7 F | DIASTOLIC BLOOD PRESSURE: 84 MMHG

## 2025-04-29 VITALS — HEIGHT: 63 IN | BODY MASS INDEX: 51.91 KG/M2 | WEIGHT: 293 LBS

## 2025-04-29 DIAGNOSIS — M17.11 PRIMARY OSTEOARTHRITIS OF RIGHT KNEE: ICD-10-CM

## 2025-04-29 DIAGNOSIS — R30.0 BURNING WITH URINATION: Primary | ICD-10-CM

## 2025-04-29 DIAGNOSIS — M70.61 GREATER TROCHANTERIC BURSITIS OF BOTH HIPS: Primary | ICD-10-CM

## 2025-04-29 DIAGNOSIS — M17.12 PRIMARY OSTEOARTHRITIS OF LEFT KNEE: ICD-10-CM

## 2025-04-29 DIAGNOSIS — R35.0 URINARY FREQUENCY: ICD-10-CM

## 2025-04-29 DIAGNOSIS — N89.8 VAGINAL DISCHARGE: ICD-10-CM

## 2025-04-29 DIAGNOSIS — M70.62 GREATER TROCHANTERIC BURSITIS OF BOTH HIPS: Primary | ICD-10-CM

## 2025-04-29 PROCEDURE — 51701 INSERT BLADDER CATHETER: CPT | Performed by: NURSE PRACTITIONER

## 2025-04-29 PROCEDURE — G8427 DOCREV CUR MEDS BY ELIG CLIN: HCPCS | Performed by: NURSE PRACTITIONER

## 2025-04-29 PROCEDURE — G8417 CALC BMI ABV UP PARAM F/U: HCPCS | Performed by: NURSE PRACTITIONER

## 2025-04-29 PROCEDURE — 3077F SYST BP >= 140 MM HG: CPT | Performed by: NURSE PRACTITIONER

## 2025-04-29 PROCEDURE — 99213 OFFICE O/P EST LOW 20 MIN: CPT | Performed by: NURSE PRACTITIONER

## 2025-04-29 PROCEDURE — 1036F TOBACCO NON-USER: CPT | Performed by: NURSE PRACTITIONER

## 2025-04-29 PROCEDURE — 3079F DIAST BP 80-89 MM HG: CPT | Performed by: NURSE PRACTITIONER

## 2025-04-29 PROCEDURE — 81002 URINALYSIS NONAUTO W/O SCOPE: CPT | Performed by: NURSE PRACTITIONER

## 2025-04-29 NOTE — PROGRESS NOTES
Cancer Neg Hx     Rashes/Skin Problems Neg Hx     Seizures Neg Hx     Stroke Neg Hx          Objective:     Vitals  Vitals:    04/29/25 0956   BP: (!) 141/84   Pulse: 69   Resp: 16   Temp: 97.7 °F (36.5 °C)   SpO2: 98%     Physical Exam  Physical Exam  Genitourinary:     General: Normal vulva.      Vagina: Vaginal discharge present.      Comments: Urethra: WNL, no lesions  Vagina:  clear of lesions, scant discharge, affirm testing obtained          Results for orders placed or performed in visit on 04/29/25   POCT Urinalysis no Micro   Result Value Ref Range    Color, UA yellow     Clarity, UA clear     Glucose, UA POC neg mg/dL    Bilirubin, UA neg     Ketones, UA pos, small mg/dL    Spec Grav, UA 1.015     Blood, UA POC neg     pH, UA 6.5     Protein, UA POC neg mg/dL    Urobilinogen, UA neg mg/dL    Leukocytes, UA neg     Nitrite, UA neg       Straight urinary catheterization performed by sterile procedure for urine specimen per Meka Chatman NP.   Patient tolerated well.  Meka Chatman NP made aware.      Assessment/Plan:     Kerry Villela is a 44 y.o. female with   1. Burning with urination    2. Urinary frequency    3. Vaginal discharge    Dysuria:  Urine sent for culture  Vaginal discharge:  Affirm testing obtained  If urine culture again negative, recommend follow up with GYN for burning. She reports she does have annual exam scheduled in 1-2 weeks  ZELDA Muhammad CNP      Orders Placed This Encounter   Procedures    INSERT,NON-INDWELLING BLADDER CATHETER    VAGINITIS PANEL PCR    Culture, Urine     Specify (ex-cath, midstream, cysto, etc)?:   straight catheter    POCT Urinalysis no Micro     No orders of the defined types were placed in this encounter.      ZELDA Muhammad CNP

## 2025-04-29 NOTE — PROGRESS NOTES
Kerry Villeal  [id number]  April 29, 2025    Chief Complaint   Patient presents with    Follow-up     Bilateral Hips       History: The patient is a 44-year-old female who is here for evaluation of both hips.  She states that her left hip bothers her more than the right.  Most of the pain is lateral.  She has been taking meloxicam 15 mg daily with mild improvement.  She did receive bilateral knee injections a few weeks ago and feels that her knees are affecting her hips.  The knee pain has returned.  She also wanted to be evaluated for the knees as well.    The patient's  past medical history, medications, allergies,  family history, social history, and have been reviewed, and dated and are recorded in the chart.  Pertinent items are noted in HPI.  Review of systems reviewed from Pertinent History Form dated on 4/29 and available in the patient's chart under the Media tab.     Vitals:  Ht 1.6 m (5' 3\")   Wt (!) 151 kg (333 lb)   LMP 05/26/2014   BMI 58.99 kg/m²     Physical: Physical: Ms. Kerry Villela appears well, she is in no apparent distress, she demonstrates appropriate mood & affect. The patient is non tender to palpation of the lumbar spine. Leg lengths are symmetric. She has severe tenderness to palpation over the bilateral greater trochanter. She has pain with resisted abduction of the bilateral hip and passive adduction. Range of motion of the hips is full. Examination of the skin reveals no rashes, ulcerations, or lesions, bilaterally in the lower extremities.  Sensation to both lower extremities is grossly intact.  Exam of both feet reveals pedal pulses intact and brisk cap refill.  Patient is able to dorsiflex and wiggle all toes.  Deep tendon reflexes of the lower extremities are normal and symmetric. Straight leg testing is negative bilaterally.  Examination of both knees is essentially unchanged.  She has severe joint line tenderness bilaterally.  She has moderate to severe patellofemoral

## 2025-04-30 ENCOUNTER — HOSPITAL ENCOUNTER (OUTPATIENT)
Dept: PHYSICAL THERAPY | Age: 45
Setting detail: THERAPIES SERIES
Discharge: HOME | End: 2025-04-30
Attending: ORTHOPAEDIC SURGERY
Payer: COMMERCIAL

## 2025-04-30 ENCOUNTER — RESULTS FOLLOW-UP (OUTPATIENT)
Dept: UROGYNECOLOGY | Age: 45
End: 2025-04-30

## 2025-04-30 LAB
BV BACTERIA DNA VAG QL NAA+PROBE: NOT DETECTED
C GLABRATA DNA VAG QL NAA+PROBE: NORMAL
C GLABRATA DNA VAG QL NAA+PROBE: NOT DETECTED
C KRUSEI DNA VAG QL NAA+PROBE: NOT DETECTED
CANDIDA DNA VAG QL NAA+PROBE: NOT DETECTED
T VAGINALIS DNA VAG QL NAA+PROBE: NOT DETECTED

## 2025-04-30 PROCEDURE — 97110 THERAPEUTIC EXERCISES: CPT

## 2025-04-30 PROCEDURE — 97112 NEUROMUSCULAR REEDUCATION: CPT

## 2025-04-30 NOTE — RESULT ENCOUNTER NOTE
Advised patient of normal results. Pt verbalized understanding, denies any questions/concerns at this time.

## 2025-04-30 NOTE — FLOWSHEET NOTE
[facility] - Outpatient Rehabilitation and Therapy: [address], [address], [place] [address] office: [phone] fax: [phone]       Physical Therapy: TREATMENT/PROGRESS NOTE   Patient: Kerry Villela (44 y.o. female)   Examination Date: 2025   :  1980 MRN: [id number]   Visit #: 5/ AFTER 30  Insurance Allowable Auth Needed   CARESOURCE []Yes    [x]No    Insurance: Payor: CARESOURCE / Plan: [employer] MEDICAID / Product Type: *No Product type* /   Insurance ID: [id number] - (Medicaid Managed)  Secondary Insurance (if applicable):    Treatment Diagnosis:     ICD-10-CM    1. Decreased functional mobility  R26.89       2. Decreased activity tolerance  R68.89       3. Pain aggravated by standing  R52       4. Functional weakness  R53.1       5. Pain in both knees, unspecified chronicity  M25.561     M25.562       6. Need for home exercise program  Z78.9          Medical Diagnosis:  Primary osteoarthritis of right knee [M17.11]  Primary osteoarthritis of left knee [M17.12]   Referring Physician: Trevon Houston MD  PCP: Zahira Dominguez DO     Plan of care signed (Y/N): YES    Date of Patient follow up with Physician:      Progress Report/POC: NO  POC note due: 2025 (OR 10 visits /OR AUTH LIMITS, whichever is less)                                            Medical History:  Comorbidities:  Hypertension  Osteoarthritis  Other Cardiovascular Conditions: congestive heart failure  Other: hyperlipidemia, hx of  blood transfusion, anemia, hx of blood clots, fibromyalgia, GERD, obesity, PCOS, pulmonary embolism                                         Precautions/ Contra-indications:           Latex allergy:  YES  Pacemaker:    NO  Contraindications for Manipulation: NA  Date of Surgery: NA  Other:    Red Flags:  None    Suicide Screening:   The patient did not verbalize a primary behavioral concern, suicidal ideation, suicidal intent, or demonstrate suicidal

## 2025-05-02 ENCOUNTER — HOSPITAL ENCOUNTER (OUTPATIENT)
Dept: PHYSICAL THERAPY | Age: 45
Setting detail: THERAPIES SERIES
Discharge: HOME | End: 2025-05-02
Attending: ORTHOPAEDIC SURGERY
Payer: COMMERCIAL

## 2025-05-02 LAB — BACTERIA UR CULT: NORMAL

## 2025-05-02 PROCEDURE — 97110 THERAPEUTIC EXERCISES: CPT

## 2025-05-02 PROCEDURE — 97112 NEUROMUSCULAR REEDUCATION: CPT

## 2025-05-02 NOTE — FLOWSHEET NOTE
[facility] - Outpatient Rehabilitation and Therapy: [address], [address], [place] [address] office: [phone] fax: [phone]       Physical Therapy: TREATMENT/PROGRESS NOTE   Patient: Kerry Villela (44 y.o. female)   Examination Date: 2025   :  1980 MRN: [id number]   Visit #:  AFTER 30  Insurance Allowable Auth Needed   CARESOURCE []Yes    [x]No    Insurance: Payor: [employer]ESOURCE / Plan: [employer] MEDICAID / Product Type: *No Product type* /   Insurance ID: [id number] - (Medicaid Managed)  Secondary Insurance (if applicable):    Treatment Diagnosis:     ICD-10-CM    1. Decreased functional mobility  R26.89       2. Decreased activity tolerance  R68.89       3. Pain aggravated by standing  R52       4. Functional weakness  R53.1       5. Pain in both knees, unspecified chronicity  M25.561     M25.562       6. Need for home exercise program  Z78.9          Medical Diagnosis:  Primary osteoarthritis of right knee [M17.11]  Primary osteoarthritis of left knee [M17.12]   Referring Physician: Trevon Houston MD  PCP: Zahira Dominguez DO     Plan of care signed (Y/N): YES    Date of Patient follow up with Physician:      Progress Report/POC: NO  POC note due: 2025                                             Medical History:  Comorbidities:  Hypertension  Osteoarthritis  Other Cardiovascular Conditions: congestive heart failure  Other: hyperlipidemia, hx of  blood transfusion, anemia, hx of blood clots, fibromyalgia, GERD, obesity, PCOS, pulmonary embolism                                         Precautions/ Contra-indications:           Latex allergy:  YES  Pacemaker:    NO  Contraindications for Manipulation: NA  Date of Surgery: NA  Other:    Red Flags:  None    Suicide Screening:   The patient did not verbalize a primary behavioral concern, suicidal ideation, suicidal intent, or demonstrate suicidal behaviors.    Preferred Language for Healthcare:   [x]

## 2025-05-05 ENCOUNTER — HOSPITAL ENCOUNTER (OUTPATIENT)
Dept: PHYSICAL THERAPY | Age: 45
Setting detail: THERAPIES SERIES
Discharge: HOME | End: 2025-05-05
Attending: ORTHOPAEDIC SURGERY
Payer: COMMERCIAL

## 2025-05-05 PROCEDURE — 97110 THERAPEUTIC EXERCISES: CPT

## 2025-05-05 PROCEDURE — 97112 NEUROMUSCULAR REEDUCATION: CPT

## 2025-05-05 NOTE — PLAN OF CARE
[facility] - Outpatient Rehabilitation and Therapy: [address], [place] [address] office: [phone] fax: [phone]    Physical Therapy Re-Certification Plan of Care    Dear Trevon Houston MD  ,    We had the pleasure of treating the following patient for physical therapy services at [facility] Outpatient Physical Therapy. A summary of our findings can be found in the updated assessment below.  This includes our plan of care.  If you have any questions or concerns regarding these findings, please do not hesitate to contact me at the office phone number checked above.  Thank you for the referral.     Physician Signature:________________________________Date:__________________  By signing above (or electronic signature), therapist's plan is approved by physician      Total Visits: 7     Overall Response to Treatment:  Patient is responding well to treatment and improvement is noted with regards to goals    Recommendation:    [x] Continue PT as per initial evaluation  [] Hold PT, pending MD visit   [] Discharge to [facility]. Follow up with PT or MD PRN.      Physical Therapy: TREATMENT/PROGRESS NOTE   Patient: Kerry Villela (44 y.o. female)   Examination Date: 2025   :  1980 MRN: [id number]   Visit #: 7 AFTER 30  Insurance Allowable Auth Needed   CARESOURCE []Yes    [x]No    Insurance: Payor: [employer]ESOURCE / Plan: [employer] [place] MEDICAID / Product Type: *No Product type* /   Insurance ID: [id number] - (Medicaid Managed)  Secondary Insurance (if applicable):    Treatment Diagnosis:     ICD-10-CM    1. Decreased functional mobility  R26.89       2. Decreased activity tolerance  R68.89       3. Pain aggravated by standing  R52       4. Functional weakness  R53.1       5. Pain in both knees, unspecified chronicity  M25.561     M25.562       6. Need for home exercise program  Z78.9          Medical Diagnosis:  Primary osteoarthritis of right knee [M17.11]  Primary

## 2025-05-06 ENCOUNTER — OFFICE VISIT (OUTPATIENT)
Dept: ORTHOPEDIC SURGERY | Age: 45
End: 2025-05-06

## 2025-05-06 VITALS — HEIGHT: 62 IN | BODY MASS INDEX: 53.92 KG/M2 | WEIGHT: 293 LBS

## 2025-05-06 DIAGNOSIS — M17.12 PRIMARY OSTEOARTHRITIS OF LEFT KNEE: Primary | ICD-10-CM

## 2025-05-06 DIAGNOSIS — M17.11 PRIMARY OSTEOARTHRITIS OF RIGHT KNEE: ICD-10-CM

## 2025-05-06 NOTE — PROGRESS NOTES
Kerry Villela  [id number]  May 6, 2025    Chief Complaint   Patient presents with    Follow-up     Bilateral knee, Gel-syn injection #2       Ms. Villela is here in follow-up regarding her bilateral  knee.  She is having no problems or concerns.  Her pain has moderately improved for both knees.  She rates her left knee pain as 0-1/10.  She rates her right knee pain as 2/10. She is here for the second Gelsyn injections.     Physical Exam:   Examination of the bilateral knee reveals no sign of synovitis.   There is minimal to no swelling.    Examination is essentially unchanged.     Impression:  bilateral knee osteoarthritis.      Plan: The patient was explained the risks, benefits and alternatives to injection.  The patient understood the risks and benefits and agreed to proceed.  We will go ahead and administer the second Gelsyn injections into the bilateral knee under sterile conditions.  After a betadine prep of the knee joints, 16.8 mg/2 ml of Gelsyn was injected into the knees.  The patient tolerated the injections rather well.  The patient was instructed to follow up for any signs of infection.        Trevon Houston M.D.

## 2025-05-09 ENCOUNTER — HOSPITAL ENCOUNTER (OUTPATIENT)
Dept: PHYSICAL THERAPY | Age: 45
Setting detail: THERAPIES SERIES
End: 2025-05-09
Attending: ORTHOPAEDIC SURGERY
Payer: COMMERCIAL

## 2025-05-13 ENCOUNTER — OFFICE VISIT (OUTPATIENT)
Dept: ORTHOPEDIC SURGERY | Age: 45
End: 2025-05-13
Payer: COMMERCIAL

## 2025-05-13 VITALS — WEIGHT: 293 LBS | BODY MASS INDEX: 53.92 KG/M2 | HEIGHT: 62 IN

## 2025-05-13 DIAGNOSIS — M17.12 PRIMARY OSTEOARTHRITIS OF LEFT KNEE: Primary | ICD-10-CM

## 2025-05-13 DIAGNOSIS — M17.11 PRIMARY OSTEOARTHRITIS OF RIGHT KNEE: ICD-10-CM

## 2025-05-13 PROCEDURE — 20610 DRAIN/INJ JOINT/BURSA W/O US: CPT | Performed by: ORTHOPAEDIC SURGERY

## 2025-05-13 RX ORDER — MELOXICAM 15 MG/1
15 TABLET ORAL DAILY PRN
Qty: 30 TABLET | Refills: 2 | Status: CANCELLED | OUTPATIENT
Start: 2025-05-13

## 2025-05-13 NOTE — PROGRESS NOTES
Kerry Villela  [id number]  May 13, 2025    Chief Complaint   Patient presents with    Follow-up     Bilat knee 3rd Gelsyn       Ms. Villela is here in follow-up regarding her bilateral  knee.  She is having no problems or concerns.  Her pain has moderately improved for both knees.  She rates her left knee pain as 0-1/10.  She rates her right knee pain as 2/10. She is here for the third Gelsyn injections.     Physical Exam:   Examination of the bilateral knee reveals no sign of synovitis.   There is minimal to no swelling.    Examination is essentially unchanged.     Impression:  bilateral knee osteoarthritis.      Plan: The patient was explained the risks, benefits and alternatives to injection.  The patient understood the risks and benefits and agreed to proceed.  We will go ahead and administer the third Gelsyn injections into the bilateral knee under sterile conditions.  After a betadine prep of the knee joints, 16.8 mg/2 ml of Gelsyn was injected into the knees.  The patient tolerated the injections rather well.  The patient was instructed to follow up for any signs of infection.  The patient was given a refill of the meloxicam.  She does have some bursitis of her left hip.  If she continues to have the left hip pain, she was instructed to make an appointment      Trevon Houston M.D.

## 2025-05-14 ENCOUNTER — HOSPITAL ENCOUNTER (OUTPATIENT)
Dept: PHYSICAL THERAPY | Age: 45
Setting detail: THERAPIES SERIES
Discharge: HOME | End: 2025-05-14
Attending: ORTHOPAEDIC SURGERY
Payer: COMMERCIAL

## 2025-05-14 ENCOUNTER — OFFICE VISIT (OUTPATIENT)
Dept: ENT CLINIC | Age: 45
End: 2025-05-14
Payer: COMMERCIAL

## 2025-05-14 VITALS — WEIGHT: 293 LBS | HEIGHT: 62 IN | BODY MASS INDEX: 53.92 KG/M2

## 2025-05-14 DIAGNOSIS — H93.11 TINNITUS OF RIGHT EAR: ICD-10-CM

## 2025-05-14 DIAGNOSIS — J30.9 ALLERGIC RHINITIS, UNSPECIFIED SEASONALITY, UNSPECIFIED TRIGGER: Primary | ICD-10-CM

## 2025-05-14 PROCEDURE — 97110 THERAPEUTIC EXERCISES: CPT

## 2025-05-14 PROCEDURE — G8417 CALC BMI ABV UP PARAM F/U: HCPCS | Performed by: OTOLARYNGOLOGY

## 2025-05-14 PROCEDURE — 1036F TOBACCO NON-USER: CPT | Performed by: OTOLARYNGOLOGY

## 2025-05-14 PROCEDURE — 97112 NEUROMUSCULAR REEDUCATION: CPT

## 2025-05-14 PROCEDURE — G8427 DOCREV CUR MEDS BY ELIG CLIN: HCPCS | Performed by: OTOLARYNGOLOGY

## 2025-05-14 PROCEDURE — 99203 OFFICE O/P NEW LOW 30 MIN: CPT | Performed by: OTOLARYNGOLOGY

## 2025-05-14 RX ORDER — MELOXICAM 15 MG/1
15 TABLET ORAL DAILY PRN
Qty: 30 TABLET | Refills: 0 | Status: SHIPPED | OUTPATIENT
Start: 2025-05-14

## 2025-05-14 RX ORDER — MELOXICAM 15 MG/1
TABLET ORAL
Qty: 30 TABLET | Refills: 0 | OUTPATIENT
Start: 2025-05-14

## 2025-05-14 NOTE — PROGRESS NOTES
Tinnitus of right ear  She complains of a whooshing sound in the right ear.  Recommend following up with an audiogram             I have performed a head and neck physical exam personally or was physically present during the key or critical portions of the service.    This note was generated completely or in part utilizing Dragon dictation speech recognition software.  Occasionally, words are mistranscribed and despite editing, the text may contain inaccuracies due to incorrect word recognition.  If further clarification is needed please contact the office at [phone].

## 2025-05-14 NOTE — FLOWSHEET NOTE
[facility] - Outpatient Rehabilitation and Therapy: [address], [address], [place] [address] office: [phone] fax: [phone]      Physical Therapy: TREATMENT/PROGRESS NOTE   Patient: Kerry Villela (44 y.o. female)   Examination Date: 2025   :  1980 MRN: [id number]   Visit #:  AFTER 30  Insurance Allowable Auth Needed   CARESOURCE []Yes    [x]No    Insurance: Payor: [employer]ESOURCE / Plan: [employer] [place] MEDICAID / Product Type: *No Product type* /   Insurance ID: [id number] - (Medicaid Managed)  Secondary Insurance (if applicable):    Treatment Diagnosis:     ICD-10-CM    1. Decreased functional mobility  R26.89       2. Decreased activity tolerance  R68.89       3. Pain aggravated by standing  R52       4. Functional weakness  R53.1       5. Pain in both knees, unspecified chronicity  M25.561     M25.562       6. Need for home exercise program  Z78.9          Medical Diagnosis:  Primary osteoarthritis of right knee [M17.11]  Primary osteoarthritis of left knee [M17.12]   Referring Physician: Trevon Houston MD  PCP: Zahira Dominguez DO     Plan of care signed (Y/N): YES    Date of Patient follow up with Physician:      Progress Report/POC: NO  POC note due: 2025, 25                                            Medical History:  Comorbidities:  Hypertension  Osteoarthritis  Other Cardiovascular Conditions: congestive heart failure  Other: hyperlipidemia, hx of  blood transfusion, anemia, hx of blood clots, fibromyalgia, GERD, obesity, PCOS, pulmonary embolism                                         Precautions/ Contra-indications:           Latex allergy:  YES  Pacemaker:    NO  Contraindications for Manipulation: NA  Date of Surgery: NA  Other:    Red Flags:  None    Suicide Screening:   The patient did not verbalize a primary behavioral concern, suicidal ideation, suicidal intent, or demonstrate suicidal behaviors.    Preferred Language for Healthcare:

## 2025-05-15 NOTE — PROGRESS NOTES
Interventional Cardiology Consultation     Kerry Villela  1980    PCP: Zahira Dominguez DO  Referring Physician: Dr. Johnson  Reason for Referral: lymphedema   Chief Complaint: \"I feel better\"  Chief Complaint   Patient presents with    Follow-up       Subjective:   History of Present Illness: The patient is 44 y.o. female with a past medical history significant for HTN, DVT/PE, sleep apnea, and prediabetes. Patient initially seen for further evaluation of lymphedema vs venous insufficiency. Reports history of lymphedema since she was 18. Patient here today for routine follow up. Reports overall she is doing better. She received her compression pumps. She wears them once daily with noted improvement in her lower extremity edema. She denies any new or worsening symptoms.           Past Medical History:   Diagnosis Date    Abscess 05/20/2022    Allergic reaction to food 05/27/2020    Allergic rhinitis     Anemia     Anesthesia complication     last same surgery at [facility] states she had trouble breathing afterwards    Bilateral hand numbness 12/04/2020    Bilateral hip pain 06/15/2021    CHF (congestive heart failure) ([facility])     no meds-only saw cardiologist x1 and discharged    chronic gerd     Dermatomyositis ([facility]) 2008    Eczema     Fibromyalgia     Headache(784.0)     History of blood transfusion 2009    also two in 2014     HTN (hypertension)     Hx of blood clots 2014    PE and DVT    Hyperlipidemia     Irritable bowel syndrome     Lymphedema     Morbid obesity with BMI of 50.0-59.9, adult ([facility])     obstructive sleep apnea     pt uses cpap machine    Osteoarthritis     Overactive bladder     PCOS (polycystic ovarian syndrome)     Pre-diabetes     Pre-operative clearance     Prediabetes     Prolonged emergence from general anesthesia     x1 at age 16 after ovarian cyst removal    Pulmonary embolism (HCC) 07/17/2014    Overview:  Continue anticoagulation.  Medicine managing

## 2025-05-16 ENCOUNTER — HOSPITAL ENCOUNTER (OUTPATIENT)
Dept: PHYSICAL THERAPY | Age: 45
Setting detail: THERAPIES SERIES
Discharge: HOME | End: 2025-05-16
Attending: ORTHOPAEDIC SURGERY
Payer: COMMERCIAL

## 2025-05-16 ENCOUNTER — OFFICE VISIT (OUTPATIENT)
Dept: CARDIOLOGY CLINIC | Age: 45
End: 2025-05-16
Payer: COMMERCIAL

## 2025-05-16 VITALS
SYSTOLIC BLOOD PRESSURE: 118 MMHG | HEART RATE: 93 BPM | BODY MASS INDEX: 53.92 KG/M2 | DIASTOLIC BLOOD PRESSURE: 70 MMHG | WEIGHT: 293 LBS | OXYGEN SATURATION: 98 % | HEIGHT: 62 IN

## 2025-05-16 DIAGNOSIS — I89.0 LYMPHEDEMA: Primary | ICD-10-CM

## 2025-05-16 PROCEDURE — 97110 THERAPEUTIC EXERCISES: CPT

## 2025-05-16 PROCEDURE — 1036F TOBACCO NON-USER: CPT | Performed by: INTERNAL MEDICINE

## 2025-05-16 PROCEDURE — 3078F DIAST BP <80 MM HG: CPT | Performed by: INTERNAL MEDICINE

## 2025-05-16 PROCEDURE — 99213 OFFICE O/P EST LOW 20 MIN: CPT | Performed by: INTERNAL MEDICINE

## 2025-05-16 PROCEDURE — G8417 CALC BMI ABV UP PARAM F/U: HCPCS | Performed by: INTERNAL MEDICINE

## 2025-05-16 PROCEDURE — 97112 NEUROMUSCULAR REEDUCATION: CPT

## 2025-05-16 PROCEDURE — G8427 DOCREV CUR MEDS BY ELIG CLIN: HCPCS | Performed by: INTERNAL MEDICINE

## 2025-05-16 PROCEDURE — 3074F SYST BP LT 130 MM HG: CPT | Performed by: INTERNAL MEDICINE

## 2025-05-16 RX ORDER — ADHESIVE TAPE 3"X 2.3 YD
500 TAPE, NON-MEDICATED TOPICAL 2 TIMES DAILY
COMMUNITY

## 2025-05-16 NOTE — PLAN OF CARE
[id number]) 21 2  Re-Eval (36986)     Manual (12843)    Estim Unattended (93214)     Ther. Act (37544)    Mech. Traction (20768)     Gait (91378)    Dry Needle 1-2 muscle (15299)     Aquatic Therex (11394)    Dry Needle 3+ muscle (20561)     Iontophoresis (87403)    VASO (84204)     Ultrasound (57110)    Group Therapy (06074)     Estim Attended (49002)    Canalith Repositioning (37827)     Physical Performance Test (71051)    Custom orthotic ()     Other:    Other:    Total Timed Code Tx Minutes 40 3       Total Treatment Minutes 40        Charge Justification:  (19814) THERAPEUTIC EXERCISE - Provided verbal/tactile cueing for HEP and/or activities related to strengthening, flexibility, endurance, ROM performed to prevent loss of range of motion, maintain or improve muscular strength or increase flexibility, following either an injury or surgery.   (34803) NEUROMUSCULAR RE-EDUCATION - Provided therapeutic procedure on activities related to neuromuscular reeducation of movement, balance, coordination, kinesthetic sense, posture, and/or proprioception for sitting and/or standing activities. Provided HEP review and/or progression.    GOALS     Patient stated goal: walking and stairs  [] Progressing: [x] Met: [] Not Met: [] Adjusted    Therapist goals for Patient:   Short Term Goals: To be achieved in: 2 weeks  1Independent in HEP and progression per patient tolerance, in order to prevent re-injury.   [] Progressing: [x] Met: [] Not Met: [] Adjusted  Patient will have a decrease in pain to a maximum of 4/10 on VAS at rest and with activity to facilitate improved tolerance to movement, improved tolerance to functional mobility tasks such as ambulation at home and within the community, and improved tolerance to ADLs such as dressing and self-care activities.   [] Progressing: [x] Met: [] Not Met: [] Adjusted    Long Term Goals: To be achieved in: 6-8 weeks  Patient will demonstrate a raw score of 45/96 or less for the

## 2025-05-22 NOTE — PLAN OF CARE
ambulation, self-care activities, and community navigation to facilitate full return to prior level of function.  [] Progressing: [] Met: [] Not Met: [] Adjusted  Patient will demonstrate a raw score of 11/50 or less for the Neck Disability Index to facilitate improved tolerance to ADLs and functional activities including ambulation, self-care activities, and community navigation to facilitate full return to prior level of function.  [] Progressing: [] Met: [] Not Met: [] Adjusted  Patient will demonstrate increased AROM of cervical sidebending to WNL without pain to allow for proper joint functioning to enable patient to perform sitting ADLs and driving activities with decreased pain.   [] Progressing: [] Met: [] Not Met: [] Adjusted  Patient will demonstrate an increase in strength to 4/5 hip flexion musculature without posterior trunk lean to allow for proper functional mobility and improved tolerance to ADLs including cooking, cleaning, and light housework as indicated by patients Functional Deficits.  [] Progressing: [] Met: [] Not Met: [] Adjusted  Patient will report the ability to ambulate 10 minutes independently without AD with less than or equal to 6/10 pain in order to promote improved functional mobility and navigation within the community.   [] Progressing: [] Met: [] Not Met: [] Adjusted       Overall Progression Towards Functional goals/ Treatment Progress Update:  [] Patient is progressing as expected towards functional goals listed.    [] Progression is slowed due to complexities/Impairments listed.  [] Progression has been slowed due to co-morbidities.  [x] Plan just implemented, too soon (<30days) to assess goals progression   [] Goals require adjustment due to lack of progress  [] Patient is not progressing as expected and requires additional follow up with physician  [] Other:     TREATMENT PLAN     Frequency/Duration: 1-2x/week for  6-8  weeks (7/24/25) for the following treatment

## 2025-05-23 ENCOUNTER — OFFICE VISIT (OUTPATIENT)
Dept: GYNECOLOGY | Age: 45
End: 2025-05-23
Payer: COMMERCIAL

## 2025-05-23 VITALS
HEIGHT: 63 IN | BODY MASS INDEX: 51.91 KG/M2 | SYSTOLIC BLOOD PRESSURE: 118 MMHG | RESPIRATION RATE: 17 BRPM | HEART RATE: 80 BPM | WEIGHT: 293 LBS | DIASTOLIC BLOOD PRESSURE: 74 MMHG | OXYGEN SATURATION: 98 %

## 2025-05-23 DIAGNOSIS — Z01.419 WELL WOMAN EXAM WITH ROUTINE GYNECOLOGICAL EXAM: Primary | ICD-10-CM

## 2025-05-23 DIAGNOSIS — R39.15 URINARY URGENCY: ICD-10-CM

## 2025-05-23 PROCEDURE — 99396 PREV VISIT EST AGE 40-64: CPT | Performed by: OBSTETRICS & GYNECOLOGY

## 2025-05-23 PROCEDURE — 3078F DIAST BP <80 MM HG: CPT | Performed by: OBSTETRICS & GYNECOLOGY

## 2025-05-23 PROCEDURE — 3074F SYST BP LT 130 MM HG: CPT | Performed by: OBSTETRICS & GYNECOLOGY

## 2025-05-27 ASSESSMENT — ENCOUNTER SYMPTOMS
ALLERGIC/IMMUNOLOGIC NEGATIVE: 1
GASTROINTESTINAL NEGATIVE: 1
EYES NEGATIVE: 1
RESPIRATORY NEGATIVE: 1

## 2025-05-28 ENCOUNTER — OFFICE VISIT (OUTPATIENT)
Dept: SLEEP MEDICINE | Age: 45
End: 2025-05-28
Payer: COMMERCIAL

## 2025-05-28 VITALS
HEIGHT: 63 IN | HEART RATE: 81 BPM | WEIGHT: 293 LBS | SYSTOLIC BLOOD PRESSURE: 120 MMHG | OXYGEN SATURATION: 95 % | BODY MASS INDEX: 51.91 KG/M2 | RESPIRATION RATE: 18 BRPM | DIASTOLIC BLOOD PRESSURE: 78 MMHG | TEMPERATURE: 97.1 F

## 2025-05-28 DIAGNOSIS — G47.33 OSA ON CPAP: Primary | ICD-10-CM

## 2025-05-28 DIAGNOSIS — I10 PRIMARY HYPERTENSION: Chronic | ICD-10-CM

## 2025-05-28 DIAGNOSIS — E66.813 CLASS 3 DRUG-INDUCED OBESITY WITH SERIOUS COMORBIDITY AND BODY MASS INDEX (BMI) OF 60.0 TO 69.9 IN ADULT (HCC): ICD-10-CM

## 2025-05-28 DIAGNOSIS — Z99.89 DEPENDENCE ON OTHER ENABLING MACHINES AND DEVICES: ICD-10-CM

## 2025-05-28 DIAGNOSIS — Z86.79 H/O CHF: ICD-10-CM

## 2025-05-28 DIAGNOSIS — E66.1 CLASS 3 DRUG-INDUCED OBESITY WITH SERIOUS COMORBIDITY AND BODY MASS INDEX (BMI) OF 60.0 TO 69.9 IN ADULT (HCC): ICD-10-CM

## 2025-05-28 PROCEDURE — 3074F SYST BP LT 130 MM HG: CPT | Performed by: PSYCHIATRY & NEUROLOGY

## 2025-05-28 PROCEDURE — G8417 CALC BMI ABV UP PARAM F/U: HCPCS | Performed by: PSYCHIATRY & NEUROLOGY

## 2025-05-28 PROCEDURE — 99214 OFFICE O/P EST MOD 30 MIN: CPT | Performed by: PSYCHIATRY & NEUROLOGY

## 2025-05-28 PROCEDURE — G2211 COMPLEX E/M VISIT ADD ON: HCPCS | Performed by: PSYCHIATRY & NEUROLOGY

## 2025-05-28 PROCEDURE — G8427 DOCREV CUR MEDS BY ELIG CLIN: HCPCS | Performed by: PSYCHIATRY & NEUROLOGY

## 2025-05-28 PROCEDURE — 3078F DIAST BP <80 MM HG: CPT | Performed by: PSYCHIATRY & NEUROLOGY

## 2025-05-28 PROCEDURE — 1036F TOBACCO NON-USER: CPT | Performed by: PSYCHIATRY & NEUROLOGY

## 2025-05-28 ASSESSMENT — SLEEP AND FATIGUE QUESTIONNAIRES
HOW LIKELY ARE YOU TO NOD OFF OR FALL ASLEEP WHILE WATCHING TV: MODERATE CHANCE OF DOZING
HOW LIKELY ARE YOU TO NOD OFF OR FALL ASLEEP WHILE SITTING QUIETLY AFTER LUNCH WITHOUT ALCOHOL: WOULD NEVER DOZE
ESS TOTAL SCORE: 5
HOW LIKELY ARE YOU TO NOD OFF OR FALL ASLEEP WHILE SITTING AND TALKING TO SOMEONE: WOULD NEVER DOZE
HOW LIKELY ARE YOU TO NOD OFF OR FALL ASLEEP WHILE SITTING INACTIVE IN A PUBLIC PLACE: WOULD NEVER DOZE
HOW LIKELY ARE YOU TO NOD OFF OR FALL ASLEEP IN A CAR, WHILE STOPPED FOR A FEW MINUTES IN TRAFFIC: WOULD NEVER DOZE
HOW LIKELY ARE YOU TO NOD OFF OR FALL ASLEEP WHILE LYING DOWN TO REST IN THE AFTERNOON WHEN CIRCUMSTANCES PERMIT: HIGH CHANCE OF DOZING
HOW LIKELY ARE YOU TO NOD OFF OR FALL ASLEEP WHEN YOU ARE A PASSENGER IN A CAR FOR AN HOUR WITHOUT A BREAK: WOULD NEVER DOZE
HOW LIKELY ARE YOU TO NOD OFF OR FALL ASLEEP WHILE SITTING AND READING: WOULD NEVER DOZE

## 2025-05-28 NOTE — PROGRESS NOTES
Subjective   Patient ID: Kerry Villela is a 44 y.o. female.    Patient is here for annual. Patient questions about vaginal estrogen. Patient with continued urinary urgency. States Dr. Alvarado method of botox really helped her.     Gynecologic Exam        Review of Systems   Constitutional: Negative.    HENT: Negative.     Eyes: Negative.    Respiratory: Negative.     Cardiovascular: Negative.    Gastrointestinal: Negative.    Endocrine: Negative.    Genitourinary:  Positive for difficulty urinating.   Musculoskeletal: Negative.    Skin: Negative.    Allergic/Immunologic: Negative.    Neurological: Negative.    Hematological: Negative.    Psychiatric/Behavioral: Negative.       Date of Birth 1980  Past Medical History:   Diagnosis Date    Abscess 05/20/2022    Allergic reaction to food 05/27/2020    Allergic rhinitis     Anemia     Anesthesia complication     last same surgery at [facility] states she had trouble breathing afterwards    Bilateral hand numbness 12/04/2020    Bilateral hip pain 06/15/2021    CHF (congestive heart failure) ([facility])     no meds-only saw cardiologist x1 and discharged    chronic gerd     Dermatomyositis ([facility]) 2008    Eczema     Fibromyalgia     Headache(784.0)     History of blood transfusion 2009    also two in 2014     HTN (hypertension)     Hx of blood clots 2014    PE and DVT    Hyperlipidemia     Irritable bowel syndrome     Lymphedema     Morbid obesity with BMI of 50.0-59.9, adult ([facility])     obstructive sleep apnea     pt uses cpap machine    Osteoarthritis     Overactive bladder     PCOS (polycystic ovarian syndrome)     Pre-diabetes     Pre-operative clearance     Prediabetes     Prolonged emergence from general anesthesia     x1 at age 16 after ovarian cyst removal    Pulmonary embolism (HCC) 07/17/2014    Overview:  Continue anticoagulation.  Medicine managing Coumadin bridge.  Daily H/H, PTT, PT/INR.   Last Assessment & Plan:  HPI: Seen in hospital follow up.

## 2025-05-28 NOTE — PROGRESS NOTES
cpap machine    Osteoarthritis     Overactive bladder     PCOS (polycystic ovarian syndrome)     Pre-diabetes     Pre-operative clearance     Prediabetes     Prolonged emergence from general anesthesia     x1 at age 16 after ovarian cyst removal    Pulmonary embolism (HCC) 07/17/2014    Overview:  Continue anticoagulation.  Medicine managing Coumadin bridge.  Daily H/H, PTT, PT/INR.   Last Assessment & Plan:  HPI: Seen in hospital follow up.  The patient is a 33 year old y/o female who admitted to the hospital for pulmonary emboli. She began to experience left lower extremity swelling and pain for two days, and day of admission she began to experience chest pain and shortness of     Vaginal high risk HPV DNA test positive 05/2016    Wears glasses        Past Surgical History:   Procedure Laterality Date    CARPAL TUNNEL RELEASE Right 01/11/2024    RIGHT CARPAL TUNNEL RELEASE performed by Shawn Garcia MD at [facility] OR    COLONOSCOPY N/A 01/13/2021    COLONOSCOPY performed by Salvatore Carranza MD at [facility] ENDOSCOPY    CYSTOSCOPY N/A 04/28/2023    CYSTOSCOPY performed by Mihir Alvarado MD at [facility] OR    CYSTOSCOPY N/A 03/15/2024    CYSTOSCOPY WITH CHEMODENERVATION OF BLADDER- BOTOX 100units performed by Mihir Alvarado MD at [facility] OR    CYSTOSCOPY N/A 09/09/2024    BOTOX INJECTIONS 100 UNITS performed by Giuliano Krishnan MD at [facility] OR    CYSTOSCOPY N/A 09/09/2024    CYSTOSCOPY performed by Giulinao Krishnan MD at [facility] OR    CYSTOSCOPY N/A 2/17/2025    CYSTOSCOPY WITH BOTOX INJECTIONS IN THE BLADDER 100 UNITS performed by Giuliano Krishnan MD at [facility] OR    EYE SURGERY      x2    HYSTERECTOMY (CERVIX STATUS UNKNOWN)  2014    full-ovaries gone    MUSCLE BIOPSY      left thigh    OVARIAN CYST REMOVAL  1998    PARTIAL HYSTERECTOMY (CERVIX NOT REMOVED)      RECTOCELE REPAIR  2023    SKIN BIOPSY  2021    birth merritt removal left arm    STRABISMUS SURGERY  2015    UPPER GASTROINTESTINAL ENDOSCOPY N/A 09/11/2023

## 2025-05-29 ENCOUNTER — HOSPITAL ENCOUNTER (OUTPATIENT)
Dept: PHYSICAL THERAPY | Age: 45
Setting detail: THERAPIES SERIES
Discharge: HOME | End: 2025-05-29
Attending: ORTHOPAEDIC SURGERY
Payer: COMMERCIAL

## 2025-05-29 ENCOUNTER — TELEPHONE (OUTPATIENT)
Dept: CARDIOLOGY CLINIC | Age: 45
End: 2025-05-29

## 2025-05-29 DIAGNOSIS — M54.9 BACK PAIN, UNSPECIFIED BACK LOCATION, UNSPECIFIED BACK PAIN LATERALITY, UNSPECIFIED CHRONICITY: ICD-10-CM

## 2025-05-29 DIAGNOSIS — R52 PAIN AGGRAVATED BY STANDING: ICD-10-CM

## 2025-05-29 DIAGNOSIS — M54.2 NECK PAIN: ICD-10-CM

## 2025-05-29 DIAGNOSIS — R68.89 DECREASED ACTIVITY TOLERANCE: ICD-10-CM

## 2025-05-29 DIAGNOSIS — Z78.9 NEED FOR HOME EXERCISE PROGRAM: ICD-10-CM

## 2025-05-29 DIAGNOSIS — R53.1 FUNCTIONAL WEAKNESS: ICD-10-CM

## 2025-05-29 DIAGNOSIS — R26.89 DECREASED FUNCTIONAL MOBILITY: Primary | ICD-10-CM

## 2025-05-29 PROCEDURE — 97112 NEUROMUSCULAR REEDUCATION: CPT

## 2025-05-29 PROCEDURE — 97161 PT EVAL LOW COMPLEX 20 MIN: CPT

## 2025-05-29 NOTE — TELEPHONE ENCOUNTER
Spoke with patient and explained advise below. Patient will contact Dr. Dominguez in regard to increasing her Aldactone. V/U

## 2025-05-29 NOTE — TELEPHONE ENCOUNTER
Kerry came into the office this morning asking if Dr. Jefferson could up her dose of spironolactone?  Patient states she saw a new CNP Dermatologist at  and she had stated it might help with her issues.     Kerry saw Dr. Jefferson in office on 5/16/2025    Any question Kerry can be reached at [phone]      Medication dose she is currently taking     spironolactone (ALDACTONE)    Dosage of the medication:  25 MG tablet   How are you taking this medication (QD, BID, TID, QID, PRN):  1 table by mouth daily.        [facility] [address] [place] [address] ([place]), [place] - [address] - P [phone] - F [phone] [address] ([place] [place] [address]  Phone: [phone]  Fax: [phone]

## 2025-05-29 NOTE — TELEPHONE ENCOUNTER
Patient is on aldactone for BP control.   This medication was added at ov with Dr. Jefferson 5/7/24 and [facility] d/c'd at that time due to increased LE edema.   Patient follows with Dr. Jefferson for lymphedema/venous insufficiency.   Would advise patient reach out to PCP regarding BP medications and adjustments as if it is controlled, then medications likely will not need to be adjusted.   Thanks

## 2025-06-04 ENCOUNTER — HOSPITAL ENCOUNTER (OUTPATIENT)
Dept: PHYSICAL THERAPY | Age: 45
Setting detail: THERAPIES SERIES
End: 2025-06-04
Attending: ORTHOPAEDIC SURGERY
Payer: COMMERCIAL

## 2025-06-04 DIAGNOSIS — H91.90 HEARING LOSS, UNSPECIFIED HEARING LOSS TYPE, UNSPECIFIED LATERALITY: Primary | ICD-10-CM

## 2025-06-06 ENCOUNTER — RESULTS FOLLOW-UP (OUTPATIENT)
Dept: OBGYN | Age: 45
End: 2025-06-06

## 2025-06-09 RX ORDER — MELOXICAM 15 MG/1
TABLET ORAL
Qty: 30 TABLET | Refills: 0 | OUTPATIENT
Start: 2025-06-09

## 2025-06-10 ENCOUNTER — HOSPITAL ENCOUNTER (OUTPATIENT)
Dept: PHYSICAL THERAPY | Age: 45
Setting detail: THERAPIES SERIES
End: 2025-06-10
Attending: ORTHOPAEDIC SURGERY
Payer: COMMERCIAL

## 2025-06-10 RX ORDER — MELOXICAM 15 MG/1
15 TABLET ORAL DAILY PRN
Qty: 30 TABLET | Refills: 2 | Status: SHIPPED | OUTPATIENT
Start: 2025-06-10

## 2025-06-12 ENCOUNTER — HOSPITAL ENCOUNTER (OUTPATIENT)
Dept: PHYSICAL THERAPY | Age: 45
Setting detail: THERAPIES SERIES
Discharge: HOME | End: 2025-06-12
Attending: ORTHOPAEDIC SURGERY
Payer: COMMERCIAL

## 2025-06-12 PROCEDURE — 97110 THERAPEUTIC EXERCISES: CPT

## 2025-06-12 PROCEDURE — 97140 MANUAL THERAPY 1/> REGIONS: CPT

## 2025-06-12 PROCEDURE — 97112 NEUROMUSCULAR REEDUCATION: CPT

## 2025-06-12 NOTE — FLOWSHEET NOTE
Modalities:    No modalities applied this session    Education/Home Exercise Program: Patient HEP program created electronically.  Refer to [employer] access code: Access Code: [email or web address]  Access Code: [email or web address]  URL: [email or web address]  Date: 05/29/2025  Prepared by: Wendi Sprauberenice    Exercises  - Seated Scapular Retraction  - 2 x daily - 7 x weekly - 1 sets - 10 reps  - Seated Gentle Upper Trapezius Stretch  - 2 x daily - 7 x weekly - 1 sets - 3 reps - 30 sec hold  - Supine Chin Tuck  - 2 x daily - 7 x weekly - 1 sets - 10 reps - 10 sec hold  - Hooklying Lumbar Rotation  - 2 x daily - 7 x weekly - 1 sets - 10 reps - 5 sec hold  - Supine Figure 4 Piriformis Stretch  - 2 x daily - 7 x weekly - 1 sets - 3 reps - 30 sec hold  - Supine Transversus Abdominis Bracing - Hands on Stomach  - 2 x daily - 7 x weekly - 1 sets - 10 reps - 10 sec hold    ASSESSMENT   Today's Assessment: Patient demonstrates good tolerance to band-resisted parascapular strengthening exercises, patient reports no increase in pain and appropriate fatigue with repetitions. Also added core strengthening exercises including paloff press to promote improved lumbar stability, patient reports slight increase in pain consistent with muscle fatigue with exercise. Good tolerance to manual therapy and stretching at end of session, patient reports decreased stiffness following. Continue to progress as able. Patient would benefit from continued skilled physical therapy to improve cervical and lumbar flexibility and strength in order to decrease pain and promote improved tolerance to ADLs and functional activities.     Medical Necessity Documentation:  I certify that this patient meets the below criteria necessary for medical necessity for care and/or justification of therapy services:  The patient has functional impairments and/or activity limitations and would benefit from continued outpatient therapy services to address the deficits outlined

## 2025-06-13 ENCOUNTER — TELEPHONE (OUTPATIENT)
Dept: PRIMARY CARE CLINIC | Age: 45
End: 2025-06-13

## 2025-06-13 NOTE — TELEPHONE ENCOUNTER
Pt came in and dropped off a form that needs to be completed for  Health   Last visit 4/25  Next visit 6/18/25  Please fax [phone]  Please call pt when form is ready she will  a copy   Form in provider's mailbox

## 2025-06-16 ENCOUNTER — PROCEDURE VISIT (OUTPATIENT)
Dept: AUDIOLOGY | Age: 45
End: 2025-06-16
Payer: COMMERCIAL

## 2025-06-16 ENCOUNTER — OFFICE VISIT (OUTPATIENT)
Dept: ENT CLINIC | Age: 45
End: 2025-06-16
Payer: COMMERCIAL

## 2025-06-16 VITALS — DIASTOLIC BLOOD PRESSURE: 87 MMHG | SYSTOLIC BLOOD PRESSURE: 134 MMHG | HEART RATE: 89 BPM

## 2025-06-16 DIAGNOSIS — H93.11 TINNITUS OF RIGHT EAR: Primary | ICD-10-CM

## 2025-06-16 DIAGNOSIS — Z01.10 NORMAL HEARING TEST: ICD-10-CM

## 2025-06-16 PROCEDURE — 3079F DIAST BP 80-89 MM HG: CPT | Performed by: OTOLARYNGOLOGY

## 2025-06-16 PROCEDURE — 92557 COMPREHENSIVE HEARING TEST: CPT

## 2025-06-16 PROCEDURE — 3075F SYST BP GE 130 - 139MM HG: CPT | Performed by: OTOLARYNGOLOGY

## 2025-06-16 PROCEDURE — 92567 TYMPANOMETRY: CPT

## 2025-06-16 PROCEDURE — G8417 CALC BMI ABV UP PARAM F/U: HCPCS | Performed by: OTOLARYNGOLOGY

## 2025-06-16 PROCEDURE — 99213 OFFICE O/P EST LOW 20 MIN: CPT | Performed by: OTOLARYNGOLOGY

## 2025-06-16 PROCEDURE — 1036F TOBACCO NON-USER: CPT | Performed by: OTOLARYNGOLOGY

## 2025-06-16 PROCEDURE — G8427 DOCREV CUR MEDS BY ELIG CLIN: HCPCS | Performed by: OTOLARYNGOLOGY

## 2025-06-16 NOTE — PROGRESS NOTES
[facility]  DIVISION OF OTOLARYNGOLOGY- HEAD & NECK SURGERY  Follow up      Patient Name: Kerry Villela  Medical Record Number:  [id number]  Primary Care Physician:  Zahira Dominguez DO  Date of Consultation: 6/16/2025    Chief Complaint: Ear issues        Interval History    She is following up for ears.  She had a whooshing noise in the right ear when I saw her previously so I recommended an audiogram.  She said that the whooshing sound actually has resolved.          REVIEW OF SYSTEMS    As above  PHYSICAL EXAM  GENERAL: No Acute Distress, Alert and Oriented, no Hoarseness, strong voice  EYES: EOMI, Anti-icteric  HENT:   Head: Normocephalic and atraumatic.   Face:  Symmetric, facial nerve intact, no sinus tenderness  Right Ear: Normal external ear, normal external auditory canal, intact tympanic membrane with normal mobility and aerated middle ear  Left Ear: Normal external ear, normal external auditory canal, intact tympanic membrane with normal mobility and aerated middle ear    She had an audiogram today that showed normal hearing        ASSESSMENT/PLAN  1. Tinnitus of right ear  This actually has resolved and she has normal hearing.  I do not think we need to do any further investigation unless it returns.             I have performed a head and neck physical exam personally or was physically present during the key or critical portions of the service.    This note was generated completely or in part utilizing Dragon dictation speech recognition software.  Occasionally, words are mistranscribed and despite editing, the text may contain inaccuracies due to incorrect word recognition.  If further clarification is needed please contact the office at [phone].

## 2025-06-17 ENCOUNTER — HOSPITAL ENCOUNTER (OUTPATIENT)
Dept: PHYSICAL THERAPY | Age: 45
Setting detail: THERAPIES SERIES
Discharge: HOME | End: 2025-06-17
Attending: ORTHOPAEDIC SURGERY
Payer: COMMERCIAL

## 2025-06-17 PROCEDURE — 97112 NEUROMUSCULAR REEDUCATION: CPT

## 2025-06-17 PROCEDURE — 97110 THERAPEUTIC EXERCISES: CPT

## 2025-06-17 PROCEDURE — 97140 MANUAL THERAPY 1/> REGIONS: CPT

## 2025-06-17 NOTE — FLOWSHEET NOTE
[facility] - Outpatient Rehabilitation and Therapy: [address], [address], [place] [address] office: [phone] fax: [phone]       Physical Therapy: TREATMENT/PROGRESS NOTE   Patient: Kerry Villela (44 y.o. female)   Examination Date: 2025   :  1980 MRN: [id number]   Visit #: 3/21 (9 already used)  Insurance Allowable Auth Needed   [employer]  Auth after 30 []Yes    []No    Insurance: Payor: [employer]OURCE / Plan: [employer] [place] MEDICAID / Product Type: *No Product type* /   Insurance ID: [id number] - (Medicaid Managed)  Secondary Insurance (if applicable):    Treatment Diagnosis:     ICD-10-CM    1. Decreased functional mobility  R26.89       2. Decreased activity tolerance  R68.89       3. Pain aggravated by standing  R52       4. Functional weakness  R53.1       5. Back pain, unspecified back location, unspecified back pain laterality, unspecified chronicity  M54.9       6. Neck pain  M54.2       7. Need for home exercise program  Z78.9          Medical Diagnosis:  Spondylosis, cervical [M47.812]  Spondylosis, thoracic [M47.814]   Referring Physician: Giuliano Castellanos PA  PCP: Zahira Dominguez DO     Plan of care signed (Y/N): YES    Date of Patient follow up with Physician:      Plan of Care Report: NO  POC update due: 2025                                             Medical History:  Comorbidities:  Hypertension  Osteoarthritis  Other Cardiovascular Conditions: congestive heart failure  Other: hyperlipidemia, hx of  blood transfusion, anemia, hx of blood clots, fibromyalgia, GERD, obesity, PCOS, pulmonary embolism                                         Precautions/ Contra-indications:           Latex allergy:  YES  Pacemaker:    NO  Contraindications for Manipulation: NA  Date of Surgery: NA  Other:       Red Flags:  None    Suicide Screening:   The patient did not verbalize a primary behavioral concern, suicidal ideation, suicidal intent, or demonstrate

## 2025-06-18 ENCOUNTER — OFFICE VISIT (OUTPATIENT)
Dept: UROGYNECOLOGY | Age: 45
End: 2025-06-18
Payer: COMMERCIAL

## 2025-06-18 ENCOUNTER — TELEMEDICINE (OUTPATIENT)
Dept: PRIMARY CARE CLINIC | Age: 45
End: 2025-06-18
Payer: COMMERCIAL

## 2025-06-18 ENCOUNTER — PREP FOR PROCEDURE (OUTPATIENT)
Dept: UROGYNECOLOGY | Age: 45
End: 2025-06-18

## 2025-06-18 VITALS
OXYGEN SATURATION: 97 % | SYSTOLIC BLOOD PRESSURE: 128 MMHG | DIASTOLIC BLOOD PRESSURE: 84 MMHG | RESPIRATION RATE: 18 BRPM | HEART RATE: 65 BPM | TEMPERATURE: 96.9 F

## 2025-06-18 DIAGNOSIS — I50.32 CHRONIC DIASTOLIC CHF (CONGESTIVE HEART FAILURE) (HCC): ICD-10-CM

## 2025-06-18 DIAGNOSIS — M33.13 DERMATOMYOSITIS (HCC): Primary | ICD-10-CM

## 2025-06-18 DIAGNOSIS — G89.29 CHRONIC PAIN OF LEFT KNEE: ICD-10-CM

## 2025-06-18 DIAGNOSIS — N39.41 URGE INCONTINENCE: ICD-10-CM

## 2025-06-18 DIAGNOSIS — M25.562 CHRONIC PAIN OF LEFT KNEE: ICD-10-CM

## 2025-06-18 DIAGNOSIS — N32.81 OAB (OVERACTIVE BLADDER): ICD-10-CM

## 2025-06-18 DIAGNOSIS — M79.7 FIBROMYALGIA: ICD-10-CM

## 2025-06-18 DIAGNOSIS — R35.0 URINARY FREQUENCY: Primary | ICD-10-CM

## 2025-06-18 PROBLEM — I87.2 VENOUS STASIS DERMATITIS OF BOTH LOWER EXTREMITIES: Status: ACTIVE | Noted: 2025-06-18

## 2025-06-18 PROCEDURE — 3074F SYST BP LT 130 MM HG: CPT | Performed by: OBSTETRICS & GYNECOLOGY

## 2025-06-18 PROCEDURE — 99214 OFFICE O/P EST MOD 30 MIN: CPT | Performed by: FAMILY MEDICINE

## 2025-06-18 PROCEDURE — G8417 CALC BMI ABV UP PARAM F/U: HCPCS | Performed by: OBSTETRICS & GYNECOLOGY

## 2025-06-18 PROCEDURE — 3079F DIAST BP 80-89 MM HG: CPT | Performed by: OBSTETRICS & GYNECOLOGY

## 2025-06-18 PROCEDURE — G8428 CUR MEDS NOT DOCUMENT: HCPCS | Performed by: OBSTETRICS & GYNECOLOGY

## 2025-06-18 PROCEDURE — 1036F TOBACCO NON-USER: CPT | Performed by: OBSTETRICS & GYNECOLOGY

## 2025-06-18 PROCEDURE — 99214 OFFICE O/P EST MOD 30 MIN: CPT | Performed by: OBSTETRICS & GYNECOLOGY

## 2025-06-18 NOTE — PROGRESS NOTES
Year: Never true   Transportation Needs: No Transportation Needs (9/9/2024)    PRAPARE - Transportation     Lack of Transportation (Medical): No     Lack of Transportation (Non-Medical): No   Physical Activity: Insufficiently Active (9/9/2024)    Exercise Vital Sign     Days of Exercise per Week: 2 days     Minutes of Exercise per Session: 60 min   Stress: Stress Concern Present (9/9/2024)    [employer] [place] [employer] - Occupational Stress Questionnaire     Feeling of Stress : To some extent   Social Connections: Socially Isolated (9/9/2024)    Social Connection and Isolation Panel [NHANES]     Frequency of Communication with Friends and Family: Once a week     Frequency of Social Gatherings with Friends and Family: Never     Attends [employer] Services: Never     Active Member of Clubs or Organizations: No     Attends Club or Organization Meetings: Never     Marital Status: Never    Intimate Partner Violence: Not At Risk (9/9/2024)    Humiliation, Afraid, Rape, and Kick questionnaire     Fear of Current or Ex-Partner: No     Emotionally Abused: No     Physically Abused: No     Sexually Abused: No   Housing Stability: Low Risk  (9/9/2024)    Housing Stability Vital Sign     Unable to Pay for Housing in the Last Year: No     Number of Times Moved in the Last Year: 0     Homeless in the Last Year: No     Family History:   Family History   Problem Relation Age of Onset    High Blood Pressure Mother     Elevated Lipids Mother     Other Father         gout    Cancer Father         prostate    Diabetes Brother     High Blood Pressure Brother     Breast Cancer Maternal Aunt 54    Migraines Maternal Aunt     Asthma Maternal Cousin     Asthma Daughter     Heart Failure Neg Hx     Hypertension Neg Hx     Ovarian Cancer Neg Hx     Rashes/Skin Problems Neg Hx     Seizures Neg Hx     Stroke Neg Hx          Objective:     Vitals  Vitals:    06/18/25 1123   BP: 128/84   Pulse: 65   Resp: 18   Temp: 96.9 °F

## 2025-06-18 NOTE — PROGRESS NOTES
Kerry Villela, was evaluated through a synchronous (real-time) audio-video encounter. The patient (or guardian if applicable) is aware that this is a billable service, which includes applicable co-pays. This Virtual Visit was conducted with patient's (and/or legal guardian's) consent. Patient identification was verified, and a caregiver was present when appropriate.   The patient was located at Home: [address] [address]  Provider was located at Facility (Appt Dept): [address] [place], [place] [address]  Confirm you are appropriately licensed, registered, or certified to deliver care in the state where the patient is located as indicated above. If you are not or unsure, please re-schedule the visit: Yes, I confirm.     Kerry Villela (:  1980) is a Established patient, presenting virtually for evaluation of the following:      Below is the assessment and plan developed based on review of pertinent history, physical exam, labs, studies, and medications.     Assessment & Plan  Dermatomyositis ([facility])            Fibromyalgia            Chronic pain of left knee            Chronic diastolic CHF (congestive heart failure) ([facility])              No follow-ups on file.       Subjective   6.18.25 - virtual    Subjective:  - Kerry Villela presented for completion of an employee health and well-being form from [employer]. She reported experiencing significant back, neck, and knee pain.  - The pain in her neck and back is attributed to osteoarthritis of the entire spine, including the neck. The knee pain is due to osteoarthritis in the knee. She has been receiving gel injections for her knee pain, which have not yet provided significant relief. She noted that the pain has been long-standing.  - The knee pain limits her ability to squat. For her neck and back pain, she is currently undergoing physical therapy twice a week at [facility]. She also performs knee exercises at home.  - She experiences

## 2025-06-19 ENCOUNTER — HOSPITAL ENCOUNTER (OUTPATIENT)
Dept: PHYSICAL THERAPY | Age: 45
Setting detail: THERAPIES SERIES
End: 2025-06-19
Attending: ORTHOPAEDIC SURGERY
Payer: COMMERCIAL

## 2025-06-20 ENCOUNTER — OFFICE VISIT (OUTPATIENT)
Dept: ORTHOPEDIC SURGERY | Age: 45
End: 2025-06-20

## 2025-06-20 ENCOUNTER — TELEPHONE (OUTPATIENT)
Dept: UROGYNECOLOGY | Age: 45
End: 2025-06-20

## 2025-06-20 VITALS — BODY MASS INDEX: 51.91 KG/M2 | WEIGHT: 293 LBS | HEIGHT: 63 IN | RESPIRATION RATE: 16 BRPM

## 2025-06-20 DIAGNOSIS — M79.641 PAIN OF RIGHT HAND: Primary | ICD-10-CM

## 2025-06-20 NOTE — PROGRESS NOTES
Ms. Kerry Villela is a 44 y.o. right handed woman  who is seen today in Hand Surgical Consultation at the request of Zahira Dominguez DO.    She is seen today regarding a 3+ week(s) history of right basilar thumb pain and volar wrist pain without history of previous injury.  She  was not seen for this concern by her primary care physician; previous treatment has included no prior treatments used.  She  reports moderate Basilar Thumb pain and proximal radiation of pain located about the base of the right thumbs at the level of the CMC Joint, no tenderness of the remaining hand, wrist, or elbow on either side.  She notes today, no neurologic symptoms in the hands. Symptoms are worsening over time.  She has previously had a right Carpal Tunnel Release by Dr. Garcia in 1/2024 with symptom relief.     I have today reviewed with Kerry Villela the clinically relevant, past medical history, medications, allergies,  family history, social history, and Review Of Systems & I have documented any details relevant to today's presenting complaints in my history above.  Ms. Kerry Villela's self-reported past medical history, medications, allergies,  family history, social history, and Review Of Systems have been scanned into the chart under the \"Media\" tab.    Physical Exam:  Ms. Kerry Villela's most recent vitals:  Vitals  Respirations: 16  Height: 160 cm (5' 3\")  Weight - Scale: (!) 153.8 kg (339 lb)    She is well nourished, oriented to person, place & time.  She demonstrates appropriate mood and affect as well as normal gait and station.    Skin: Normal in appearance, Normal Color, and Free of Lesions Bilateral   Finger range of motion is without significant limitation bilaterally.  Thumb range of motion is not decreased in all spheres at the basilar joint bilaterally   Wrist range of motion is without significant limitation bilaterally  There is no evidence of gross joint instability bilaterally.  Sensation is

## 2025-06-20 NOTE — TELEPHONE ENCOUNTER
Made addendum in letter per patients request to state every 30 minutes for bathroom breaks if needed. Sent to email provided by patients.

## 2025-06-20 NOTE — TELEPHONE ENCOUNTER
Pt received note yesterday for employer excusing her to use the bathroom frequently due to OAB, pts employer is requesting that we addend the note to give a timeframe on how frequently she may need to go to the bathroom, pt would like for it to say every 30 minutes, as this is her baseline after botox wears off.     Please send to pts email '[email or web address]'

## 2025-06-24 ENCOUNTER — TELEPHONE (OUTPATIENT)
Dept: UROGYNECOLOGY | Age: 45
End: 2025-06-24

## 2025-06-24 ENCOUNTER — APPOINTMENT (OUTPATIENT)
Dept: PHYSICAL THERAPY | Age: 45
End: 2025-06-24
Attending: ORTHOPAEDIC SURGERY
Payer: COMMERCIAL

## 2025-06-26 ENCOUNTER — HOSPITAL ENCOUNTER (OUTPATIENT)
Dept: PHYSICAL THERAPY | Age: 45
Setting detail: THERAPIES SERIES
End: 2025-06-26
Attending: ORTHOPAEDIC SURGERY
Payer: COMMERCIAL

## 2025-06-26 ENCOUNTER — APPOINTMENT (OUTPATIENT)
Dept: PHYSICAL THERAPY | Age: 45
End: 2025-06-26
Attending: ORTHOPAEDIC SURGERY
Payer: COMMERCIAL

## 2025-06-26 ENCOUNTER — TELEPHONE (OUTPATIENT)
Dept: PRIMARY CARE CLINIC | Age: 45
End: 2025-06-26

## 2025-06-26 NOTE — TELEPHONE ENCOUNTER
----- Message from Nitza MAYO sent at 6/26/2025 12:50 PM EDT -----  Regarding: [facility] Appointment Request  [facility] Appointment Request    Patient needs appointment for [facility] Appointment Type: Pre-Op Visit.    Patient Requested Dates(s): JULY 16, 2025  Patient Requested Time: 11:30 AM  Provider Name: Zahira Dominguez     Reason for Appointment Request: Established Patient - Available appointments did not meet patient need  The patient is requesting that the appointment on July 16, 2025 at 11:30 am for a follow up would be change to a pre-op visit. The surgery would be July 17,2025 for the bladder  --------------------------------------------------------------------------------------------------------------------------    Relationship to Patient: Self     Call Back Information: Do not leave any message, patient will call back for answer  Preferred Call Back Number: Phone [phone]

## 2025-06-30 ENCOUNTER — HOSPITAL ENCOUNTER (OUTPATIENT)
Dept: PHYSICAL THERAPY | Age: 45
Setting detail: THERAPIES SERIES
End: 2025-06-30
Attending: ORTHOPAEDIC SURGERY
Payer: COMMERCIAL

## 2025-06-30 NOTE — PROGRESS NOTES
[facility] PRE-OPERATIVE INSTRUCTIONS    Day of Procedure:   7/14/25       Arrival time: 0600   Surgery time:0730    Take the following medications with a sip of water:  Follow your MD/Surgeons pre-procedure instructions regarding your medications     Do not eat or drink anything after 12:00 midnight prior to your surgery.  This includes water, chewing gum, mints and ice chips.   You may brush your teeth and gargle the morning of your surgery, but do not swallow the water.     Please see your family doctor/pediatrician for a history and physical and/or concerning medications.   Bring any test results/reports from your physicians office.   If you are under the care of a heart doctor or specialist doctor, please be aware that you may be asked to see them for clearance.    You may be asked to stop blood thinners such as Coumadin, Plavix, Fragmin, Lovenox, etc., or any anti-inflammatories such as:  Aspirin, Ibuprofen, Advil, Naproxen prior to your surgery.    We also ask that you stop any over the counter medications such as fish oil, vitamin E, glucosamine, garlic, Multivitamins, COQ 10, etc.    We ask that you do not smoke 24 hours prior to surgery.  We ask that you do not  drink any alcoholic beverages 24 hours prior to surgery     You must make arrangements for a responsible adult to take you home after your surgery.    For your safety, you will not be allowed to leave alone or drive yourself home.  Your surgery will be cancelled if you do not have a ride home.     Also for your safety, you must have someone stay with you the first 24 hours after your surgery.     A parent or legal guardian must accompany a child scheduled for surgery and plan to stay at the hospital until the child is discharged.    Please do not bring other children with you.    For your comfort, please wear simple loose fitting clothing to the hospital.  Please do not bring valuables.    Do not wear any make-up on face or nail polish

## 2025-07-01 ENCOUNTER — APPOINTMENT (OUTPATIENT)
Dept: PHYSICAL THERAPY | Age: 45
End: 2025-07-01
Attending: ORTHOPAEDIC SURGERY
Payer: COMMERCIAL

## 2025-07-02 ENCOUNTER — HOSPITAL ENCOUNTER (OUTPATIENT)
Dept: PHYSICAL THERAPY | Age: 45
Setting detail: THERAPIES SERIES
Discharge: HOME | End: 2025-07-02
Attending: ORTHOPAEDIC SURGERY
Payer: COMMERCIAL

## 2025-07-02 PROCEDURE — 97110 THERAPEUTIC EXERCISES: CPT

## 2025-07-02 PROCEDURE — 97140 MANUAL THERAPY 1/> REGIONS: CPT

## 2025-07-02 NOTE — PLAN OF CARE
[facility] - Outpatient Rehabilitation and Therapy: [address], [place] [address] office: [phone] fax: [phone]       Physical Therapy Re-Certification Plan of Care/MD UPDATE      Dear Giuliano Castellanos PA,    We had the pleasure of treating the following patient for physical therapy services at [facility].  A summary of our findings can be found in the updated assessment below.  This includes our plan of care.  If you have any questions or concerns regarding these findings, please do not hesitate to contact me at the office phone number checked above.  Thank you for the referral.     Physician Signature:________________________________Date:__________________    Total Visits to Date: 4  Overall Response to Treatment:   [x]Patient is responding well to treatment and improvement is noted with regards  to goals   []Patient should continue to improve in reasonable time if they continue HEP   []Patient has plateaued and is no longer responding to skilled PT intervention    []Patient is getting worse and would benefit from return to referring MD   []Patient unable to adhere to initial POC   []Other:       Recommendation:    [x]Continue PT 1-2x / wk for 6 weeks.    []Hold PT, pending MD visit             Physical Therapy: TREATMENT/PROGRESS NOTE   Patient: Kerry Villela (44 y.o. female)   Examination Date: 2025   :  1980 MRN: [id number]   Visit #:  (9 already used)  Insurance Allowable Auth Needed   [employer]  Auth after 30 []Yes    []No    Insurance: Payor: [employer] / Plan: [employer] [place] MEDICAID / Product Type: *No Product type* /   Insurance ID: [id number] - (Medicaid Managed)  Secondary Insurance (if applicable):    Treatment Diagnosis:     ICD-10-CM    1. Decreased functional mobility  R26.89       2. Decreased activity tolerance  R68.89       3. Pain aggravated by standing  R52       4. Functional weakness  R53.1

## 2025-07-03 ENCOUNTER — TELEPHONE (OUTPATIENT)
Dept: UROGYNECOLOGY | Age: 45
End: 2025-07-03

## 2025-07-03 ENCOUNTER — APPOINTMENT (OUTPATIENT)
Dept: PHYSICAL THERAPY | Age: 45
End: 2025-07-03
Attending: ORTHOPAEDIC SURGERY
Payer: COMMERCIAL

## 2025-07-03 NOTE — TELEPHONE ENCOUNTER
Attempted to reach patient regarding pre op instructions, unable to leave VM - mailbox not set up yet.

## 2025-07-07 RX ORDER — NITROFURANTOIN 25; 75 MG/1; MG/1
CAPSULE ORAL
Qty: 14 CAPSULE | Refills: 0 | Status: SHIPPED | OUTPATIENT
Start: 2025-07-07

## 2025-07-08 ENCOUNTER — HOSPITAL ENCOUNTER (OUTPATIENT)
Dept: PHYSICAL THERAPY | Age: 45
Setting detail: THERAPIES SERIES
End: 2025-07-08
Attending: ORTHOPAEDIC SURGERY
Payer: COMMERCIAL

## 2025-07-10 ENCOUNTER — APPOINTMENT (OUTPATIENT)
Dept: PHYSICAL THERAPY | Age: 45
End: 2025-07-10
Attending: ORTHOPAEDIC SURGERY
Payer: COMMERCIAL

## 2025-07-11 ENCOUNTER — ANESTHESIA EVENT (OUTPATIENT)
Dept: OPERATING ROOM | Age: 45
End: 2025-07-11
Payer: COMMERCIAL

## 2025-07-11 ENCOUNTER — TELEPHONE (OUTPATIENT)
Dept: ORTHOPEDIC SURGERY | Age: 45
End: 2025-07-11

## 2025-07-11 NOTE — TELEPHONE ENCOUNTER
I called the patient, no answer, no voicemail set up.    The patient cannot increase her Meloxicam as she is already at the highest dose.     More information is needed regarding a work note as Dr. Houston has not taken the patient off work or provided restrictions.

## 2025-07-11 NOTE — TELEPHONE ENCOUNTER
General Question     Subject: NEEDS NEW WORK NOTE SENT  Patient and /or Facility Request: Kerry Villela \"Sameer\"   Contact Number: [phone]

## 2025-07-14 ENCOUNTER — ANESTHESIA (OUTPATIENT)
Dept: OPERATING ROOM | Age: 45
End: 2025-07-14
Payer: COMMERCIAL

## 2025-07-14 ENCOUNTER — HOSPITAL ENCOUNTER (OUTPATIENT)
Age: 45
Setting detail: OUTPATIENT SURGERY
Discharge: HOME | End: 2025-07-14
Attending: OBSTETRICS & GYNECOLOGY | Admitting: OBSTETRICS & GYNECOLOGY
Payer: COMMERCIAL

## 2025-07-14 VITALS
TEMPERATURE: 97.1 F | RESPIRATION RATE: 16 BRPM | DIASTOLIC BLOOD PRESSURE: 76 MMHG | BODY MASS INDEX: 51.91 KG/M2 | WEIGHT: 293 LBS | HEART RATE: 80 BPM | HEIGHT: 63 IN | SYSTOLIC BLOOD PRESSURE: 124 MMHG | OXYGEN SATURATION: 98 %

## 2025-07-14 LAB
GLUCOSE BLD-MCNC: 107 MG/DL (ref 70–99)
GLUCOSE BLD-MCNC: 129 MG/DL (ref 70–99)
PERFORMED ON: ABNORMAL
PERFORMED ON: ABNORMAL

## 2025-07-14 PROCEDURE — 2580000003 HC RX 258: Performed by: ANESTHESIOLOGY

## 2025-07-14 PROCEDURE — 3600000013 HC SURGERY LEVEL 3 ADDTL 15MIN: Performed by: OBSTETRICS & GYNECOLOGY

## 2025-07-14 PROCEDURE — 2500000003 HC RX 250 WO HCPCS: Performed by: OBSTETRICS & GYNECOLOGY

## 2025-07-14 PROCEDURE — 3700000000 HC ANESTHESIA ATTENDED CARE: Performed by: OBSTETRICS & GYNECOLOGY

## 2025-07-14 PROCEDURE — 7100000000 HC PACU RECOVERY - FIRST 15 MIN: Performed by: OBSTETRICS & GYNECOLOGY

## 2025-07-14 PROCEDURE — 2709999900 HC NON-CHARGEABLE SUPPLY: Performed by: OBSTETRICS & GYNECOLOGY

## 2025-07-14 PROCEDURE — 7100000011 HC PHASE II RECOVERY - ADDTL 15 MIN: Performed by: OBSTETRICS & GYNECOLOGY

## 2025-07-14 PROCEDURE — 7100000001 HC PACU RECOVERY - ADDTL 15 MIN: Performed by: OBSTETRICS & GYNECOLOGY

## 2025-07-14 PROCEDURE — 52287 CYSTOSCOPY CHEMODENERVATION: CPT | Performed by: OBSTETRICS & GYNECOLOGY

## 2025-07-14 PROCEDURE — 6360000002 HC RX W HCPCS: Performed by: OBSTETRICS & GYNECOLOGY

## 2025-07-14 PROCEDURE — 3600000003 HC SURGERY LEVEL 3 BASE: Performed by: OBSTETRICS & GYNECOLOGY

## 2025-07-14 PROCEDURE — 3700000001 HC ADD 15 MINUTES (ANESTHESIA): Performed by: OBSTETRICS & GYNECOLOGY

## 2025-07-14 PROCEDURE — 6360000002 HC RX W HCPCS

## 2025-07-14 PROCEDURE — 7100000010 HC PHASE II RECOVERY - FIRST 15 MIN: Performed by: OBSTETRICS & GYNECOLOGY

## 2025-07-14 RX ORDER — FENTANYL CITRATE 50 UG/ML
INJECTION, SOLUTION INTRAMUSCULAR; INTRAVENOUS
Status: DISCONTINUED | OUTPATIENT
Start: 2025-07-14 | End: 2025-07-14 | Stop reason: SDUPTHER

## 2025-07-14 RX ORDER — SODIUM CHLORIDE, SODIUM LACTATE, POTASSIUM CHLORIDE, CALCIUM CHLORIDE 600; 310; 30; 20 MG/100ML; MG/100ML; MG/100ML; MG/100ML
INJECTION, SOLUTION INTRAVENOUS CONTINUOUS
Status: DISCONTINUED | OUTPATIENT
Start: 2025-07-14 | End: 2025-07-14 | Stop reason: HOSPADM

## 2025-07-14 RX ORDER — SODIUM CHLORIDE 0.9 % (FLUSH) 0.9 %
5-40 SYRINGE (ML) INJECTION EVERY 12 HOURS SCHEDULED
Status: DISCONTINUED | OUTPATIENT
Start: 2025-07-14 | End: 2025-07-14 | Stop reason: HOSPADM

## 2025-07-14 RX ORDER — SODIUM CHLORIDE 0.9 % (FLUSH) 0.9 %
5-40 SYRINGE (ML) INJECTION PRN
Status: DISCONTINUED | OUTPATIENT
Start: 2025-07-14 | End: 2025-07-14 | Stop reason: HOSPADM

## 2025-07-14 RX ORDER — LORAZEPAM 2 MG/ML
0.5 INJECTION INTRAMUSCULAR
Status: DISCONTINUED | OUTPATIENT
Start: 2025-07-14 | End: 2025-07-14 | Stop reason: HOSPADM

## 2025-07-14 RX ORDER — MIDAZOLAM HYDROCHLORIDE 1 MG/ML
INJECTION, SOLUTION INTRAMUSCULAR; INTRAVENOUS
Status: DISCONTINUED | OUTPATIENT
Start: 2025-07-14 | End: 2025-07-14 | Stop reason: SDUPTHER

## 2025-07-14 RX ORDER — HALOPERIDOL 5 MG/ML
1 INJECTION INTRAMUSCULAR
Status: DISCONTINUED | OUTPATIENT
Start: 2025-07-14 | End: 2025-07-14 | Stop reason: HOSPADM

## 2025-07-14 RX ORDER — PHENAZOPYRIDINE HYDROCHLORIDE 100 MG/1
100 TABLET, FILM COATED ORAL 3 TIMES DAILY PRN
Qty: 15 TABLET | Refills: 0 | Status: SHIPPED | OUTPATIENT
Start: 2025-07-14 | End: 2026-07-14

## 2025-07-14 RX ORDER — FENTANYL CITRATE 50 UG/ML
50 INJECTION, SOLUTION INTRAMUSCULAR; INTRAVENOUS EVERY 5 MIN PRN
Status: DISCONTINUED | OUTPATIENT
Start: 2025-07-14 | End: 2025-07-14 | Stop reason: HOSPADM

## 2025-07-14 RX ORDER — SODIUM CHLORIDE 9 MG/ML
INJECTION, SOLUTION INTRAVENOUS PRN
Status: DISCONTINUED | OUTPATIENT
Start: 2025-07-14 | End: 2025-07-14 | Stop reason: SDUPTHER

## 2025-07-14 RX ORDER — LIDOCAINE HYDROCHLORIDE 20 MG/ML
INJECTION, SOLUTION EPIDURAL; INFILTRATION; INTRACAUDAL; PERINEURAL
Status: DISCONTINUED | OUTPATIENT
Start: 2025-07-14 | End: 2025-07-14 | Stop reason: SDUPTHER

## 2025-07-14 RX ORDER — MAGNESIUM HYDROXIDE 1200 MG/15ML
LIQUID ORAL CONTINUOUS PRN
Status: COMPLETED | OUTPATIENT
Start: 2025-07-14 | End: 2025-07-14

## 2025-07-14 RX ORDER — ONDANSETRON 2 MG/ML
4 INJECTION INTRAMUSCULAR; INTRAVENOUS
Status: DISCONTINUED | OUTPATIENT
Start: 2025-07-14 | End: 2025-07-14 | Stop reason: HOSPADM

## 2025-07-14 RX ORDER — SODIUM CHLORIDE 9 MG/ML
INJECTION, SOLUTION INTRAVENOUS PRN
Status: DISCONTINUED | OUTPATIENT
Start: 2025-07-14 | End: 2025-07-14 | Stop reason: HOSPADM

## 2025-07-14 RX ORDER — MEPERIDINE HYDROCHLORIDE 50 MG/ML
12.5 INJECTION INTRAMUSCULAR; INTRAVENOUS; SUBCUTANEOUS
Status: DISCONTINUED | OUTPATIENT
Start: 2025-07-14 | End: 2025-07-14 | Stop reason: HOSPADM

## 2025-07-14 RX ORDER — PROPOFOL 10 MG/ML
INJECTION, EMULSION INTRAVENOUS
Status: DISCONTINUED | OUTPATIENT
Start: 2025-07-14 | End: 2025-07-14 | Stop reason: SDUPTHER

## 2025-07-14 RX ORDER — DIPHENHYDRAMINE HYDROCHLORIDE 50 MG/ML
12.5 INJECTION, SOLUTION INTRAMUSCULAR; INTRAVENOUS
Status: DISCONTINUED | OUTPATIENT
Start: 2025-07-14 | End: 2025-07-14 | Stop reason: HOSPADM

## 2025-07-14 RX ORDER — ONDANSETRON 4 MG/1
4 TABLET, ORALLY DISINTEGRATING ORAL ONCE
Status: DISCONTINUED | OUTPATIENT
Start: 2025-07-14 | End: 2025-07-14 | Stop reason: HOSPADM

## 2025-07-14 RX ADMIN — FENTANYL CITRATE 50 MCG: 50 INJECTION INTRAMUSCULAR; INTRAVENOUS at 07:29

## 2025-07-14 RX ADMIN — PROPOFOL 50 MG: 10 INJECTION, EMULSION INTRAVENOUS at 07:37

## 2025-07-14 RX ADMIN — LIDOCAINE HYDROCHLORIDE 100 MG: 20 INJECTION, SOLUTION EPIDURAL; INFILTRATION; INTRACAUDAL; PERINEURAL at 07:37

## 2025-07-14 RX ADMIN — MIDAZOLAM 2 MG: 1 INJECTION INTRAMUSCULAR; INTRAVENOUS at 07:29

## 2025-07-14 RX ADMIN — PROPOFOL 150 MCG/KG/MIN: 10 INJECTION, EMULSION INTRAVENOUS at 07:38

## 2025-07-14 RX ADMIN — FENTANYL CITRATE 25 MCG: 50 INJECTION INTRAMUSCULAR; INTRAVENOUS at 08:00

## 2025-07-14 RX ADMIN — FENTANYL CITRATE 25 MCG: 50 INJECTION INTRAMUSCULAR; INTRAVENOUS at 07:41

## 2025-07-14 RX ADMIN — SODIUM CHLORIDE: 9 INJECTION, SOLUTION INTRAVENOUS at 07:29

## 2025-07-14 ASSESSMENT — PAIN - FUNCTIONAL ASSESSMENT
PAIN_FUNCTIONAL_ASSESSMENT: ADULT NONVERBAL PAIN SCALE (NPVS)
PAIN_FUNCTIONAL_ASSESSMENT: 0-10

## 2025-07-14 ASSESSMENT — ENCOUNTER SYMPTOMS: SHORTNESS OF BREATH: 0

## 2025-07-14 ASSESSMENT — PAIN SCALES - GENERAL: PAINLEVEL_OUTOF10: 0

## 2025-07-14 ASSESSMENT — LIFESTYLE VARIABLES: SMOKING_STATUS: 0

## 2025-07-14 NOTE — PROGRESS NOTES
CLINICAL PHARMACY NOTE: MEDS TO BEDS    Total # of Prescriptions Filled: 1   The following medications were delivered to the patient:  Current Discharge Medication List        START taking these medications    Details   phenazopyridine (PYRIDIUM) 100 MG tablet Take 1 tablet by mouth 3 times daily as needed for Pain  Qty: 15 tablet, Refills: 0               Additional Documentation: Went over instructions with patient and handed med off to her as she was leaving

## 2025-07-14 NOTE — ANESTHESIA PRE PROCEDURE
Department of Anesthesiology  Preprocedure Note       Name:  Kerry Villela   Age:  44 y.o.  :  1980                                          MRN:  [id number]         Date:  2025      Surgeon: Surgeon(s):  Giuliano Krishnan MD    Procedure: Procedure(s):  BOTOX INJECTIONS IN THE BLADDER 100 UNITS  CYSTOSCOPY    Medications prior to admission:   Prior to Admission medications    Medication Sig Start Date End Date Taking? Authorizing Provider   nitrofurantoin, macrocrystal-monohydrate, (MACROBID) 100 MG capsule Take 1 tablet by mouth twice daily for 7 days beginning 3 days prior to procedure 25  Yes Giuliano Krishnan MD   meloxicam (MOBIC) 15 MG tablet Take 1 tablet by mouth daily as needed for Pain 6/10/25  Yes Trevon Houston MD   tiZANidine (ZANAFLEX) 4 MG tablet Take 1 tablet by mouth 2 times daily as needed 5/15/25  Yes Chelsy Gomez MD   Cranberry 500 MG CAPS Take 1 capsule by mouth 2 times daily   Yes Chelsy Gomez MD   clindamycin 1 % gel Apply topically 2 times daily 4/15/25  Yes Chelsy Gomez MD   fluocinonide (LIDEX) 0.05 % cream as needed (applies to hands) Patient states applies a couple times per day   Yes Chelsy Gomez MD   hydrocortisone 2.5 % ointment Applies to face and hands couple times per day 3/17/25  Yes Chelsy Gomez MD   docusate sodium (COLACE) 100 MG capsule Take 2 capsules by mouth 2 times daily 4/16/25 7/15/25 Yes Zahira Dominguez DO   fluticasone (FLONASE) 50 MCG/ACT nasal spray 2 sprays by Nasal route daily 25  Yes Zahira Dominguez DO   montelukast (SINGULAIR) 10 MG tablet Take 1 tablet by mouth nightly 25  Yes Zahira Dominguez DO   torsemide (DEMADEX) 5 MG tablet Take 1 tablet by mouth as needed (swelling) 25  Yes Zahira Dominguez DO   fexofenadine (ALLEGRA) 180 MG tablet Take 1 tablet by mouth daily 25  Yes Zahira Dominguez DO   DULoxetine (CYMBALTA) 60 MG extended release capsule Take 1

## 2025-07-14 NOTE — PROGRESS NOTES
Pt complaining of R sided intermittent chest pain that she describes as sharp. Pt states pain occurs whenever she takes a deep breath. This was noted to begin upon using the incentive spirometer with pt. Pt states she has experienced this type of pain before after she had this same procedure done. Anesthesia MD notified; would like this RN to continue to monitor for now.

## 2025-07-14 NOTE — PROGRESS NOTES
Patient admitted to PACU # 8 from OR at 0802 post cystoscopy botox injections in the bladder 100 Units per Dr. Krishnan.  Attached to PACU monitoring system and report received from anesthesia provider.  Patient was reported to be hemodynamically stable during procedure.  Patient drowsy on admission and denieds

## 2025-07-14 NOTE — ANESTHESIA POSTPROCEDURE EVALUATION
Department of Anesthesiology  Postprocedure Note    Patient: Kerry Villela  MRN: [id number]  YOB: 1980  Date of evaluation: 7/14/2025    Procedure Summary       Date: 07/14/25 Room / Location: [address]    Anesthesia Start: 0729 Anesthesia Stop: 0758    Procedures:       BOTOX INJECTIONS IN THE BLADDER 100 UNITS (Bladder)      CYSTOSCOPY (Bladder) Diagnosis:       OAB (overactive bladder)      (OAB (overactive bladder) [N32.81])    Surgeons: Giuliano Krishnan MD Responsible Provider: Lincoln Salvador MD    Anesthesia Type: MAC ASA Status: 3            Anesthesia Type: MAC    Yessenia Phase I: Yessenia Score: 10    Yessenia Phase II: Yessenia Score: 10    Anesthesia Post Evaluation    Patient location during evaluation: bedside  Patient participation: complete - patient participated  Level of consciousness: awake and alert  Pain score: 0  Nausea & Vomiting: no nausea  Cardiovascular status: hemodynamically stable  Respiratory status: acceptable  Hydration status: stable  Pain management: adequate    No notable events documented.

## 2025-07-14 NOTE — PROGRESS NOTES
PACU Transfer to [facility]    Vitals:    07/14/25 0835   BP:    Pulse: 82   Resp: 17   Temp:    SpO2: 97%         Intake/Output Summary (Last 24 hours) at 7/14/2025 0839  Last data filed at 7/14/2025 0747  Gross per 24 hour   Intake 300 ml   Output --   Net 300 ml       Pain assessment:  none. Pt reports improvement in intermittent chest pain.   Pain Level: 0    Patient transferred to care of [facility] RN.    7/14/2025 8:39 AM

## 2025-07-14 NOTE — H&P
Date of Surgery Update:  Kerry Villela was seen, history and physical examination reviewed, and patient examined by me today. There have been no significant clinical changes since the completion of the previous history and physical. The surgical site was confirmed by the patient and me.     I have presented reasonable alternatives to the patient's proposed care, treatment, and services. The discussion I have done encompassed risks, benefits, and side effects related to the alternatives and the risks related to not receiving the proposed care, treatment, and services.     All questions answered. Patient wishes to proceed.     Electronically signed by: NAYELI CROSS MD,7/14/2025,7:24 AM

## 2025-07-15 ENCOUNTER — APPOINTMENT (OUTPATIENT)
Dept: PHYSICAL THERAPY | Age: 45
End: 2025-07-15
Attending: ORTHOPAEDIC SURGERY
Payer: COMMERCIAL

## 2025-07-18 ENCOUNTER — TELEMEDICINE (OUTPATIENT)
Dept: PRIMARY CARE CLINIC | Age: 45
End: 2025-07-18
Payer: COMMERCIAL

## 2025-07-18 DIAGNOSIS — L68.0 HIRSUTISM: Primary | ICD-10-CM

## 2025-07-18 DIAGNOSIS — M15.0 PRIMARY OSTEOARTHRITIS INVOLVING MULTIPLE JOINTS: Chronic | ICD-10-CM

## 2025-07-18 DIAGNOSIS — M79.7 FIBROMYALGIA: ICD-10-CM

## 2025-07-18 DIAGNOSIS — I10 PRIMARY HYPERTENSION: Chronic | ICD-10-CM

## 2025-07-18 PROCEDURE — 99214 OFFICE O/P EST MOD 30 MIN: CPT | Performed by: FAMILY MEDICINE

## 2025-07-18 RX ORDER — SPIRONOLACTONE 25 MG/1
50 TABLET ORAL DAILY
Qty: 180 TABLET | Refills: 2 | Status: SHIPPED | OUTPATIENT
Start: 2025-07-18 | End: 2026-04-14
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
## 2025-07-22 ENCOUNTER — APPOINTMENT (OUTPATIENT)
Dept: PHYSICAL THERAPY | Age: 45
End: 2025-07-22
Attending: ORTHOPAEDIC SURGERY
Payer: COMMERCIAL

## 2025-07-24 ENCOUNTER — APPOINTMENT (OUTPATIENT)
Dept: PHYSICAL THERAPY | Age: 45
End: 2025-07-24
Attending: ORTHOPAEDIC SURGERY
Payer: COMMERCIAL

## 2025-07-30 ENCOUNTER — OFFICE VISIT (OUTPATIENT)
Dept: UROGYNECOLOGY | Age: 45
End: 2025-07-30
Payer: COMMERCIAL

## 2025-07-30 ENCOUNTER — TELEPHONE (OUTPATIENT)
Dept: UROGYNECOLOGY | Age: 45
End: 2025-07-30

## 2025-07-30 VITALS
TEMPERATURE: 97.2 F | SYSTOLIC BLOOD PRESSURE: 133 MMHG | RESPIRATION RATE: 16 BRPM | HEART RATE: 78 BPM | DIASTOLIC BLOOD PRESSURE: 74 MMHG | OXYGEN SATURATION: 99 %

## 2025-07-30 DIAGNOSIS — N32.81 OAB (OVERACTIVE BLADDER): ICD-10-CM

## 2025-07-30 DIAGNOSIS — R35.0 URINARY FREQUENCY: Primary | ICD-10-CM

## 2025-07-30 DIAGNOSIS — R39.15 URINARY URGENCY: ICD-10-CM

## 2025-07-30 PROCEDURE — 81002 URINALYSIS NONAUTO W/O SCOPE: CPT | Performed by: NURSE PRACTITIONER

## 2025-07-30 PROCEDURE — 99212 OFFICE O/P EST SF 10 MIN: CPT | Performed by: NURSE PRACTITIONER

## 2025-07-30 PROCEDURE — G8427 DOCREV CUR MEDS BY ELIG CLIN: HCPCS | Performed by: NURSE PRACTITIONER

## 2025-07-30 PROCEDURE — 3078F DIAST BP <80 MM HG: CPT | Performed by: NURSE PRACTITIONER

## 2025-07-30 PROCEDURE — 3075F SYST BP GE 130 - 139MM HG: CPT | Performed by: NURSE PRACTITIONER

## 2025-07-30 PROCEDURE — 1036F TOBACCO NON-USER: CPT | Performed by: NURSE PRACTITIONER

## 2025-07-30 PROCEDURE — 51701 INSERT BLADDER CATHETER: CPT | Performed by: NURSE PRACTITIONER

## 2025-07-30 PROCEDURE — G8417 CALC BMI ABV UP PARAM F/U: HCPCS | Performed by: NURSE PRACTITIONER

## 2025-07-30 RX ORDER — DICLOFENAC SODIUM 100 %
POWDER (GRAM) MISCELLANEOUS
COMMUNITY
Start: 2025-07-10

## 2025-07-30 RX ORDER — TRETINOIN 0.25 MG/G
CREAM TOPICAL
COMMUNITY
Start: 2025-04-23

## 2025-07-30 NOTE — PROGRESS NOTES
2025       HPI:     Name: Kerry Villela  YOB: 1980    CC: Patient is a 44 y.o. presenting for evaluation of OAB symptoms.       HPI: How long have you had this problem?  years  Please rate the severity of your problem: moderate  Anything make it better? Patient received 100 Units of Botox in the OR on 25 - per patient she is starting to see improvement over the last 24 hours, was concerned for a UTI at first - has some burning off and on. Was going to the bathroom every hour, now about every 3-4 hours.    Per patient, started feeling symptoms after she completed course of antibiotics for procedure - so took leftover antibiotic at home. Wants to assure no UTI, so culture sent.    Ob/Gyn History:    OB History    Para Term  AB Living   3 3 3   3   SAB IAB Ectopic Molar Multiple Live Births        2      # Outcome Date GA Lbr Maco/2nd Weight Sex Type Anes PTL Lv   3 Term 03   3.289 kg (7 lb 4 oz) F Vag-Spont  N JUSTIN   2 Term 10/04/00   2.991 kg (6 lb 9.5 oz) M Vag-Spont  N JUSTIN      Birth Comments: PRE ECLAMPSIA   1 Term         JUSTIN     Past Medical History:   Past Medical History:   Diagnosis Date    Abscess 2022    Allergic reaction to food 2020    Corn, wheat, soy bean, peanuts and walnuts    Allergic rhinitis     Anemia     Anesthesia complication     last same surgery at [facility] states she had trouble breathing afterwards    Bilateral hand numbness 2020    Bilateral hip pain 06/15/2021    CHF (congestive heart failure) ([facility])     no meds-only saw cardiologist x1 and discharged    chronic gerd     Dermatomyositis ([facility])     Eczema     Fibromyalgia     Headache(784.0)     History of blood transfusion 2009    also two in 2014     HTN (hypertension)     Hx of blood clots 2014    PE and DVT    Hyperlipidemia     Irritable bowel syndrome     Lymphedema     Morbid obesity with BMI of 50.0-59.9, adult ([facility])     obstructive sleep apnea     pt

## 2025-07-30 NOTE — TELEPHONE ENCOUNTER
Spoke with patient regarding her request to increase the frequency of her botox injections. I addressed that her PVR today was WNL but her previous PVR check after her botox injections on 2/17/25 was elevated and the timeframe between both procedures was 5 months. I educated her on the concern for elevated PVR and causing urinary retention, therefore I stated that I would address her request with Dr. Giuliano Krishnan and call her back this afternoon with his recommendation. Patient verbalizes understanding to the above and denies any questions or concerns at this time.

## 2025-07-30 NOTE — TELEPHONE ENCOUNTER
Spoke with patient regarding Dr Giuliano Krishnan's recommendation to continue her botox appointments every 5 months instead of increasing them to 4 months d/t her previous elevated PVR. Patient verbalized understanding to the above and denies any questions or concerns at this time.

## 2025-07-30 NOTE — TELEPHONE ENCOUNTER
Pt states that Dr Alvarado (previous provider) had recommended botox every 4 months to help keep her symptoms in check, pt is wondering if Dr Krishnan would be open to this schedule.

## 2025-08-02 LAB — BACTERIA UR CULT: NORMAL

## 2025-08-11 DIAGNOSIS — E78.5 HYPERLIPIDEMIA, UNSPECIFIED HYPERLIPIDEMIA TYPE: Chronic | ICD-10-CM

## 2025-08-11 DIAGNOSIS — K21.9 GASTROESOPHAGEAL REFLUX DISEASE WITHOUT ESOPHAGITIS: ICD-10-CM

## 2025-08-11 DIAGNOSIS — I10 PRIMARY HYPERTENSION: Chronic | ICD-10-CM

## 2025-08-13 RX ORDER — LANSOPRAZOLE 30 MG/1
30 CAPSULE, DELAYED RELEASE ORAL DAILY
Qty: 90 CAPSULE | Refills: 0 | Status: SHIPPED | OUTPATIENT
Start: 2025-08-13

## 2025-08-13 RX ORDER — ROSUVASTATIN CALCIUM 5 MG/1
5 TABLET, COATED ORAL DAILY
Qty: 90 TABLET | Refills: 0 | Status: SHIPPED | OUTPATIENT
Start: 2025-08-13

## 2025-08-13 RX ORDER — VALSARTAN 80 MG/1
80 TABLET ORAL DAILY
Qty: 90 TABLET | Refills: 0 | Status: SHIPPED | OUTPATIENT
Start: 2025-08-13

## 2025-09-05 ENCOUNTER — OFFICE VISIT (OUTPATIENT)
Dept: ORTHOPEDIC SURGERY | Age: 45
End: 2025-09-05

## 2025-09-05 VITALS — HEIGHT: 63 IN | BODY MASS INDEX: 61.43 KG/M2

## 2025-09-05 DIAGNOSIS — M17.11 PRIMARY OSTEOARTHRITIS OF RIGHT KNEE: ICD-10-CM

## 2025-09-05 DIAGNOSIS — M17.12 PRIMARY OSTEOARTHRITIS OF LEFT KNEE: Primary | ICD-10-CM

## 2025-09-05 RX ORDER — DICLOFENAC SODIUM 75 MG/1
75 TABLET, DELAYED RELEASE ORAL 2 TIMES DAILY
COMMUNITY
Start: 2025-08-26

## 2025-09-05 RX ORDER — METHYLPREDNISOLONE 4 MG/1
TABLET ORAL
Qty: 1 KIT | Refills: 0 | Status: SHIPPED | OUTPATIENT
Start: 2025-09-05 | End: 2025-09-11

## 2025-09-05 RX ORDER — DULOXETIN HYDROCHLORIDE 30 MG/1
30 CAPSULE, DELAYED RELEASE ORAL DAILY
COMMUNITY
Start: 2025-08-01

## (undated) DEVICE — TUBING, SUCTION, 1/4" X 10', STRAIGHT: Brand: MEDLINE

## (undated) DEVICE — GAUZE,PACKING STRIP,IODOFORM,1"X5YD,STRL: Brand: CURAD

## (undated) DEVICE — SOLUTION IRRIGATION STRL H2O 1000 ML UROMATIC CONTAINER

## (undated) DEVICE — NEEDLE SPNL L3.5IN PNK HUB S STL REG WALL FIT STYL W/ QNCKE

## (undated) DEVICE — BLADE CLIPPER GEN PURP NS

## (undated) DEVICE — TRANSFER SET 3": Brand: MEDLINE INDUSTRIES, INC.

## (undated) DEVICE — ELECTRODE PT RET AD L9FT HI MOIST COND ADH HYDRGEL CORDED

## (undated) DEVICE — MERCY HEALTH WEST TURNOVER: Brand: MEDLINE INDUSTRIES, INC.

## (undated) DEVICE — CATHETER URETH 14FR L16IN RED RUB INTMIT ROB MOD BARDX

## (undated) DEVICE — SOLUTION IRRIG 1000ML 0.9% SOD CHL USP POUR PLAS BTL

## (undated) DEVICE — MASK O2 ADULT POM PROCEDURAL OXYGEN MASK 7FT O2 TUBING 10FT

## (undated) DEVICE — Device: Brand: INJETAK ADJUSTABLE TIP NEEDLE 70CM

## (undated) DEVICE — SOLUTION IRRIG 3000ML STRL H2O USP UROMATIC PLAS CONT

## (undated) DEVICE — NEEDLE CYSTO FLX 23 GAX70 CM ADJ DEPTH PRECIS INJ INJETAK

## (undated) DEVICE — SUTURE VCRL + SZ 0 L27IN ABSRB WHT CT-2 1/2 CIR TAPERPOINT VCP270H

## (undated) DEVICE — SYRINGE, LUER LOCK, 10ML: Brand: MEDLINE

## (undated) DEVICE — GLOVE ORANGE PI 8   MSG9080

## (undated) DEVICE — SHEET,DRAPE,40X58,STERILE: Brand: MEDLINE

## (undated) DEVICE — WILLIS PACK: Brand: MEDLINE INDUSTRIES, INC.

## (undated) DEVICE — GOWN SIRUS NONREIN XL W/TWL: Brand: MEDLINE INDUSTRIES, INC.

## (undated) DEVICE — CYSTO/BLADDER IRRIGATION SET, REGULATING CLAMP

## (undated) DEVICE — SOLUTION IV IRRIG 250ML ST LF 0.9% SODIUM 2F7122

## (undated) DEVICE — WET SKIN PREP TRAY: Brand: MEDLINE INDUSTRIES, INC.

## (undated) DEVICE — GAUZE,SPONGE,4"X4",8PLY,STRL,LF,10/TRAY: Brand: MEDLINE

## (undated) DEVICE — BLANKET WRM W29.9XL79.1IN UP BODY FORC AIR MISTRAL-AIR

## (undated) DEVICE — SCRUB SURG BDINE FREE 4 OZ TECHNICARE

## (undated) DEVICE — D & C-LF: Brand: MEDLINE INDUSTRIES, INC.

## (undated) DEVICE — SPONGE LAP W18XL18IN WHT COT 4 PLY FLD STRUNG RADPQ DISP ST 2 PER PACK

## (undated) DEVICE — SYRINGE MED 10ML SLIP TIP BLNT FILL AND LUERLOCK DISP

## (undated) DEVICE — SUTURE VCRL + SZ 0 L18IN ABSRB VLT L26MM CT-2 1/2 CIR VCP727D

## (undated) DEVICE — SUTURE PROL SZ 0 L30IN NONABSORBABLE BLU MO-6 L26MM 1/2 CIR 8418H

## (undated) DEVICE — CATHETER URETH 12FR L16IN RED RUB INTMIT ROB MOD BARDX

## (undated) DEVICE — SYRINGE MED 10ML TRNSLUC BRL PLUNG BLK MRK POLYPR CTRL

## (undated) DEVICE — MERCY FAIRFIELD TURNOVER KIT: Brand: MEDLINE INDUSTRIES, INC.

## (undated) DEVICE — GAUZE,SPONGE,4"X4",16PLY,XRAY,STRL,LF: Brand: MEDLINE

## (undated) DEVICE — SOLUTION IRRIG 1000ML STRL H2O USP PLAS POUR BTL

## (undated) DEVICE — SOL IRR SOD CHL 0.9% TITAN XL CNTNR 3000ML

## (undated) DEVICE — SOLUTION IRRIG 1000ML H2O PIC PLAS SHATTERPROOF CONTAINER

## (undated) DEVICE — NEEDLE,22GX1.5",REG,BEVEL: Brand: MEDLINE

## (undated) DEVICE — Device: Brand: MEDEX

## (undated) DEVICE — GLOVE SURG SZ 65 CRM LTX FREE POLYISOPRENE POLYMER BEAD ANTI

## (undated) DEVICE — BASIC SINGLE BASIN 2-LF: Brand: MEDLINE INDUSTRIES, INC.

## (undated) DEVICE — SYRINGE MED 10ML LUERLOCK TIP W/O SFTY DISP

## (undated) DEVICE — BAG DRNGE L6FT 20L PREFIL ABSRB POLYMER EXP TBNG DISP FOR

## (undated) DEVICE — WRAP COHESIVE W2INXL5YD TAN SELF ADH BNDG HND NON STERILE TEAR CARING

## (undated) DEVICE — DRAPE,TOWEL,LARGE,INVISISHIELD: Brand: MEDLINE

## (undated) DEVICE — GAUZE,PACKING STRIP,IODOFORM,2"X5YD,STRL: Brand: CURAD

## (undated) DEVICE — COVER,MAYO STAND,STERILE: Brand: MEDLINE

## (undated) DEVICE — HYPODERMIC SAFETY NEEDLE: Brand: MONOJECT

## (undated) DEVICE — FORCEPS BX L240CM JAW DIA2.8MM L CAP W/ NDL MIC MESH TOOTH

## (undated) DEVICE — CYSTO PACK: Brand: MEDLINE INDUSTRIES, INC.

## (undated) DEVICE — DRAPE SURG L39XW46IN PERI OB

## (undated) DEVICE — ENDOSCOPY KIT: Brand: MEDLINE INDUSTRIES, INC.

## (undated) DEVICE — TOTAL TRAY, DB, 100% SILI FOLEY, 16FR 10: Brand: MEDLINE

## (undated) DEVICE — PENCIL SMK EVAC TELSCP 3 M TBNG

## (undated) DEVICE — CYSTOSCOPY: Brand: MEDLINE INDUSTRIES, INC.

## (undated) DEVICE — BOWL 32OZ-LF: Brand: MEDLINE INDUSTRIES, INC.

## (undated) DEVICE — COVER LT HNDL BLU PLAS